# Patient Record
Sex: FEMALE | Race: WHITE | Employment: OTHER | URBAN - METROPOLITAN AREA
[De-identification: names, ages, dates, MRNs, and addresses within clinical notes are randomized per-mention and may not be internally consistent; named-entity substitution may affect disease eponyms.]

---

## 2017-04-19 ENCOUNTER — OFFICE VISIT (OUTPATIENT)
Dept: CARDIOLOGY CLINIC | Age: 67
End: 2017-04-19

## 2017-04-19 VITALS
OXYGEN SATURATION: 97 % | HEIGHT: 62 IN | RESPIRATION RATE: 18 BRPM | WEIGHT: 157.3 LBS | SYSTOLIC BLOOD PRESSURE: 142 MMHG | BODY MASS INDEX: 28.95 KG/M2 | DIASTOLIC BLOOD PRESSURE: 70 MMHG | HEART RATE: 72 BPM

## 2017-04-19 DIAGNOSIS — I25.10 ATHEROSCLEROSIS OF NATIVE CORONARY ARTERY OF NATIVE HEART WITHOUT ANGINA PECTORIS: ICD-10-CM

## 2017-04-19 DIAGNOSIS — I10 ESSENTIAL HYPERTENSION, BENIGN: Primary | ICD-10-CM

## 2017-04-19 DIAGNOSIS — I35.9 AORTIC VALVE DISORDERS: ICD-10-CM

## 2017-04-19 DIAGNOSIS — I48.0 PAROXYSMAL ATRIAL FIBRILLATION (HCC): ICD-10-CM

## 2017-04-19 NOTE — MR AVS SNAPSHOT
Visit Information Date & Time Provider Department Dept. Phone Encounter #  
 4/19/2017 10:15 AM [address], [place] [facility] [phone] [id number] Upcoming Health Maintenance Date Due Hepatitis C Screening 1950 HEMOGLOBIN A1C Q6M 1950 LIPID PANEL Q1 1950 FOOT EXAM Q1 7/23/1958 MICROALBUMIN Q1 7/23/1958 DTaP/Tdap/Td series (1 - Tdap) 7/23/1969 FOBT Q 1 YEAR AGE 50-75 7/23/1998 ZOSTER VACCINE AGE 60> 7/23/2008 OSTEOPOROSIS SCREENING (DEXA) 7/23/2013 Pneumococcal 65+ Low/Medium Risk (1 of 2 - PCV13) 7/23/2013 MEDICARE YEARLY EXAM 7/23/2013 EYE EXAM RETINAL OR DILATED Q1 6/25/2016 INFLUENZA AGE 9 TO ADULT 8/1/2016 BREAST CANCER SCRN MAMMOGRAM 6/22/2017 GLAUCOMA SCREENING Q2Y 6/25/2017 Allergies as of 4/19/2017  Review Complete On: 4/19/2017 By: Nevaeh Watson LPN Severity Noted Reaction Type Reactions Sulfa Dyne  06/20/2012    Other (comments) Current Immunizations  Never Reviewed No immunizations on file. Not reviewed this visit You Were Diagnosed With   
  
 Codes Comments Essential hypertension, benign    -  Primary ICD-10-CM: I10 
ICD-9-CM: 401.1 Aortic valve disorders     ICD-10-CM: I35.9 ICD-9-CM: 424.1 Paroxysmal atrial fibrillation (HCC)     ICD-10-CM: I48.0 ICD-9-CM: 427.31 Atherosclerosis of native coronary artery of native heart without angina pectoris     ICD-10-CM: I25.10 ICD-9-CM: 414.01 Vitals BP Pulse Resp Height(growth percentile) Weight(growth percentile) SpO2  
 142/70 (BP 1 Location: Left arm) 72 18 5' 2\" (1.575 m) 157 lb 4.8 oz (71.4 kg) 97% BMI Smoking Status 28.77 kg/m2 Never Smoker Vitals History BMI and BSA Data Body Mass Index Body Surface Area 28.77 kg/m 2 1.77 m 2 Preferred Pharmacy Pharmacy Name Phone [facility] [address] [facility] [phone] Your Updated Medication List  
  
   
This list is accurate as of: 4/19/17 11:03 AM.  Always use your most recent med list.  
  
  
  
  
 ACETAMINOPHEN PM PO Take  by mouth. amLODIPine 5 mg tablet Commonly known as:  Janelle Punter Take 1 Tab by mouth daily. amoxicillin 500 mg capsule Commonly known as:  AMOXIL Take 1 Cap by mouth as needed (take as directed one hr prior to dental procedures). aspirin 81 mg tablet Take 81 mg by mouth. atorvastatin 40 mg tablet Commonly known as:  LIPITOR Take 1 Tab by mouth nightly. Biotin 2,500 mcg Cap Take  by mouth.  
  
 calcium 600 mg Cap Take  by mouth.  
  
 clobetasol 0.05 % topical cream  
Commonly known as:  Marlyse Salt Apply  to affected area two (2) times a day. ENBREL SC  
by SubCUTAneous route every seven (7) days. FISH OIL 1,000 mg Cap Generic drug:  omega-3 fatty acids-vitamin e Take 2 Caps by mouth. glipiZIDE 5 mg tablet Commonly known as:  Ludie Null Take 5 mg by mouth daily. hydroCHLOROthiazide 50 mg tablet Commonly known as:  HYDRODIURIL Take 25 mg by mouth daily. lisinopril 20 mg tablet Commonly known as:  PRINIVIL, ZESTRIL  
TAKE 1 TABLET EVERY DAY FOR BLOOD PRESSURE  
  
 metFORMIN 500 mg tablet Commonly known as:  GLUCOPHAGE Take  by mouth two (2) times daily (with meals). metoprolol succinate 100 mg tablet Commonly known as:  TOPROL-XL Take 1 Tab by mouth daily. naproxen sodium 220 mg tablet Commonly known as:  NAPROSYN Take 220 mg by mouth nightly. NITROSTAT 0.4 mg SL tablet Generic drug:  nitroglycerin PLACE 1 TABLET UNDER THE TONGUE EVERY 5 MINUTES FOR 3 DOSES AS NEEDED FOR CHEST PAIN  
  
 ONE DAILY Tab Generic drug:  multivitamins-ca-iron-minerals Take  by mouth. terbinafine HCl 250 mg tablet Commonly known as:  LAMISIL Take 250 mg by mouth daily. VITAMIN C 500 mg tablet Generic drug:  ascorbic acid (vitamin C) Take  by mouth. VITRON-C 65 mg iron- 125 mg Tbec Generic drug:  iron,carbonyl-vitamin C Take  by mouth. We Performed the Following AMB POC EKG ROUTINE W/ 12 LEADS, INTER & REP [93333 CPT(R)] Introducing [facility] SERVICES! [employer] introduces [employer] patient portal. Now you can access parts of your medical record, email your doctor's office, and request medication refills online. 1. In your internet browser, go to [email or web address] 2. Click on the First Time User? Click Here link in the Sign In box. You will see the New Member Sign Up page. 3. Enter your [employer] Access Code exactly as it appears below. You will not need to use this code after youve completed the sign-up process. If you do not sign up before the expiration date, you must request a new code. · [employer] Access Code: [email or web address] Expires: 7/18/2017  9:56 AM 
 
4. Enter the last four digits of your Social Security Number (xxxx) and Date of Birth (mm/dd/yyyy) as indicated and click Submit. You will be taken to the next sign-up page. 5. Create a Ex[employer] ID. This will be your [employer] login ID and cannot be changed, so think of one that is secure and easy to remember. 6. Create a [employer] password. You can change your password at any time. 7. Enter your Password Reset Question and Answer. This can be used at a later time if you forget your password. 8. Enter your e-mail address. You will receive e-mail notification when new information is available in [address] 9. Click Sign Up. You can now view and download portions of your medical record. 10. Click the Download Summary menu link to download a portable copy of your medical information. If you have questions, please visit the Frequently Asked Questions section of the [employer] website.  Remember, [employer] is NOT to be used for urgent needs. For medical emergencies, dial 911. Now available from your iPhone and Android! Please provide this summary of care documentation to your next provider. Your primary care clinician is listed as Caitlyn Kraus. If you have any questions after today's visit, please call [phone].

## 2017-04-19 NOTE — PROGRESS NOTES
NAME:  Weston Del Rosario   :   1950   MRN:   [id number]   PCP:  Ovidio Whitt. Hieu Wilkins NP           Subjective: The patient is a 74y.o. year old female  who returns for a routine follow-up. Since the last visit, patient reports no change in exercise tolerance, chest pain, edema, medication intolerance, palpitations, shortness of breath, PND/orthopnea wheezing, sputum, syncope, dizziness or light headedness. Doing well. Past Medical History:   Diagnosis Date    CAD (coronary artery disease)     DM [facility]diabetes mellitus) ([facility] [address]     Dyslipidemia     Hypertension     Mitral regurgitation and aortic stenosis        Social History   Substance Use Topics    Smoking status: Never Smoker    Smokeless tobacco: Never Used    Alcohol use 0.0 oz/week     0 Standard drinks or equivalent per week      Comment: Rare      No family history on file. Review of Systems  Constitutional: Negative for fever, chills, and diaphoresis. Respiratory: Negative for cough, hemoptysis, sputum production, shortness of breath and wheezing. Cardiovascular: Negative for chest pain, palpitations, orthopnea, claudication, leg swelling and PND. Gastrointestinal: Negative for heartburn, nausea, vomiting, blood in stool and melena. Genitourinary: Negative for dysuria and flank pain. Musculoskeletal: Negative for joint pain and back pain. Skin: Negative for rash. Neurological: Negative for focal weakness, seizures, loss of consciousness, weakness and headaches. Endo/Heme/Allergies: Does not bruise/bleed easily. Psychiatric/Behavioral: Negative for memory loss. The patient does not have insomnia. Objective:       Vitals:    17 0958 17 1012 17 1048   BP: 148/70 150/78 142/70   Pulse: 72     Resp: 18     SpO2: 97%     Weight: 157 lb 4.8 oz (71.4 kg)     Height: 5' 2\" (1.575 m)      Body mass index is 28.77 kg/(m^2). General PE    Gen: NAD     Mental Status - Alert.  General Appearance - Not in acute distress. Neck - no JVD     Chest and Lung Exam     Inspection: Accessory muscles - No use of accessory muscles in breathing. Auscultation:   Breath sounds: - Normal.     Cardiovascular   Inspection: Jugular vein - Bilateral - Inspection Normal.   Palpation/Percussion:   Apical Impulse: - Normal.   Auscultation: Rhythm - Regular. Heart Sounds - S1 WNL and S2 WNL. No S3 or S4. Murmurs & Other Heart Sounds: Auscultation of the heart reveals - No Murmurs. Peripheral Vascular   Upper Extremity: Inspection - Bilateral - No Cyanotic nailbeds or Digital clubbing. Lower Extremity:   Palpation: Edema - Bilateral - No edema. Abdomen: Soft, non-tender, bowel sounds are active. Neuro: A&O times 3, CN and motor grossly WNL      Data Review:     EKG -  Sinus  Rhythm   Possible left ventricular hypertrophy on non-voltage basis. -Nonspecific ST depression      Allergies reviewed  Allergies   Allergen Reactions    Sulfa Dyne Other (comments)       Medications reviewed  Current Outpatient Prescriptions   Medication Sig    lisinopril (PRINIVIL, ZESTRIL) 20 mg tablet TAKE 1 TABLET EVERY DAY FOR BLOOD PRESSURE    amLODIPine (NORVASC) 5 mg tablet Take 1 Tab by mouth daily.  metoprolol succinate (TOPROL-XL) 100 mg tablet Take 1 Tab by mouth daily.  NITROSTAT 0.4 mg SL tablet PLACE 1 TABLET UNDER THE TONGUE EVERY 5 MINUTES FOR 3 DOSES AS NEEDED FOR CHEST PAIN    ACETAMINOPHEN/DIPHENHYDRAMINE (ACETAMINOPHEN PM PO) Take  by mouth.  naproxen sodium (NAPROSYN) 220 mg tablet Take 220 mg by mouth nightly.  iron-vitamin C (VITRON-C) 65 mg iron- 125 mg TbEC Take  by mouth.  Biotin 2,500 mcg cap Take  by mouth.  ETANERCEPT (ENBREL SC) by SubCUTAneous route every seven (7) days.  amoxicillin (AMOXIL) 500 mg capsule Take 1 Cap by mouth as needed (take as directed one hr prior to dental procedures).  atorvastatin (LIPITOR) 40 mg tablet Take 1 Tab by mouth nightly.     clobetasol (TEMOVATE) 0.05 % topical cream Apply  to affected area two (2) times a day.  aspirin 81 mg tablet Take 81 mg by mouth.  glipiZIDE (GLUCOTROL) 5 mg tablet Take 5 mg by mouth daily.  hydrochlorothiazide (HYDRODIURIL) 50 mg tablet Take 25 mg by mouth daily.  metFORMIN (GLUCOPHAGE) 500 mg tablet Take  by mouth two (2) times daily (with meals).  terbinafine (LAMISIL) 250 mg tablet Take 250 mg by mouth daily.  calcium 600 mg Cap Take  by mouth.  multivitamins-ca-iron-minerals (ONE DAILY) Tab Take  by mouth.  ascorbic acid (VITAMIN C) 500 mg tablet Take  by mouth.  omega-3 fatty acids-vitamin e (FISH OIL) 1,000 mg Cap Take 2 Caps by mouth. No current facility-administered medications for this visit. Assessment:       ICD-10-CM ICD-9-CM    1. Essential hypertension, benign I10 401.1 AMB POC EKG ROUTINE W/ 12 LEADS, INTER & REP   2. Aortic valve disorders I35.9 424.1    3. Paroxysmal atrial fibrillation (HCC) I48.0 427.31    4. Atherosclerosis of native coronary artery of native heart without angina pectoris I25.10 414.01         Orders Placed This Encounter    AMB POC EKG ROUTINE W/ 12 LEADS, INTER & REP     Order Specific Question:   Reason for Exam:     Answer:   routine       Patient Active Problem List   Diagnosis Code    Coronary atherosclerosis of native coronary artery I25.10    Mitral valve disorders I05.9    Aortic valve disorders I35.9    DM (diabetes mellitus) ([facility] [address] E11.9    Essential hypertension, benign I10    Dyslipidemia E78.5    History of MVR with cardiopulmonary bypass Z95.2    S/P AVR (aortic valve replacement) Z95.2    Paroxysmal atrial fibrillation ([facility] [address] I48.0       Plan:     Patient presents for follow up, feeling well and stable from cardiac standpoint. 1. ASHD -  s/p CABG, AVR, MVR 1/09. Stable. Last echo with stable prosthetic valve. 2. PAF - remains in sinus rhythm off amiodarone. 3. Hyperlipidemia- on atorvastatin.    Will request labs from PCP. Paul Buchanan, [facility] [address] [facility]    4/19/2017         Agree with note as outlined by  NP. I confirm findings in history and physical exam. No additional findings noted. Agree with plan as outlined above.      Kallie Beard MD

## 2017-06-14 RX ORDER — NITROGLYCERIN 0.4 MG/1
TABLET SUBLINGUAL
Qty: 50 TAB | Refills: 0 | Status: SHIPPED | OUTPATIENT
Start: 2017-06-14 | End: 2018-01-08 | Stop reason: SDUPTHER

## 2017-10-25 ENCOUNTER — OFFICE VISIT (OUTPATIENT)
Dept: CARDIOLOGY CLINIC | Age: 67
End: 2017-10-25

## 2017-10-25 VITALS
SYSTOLIC BLOOD PRESSURE: 150 MMHG | OXYGEN SATURATION: 97 % | BODY MASS INDEX: 27.71 KG/M2 | RESPIRATION RATE: 16 BRPM | HEART RATE: 64 BPM | HEIGHT: 62 IN | WEIGHT: 150.6 LBS | DIASTOLIC BLOOD PRESSURE: 80 MMHG

## 2017-10-25 DIAGNOSIS — I10 ESSENTIAL HYPERTENSION, BENIGN: ICD-10-CM

## 2017-10-25 DIAGNOSIS — R09.89 BRUIT OF RIGHT CAROTID ARTERY: ICD-10-CM

## 2017-10-25 DIAGNOSIS — E78.5 DYSLIPIDEMIA: ICD-10-CM

## 2017-10-25 DIAGNOSIS — I48.0 PAROXYSMAL ATRIAL FIBRILLATION (HCC): Primary | ICD-10-CM

## 2017-10-25 DIAGNOSIS — I25.10 ATHEROSCLEROSIS OF NATIVE CORONARY ARTERY OF NATIVE HEART WITHOUT ANGINA PECTORIS: ICD-10-CM

## 2017-10-25 DIAGNOSIS — Z95.2 S/P AVR (AORTIC VALVE REPLACEMENT): ICD-10-CM

## 2017-10-25 DIAGNOSIS — Z95.2 HISTORY OF MVR WITH CARDIOPULMONARY BYPASS: ICD-10-CM

## 2017-10-25 NOTE — PROGRESS NOTES
Vincent Saunders DNP, ANP-BC  Subjective/HPI:     Eduard Joseph is a 69 y.o. female is here for routine f/u. The patient denies chest pain/ shortness of breath, orthopnea, PND, LE edema, palpitations, syncope, presyncope or fatigue. Patient reports feeling well in her usual state of health, admits to some mild dyspnea on exertion with heavy outdoor physical activity. Reports labs followed by primary care for routine basis. PCP Provider  Shelia Quiles NP  Past Medical History:   Diagnosis Date    CAD (coronary artery disease)     DM [facility]diabetes mellitus) ([facility] [address]     Dyslipidemia     Hypertension     Mitral regurgitation and aortic stenosis       Past Surgical History:   Procedure Laterality Date    HX AORTIC VALVE REPLACEMENT      HX CORONARY ARTERY BYPASS GRAFT      HX MITRAL VALVE REPLACEMENT       Allergies   Allergen Reactions    Sulfa Dyne Other (comments)      History reviewed. No pertinent family history. Current Outpatient Prescriptions   Medication Sig    nitroglycerin (NITROSTAT) 0.4 mg SL tablet DISSOLVE 1 TABLET UNDER THE TONGUE EVERY 5 MINUTES FOR 3 DOSES AS NEEDED FOR CHEST PAIN    lisinopril (PRINIVIL, ZESTRIL) 20 mg tablet TAKE 1 TABLET EVERY DAY FOR BLOOD PRESSURE    amLODIPine (NORVASC) 5 mg tablet Take 1 Tab by mouth daily.  metoprolol succinate (TOPROL-XL) 100 mg tablet Take 1 Tab by mouth daily.  ACETAMINOPHEN/DIPHENHYDRAMINE (ACETAMINOPHEN PM PO) Take  by mouth.  naproxen sodium (NAPROSYN) 220 mg tablet Take 220 mg by mouth nightly.  iron-vitamin C (VITRON-C) 65 mg iron- 125 mg TbEC Take  by mouth.  Biotin 2,500 mcg cap Take  by mouth.  ETANERCEPT (ENBREL SC) by SubCUTAneous route every seven (7) days.  amoxicillin (AMOXIL) 500 mg capsule Take 1 Cap by mouth as needed (take as directed one hr prior to dental procedures).  atorvastatin (LIPITOR) 40 mg tablet Take 1 Tab by mouth nightly.  aspirin 81 mg tablet Take 81 mg by mouth.       glipiZIDE (GLUCOTROL) 5 mg tablet Take 5 mg by mouth daily.  hydrochlorothiazide (HYDRODIURIL) 50 mg tablet Take 25 mg by mouth daily.  metFORMIN (GLUCOPHAGE) 500 mg tablet Take  by mouth two (2) times daily (with meals).  calcium 600 mg Cap Take  by mouth.  multivitamins-ca-iron-minerals (ONE DAILY) Tab Take  by mouth.  ascorbic acid (VITAMIN C) 500 mg tablet Take  by mouth.  omega-3 fatty acids-vitamin e (FISH OIL) 1,000 mg Cap Take 2 Caps by mouth. No current facility-administered medications for this visit. Vitals:    10/25/17 0943 10/25/17 0944   BP: 140/80 150/80   Pulse: 64    Resp: 16    SpO2: 97%    Weight: 150 lb 9.6 oz (68.3 kg)    Height: 5' 2\" (1.575 m)      Social History     Social History    Marital status:      Spouse name: N/A    Number of children: N/A    Years of education: N/A     Occupational History    Not on file. Social History Main Topics    Smoking status: Never Smoker    Smokeless tobacco: Never Used    Alcohol use 0.0 oz/week     0 Standard drinks or equivalent per week      Comment: Rare    Drug use: Not on file    Sexual activity: Not on file     Other Topics Concern    Not on file     Social History Narrative       I have reviewed the nurses notes, vitals, problem list, allergy list, medical history, family, social history and medications. Review of Symptoms:    General: Pt denies excessive weight gain or loss. Pt is able to conduct ADL's  HEENT: Denies blurred vision, headaches, epistaxis and difficulty swallowing. Respiratory: Denies shortness of breath, + SONI, denies wheezing or stridor.   Cardiovascular: Denies precordial pain, palpitations, edema or PND  Gastrointestinal: Denies poor appetite, indigestion, abdominal pain or blood in stool  Musculoskeletal: Denies pain or swelling from muscles or joints  Neurologic: Denies tremor, paresthesias, or sensory motor disturbance  Skin: Denies rash, itching or texture change. Physical Exam:      General: Well developed, in no acute distress, cooperative and alert  HEENT: + Right carotid bruits, no JVD, trach is midline. Neck Supple, PEERL, EOM intact. Heart:  Normal S1/S2 negative S3 or S4. Regular, no murmur, gallop or rub.   Respiratory: Clear bilaterally x 4, no wheezing or rales  Abdomen:   Soft, non-tender, no masses, bowel sounds are active.   Extremities:  No edema, normal cap refill, no cyanosis, atraumatic. Neuro: A&Ox3, speech clear, gait stable. Skin: Skin color is normal. No rashes or lesions. Non diaphoretic  Vascular: 2+ pulses symmetric in all extremities    Cardiographics    ECG: Normal sinus rhythm unchanged from previous tracings. No results found for this or any previous visit. Cardiology Labs:  No results found for: CHOL, CHOLX, CHLST, [address], [address], HDL, LDL, LDLC, DLDLP, TGLX, TRIGL, TRIGP, CHHD, CHHDX    Lab Results   Component Value Date/Time    Sodium 137 02/20/2009 02:52 AM    Potassium 4.2 02/20/2009 02:52 AM    Chloride 98 02/20/2009 02:52 AM    CO2 29 02/20/2009 02:52 AM    Anion gap 10 02/20/2009 02:52 AM    Glucose 103 02/20/2009 02:52 AM    BUN 12 02/20/2009 02:52 AM    Creatinine 0.8 02/20/2009 02:52 AM    BUN/Creatinine ratio 15 02/20/2009 02:52 AM    GFR est AA >60 02/20/2009 02:52 AM    GFR est non-AA >60 02/20/2009 02:52 AM    Calcium 8.6 02/20/2009 02:52 AM    Bilirubin, total 0.4 02/17/2009 05:40 PM    AST (SGOT) 30 02/17/2009 05:40 PM    Alk. phosphatase 176 02/17/2009 05:40 PM    Protein, total 7.9 02/17/2009 05:40 PM    Albumin 2.6 02/17/2009 05:40 PM    Globulin 5.3 02/17/2009 05:40 PM    A-G Ratio 0.5 02/17/2009 05:40 PM    ALT (SGPT) 52 02/17/2009 05:40 PM           Assessment:     Assessment:     Diagnoses and all orders for this visit:    1. Paroxysmal atrial fibrillation (HCC)  -     AMB POC EKG ROUTINE W/ 12 LEADS, INTER & REP  -     DUPLEX CAROTID BILATERAL; Future    2.  Atherosclerosis of native coronary artery of native heart without angina pectoris  -     DUPLEX CAROTID BILATERAL; Future    3. History of MVR with cardiopulmonary bypass  -     DUPLEX CAROTID BILATERAL; Future    4. S/P AVR (aortic valve replacement)  -     DUPLEX CAROTID BILATERAL; Future    5. Dyslipidemia  -     DUPLEX CAROTID BILATERAL; Future    6. Essential hypertension, benign  -     DUPLEX CAROTID BILATERAL; Future    7. Bruit of right carotid artery  -     DUPLEX CAROTID BILATERAL; Future        ICD-10-CM ICD-9-CM    1. Paroxysmal atrial fibrillation (HCC) I48.0 427.31 AMB POC EKG ROUTINE W/ 12 LEADS, INTER & REP      DUPLEX CAROTID BILATERAL   2. Atherosclerosis of native coronary artery of native heart without angina pectoris I25.10 414.01 DUPLEX CAROTID BILATERAL   3. History of MVR with cardiopulmonary bypass Z95.2 V15.1 DUPLEX CAROTID BILATERAL   4. S/P AVR (aortic valve replacement) Z95.2 V43.3 DUPLEX CAROTID BILATERAL   5. Dyslipidemia E78.5 272.4 DUPLEX CAROTID BILATERAL   6. Essential hypertension, benign I10 401.1 DUPLEX CAROTID BILATERAL   7. Bruit of right carotid artery R09.89 785.9 DUPLEX CAROTID BILATERAL     Orders Placed This Encounter    DUPLEX CAROTID BILATERAL     Standing Status:   Future     Standing Expiration Date:   11/25/2018    AMB POC EKG ROUTINE W/ 12 LEADS, INTER & REP     Order Specific Question:   Reason for Exam:     Answer:   routine        Plan:     Patient is a 59-year-old female with a history of bioprosthetic mitral aortic valve replacements with a single-vessel Was presenting in her usual state of health. Clinically on exam today has right carotid bruit will evaluate with carotid duplex. Blood pressure mildly elevated discussed with patient diet exercise and low-sodium, lipids per primary care. Follow-up in 6 months unless markedly abnormal carotid duplex.     Yeny Ya NP

## 2017-10-25 NOTE — MR AVS SNAPSHOT
Visit Information Date & Time Provider Department Dept. Phone Encounter #  
 10/25/2017  9:45 AM [address], MD [facility] [phone] [id number] Upcoming Health Maintenance Date Due Hepatitis C Screening 1950 HEMOGLOBIN A1C Q6M 1950 LIPID PANEL Q1 1950 FOOT EXAM Q1 7/23/1958 MICROALBUMIN Q1 7/23/1958 DTaP/Tdap/Td series (1 - Tdap) 7/23/1969 FOBT Q 1 YEAR AGE 50-75 7/23/1998 ZOSTER VACCINE AGE 60> 5/23/2008 OSTEOPOROSIS SCREENING (DEXA) 7/23/2013 Pneumococcal 65+ Low/Medium Risk (1 of 2 - PCV13) 7/23/2013 MEDICARE YEARLY EXAM 7/23/2013 EYE EXAM RETINAL OR DILATED Q1 6/25/2016 BREAST CANCER SCRN MAMMOGRAM 6/22/2017 GLAUCOMA SCREENING Q2Y 6/25/2017 INFLUENZA AGE 9 TO ADULT 8/1/2017 Allergies as of 10/25/2017  Review Complete On: 10/25/2017 By: Yeny Ya NP Severity Noted Reaction Type Reactions Sulfa Dyne  06/20/2012    Other (comments) Current Immunizations  Never Reviewed No immunizations on file. Not reviewed this visit You Were Diagnosed With   
  
 Codes Comments Paroxysmal atrial fibrillation (HCC)    -  Primary ICD-10-CM: I48.0 ICD-9-CM: 427.31 Atherosclerosis of native coronary artery of native heart without angina pectoris     ICD-10-CM: I25.10 ICD-9-CM: 414.01 History of MVR with cardiopulmonary bypass     ICD-10-CM: Z95.2 ICD-9-CM: V15.1 S/P AVR (aortic valve replacement)     ICD-10-CM: Y02.3 ICD-9-CM: V43.3 Dyslipidemia     ICD-10-CM: E78.5 ICD-9-CM: 272.4 Essential hypertension, benign     ICD-10-CM: I10 
ICD-9-CM: 401.1 Bruit of right carotid artery     ICD-10-CM: R09.89 ICD-9-CM: 224. 9 Vitals BP Pulse Resp Height(growth percentile) Weight(growth percentile) SpO2  
 150/80 (BP 1 Location: Right arm, BP Patient Position: Sitting) 64 16 5' 2\" (1.575 m) 150 lb 9.6 oz (68.3 kg) 97% BMI Smoking Status 27.55 kg/m2 Never Smoker Vitals History BMI and BSA Data Body Mass Index Body Surface Area  
 27.55 kg/m 2 1.73 m 2 Preferred Pharmacy Pharmacy Name Phone [address] [facility] [phone] Your Updated Medication List  
  
   
This list is accurate as of: 10/25/17 10:24 AM.  Always use your most recent med list.  
  
  
  
  
 ACETAMINOPHEN PM PO Take  by mouth. amLODIPine 5 mg tablet Commonly known as:  Guillermina Abhishek Take 1 Tab by mouth daily. amoxicillin 500 mg capsule Commonly known as:  AMOXIL Take 1 Cap by mouth as needed (take as directed one hr prior to dental procedures). aspirin 81 mg tablet Take 81 mg by mouth. atorvastatin 40 mg tablet Commonly known as:  LIPITOR Take 1 Tab by mouth nightly. Biotin 2,500 mcg Cap Take  by mouth.  
  
 calcium 600 mg Cap Take  by mouth. ENBREL SC  
by SubCUTAneous route every seven (7) days. FISH OIL 1,000 mg Cap Generic drug:  omega-3 fatty acids-vitamin e Take 2 Caps by mouth. glipiZIDE 5 mg tablet Commonly known as:  Atlee Crystal Take 5 mg by mouth daily. hydroCHLOROthiazide 50 mg tablet Commonly known as:  HYDRODIURIL Take 25 mg by mouth daily. lisinopril 20 mg tablet Commonly known as:  PRINIVIL, ZESTRIL  
TAKE 1 TABLET EVERY DAY FOR BLOOD PRESSURE  
  
 metFORMIN 500 mg tablet Commonly known as:  GLUCOPHAGE Take  by mouth two (2) times daily (with meals). metoprolol succinate 100 mg tablet Commonly known as:  TOPROL-XL Take 1 Tab by mouth daily. naproxen sodium 220 mg tablet Commonly known as:  NAPROSYN Take 220 mg by mouth nightly. nitroglycerin 0.4 mg SL tablet Commonly known as:  NITROSTAT  
DISSOLVE 1 TABLET UNDER THE TONGUE EVERY 5 MINUTES FOR 3 DOSES AS NEEDED FOR CHEST PAIN  
  
 ONE DAILY Tab Generic drug:  multivitamins-ca-iron-minerals Take  by mouth. VITAMIN C 500 mg tablet Generic drug:  ascorbic acid (vitamin C) Take  by mouth. VITRON-C 65 mg iron- 125 mg Tbec Generic drug:  iron,carbonyl-vitamin C Take  by mouth. We Performed the Following AMB POC EKG ROUTINE W/ 12 LEADS, INTER & REP [29472 CPT(R)] To-Do List   
 10/25/2017 Imaging:  DUPLEX CAROTID BILATERAL Introducing [facility]! Bee Clayton introduces [employer] patient portal. Now you can access parts of your medical record, email your doctor's office, and request medication refills online. 1. In your internet browser, go to [email or web address] 2. Click on the First Time User? Click Here link in the Sign In box. You will see the New Member Sign Up page. 3. Enter your [employer] Access Code exactly as it appears below. You will not need to use this code after youve completed the sign-up process. If you do not sign up before the expiration date, you must request a new code. · [employer] Access Code: [facility] AUTHORITY Expires: 1/23/2018 10:24 AM 
 
4. Enter the last four digits of your Social Security Number (xxxx) and Date of Birth (mm/dd/yyyy) as indicated and click Submit. You will be taken to the next sign-up page. 5. Create a [employer] ID. This will be your [employer] login ID and cannot be changed, so think of one that is secure and easy to remember. 6. Create a [employer] password. You can change your password at any time. 7. Enter your Password Reset Question and Answer. This can be used at a later time if you forget your password. 8. Enter your e-mail address. You will receive e-mail notification when new information is available in [address] 9. Click Sign Up. You can now view and download portions of your medical record. 10. Click the Download Summary menu link to download a portable copy of your medical information.  
 
If you have questions, please visit the Frequently Asked Questions section of the [employer]. Remember, [employer] is NOT to be used for urgent needs. For medical emergencies, dial 911. Now available from your iPhone and Android! Please provide this summary of care documentation to your next provider. Your primary care clinician is listed as Tony Calabrese. If you have any questions after today's visit, please call [phone].

## 2017-10-27 NOTE — PROGRESS NOTES
NAME:  Zina Dorman   :   1950   MRN:   [id number]   PCP:  Lilly Dela Cruz NP           Subjective: The patient is a 69y.o. year old female  who returns for a routine follow-up. Since the last visit, patient reports no change in exercise tolerance, chest pain, edema, medication intolerance, palpitations, shortness of breath, PND/orthopnea wheezing, sputum, syncope, dizziness or light headedness. Doing well. Past Medical History:   Diagnosis Date    CAD (coronary artery disease)     DM (diabetes mellitus) ([facility] [address]     Dyslipidemia     Hypertension     Mitral regurgitation and aortic stenosis         ICD-10-CM ICD-9-CM    1. Paroxysmal atrial fibrillation (HCC) I48.0 427.31 AMB POC EKG ROUTINE W/ 12 LEADS, INTER & REP      DUPLEX CAROTID BILATERAL   2. Atherosclerosis of native coronary artery of native heart without angina pectoris I25.10 414.01 DUPLEX CAROTID BILATERAL   3. History of MVR with cardiopulmonary bypass Z95.2 V15.1 DUPLEX CAROTID BILATERAL   4. S/P AVR (aortic valve replacement) Z95.2 V43.3 DUPLEX CAROTID BILATERAL   5. Dyslipidemia E78.5 272.4 DUPLEX CAROTID BILATERAL   6. Essential hypertension, benign I10 401.1 DUPLEX CAROTID BILATERAL   7. Bruit of right carotid artery R09.89 785.9 DUPLEX CAROTID BILATERAL      Social History   Substance Use Topics    Smoking status: Never Smoker    Smokeless tobacco: Never Used    Alcohol use 0.0 oz/week     0 Standard drinks or equivalent per week      Comment: Rare      History reviewed. No pertinent family history. Review of Systems  General: Pt denies excessive weight gain or loss. Pt is able to conduct ADL's  HEENT: Denies blurred vision, headaches, epistaxis and difficulty swallowing. Respiratory: Denies shortness of breath, SONI, wheezing or stridor.   Cardiovascular: Denies precordial pain, palpitations, edema or PND  Gastrointestinal: Denies poor appetite, indigestion, abdominal pain or blood in stool  Musculoskeletal: Denies pain or swelling from muscles or joints  Neurologic: Denies tremor, paresthesias, or sensory motor disturbance  Skin: Denies rash, itching or texture change. Objective:       Vitals:    10/25/17 0943 10/25/17 0944   BP: 140/80 150/80   Pulse: 64    Resp: 16    SpO2: 97%    Weight: 150 lb 9.6 oz (68.3 kg)    Height: 5' 2\" (1.575 m)     Body mass index is 27.55 kg/(m^2). General PE  Mental Status - Alert. General Appearance - Not in acute distress. Chest and Lung Exam   Inspection: Accessory muscles - No use of accessory muscles in breathing. Auscultation:   Breath sounds: - Normal.    Cardiovascular   Inspection: Jugular vein - Bilateral - Inspection Normal.  Palpation/Percussion:   Apical Impulse: - Normal.  Auscultation: Rhythm - Regular. Heart Sounds - S1 WNL and S2 WNL. No S3 or S4. Murmurs & Other Heart Sounds: Auscultation of the heart reveals - No Murmurs. Peripheral Vascular   Upper Extremity: Inspection - Bilateral - No Cyanotic nailbeds or Digital clubbing. Lower Extremity:   Palpation: Edema - Bilateral - No edema. Data Review:     EKG -EKG: normal EKG, normal sinus rhythm, unchanged from previous tracings. Medications reviewed  Current Outpatient Prescriptions   Medication Sig    nitroglycerin (NITROSTAT) 0.4 mg SL tablet DISSOLVE 1 TABLET UNDER THE TONGUE EVERY 5 MINUTES FOR 3 DOSES AS NEEDED FOR CHEST PAIN    lisinopril (PRINIVIL, ZESTRIL) 20 mg tablet TAKE 1 TABLET EVERY DAY FOR BLOOD PRESSURE    amLODIPine (NORVASC) 5 mg tablet Take 1 Tab by mouth daily.  metoprolol succinate (TOPROL-XL) 100 mg tablet Take 1 Tab by mouth daily.  ACETAMINOPHEN/DIPHENHYDRAMINE (ACETAMINOPHEN PM PO) Take  by mouth.  naproxen sodium (NAPROSYN) 220 mg tablet Take 220 mg by mouth nightly.  iron-vitamin C (VITRON-C) 65 mg iron- 125 mg TbEC Take  by mouth.  Biotin 2,500 mcg cap Take  by mouth.     ETANERCEPT (ENBREL SC) by SubCUTAneous route every seven (7) days.  amoxicillin (AMOXIL) 500 mg capsule Take 1 Cap by mouth as needed (take as directed one hr prior to dental procedures).  atorvastatin (LIPITOR) 40 mg tablet Take 1 Tab by mouth nightly.  aspirin 81 mg tablet Take 81 mg by mouth.  glipiZIDE (GLUCOTROL) 5 mg tablet Take 5 mg by mouth daily.  hydrochlorothiazide (HYDRODIURIL) 50 mg tablet Take 25 mg by mouth daily.  metFORMIN (GLUCOPHAGE) 500 mg tablet Take  by mouth two (2) times daily (with meals).  calcium 600 mg Cap Take  by mouth.  multivitamins-ca-iron-minerals (ONE DAILY) Tab Take  by mouth.  ascorbic acid (VITAMIN C) 500 mg tablet Take  by mouth.  omega-3 fatty acids-vitamin e (FISH OIL) 1,000 mg Cap Take 2 Caps by mouth. No current facility-administered medications for this visit. Assessment:       ICD-10-CM ICD-9-CM    1. Paroxysmal atrial fibrillation (HCC) I48.0 427.31 AMB POC EKG ROUTINE W/ 12 LEADS, INTER & REP      DUPLEX CAROTID BILATERAL   2. Atherosclerosis of native coronary artery of native heart without angina pectoris I25.10 414.01 DUPLEX CAROTID BILATERAL   3. History of MVR with cardiopulmonary bypass Z95.2 V15.1 DUPLEX CAROTID BILATERAL   4. S/P AVR (aortic valve replacement) Z95.2 V43.3 DUPLEX CAROTID BILATERAL   5. Dyslipidemia E78.5 272.4 DUPLEX CAROTID BILATERAL   6. Essential hypertension, benign I10 401.1 DUPLEX CAROTID BILATERAL   7. Bruit of right carotid artery R09.89 785.9 DUPLEX CAROTID BILATERAL        Plan:     Patient presents doing well and stable from cardiac standpoint. Maintaining sinus rhythm. Carotid dopplers to evaluate her bruit. Continue current care and follow up in 6 months.     Carlota Russo MD

## 2017-11-29 ENCOUNTER — HOSPITAL ENCOUNTER (OUTPATIENT)
Dept: LAB | Age: 67
Discharge: HOME | End: 2017-11-29

## 2017-12-27 RX ORDER — AMLODIPINE BESYLATE 5 MG/1
TABLET ORAL
Qty: 90 TAB | Refills: 3 | Status: SHIPPED | OUTPATIENT
Start: 2017-12-27 | End: 2018-01-01 | Stop reason: SDUPTHER

## 2017-12-27 RX ORDER — METOPROLOL SUCCINATE 100 MG/1
TABLET, EXTENDED RELEASE ORAL
Qty: 90 TAB | Refills: 3 | Status: SHIPPED | OUTPATIENT
Start: 2017-12-27 | End: 2018-01-01

## 2018-01-01 ENCOUNTER — HOSPITAL ENCOUNTER (OUTPATIENT)
Dept: CARDIAC REHAB | Age: 68
Discharge: HOME | End: 2018-06-26
Payer: COMMERCIAL

## 2018-01-01 ENCOUNTER — TELEPHONE ANTICOAG (OUTPATIENT)
Dept: CARDIOTHORACIC SURGERY | Age: 68
End: 2018-01-01

## 2018-01-01 ENCOUNTER — TELEPHONE (OUTPATIENT)
Dept: CARDIOTHORACIC SURGERY | Age: 68
End: 2018-01-01

## 2018-01-01 ENCOUNTER — HOSPITAL ENCOUNTER (INPATIENT)
Age: 68
LOS: 2 days | Discharge: HOME HEALTH | DRG: 267 | End: 2018-05-04
Attending: THORACIC SURGERY (CARDIOTHORACIC VASCULAR SURGERY) | Admitting: THORACIC SURGERY (CARDIOTHORACIC VASCULAR SURGERY)
Payer: COMMERCIAL

## 2018-01-01 ENCOUNTER — HOSPITAL ENCOUNTER (OUTPATIENT)
Dept: PULMONOLOGY | Age: 68
Discharge: HOME | End: 2018-04-23
Attending: NURSE PRACTITIONER
Payer: COMMERCIAL

## 2018-01-01 ENCOUNTER — HOSPITAL ENCOUNTER (OUTPATIENT)
Dept: CARDIAC REHAB | Age: 68
Discharge: HOME | End: 2018-07-10
Payer: COMMERCIAL

## 2018-01-01 ENCOUNTER — APPOINTMENT (OUTPATIENT)
Dept: GENERAL RADIOLOGY | Age: 68
DRG: 267 | End: 2018-01-01
Attending: NURSE PRACTITIONER
Payer: COMMERCIAL

## 2018-01-01 ENCOUNTER — HOME CARE VISIT (OUTPATIENT)
Dept: SCHEDULING | Facility: HOME HEALTH | Age: 68
End: 2018-01-01
Payer: COMMERCIAL

## 2018-01-01 ENCOUNTER — TELEPHONE (OUTPATIENT)
Dept: CARDIOLOGY CLINIC | Age: 68
End: 2018-01-01

## 2018-01-01 ENCOUNTER — HOSPITAL ENCOUNTER (OUTPATIENT)
Dept: CARDIAC REHAB | Age: 68
Discharge: HOME | End: 2018-07-17
Payer: COMMERCIAL

## 2018-01-01 ENCOUNTER — OFFICE VISIT (OUTPATIENT)
Dept: CARDIOTHORACIC SURGERY | Age: 68
End: 2018-01-01

## 2018-01-01 ENCOUNTER — HOSPITAL ENCOUNTER (OUTPATIENT)
Age: 68
Setting detail: OBSERVATION
Discharge: HOME HEALTH | End: 2018-05-08
Attending: EMERGENCY MEDICINE | Admitting: THORACIC SURGERY (CARDIOTHORACIC VASCULAR SURGERY)
Payer: COMMERCIAL

## 2018-01-01 ENCOUNTER — TELEPHONE (OUTPATIENT)
Dept: CARDIAC REHAB | Age: 68
End: 2018-01-01

## 2018-01-01 ENCOUNTER — HOSPITAL ENCOUNTER (OUTPATIENT)
Dept: PREADMISSION TESTING | Age: 68
Discharge: HOME | End: 2018-04-23
Attending: THORACIC SURGERY (CARDIOTHORACIC VASCULAR SURGERY)
Payer: COMMERCIAL

## 2018-01-01 ENCOUNTER — HOSPITAL ENCOUNTER (OUTPATIENT)
Dept: VASCULAR SURGERY | Age: 68
Discharge: HOME | End: 2018-04-23
Attending: THORACIC SURGERY (CARDIOTHORACIC VASCULAR SURGERY)
Payer: COMMERCIAL

## 2018-01-01 ENCOUNTER — ANESTHESIA EVENT (OUTPATIENT)
Dept: CARDIOTHORACIC SURGERY | Age: 68
DRG: 267 | End: 2018-01-01
Payer: COMMERCIAL

## 2018-01-01 ENCOUNTER — HOME HEALTH ADMISSION (OUTPATIENT)
Dept: HOME HEALTH SERVICES | Facility: HOME HEALTH | Age: 68
End: 2018-01-01
Payer: COMMERCIAL

## 2018-01-01 ENCOUNTER — APPOINTMENT (OUTPATIENT)
Dept: CARDIAC REHAB | Age: 68
End: 2018-01-01
Payer: COMMERCIAL

## 2018-01-01 ENCOUNTER — ANTI-COAG VISIT (OUTPATIENT)
Dept: CARDIOLOGY CLINIC | Age: 68
End: 2018-01-01

## 2018-01-01 ENCOUNTER — HOSPITAL ENCOUNTER (OUTPATIENT)
Dept: NON INVASIVE DIAGNOSTICS | Age: 68
Discharge: HOME | End: 2018-04-03
Attending: NURSE PRACTITIONER
Payer: COMMERCIAL

## 2018-01-01 ENCOUNTER — HOSPITAL ENCOUNTER (OUTPATIENT)
Dept: NON INVASIVE DIAGNOSTICS | Age: 68
Discharge: HOME | End: 2018-06-05
Attending: NURSE PRACTITIONER
Payer: COMMERCIAL

## 2018-01-01 ENCOUNTER — APPOINTMENT (OUTPATIENT)
Dept: CARDIAC REHAB | Age: 68
End: 2018-01-01

## 2018-01-01 ENCOUNTER — HOSPITAL ENCOUNTER (OUTPATIENT)
Dept: GENERAL RADIOLOGY | Age: 68
Discharge: HOME | End: 2018-04-23
Attending: NURSE PRACTITIONER
Payer: COMMERCIAL

## 2018-01-01 ENCOUNTER — APPOINTMENT (OUTPATIENT)
Dept: GENERAL RADIOLOGY | Age: 68
DRG: 267 | End: 2018-01-01
Attending: THORACIC SURGERY (CARDIOTHORACIC VASCULAR SURGERY)
Payer: COMMERCIAL

## 2018-01-01 ENCOUNTER — HOSPITAL ENCOUNTER (OUTPATIENT)
Dept: NON INVASIVE DIAGNOSTICS | Age: 68
Discharge: HOME | End: 2018-02-22
Payer: COMMERCIAL

## 2018-01-01 ENCOUNTER — OFFICE VISIT (OUTPATIENT)
Dept: CARDIOLOGY CLINIC | Age: 68
End: 2018-01-01

## 2018-01-01 ENCOUNTER — HOSPITAL ENCOUNTER (OUTPATIENT)
Dept: CT IMAGING | Age: 68
Discharge: HOME | End: 2018-04-03
Attending: NURSE PRACTITIONER
Payer: COMMERCIAL

## 2018-01-01 ENCOUNTER — HOME CARE VISIT (OUTPATIENT)
Dept: HOME HEALTH SERVICES | Facility: HOME HEALTH | Age: 68
End: 2018-01-01
Payer: COMMERCIAL

## 2018-01-01 ENCOUNTER — HOSPITAL ENCOUNTER (EMERGENCY)
Age: 68
Discharge: HOME | End: 2018-05-05
Attending: STUDENT IN AN ORGANIZED HEALTH CARE EDUCATION/TRAINING PROGRAM
Payer: COMMERCIAL

## 2018-01-01 ENCOUNTER — HOSPITAL ENCOUNTER (OUTPATIENT)
Dept: CARDIAC REHAB | Age: 68
Discharge: HOME | End: 2018-06-20
Payer: COMMERCIAL

## 2018-01-01 ENCOUNTER — HOSPITAL ENCOUNTER (OUTPATIENT)
Dept: CARDIAC REHAB | Age: 68
Discharge: HOME | End: 2018-07-03
Payer: COMMERCIAL

## 2018-01-01 ENCOUNTER — ANESTHESIA (OUTPATIENT)
Dept: CARDIOTHORACIC SURGERY | Age: 68
DRG: 267 | End: 2018-01-01
Payer: COMMERCIAL

## 2018-01-01 ENCOUNTER — TELEPHONE (OUTPATIENT)
Dept: CASE MANAGEMENT | Age: 68
End: 2018-01-01

## 2018-01-01 ENCOUNTER — HOSPITAL ENCOUNTER (OUTPATIENT)
Dept: CARDIAC REHAB | Age: 68
Discharge: HOME | End: 2018-06-28
Payer: COMMERCIAL

## 2018-01-01 ENCOUNTER — HOSPITAL ENCOUNTER (OUTPATIENT)
Dept: CARDIAC REHAB | Age: 68
Discharge: HOME | End: 2018-07-12
Payer: COMMERCIAL

## 2018-01-01 ENCOUNTER — HOSPITAL ENCOUNTER (OUTPATIENT)
Dept: CARDIAC CATH/INVASIVE PROCEDURES | Age: 68
Discharge: HOME | End: 2018-03-06
Attending: INTERNAL MEDICINE | Admitting: INTERNAL MEDICINE
Payer: COMMERCIAL

## 2018-01-01 VITALS
OXYGEN SATURATION: 99 % | HEIGHT: 62 IN | RESPIRATION RATE: 18 BRPM | SYSTOLIC BLOOD PRESSURE: 155 MMHG | BODY MASS INDEX: 27.79 KG/M2 | DIASTOLIC BLOOD PRESSURE: 58 MMHG | HEART RATE: 67 BPM | WEIGHT: 151 LBS

## 2018-01-01 VITALS
SYSTOLIC BLOOD PRESSURE: 126 MMHG | RESPIRATION RATE: 18 BRPM | HEART RATE: 100 BPM | OXYGEN SATURATION: 96 % | DIASTOLIC BLOOD PRESSURE: 60 MMHG | TEMPERATURE: 98.4 F

## 2018-01-01 VITALS
SYSTOLIC BLOOD PRESSURE: 163 MMHG | HEIGHT: 62 IN | RESPIRATION RATE: 16 BRPM | OXYGEN SATURATION: 98 % | DIASTOLIC BLOOD PRESSURE: 65 MMHG | WEIGHT: 151.68 LBS | HEART RATE: 92 BPM | TEMPERATURE: 97.9 F | BODY MASS INDEX: 27.91 KG/M2

## 2018-01-01 VITALS
TEMPERATURE: 98.7 F | WEIGHT: 150 LBS | RESPIRATION RATE: 16 BRPM | HEIGHT: 62 IN | SYSTOLIC BLOOD PRESSURE: 144 MMHG | DIASTOLIC BLOOD PRESSURE: 64 MMHG | HEART RATE: 72 BPM | OXYGEN SATURATION: 96 % | BODY MASS INDEX: 27.6 KG/M2

## 2018-01-01 VITALS
BODY MASS INDEX: 24.05 KG/M2 | RESPIRATION RATE: 20 BRPM | SYSTOLIC BLOOD PRESSURE: 122 MMHG | HEART RATE: 70 BPM | WEIGHT: 131.5 LBS | DIASTOLIC BLOOD PRESSURE: 70 MMHG | OXYGEN SATURATION: 98 % | TEMPERATURE: 98.2 F

## 2018-01-01 VITALS
DIASTOLIC BLOOD PRESSURE: 85 MMHG | HEIGHT: 62 IN | OXYGEN SATURATION: 96 % | WEIGHT: 151.5 LBS | TEMPERATURE: 98.5 F | BODY MASS INDEX: 27.88 KG/M2 | HEART RATE: 89 BPM | SYSTOLIC BLOOD PRESSURE: 172 MMHG | RESPIRATION RATE: 20 BRPM

## 2018-01-01 VITALS
TEMPERATURE: 98.1 F | SYSTOLIC BLOOD PRESSURE: 116 MMHG | DIASTOLIC BLOOD PRESSURE: 80 MMHG | HEART RATE: 60 BPM | OXYGEN SATURATION: 97 % | WEIGHT: 131.5 LBS | BODY MASS INDEX: 24.05 KG/M2 | RESPIRATION RATE: 18 BRPM

## 2018-01-01 VITALS
DIASTOLIC BLOOD PRESSURE: 68 MMHG | BODY MASS INDEX: 27.4 KG/M2 | OXYGEN SATURATION: 99 % | HEART RATE: 108 BPM | SYSTOLIC BLOOD PRESSURE: 122 MMHG | WEIGHT: 145 LBS | RESPIRATION RATE: 12 BRPM | TEMPERATURE: 98.9 F

## 2018-01-01 VITALS — WEIGHT: 137.5 LBS | BODY MASS INDEX: 24.55 KG/M2

## 2018-01-01 VITALS
RESPIRATION RATE: 18 BRPM | SYSTOLIC BLOOD PRESSURE: 132 MMHG | HEART RATE: 94 BPM | DIASTOLIC BLOOD PRESSURE: 78 MMHG | OXYGEN SATURATION: 98 % | TEMPERATURE: 98 F

## 2018-01-01 VITALS
RESPIRATION RATE: 20 BRPM | HEART RATE: 104 BPM | DIASTOLIC BLOOD PRESSURE: 60 MMHG | OXYGEN SATURATION: 98 % | TEMPERATURE: 98 F | SYSTOLIC BLOOD PRESSURE: 116 MMHG

## 2018-01-01 VITALS — BODY MASS INDEX: 25 KG/M2 | WEIGHT: 140 LBS

## 2018-01-01 VITALS
WEIGHT: 114.5 LBS | DIASTOLIC BLOOD PRESSURE: 60 MMHG | SYSTOLIC BLOOD PRESSURE: 110 MMHG | RESPIRATION RATE: 18 BRPM | HEART RATE: 104 BPM | TEMPERATURE: 97.1 F | BODY MASS INDEX: 21.07 KG/M2 | OXYGEN SATURATION: 99 % | HEIGHT: 62 IN

## 2018-01-01 VITALS
OXYGEN SATURATION: 99 % | DIASTOLIC BLOOD PRESSURE: 68 MMHG | RESPIRATION RATE: 18 BRPM | TEMPERATURE: 98.3 F | BODY MASS INDEX: 27.79 KG/M2 | HEIGHT: 61 IN | HEART RATE: 108 BPM | SYSTOLIC BLOOD PRESSURE: 124 MMHG

## 2018-01-01 VITALS
TEMPERATURE: 98 F | WEIGHT: 147 LBS | SYSTOLIC BLOOD PRESSURE: 117 MMHG | RESPIRATION RATE: 24 BRPM | HEART RATE: 110 BPM | BODY MASS INDEX: 27.78 KG/M2 | DIASTOLIC BLOOD PRESSURE: 60 MMHG | OXYGEN SATURATION: 97 %

## 2018-01-01 VITALS
SYSTOLIC BLOOD PRESSURE: 130 MMHG | WEIGHT: 145 LBS | RESPIRATION RATE: 16 BRPM | DIASTOLIC BLOOD PRESSURE: 64 MMHG | OXYGEN SATURATION: 98 % | BODY MASS INDEX: 27.38 KG/M2 | TEMPERATURE: 97.9 F | HEIGHT: 61 IN | HEART RATE: 86 BPM

## 2018-01-01 VITALS
WEIGHT: 144.5 LBS | HEART RATE: 109 BPM | SYSTOLIC BLOOD PRESSURE: 115 MMHG | RESPIRATION RATE: 20 BRPM | DIASTOLIC BLOOD PRESSURE: 62 MMHG | TEMPERATURE: 98.8 F | OXYGEN SATURATION: 97 % | BODY MASS INDEX: 27.3 KG/M2

## 2018-01-01 VITALS
OXYGEN SATURATION: 98 % | SYSTOLIC BLOOD PRESSURE: 133 MMHG | RESPIRATION RATE: 24 BRPM | TEMPERATURE: 97.6 F | BODY MASS INDEX: 27.6 KG/M2 | DIASTOLIC BLOOD PRESSURE: 60 MMHG | HEART RATE: 58 BPM | HEIGHT: 62 IN | WEIGHT: 150 LBS

## 2018-01-01 VITALS
RESPIRATION RATE: 20 BRPM | DIASTOLIC BLOOD PRESSURE: 70 MMHG | HEIGHT: 62 IN | BODY MASS INDEX: 24.11 KG/M2 | OXYGEN SATURATION: 98 % | HEART RATE: 113 BPM | TEMPERATURE: 98.4 F | WEIGHT: 131 LBS | SYSTOLIC BLOOD PRESSURE: 166 MMHG

## 2018-01-01 VITALS
WEIGHT: 130 LBS | TEMPERATURE: 98.9 F | OXYGEN SATURATION: 99 % | SYSTOLIC BLOOD PRESSURE: 112 MMHG | DIASTOLIC BLOOD PRESSURE: 86 MMHG | BODY MASS INDEX: 23.78 KG/M2 | RESPIRATION RATE: 20 BRPM | HEART RATE: 76 BPM

## 2018-01-01 VITALS
TEMPERATURE: 98.5 F | WEIGHT: 132 LBS | OXYGEN SATURATION: 99 % | DIASTOLIC BLOOD PRESSURE: 60 MMHG | SYSTOLIC BLOOD PRESSURE: 128 MMHG | HEART RATE: 94 BPM | RESPIRATION RATE: 12 BRPM | BODY MASS INDEX: 24.14 KG/M2

## 2018-01-01 VITALS
SYSTOLIC BLOOD PRESSURE: 130 MMHG | WEIGHT: 139.9 LBS | HEIGHT: 63 IN | BODY MASS INDEX: 24.79 KG/M2 | RESPIRATION RATE: 18 BRPM | OXYGEN SATURATION: 98 % | DIASTOLIC BLOOD PRESSURE: 68 MMHG | HEART RATE: 61 BPM

## 2018-01-01 VITALS
WEIGHT: 139.5 LBS | OXYGEN SATURATION: 98 % | BODY MASS INDEX: 25.51 KG/M2 | DIASTOLIC BLOOD PRESSURE: 64 MMHG | SYSTOLIC BLOOD PRESSURE: 112 MMHG | RESPIRATION RATE: 14 BRPM | TEMPERATURE: 98.2 F | HEART RATE: 107 BPM

## 2018-01-01 VITALS — BODY MASS INDEX: 24.1 KG/M2 | HEIGHT: 63 IN | WEIGHT: 136 LBS

## 2018-01-01 VITALS
HEART RATE: 81 BPM | DIASTOLIC BLOOD PRESSURE: 72 MMHG | SYSTOLIC BLOOD PRESSURE: 124 MMHG | OXYGEN SATURATION: 98 % | TEMPERATURE: 97.9 F | RESPIRATION RATE: 18 BRPM

## 2018-01-01 VITALS — WEIGHT: 140 LBS | BODY MASS INDEX: 25 KG/M2

## 2018-01-01 VITALS
WEIGHT: 147 LBS | SYSTOLIC BLOOD PRESSURE: 108 MMHG | OXYGEN SATURATION: 94 % | TEMPERATURE: 97.8 F | RESPIRATION RATE: 18 BRPM | HEART RATE: 68 BPM | BODY MASS INDEX: 27.05 KG/M2 | HEIGHT: 62 IN | DIASTOLIC BLOOD PRESSURE: 83 MMHG

## 2018-01-01 VITALS
OXYGEN SATURATION: 98 % | HEART RATE: 70 BPM | HEIGHT: 62 IN | SYSTOLIC BLOOD PRESSURE: 120 MMHG | DIASTOLIC BLOOD PRESSURE: 60 MMHG | WEIGHT: 131.2 LBS | BODY MASS INDEX: 24.14 KG/M2

## 2018-01-01 VITALS
OXYGEN SATURATION: 95 % | BODY MASS INDEX: 27.6 KG/M2 | HEART RATE: 11 BPM | WEIGHT: 146.16 LBS | DIASTOLIC BLOOD PRESSURE: 68 MMHG | HEIGHT: 61 IN | RESPIRATION RATE: 20 BRPM | SYSTOLIC BLOOD PRESSURE: 147 MMHG | TEMPERATURE: 98.4 F

## 2018-01-01 DIAGNOSIS — I35.0 AORTIC VALVE STENOSIS, ETIOLOGY OF CARDIAC VALVE DISEASE UNSPECIFIED: Primary | ICD-10-CM

## 2018-01-01 DIAGNOSIS — Z95.2 S/P AVR (AORTIC VALVE REPLACEMENT): ICD-10-CM

## 2018-01-01 DIAGNOSIS — I35.9 AORTIC VALVE DISORDER: ICD-10-CM

## 2018-01-01 DIAGNOSIS — R79.1 ELEVATED INR: Primary | ICD-10-CM

## 2018-01-01 DIAGNOSIS — N30.90 CYSTITIS: Primary | ICD-10-CM

## 2018-01-01 DIAGNOSIS — I10 ESSENTIAL HYPERTENSION, BENIGN: ICD-10-CM

## 2018-01-01 DIAGNOSIS — I34.0 MITRAL VALVE INSUFFICIENCY, UNSPECIFIED ETIOLOGY: Primary | ICD-10-CM

## 2018-01-01 DIAGNOSIS — Z79.01 LONG TERM (CURRENT) USE OF ANTICOAGULANTS: Primary | ICD-10-CM

## 2018-01-01 DIAGNOSIS — E78.5 DYSLIPIDEMIA: ICD-10-CM

## 2018-01-01 DIAGNOSIS — I35.0 AORTIC VALVE STENOSIS, ETIOLOGY OF CARDIAC VALVE DISEASE UNSPECIFIED: ICD-10-CM

## 2018-01-01 DIAGNOSIS — Z95.2 HISTORY OF MVR WITH CARDIOPULMONARY BYPASS: ICD-10-CM

## 2018-01-01 DIAGNOSIS — I34.0 MITRAL VALVE INSUFFICIENCY, UNSPECIFIED ETIOLOGY: ICD-10-CM

## 2018-01-01 DIAGNOSIS — I25.10 ATHEROSCLEROSIS OF NATIVE CORONARY ARTERY OF NATIVE HEART WITHOUT ANGINA PECTORIS: ICD-10-CM

## 2018-01-01 DIAGNOSIS — I05.9 MITRAL VALVE DISORDER: ICD-10-CM

## 2018-01-01 DIAGNOSIS — Z95.1 S/P CABG X 3: ICD-10-CM

## 2018-01-01 DIAGNOSIS — Z95.2 S/P MVR (MITRAL VALVE REPLACEMENT): ICD-10-CM

## 2018-01-01 DIAGNOSIS — I35.9 AORTIC VALVE DISORDER: Primary | ICD-10-CM

## 2018-01-01 DIAGNOSIS — E11.8 TYPE 2 DIABETES MELLITUS WITH COMPLICATION, UNSPECIFIED LONG TERM INSULIN USE STATUS: ICD-10-CM

## 2018-01-01 DIAGNOSIS — Z95.2 S/P TAVR (TRANSCATHETER AORTIC VALVE REPLACEMENT): Primary | ICD-10-CM

## 2018-01-01 DIAGNOSIS — Z95.2 S/P MVR (MITRAL VALVE REPLACEMENT): Primary | ICD-10-CM

## 2018-01-01 DIAGNOSIS — I65.29 STENOSIS OF CAROTID ARTERY, UNSPECIFIED LATERALITY: Primary | ICD-10-CM

## 2018-01-01 DIAGNOSIS — I10 ESSENTIAL HYPERTENSION: ICD-10-CM

## 2018-01-01 DIAGNOSIS — I97.790 CARDIAC ARRHYTHMIA DURING SURGERY: ICD-10-CM

## 2018-01-01 DIAGNOSIS — I65.21 STENOSIS OF RIGHT CAROTID ARTERY: ICD-10-CM

## 2018-01-01 DIAGNOSIS — I05.9 MITRAL VALVE DISEASE: ICD-10-CM

## 2018-01-01 DIAGNOSIS — E11.8 TYPE 2 DIABETES MELLITUS WITH COMPLICATION, UNSPECIFIED WHETHER LONG TERM INSULIN USE: ICD-10-CM

## 2018-01-01 DIAGNOSIS — E11.8 TYPE 2 DIABETES MELLITUS WITH COMPLICATION (HCC): ICD-10-CM

## 2018-01-01 DIAGNOSIS — I48.0 PAROXYSMAL ATRIAL FIBRILLATION (HCC): ICD-10-CM

## 2018-01-01 DIAGNOSIS — Z95.2 S/P AVR (AORTIC VALVE REPLACEMENT): Primary | ICD-10-CM

## 2018-01-01 DIAGNOSIS — R58 BLEEDING: Primary | ICD-10-CM

## 2018-01-01 DIAGNOSIS — I25.10 ASHD (ARTERIOSCLEROTIC HEART DISEASE): Primary | ICD-10-CM

## 2018-01-01 DIAGNOSIS — I49.9 CARDIAC ARRHYTHMIA DURING SURGERY: ICD-10-CM

## 2018-01-01 DIAGNOSIS — I05.9 MITRAL VALVE DISEASE: Primary | ICD-10-CM

## 2018-01-01 DIAGNOSIS — E78.2 MIXED HYPERLIPIDEMIA: ICD-10-CM

## 2018-01-01 LAB
ABO + RH BLD: NORMAL
ADMINISTERED INITIALS, ADMINIT: NORMAL
ADMINISTERED INITIALS, ADMINIT: NORMAL
ALBUMIN SERPL-MCNC: 2.9 G/DL (ref 3.5–5)
ALBUMIN SERPL-MCNC: 3 G/DL (ref 3.5–5)
ALBUMIN SERPL-MCNC: 3.1 G/DL (ref 3.5–5)
ALBUMIN SERPL-MCNC: 3.3 G/DL (ref 3.5–5)
ALBUMIN SERPL-MCNC: 4.1 G/DL (ref 3.6–4.8)
ALBUMIN SERPL-MCNC: 4.4 G/DL (ref 3.5–5)
ALBUMIN SERPL-MCNC: 4.4 G/DL (ref 3.6–4.8)
ALBUMIN/GLOB SERPL: 0.9 {RATIO} (ref 1.1–2.2)
ALBUMIN/GLOB SERPL: 1 {RATIO} (ref 1.1–2.2)
ALBUMIN/GLOB SERPL: 1 {RATIO} (ref 1.1–2.2)
ALBUMIN/GLOB SERPL: 1.1 {RATIO} (ref 1.1–2.2)
ALBUMIN/GLOB SERPL: 1.1 {RATIO} (ref 1.1–2.2)
ALBUMIN/GLOB SERPL: 1.4 {RATIO} (ref 1.2–2.2)
ALBUMIN/GLOB SERPL: 1.4 {RATIO} (ref 1.2–2.2)
ALP SERPL-CCNC: 150 IU/L (ref 39–117)
ALP SERPL-CCNC: 41 U/L (ref 45–117)
ALP SERPL-CCNC: 60 U/L (ref 45–117)
ALP SERPL-CCNC: 89 U/L (ref 45–117)
ALP SERPL-CCNC: 93 IU/L (ref 39–117)
ALT SERPL-CCNC: 17 IU/L (ref 0–32)
ALT SERPL-CCNC: 19 U/L (ref 12–78)
ALT SERPL-CCNC: 27 U/L (ref 12–78)
ALT SERPL-CCNC: 28 U/L (ref 12–78)
ALT SERPL-CCNC: 29 U/L (ref 12–78)
ALT SERPL-CCNC: 30 IU/L (ref 0–32)
ALT SERPL-CCNC: 32 U/L (ref 12–78)
ANION GAP SERPL CALC-SCNC: 10 MMOL/L (ref 5–15)
ANION GAP SERPL CALC-SCNC: 10 MMOL/L (ref 5–15)
ANION GAP SERPL CALC-SCNC: 11 MMOL/L (ref 5–15)
ANION GAP SERPL CALC-SCNC: 13 MMOL/L (ref 5–15)
ANION GAP SERPL CALC-SCNC: 13 MMOL/L (ref 5–15)
ANION GAP SERPL CALC-SCNC: 7 MMOL/L (ref 5–15)
APPEARANCE UR: CLEAR
APTT PPP: 25.4 SEC (ref 22.1–32)
APTT PPP: 26.3 SEC (ref 22.1–32)
APTT PPP: 34.9 SEC (ref 22.1–32)
APTT PPP: 42.2 SEC (ref 22.1–32)
ARTERIAL PATENCY WRIST A: ABNORMAL
ARTERIAL PATENCY WRIST A: NO
ARTERIAL PATENCY WRIST A: NO
ARTERIAL PATENCY WRIST A: YES
AST SERPL-CCNC: 31 U/L (ref 15–37)
AST SERPL-CCNC: 35 U/L (ref 15–37)
AST SERPL-CCNC: 36 U/L (ref 15–37)
AST SERPL-CCNC: 37 IU/L (ref 0–40)
AST SERPL-CCNC: 37 U/L (ref 15–37)
AST SERPL-CCNC: 61 IU/L (ref 0–40)
AST SERPL-CCNC: 83 U/L (ref 15–37)
ATRIAL RATE: 103 BPM
ATRIAL RATE: 68 BPM
ATRIAL RATE: 71 BPM
ATRIAL RATE: 89 BPM
BACTERIA SPEC CULT: ABNORMAL
BACTERIA URNS QL MICRO: ABNORMAL /HPF
BASE DEFICIT BLD-SCNC: 3 MMOL/L
BASE DEFICIT BLD-SCNC: 8 MMOL/L
BASE DEFICIT BLDV-SCNC: 21 MMOL/L
BASE DEFICIT BLDV-SCNC: 4 MMOL/L
BASOPHILS # BLD: 0 K/UL (ref 0–0.1)
BASOPHILS NFR BLD: 0 % (ref 0–1)
BDY SITE: ABNORMAL
BILIRUB SERPL-MCNC: 0.4 MG/DL (ref 0.2–1)
BILIRUB SERPL-MCNC: 0.5 MG/DL (ref 0.2–1)
BILIRUB SERPL-MCNC: 0.5 MG/DL (ref 0.2–1)
BILIRUB SERPL-MCNC: 0.9 MG/DL (ref 0–1.2)
BILIRUB SERPL-MCNC: 1.3 MG/DL (ref 0.2–1)
BILIRUB SERPL-MCNC: 1.3 MG/DL (ref 0–1.2)
BILIRUB SERPL-MCNC: 1.7 MG/DL (ref 0.2–1)
BILIRUB UR QL: NEGATIVE
BLD PROD TYP BPU: NORMAL
BLOOD GROUP ANTIBODIES SERPL: NORMAL
BPU ID: NORMAL
BUN SERPL-MCNC: 16 MG/DL (ref 6–20)
BUN SERPL-MCNC: 16 MG/DL (ref 6–20)
BUN SERPL-MCNC: 17 MG/DL (ref 6–20)
BUN SERPL-MCNC: 21 MG/DL (ref 8–27)
BUN SERPL-MCNC: 22 MG/DL (ref 6–20)
BUN SERPL-MCNC: 26 MG/DL (ref 8–27)
BUN SERPL-MCNC: 30 MG/DL (ref 6–20)
BUN SERPL-MCNC: 34 MG/DL (ref 6–20)
BUN SERPL-MCNC: 35 MG/DL (ref 6–20)
BUN SERPL-MCNC: 37 MG/DL (ref 6–20)
BUN/CREAT SERPL: 17 (ref 12–20)
BUN/CREAT SERPL: 18 (ref 12–20)
BUN/CREAT SERPL: 20 (ref 12–20)
BUN/CREAT SERPL: 20 (ref 12–20)
BUN/CREAT SERPL: 21 (ref 12–28)
BUN/CREAT SERPL: 25 (ref 12–20)
BUN/CREAT SERPL: 27 (ref 12–20)
BUN/CREAT SERPL: 28 (ref 12–20)
BUN/CREAT SERPL: 28 (ref 12–20)
BUN/CREAT SERPL: 32 (ref 12–28)
CALCIUM SERPL-MCNC: 10.6 MG/DL (ref 8.5–10.1)
CALCIUM SERPL-MCNC: 8.3 MG/DL (ref 8.5–10.1)
CALCIUM SERPL-MCNC: 8.3 MG/DL (ref 8.5–10.1)
CALCIUM SERPL-MCNC: 8.4 MG/DL (ref 8.5–10.1)
CALCIUM SERPL-MCNC: 9 MG/DL (ref 8.5–10.1)
CALCIUM SERPL-MCNC: 9.1 MG/DL (ref 8.5–10.1)
CALCIUM SERPL-MCNC: 9.4 MG/DL (ref 8.5–10.1)
CALCIUM SERPL-MCNC: 9.8 MG/DL (ref 8.5–10.1)
CALCIUM SERPL-MCNC: 9.8 MG/DL (ref 8.7–10.3)
CALCIUM SERPL-MCNC: 9.9 MG/DL (ref 8.7–10.3)
CALCULATED P AXIS, ECG09: 17 DEGREES
CALCULATED P AXIS, ECG09: 23 DEGREES
CALCULATED P AXIS, ECG09: 55 DEGREES
CALCULATED P AXIS, ECG09: 63 DEGREES
CALCULATED R AXIS, ECG10: 45 DEGREES
CALCULATED R AXIS, ECG10: 64 DEGREES
CALCULATED R AXIS, ECG10: 69 DEGREES
CALCULATED R AXIS, ECG10: 71 DEGREES
CALCULATED T AXIS, ECG11: -154 DEGREES
CALCULATED T AXIS, ECG11: -27 DEGREES
CALCULATED T AXIS, ECG11: -50 DEGREES
CALCULATED T AXIS, ECG11: -76 DEGREES
CC UR VC: ABNORMAL
CHLORIDE SERPL-SCNC: 102 MMOL/L (ref 97–108)
CHLORIDE SERPL-SCNC: 102 MMOL/L (ref 97–108)
CHLORIDE SERPL-SCNC: 103 MMOL/L (ref 97–108)
CHLORIDE SERPL-SCNC: 106 MMOL/L (ref 97–108)
CHLORIDE SERPL-SCNC: 106 MMOL/L (ref 97–108)
CHLORIDE SERPL-SCNC: 108 MMOL/L (ref 97–108)
CHLORIDE SERPL-SCNC: 108 MMOL/L (ref 97–108)
CHLORIDE SERPL-SCNC: 110 MMOL/L (ref 97–108)
CHLORIDE SERPL-SCNC: 94 MMOL/L (ref 96–106)
CHLORIDE SERPL-SCNC: 97 MMOL/L (ref 96–106)
CO2 SERPL-SCNC: 19 MMOL/L (ref 21–32)
CO2 SERPL-SCNC: 21 MMOL/L (ref 21–32)
CO2 SERPL-SCNC: 21 MMOL/L (ref 21–32)
CO2 SERPL-SCNC: 22 MMOL/L (ref 21–32)
CO2 SERPL-SCNC: 23 MMOL/L (ref 21–32)
CO2 SERPL-SCNC: 23 MMOL/L (ref 21–32)
CO2 SERPL-SCNC: 24 MMOL/L (ref 18–29)
CO2 SERPL-SCNC: 26 MMOL/L (ref 18–29)
CO2 SERPL-SCNC: 26 MMOL/L (ref 21–32)
CO2 SERPL-SCNC: 29 MMOL/L (ref 21–32)
COLOR UR: ABNORMAL
CREAT SERPL-MCNC: 0.81 MG/DL (ref 0.55–1.02)
CREAT SERPL-MCNC: 0.82 MG/DL (ref 0.57–1)
CREAT SERPL-MCNC: 0.93 MG/DL (ref 0.55–1.02)
CREAT SERPL-MCNC: 0.93 MG/DL (ref 0.55–1.02)
CREAT SERPL-MCNC: 1.01 MG/DL (ref 0.57–1)
CREAT SERPL-MCNC: 1.08 MG/DL (ref 0.55–1.02)
CREAT SERPL-MCNC: 1.21 MG/DL (ref 0.55–1.02)
CREAT SERPL-MCNC: 1.21 MG/DL (ref 0.55–1.02)
CREAT SERPL-MCNC: 1.3 MG/DL (ref 0.55–1.02)
CREAT SERPL-MCNC: 1.32 MG/DL (ref 0.55–1.02)
CROSSMATCH RESULT,%XM: NORMAL
CROSSMATCH RESULT,%XM: NORMAL
D50 ADMINISTERED, D50ADM: 0 ML
D50 ADMINISTERED, D50ADM: 14 ML
D50 ORDER, D50ORD: 0 ML
D50 ORDER, D50ORD: 14 ML
DIAGNOSIS, 93000: NORMAL
DIFFERENTIAL METHOD BLD: ABNORMAL
EOSINOPHIL # BLD: 0 K/UL (ref 0–0.4)
EOSINOPHIL # BLD: 0 K/UL (ref 0–0.4)
EOSINOPHIL # BLD: 0.1 K/UL (ref 0–0.4)
EOSINOPHIL NFR BLD: 0 % (ref 0–7)
EOSINOPHIL NFR BLD: 0 % (ref 0–7)
EOSINOPHIL NFR BLD: 1 % (ref 0–7)
EOSINOPHIL NFR BLD: 1 % (ref 0–7)
EOSINOPHIL NFR BLD: 2 % (ref 0–7)
EPITH CASTS URNS QL MICRO: ABNORMAL /LPF
ERYTHROCYTE [DISTWIDTH] IN BLOOD BY AUTOMATED COUNT: 17 % (ref 11.5–14.5)
ERYTHROCYTE [DISTWIDTH] IN BLOOD BY AUTOMATED COUNT: 17.1 % (ref 11.5–14.5)
ERYTHROCYTE [DISTWIDTH] IN BLOOD BY AUTOMATED COUNT: 17.4 % (ref 11.5–14.5)
ERYTHROCYTE [DISTWIDTH] IN BLOOD BY AUTOMATED COUNT: 18.9 % (ref 12.3–15.4)
ERYTHROCYTE [DISTWIDTH] IN BLOOD BY AUTOMATED COUNT: 19 % (ref 11.5–14.5)
ERYTHROCYTE [DISTWIDTH] IN BLOOD BY AUTOMATED COUNT: 19.4 % (ref 11.5–14.5)
ERYTHROCYTE [DISTWIDTH] IN BLOOD BY AUTOMATED COUNT: 19.9 % (ref 11.5–14.5)
ERYTHROCYTE [DISTWIDTH] IN BLOOD BY AUTOMATED COUNT: 19.9 % (ref 11.5–14.5)
ERYTHROCYTE [DISTWIDTH] IN BLOOD BY AUTOMATED COUNT: 20.6 % (ref 12.3–15.4)
EST. AVERAGE GLUCOSE BLD GHB EST-MCNC: 103 MG/DL
GAS FLOW.O2 O2 DELIVERY SYS: ABNORMAL L/MIN
GAS FLOW.O2 SETTING OXYMISER: 10 L/M
GAS FLOW.O2 SETTING OXYMISER: 10 L/M
GAS FLOW.O2 SETTING OXYMISER: 2 L/M
GFR SERPLBLD CREATININE-BSD FMLA CKD-EPI: 57 ML/MIN/1.73
GFR SERPLBLD CREATININE-BSD FMLA CKD-EPI: 66 ML/MIN/1.73
GFR SERPLBLD CREATININE-BSD FMLA CKD-EPI: 73 ML/MIN/1.73
GFR SERPLBLD CREATININE-BSD FMLA CKD-EPI: 84 ML/MIN/1.73
GLOBULIN SER CALC-MCNC: 2.9 G/DL (ref 2–4)
GLOBULIN SER CALC-MCNC: 2.9 G/DL (ref 2–4)
GLOBULIN SER CALC-MCNC: 3 G/DL (ref 1.5–4.5)
GLOBULIN SER CALC-MCNC: 3 G/DL (ref 2–4)
GLOBULIN SER CALC-MCNC: 3.2 G/DL (ref 1.5–4.5)
GLOBULIN SER CALC-MCNC: 3.8 G/DL (ref 2–4)
GLOBULIN SER CALC-MCNC: 4.1 G/DL (ref 2–4)
GLSCOM COMMENTS: NORMAL
GLSCOM COMMENTS: NORMAL
GLUCOSE BLD STRIP.AUTO-MCNC: 101 MG/DL (ref 65–100)
GLUCOSE BLD STRIP.AUTO-MCNC: 102 MG/DL (ref 65–100)
GLUCOSE BLD STRIP.AUTO-MCNC: 105 MG/DL (ref 65–100)
GLUCOSE BLD STRIP.AUTO-MCNC: 109 MG/DL (ref 65–100)
GLUCOSE BLD STRIP.AUTO-MCNC: 109 MG/DL (ref 65–100)
GLUCOSE BLD STRIP.AUTO-MCNC: 110 MG/DL (ref 65–100)
GLUCOSE BLD STRIP.AUTO-MCNC: 124 MG/DL (ref 65–100)
GLUCOSE BLD STRIP.AUTO-MCNC: 138 MG/DL (ref 65–100)
GLUCOSE BLD STRIP.AUTO-MCNC: 156 MG/DL (ref 65–100)
GLUCOSE BLD STRIP.AUTO-MCNC: 170 MG/DL (ref 65–100)
GLUCOSE BLD STRIP.AUTO-MCNC: 64 MG/DL (ref 65–100)
GLUCOSE BLD STRIP.AUTO-MCNC: 65 MG/DL (ref 65–100)
GLUCOSE BLD STRIP.AUTO-MCNC: 80 MG/DL (ref 65–100)
GLUCOSE BLD STRIP.AUTO-MCNC: 82 MG/DL (ref 65–100)
GLUCOSE BLD STRIP.AUTO-MCNC: 96 MG/DL (ref 65–100)
GLUCOSE SERPL-MCNC: 103 MG/DL (ref 65–100)
GLUCOSE SERPL-MCNC: 124 MG/DL (ref 65–100)
GLUCOSE SERPL-MCNC: 76 MG/DL (ref 65–100)
GLUCOSE SERPL-MCNC: 80 MG/DL (ref 65–99)
GLUCOSE SERPL-MCNC: 89 MG/DL (ref 65–100)
GLUCOSE SERPL-MCNC: 95 MG/DL (ref 65–100)
GLUCOSE SERPL-MCNC: 95 MG/DL (ref 65–99)
GLUCOSE SERPL-MCNC: 96 MG/DL (ref 65–100)
GLUCOSE SERPL-MCNC: 97 MG/DL (ref 65–100)
GLUCOSE SERPL-MCNC: 98 MG/DL (ref 65–100)
GLUCOSE UR STRIP.AUTO-MCNC: NEGATIVE MG/DL
GLUCOSE, GLC: 110 MG/DL
GLUCOSE, GLC: 66 MG/DL
HBA1C MFR BLD: 5.2 % (ref 4.2–6.3)
HCO3 BLD-SCNC: 19.2 MMOL/L (ref 22–26)
HCO3 BLD-SCNC: 20.8 MMOL/L (ref 22–26)
HCO3 BLDV-SCNC: 21.3 MMOL/L (ref 23–28)
HCO3 BLDV-SCNC: 7.7 MMOL/L (ref 23–28)
HCT VFR BLD AUTO: 20.9 % (ref 34–46.6)
HCT VFR BLD AUTO: 21.5 % (ref 35–47)
HCT VFR BLD AUTO: 22.8 % (ref 35–47)
HCT VFR BLD AUTO: 23.4 % (ref 35–47)
HCT VFR BLD AUTO: 24.4 % (ref 35–47)
HCT VFR BLD AUTO: 25.3 % (ref 34–46.6)
HCT VFR BLD AUTO: 26.7 % (ref 35–47)
HCT VFR BLD AUTO: 28 % (ref 35–47)
HCT VFR BLD AUTO: 28.1 % (ref 35–47)
HCT VFR BLD AUTO: 28.8 % (ref 35–47)
HGB BLD-MCNC: 6.6 G/DL (ref 11.5–16)
HGB BLD-MCNC: 6.9 G/DL (ref 11.1–15.9)
HGB BLD-MCNC: 7 G/DL (ref 11.5–16)
HGB BLD-MCNC: 7 G/DL (ref 11.5–16)
HGB BLD-MCNC: 7.2 G/DL (ref 11.5–16)
HGB BLD-MCNC: 7.6 G/DL (ref 11.1–15.9)
HGB BLD-MCNC: 8.2 G/DL (ref 11.5–16)
HGB BLD-MCNC: 8.5 G/DL (ref 11.5–16)
HGB BLD-MCNC: 8.5 G/DL (ref 11.5–16)
HGB BLD-MCNC: 8.8 G/DL (ref 11.5–16)
HGB UR QL STRIP: NEGATIVE
HIGH TARGET, HITG: 130 MG/DL
HIGH TARGET, HITG: 140 MG/DL
HYALINE CASTS URNS QL MICRO: ABNORMAL /LPF (ref 0–5)
IMM GRANULOCYTES # BLD: 0 K/UL (ref 0–0.04)
IMM GRANULOCYTES # BLD: 0.1 K/UL (ref 0–0.04)
IMM GRANULOCYTES NFR BLD AUTO: 0 % (ref 0–0.5)
IMM GRANULOCYTES NFR BLD AUTO: 1 % (ref 0–0.5)
IMM GRANULOCYTES NFR BLD AUTO: 1 % (ref 0–0.5)
INR BLD: 1.6 (ref 0.9–1.1)
INR BLD: 1.7 (ref 1–1.5)
INR BLD: 1.9 (ref 0.9–1.1)
INR BLD: 2 (ref 1–1.5)
INR BLD: 2.1 (ref 1–1.5)
INR BLD: 2.2 (ref 0.9–1.1)
INR BLD: 2.3 (ref 1–1.5)
INR BLD: 2.4 (ref 0.9–1.1)
INR BLD: 3 (ref 0.9–1.1)
INR BLD: 5 (ref 0.9–1.2)
INR BLD: 6.4 (ref 0.9–1.1)
INR PPP: 1.2 (ref 0.9–1.1)
INR PPP: 1.2 (ref 0.9–1.1)
INR PPP: 1.5 (ref 0.8–1.2)
INR PPP: 2.3 (ref 0.9–1.1)
INR PPP: 2.9 (ref 0.9–1.1)
INR PPP: 7.2 (ref 0.9–1.1)
INR, EXTERNAL: 1.4
INR, EXTERNAL: 1.5
INR, EXTERNAL: 1.6
INR, EXTERNAL: 1.8
INR, EXTERNAL: 1.9
INR, EXTERNAL: 2.2
INR, EXTERNAL: 2.2
INR, EXTERNAL: 2.3
INR, EXTERNAL: 2.4
INR, EXTERNAL: 3
INR, EXTERNAL: 3.7
INR, EXTERNAL: 6.4
INSULIN ADMINSTERED, INSADM: 0 UNITS/HOUR
INSULIN ADMINSTERED, INSADM: 0 UNITS/HOUR
INSULIN ORDER, INSORD: 0 UNITS/HOUR
INSULIN ORDER, INSORD: 1.5 UNITS/HOUR
KETONES UR QL STRIP.AUTO: ABNORMAL MG/DL
LEUKOCYTE ESTERASE UR QL STRIP.AUTO: ABNORMAL
LOW TARGET, LOT: 100 MG/DL
LOW TARGET, LOT: 95 MG/DL
LYMPHOCYTES # BLD: 0.5 K/UL (ref 0.8–3.5)
LYMPHOCYTES # BLD: 0.8 K/UL (ref 0.8–3.5)
LYMPHOCYTES # BLD: 0.8 K/UL (ref 0.8–3.5)
LYMPHOCYTES # BLD: 0.9 K/UL (ref 0.8–3.5)
LYMPHOCYTES # BLD: 1 K/UL (ref 0.8–3.5)
LYMPHOCYTES NFR BLD: 12 % (ref 12–49)
LYMPHOCYTES NFR BLD: 12 % (ref 12–49)
LYMPHOCYTES NFR BLD: 14 % (ref 12–49)
LYMPHOCYTES NFR BLD: 15 % (ref 12–49)
LYMPHOCYTES NFR BLD: 6 % (ref 12–49)
MAGNESIUM SERPL-MCNC: 1.6 MG/DL (ref 1.6–2.4)
MAGNESIUM SERPL-MCNC: 1.9 MG/DL (ref 1.6–2.4)
MAGNESIUM SERPL-MCNC: 2 MG/DL (ref 1.6–2.4)
MAGNESIUM SERPL-MCNC: 2.1 MG/DL (ref 1.6–2.4)
MCH RBC QN AUTO: 20.5 PG (ref 26.6–33)
MCH RBC QN AUTO: 20.7 PG (ref 26–34)
MCH RBC QN AUTO: 20.8 PG (ref 26–34)
MCH RBC QN AUTO: 21.2 PG (ref 26–34)
MCH RBC QN AUTO: 21.4 PG (ref 26.6–33)
MCH RBC QN AUTO: 21.7 PG (ref 26–34)
MCH RBC QN AUTO: 21.8 PG (ref 26–34)
MCH RBC QN AUTO: 21.9 PG (ref 26–34)
MCH RBC QN AUTO: 22 PG (ref 26–34)
MCHC RBC AUTO-ENTMCNC: 29.5 G/DL (ref 30–36.5)
MCHC RBC AUTO-ENTMCNC: 30 G/DL (ref 31.5–35.7)
MCHC RBC AUTO-ENTMCNC: 30.2 G/DL (ref 30–36.5)
MCHC RBC AUTO-ENTMCNC: 30.4 G/DL (ref 30–36.5)
MCHC RBC AUTO-ENTMCNC: 30.6 G/DL (ref 30–36.5)
MCHC RBC AUTO-ENTMCNC: 30.7 G/DL (ref 30–36.5)
MCHC RBC AUTO-ENTMCNC: 33 G/DL (ref 31.5–35.7)
MCV RBC AUTO: 65 FL (ref 79–97)
MCV RBC AUTO: 67.6 FL (ref 80–99)
MCV RBC AUTO: 68 FL (ref 79–97)
MCV RBC AUTO: 69.1 FL (ref 80–99)
MCV RBC AUTO: 70.1 FL (ref 80–99)
MCV RBC AUTO: 71 FL (ref 80–99)
MCV RBC AUTO: 71.6 FL (ref 80–99)
MCV RBC AUTO: 71.8 FL (ref 80–99)
MCV RBC AUTO: 72.6 FL (ref 80–99)
MINUTES UNTIL NEXT BG, NBG: 15 MIN
MINUTES UNTIL NEXT BG, NBG: 60 MIN
MONOCYTES # BLD: 0.3 K/UL (ref 0–1)
MONOCYTES # BLD: 0.5 K/UL (ref 0–1)
MONOCYTES # BLD: 0.6 K/UL (ref 0–1)
MONOCYTES # BLD: 0.6 K/UL (ref 0–1)
MONOCYTES # BLD: 0.8 K/UL (ref 0–1)
MONOCYTES NFR BLD: 11 % (ref 5–13)
MONOCYTES NFR BLD: 11 % (ref 5–13)
MONOCYTES NFR BLD: 4 % (ref 5–13)
MONOCYTES NFR BLD: 7 % (ref 5–13)
MONOCYTES NFR BLD: 8 % (ref 5–13)
MULTIPLIER, MUL: 0.03
MULTIPLIER, MUL: 0.03
NEUTS SEG # BLD: 4 K/UL (ref 1.8–8)
NEUTS SEG # BLD: 5.2 K/UL (ref 1.8–8)
NEUTS SEG # BLD: 5.5 K/UL (ref 1.8–8)
NEUTS SEG # BLD: 6 K/UL (ref 1.8–8)
NEUTS SEG # BLD: 7.5 K/UL (ref 1.8–8)
NEUTS SEG NFR BLD: 72 % (ref 32–75)
NEUTS SEG NFR BLD: 74 % (ref 32–75)
NEUTS SEG NFR BLD: 79 % (ref 32–75)
NEUTS SEG NFR BLD: 84 % (ref 32–75)
NEUTS SEG NFR BLD: 86 % (ref 32–75)
NITRITE UR QL STRIP.AUTO: NEGATIVE
NRBC # BLD: 0 K/UL (ref 0–0.01)
NRBC BLD-RTO: 0 PER 100 WBC
O2/TOTAL GAS SETTING VFR VENT: 100 %
O2/TOTAL GAS SETTING VFR VENT: 100 %
O2/TOTAL GAS SETTING VFR VENT: 21 %
ORDER INITIALS, ORDINIT: NORMAL
ORDER INITIALS, ORDINIT: NORMAL
P-R INTERVAL, ECG05: 174 MS
P-R INTERVAL, ECG05: 178 MS
P-R INTERVAL, ECG05: 200 MS
P-R INTERVAL, ECG05: 210 MS
PCO2 BLD: 31.2 MMHG (ref 35–45)
PCO2 BLD: 45.3 MMHG (ref 35–45)
PCO2 BLDV: 20.4 MMHG (ref 41–51)
PCO2 BLDV: 39.3 MMHG (ref 41–51)
PH BLD: 7.24 [PH] (ref 7.35–7.45)
PH BLD: 7.43 [PH] (ref 7.35–7.45)
PH BLDV: 7.19 [PH] (ref 7.32–7.42)
PH BLDV: 7.34 [PH] (ref 7.32–7.42)
PH UR STRIP: 5 [PH] (ref 5–8)
PLATELET # BLD AUTO: 102 K/UL (ref 150–400)
PLATELET # BLD AUTO: 108 K/UL (ref 150–400)
PLATELET # BLD AUTO: 111 K/UL (ref 150–400)
PLATELET # BLD AUTO: 112 K/UL (ref 150–400)
PLATELET # BLD AUTO: 179 K/UL (ref 150–400)
PLATELET # BLD AUTO: 218 X10E3/UL (ref 150–379)
PLATELET # BLD AUTO: 238 X10E3/UL (ref 150–379)
PLATELET # BLD AUTO: 81 K/UL (ref 150–400)
PLATELET # BLD AUTO: 90 K/UL (ref 150–400)
PLATELET COMMENTS,PCOM: ABNORMAL
PMV BLD AUTO: ABNORMAL FL (ref 8.9–12.9)
PO2 BLD: 68 MMHG (ref 80–100)
PO2 BLD: 96 MMHG (ref 80–100)
PO2 BLDV: 31 MMHG (ref 25–40)
PO2 BLDV: 37 MMHG (ref 25–40)
POTASSIUM SERPL-SCNC: 3.8 MMOL/L (ref 3.5–5.1)
POTASSIUM SERPL-SCNC: 3.8 MMOL/L (ref 3.5–5.1)
POTASSIUM SERPL-SCNC: 3.8 MMOL/L (ref 3.5–5.2)
POTASSIUM SERPL-SCNC: 3.9 MMOL/L (ref 3.5–5.1)
POTASSIUM SERPL-SCNC: 4 MMOL/L (ref 3.5–5.1)
POTASSIUM SERPL-SCNC: 4.4 MMOL/L (ref 3.5–5.1)
POTASSIUM SERPL-SCNC: 4.5 MMOL/L (ref 3.5–5.2)
POTASSIUM SERPL-SCNC: 4.6 MMOL/L (ref 3.5–5.1)
POTASSIUM SERPL-SCNC: 4.6 MMOL/L (ref 3.5–5.1)
POTASSIUM SERPL-SCNC: 4.8 MMOL/L (ref 3.5–5.1)
PROT SERPL-MCNC: 5.9 G/DL (ref 6.4–8.2)
PROT SERPL-MCNC: 5.9 G/DL (ref 6.4–8.2)
PROT SERPL-MCNC: 6 G/DL (ref 6.4–8.2)
PROT SERPL-MCNC: 7.1 G/DL (ref 6.4–8.2)
PROT SERPL-MCNC: 7.1 G/DL (ref 6–8.5)
PROT SERPL-MCNC: 7.6 G/DL (ref 6–8.5)
PROT SERPL-MCNC: 8.5 G/DL (ref 6.4–8.2)
PROT UR STRIP-MCNC: NEGATIVE MG/DL
PROTHROMBIN TIME: 11.9 SEC (ref 9–11.1)
PROTHROMBIN TIME: 12.6 SEC (ref 9–11.1)
PROTHROMBIN TIME: 14.9 SEC (ref 9.1–12)
PROTHROMBIN TIME: 22.9 SEC (ref 9–11.1)
PROTHROMBIN TIME: 28.5 SEC (ref 9–11.1)
PROTHROMBIN TIME: 73.6 SEC (ref 9–11.1)
PT POC: 18.9 SECONDS (ref 11.8–14.9)
PT POC: 20.7 SECONDS (ref 9.1–12)
PT POC: 22 SECONDS (ref 11.8–14.9)
PT POC: 23.7 SECONDS (ref 9.1–12)
PT POC: 24.9 SECONDS (ref 9.1–12)
PT POC: 26.4 SECONDS (ref 11.8–14.9)
PT POC: 27.4 SECONDS (ref 9.1–12)
PT POC: 28.3 SECONDS (ref 11.8–14.9)
PT POC: 36.1 SECONDS (ref 11.8–14.9)
PT POC: 76.6 SECONDS (ref 11.8–14.9)
Q-T INTERVAL, ECG07: 354 MS
Q-T INTERVAL, ECG07: 382 MS
Q-T INTERVAL, ECG07: 410 MS
Q-T INTERVAL, ECG07: 424 MS
QRS DURATION, ECG06: 100 MS
QRS DURATION, ECG06: 102 MS
QRS DURATION, ECG06: 106 MS
QRS DURATION, ECG06: 88 MS
QTC CALCULATION (BEZET), ECG08: 435 MS
QTC CALCULATION (BEZET), ECG08: 460 MS
QTC CALCULATION (BEZET), ECG08: 463 MS
QTC CALCULATION (BEZET), ECG08: 464 MS
RBC # BLD AUTO: 3.11 M/UL (ref 3.8–5.2)
RBC # BLD AUTO: 3.21 M/UL (ref 3.8–5.2)
RBC # BLD AUTO: 3.23 X10E6/UL (ref 3.77–5.28)
RBC # BLD AUTO: 3.48 M/UL (ref 3.8–5.2)
RBC # BLD AUTO: 3.71 X10E6/UL (ref 3.77–5.28)
RBC # BLD AUTO: 3.87 M/UL (ref 3.8–5.2)
RBC # BLD AUTO: 3.91 M/UL (ref 3.8–5.2)
RBC # BLD AUTO: 3.95 M/UL (ref 3.8–5.2)
RBC # BLD AUTO: 4.01 M/UL (ref 3.8–5.2)
RBC #/AREA URNS HPF: ABNORMAL /HPF (ref 0–5)
RBC MORPH BLD: ABNORMAL
SAO2 % BLD: 94 % (ref 92–97)
SAO2 % BLD: 96 % (ref 92–97)
SAO2 % BLDV: 55 % (ref 65–88)
SAO2 % BLDV: 60 % (ref 65–88)
SERVICE CMNT-IMP: ABNORMAL
SERVICE CMNT-IMP: NORMAL
SODIUM SERPL-SCNC: 135 MMOL/L (ref 136–145)
SODIUM SERPL-SCNC: 137 MMOL/L (ref 134–144)
SODIUM SERPL-SCNC: 138 MMOL/L (ref 136–145)
SODIUM SERPL-SCNC: 139 MMOL/L (ref 136–145)
SODIUM SERPL-SCNC: 140 MMOL/L (ref 136–145)
SODIUM SERPL-SCNC: 142 MMOL/L (ref 134–144)
SODIUM SERPL-SCNC: 142 MMOL/L (ref 136–145)
SODIUM SERPL-SCNC: 143 MMOL/L (ref 136–145)
SP GR UR REFRACTOMETRY: 1.01 (ref 1–1.03)
SPECIMEN EXP DATE BLD: NORMAL
SPECIMEN TYPE: ABNORMAL
STATUS OF UNIT,%ST: NORMAL
THERAPEUTIC RANGE,PTTT: ABNORMAL SECS (ref 58–77)
THERAPEUTIC RANGE,PTTT: ABNORMAL SECS (ref 58–77)
THERAPEUTIC RANGE,PTTT: NORMAL SECS (ref 58–77)
THERAPEUTIC RANGE,PTTT: NORMAL SECS (ref 58–77)
TOTAL RESP. RATE, ITRR: 20
TOTAL RESP. RATE, ITRR: 20
TSH SERPL DL<=0.05 MIU/L-ACNC: 0.55 UIU/ML (ref 0.36–3.74)
UA: UC IF INDICATED,UAUC: ABNORMAL
UNIT DIVISION, %UDIV: 0
UROBILINOGEN UR QL STRIP.AUTO: 0.2 EU/DL (ref 0.2–1)
VALID INTERNAL CONTROL?: YES
VENTRICULAR RATE, ECG03: 103 BPM
VENTRICULAR RATE, ECG03: 68 BPM
VENTRICULAR RATE, ECG03: 71 BPM
VENTRICULAR RATE, ECG03: 89 BPM
WBC # BLD AUTO: 5.6 K/UL (ref 3.6–11)
WBC # BLD AUTO: 5.9 X10E3/UL (ref 3.4–10.8)
WBC # BLD AUTO: 6.3 K/UL (ref 3.6–11)
WBC # BLD AUTO: 6.4 K/UL (ref 3.6–11)
WBC # BLD AUTO: 6.6 K/UL (ref 3.6–11)
WBC # BLD AUTO: 7.2 K/UL (ref 3.6–11)
WBC # BLD AUTO: 7.3 K/UL (ref 3.6–11)
WBC # BLD AUTO: 8.7 K/UL (ref 3.6–11)
WBC # BLD AUTO: 9.9 X10E3/UL (ref 3.4–10.8)
WBC URNS QL MICRO: ABNORMAL /HPF (ref 0–4)

## 2018-01-01 PROCEDURE — 36600 WITHDRAWAL OF ARTERIAL BLOOD: CPT

## 2018-01-01 PROCEDURE — 83735 ASSAY OF MAGNESIUM: CPT | Performed by: NURSE PRACTITIONER

## 2018-01-01 PROCEDURE — 77030028837 HC SYR ANGI PWR INJ COEU -A

## 2018-01-01 PROCEDURE — 93005 ELECTROCARDIOGRAM TRACING: CPT

## 2018-01-01 PROCEDURE — 86923 COMPATIBILITY TEST ELECTRIC: CPT | Performed by: NURSE PRACTITIONER

## 2018-01-01 PROCEDURE — 74011000250 HC RX REV CODE- 250: Performed by: NURSE PRACTITIONER

## 2018-01-01 PROCEDURE — 77030019702 HC WRP THER MENM -C: Performed by: THORACIC SURGERY (CARDIOTHORACIC VASCULAR SURGERY)

## 2018-01-01 PROCEDURE — 85610 PROTHROMBIN TIME: CPT

## 2018-01-01 PROCEDURE — 99284 EMERGENCY DEPT VISIT MOD MDM: CPT

## 2018-01-01 PROCEDURE — 74011250637 HC RX REV CODE- 250/637: Performed by: NURSE PRACTITIONER

## 2018-01-01 PROCEDURE — 36415 COLL VENOUS BLD VENIPUNCTURE: CPT | Performed by: NURSE PRACTITIONER

## 2018-01-01 PROCEDURE — 80053 COMPREHEN METABOLIC PANEL: CPT | Performed by: NURSE PRACTITIONER

## 2018-01-01 PROCEDURE — 74011250636 HC RX REV CODE- 250/636

## 2018-01-01 PROCEDURE — 77030013469: Performed by: THORACIC SURGERY (CARDIOTHORACIC VASCULAR SURGERY)

## 2018-01-01 PROCEDURE — 77030012929 HC DIL URET COOK -C: Performed by: THORACIC SURGERY (CARDIOTHORACIC VASCULAR SURGERY)

## 2018-01-01 PROCEDURE — G0300 HHS/HOSPICE OF LPN EA 15 MIN: HCPCS

## 2018-01-01 PROCEDURE — 85610 PROTHROMBIN TIME: CPT | Performed by: EMERGENCY MEDICINE

## 2018-01-01 PROCEDURE — C1751 CATH, INF, PER/CENT/MIDLINE: HCPCS

## 2018-01-01 PROCEDURE — 76060000038 HC ANESTHESIA 3.5 TO 4 HR: Performed by: THORACIC SURGERY (CARDIOTHORACIC VASCULAR SURGERY)

## 2018-01-01 PROCEDURE — 93922 UPR/L XTREMITY ART 2 LEVELS: CPT

## 2018-01-01 PROCEDURE — 76010000111 HC CV SURG 3.5 TO 4 HR: Performed by: THORACIC SURGERY (CARDIOTHORACIC VASCULAR SURGERY)

## 2018-01-01 PROCEDURE — 87077 CULTURE AEROBIC IDENTIFY: CPT | Performed by: NURSE PRACTITIONER

## 2018-01-01 PROCEDURE — C1773 RET DEV, INSERTABLE: HCPCS | Performed by: THORACIC SURGERY (CARDIOTHORACIC VASCULAR SURGERY)

## 2018-01-01 PROCEDURE — 74011250636 HC RX REV CODE- 250/636: Performed by: THORACIC SURGERY (CARDIOTHORACIC VASCULAR SURGERY)

## 2018-01-01 PROCEDURE — 99152 MOD SED SAME PHYS/QHP 5/>YRS: CPT

## 2018-01-01 PROCEDURE — 74011000258 HC RX REV CODE- 258: Performed by: NURSE PRACTITIONER

## 2018-01-01 PROCEDURE — 93798 PHYS/QHP OP CAR RHAB W/ECG: CPT

## 2018-01-01 PROCEDURE — 74011250636 HC RX REV CODE- 250/636: Performed by: INTERNAL MEDICINE

## 2018-01-01 PROCEDURE — 77030004533 HC CATH ANGI DX IMP BSC -B: Performed by: THORACIC SURGERY (CARDIOTHORACIC VASCULAR SURGERY)

## 2018-01-01 PROCEDURE — 85730 THROMBOPLASTIN TIME PARTIAL: CPT | Performed by: NURSE PRACTITIONER

## 2018-01-01 PROCEDURE — 99218 HC RM OBSERVATION: CPT

## 2018-01-01 PROCEDURE — 97535 SELF CARE MNGMENT TRAINING: CPT

## 2018-01-01 PROCEDURE — 85025 COMPLETE CBC W/AUTO DIFF WBC: CPT | Performed by: NURSE PRACTITIONER

## 2018-01-01 PROCEDURE — 74011000250 HC RX REV CODE- 250

## 2018-01-01 PROCEDURE — 86900 BLOOD TYPING SEROLOGIC ABO: CPT | Performed by: NURSE PRACTITIONER

## 2018-01-01 PROCEDURE — C1725 CATH, TRANSLUMIN NON-LASER: HCPCS | Performed by: THORACIC SURGERY (CARDIOTHORACIC VASCULAR SURGERY)

## 2018-01-01 PROCEDURE — 82962 GLUCOSE BLOOD TEST: CPT

## 2018-01-01 PROCEDURE — 36415 COLL VENOUS BLD VENIPUNCTURE: CPT | Performed by: EMERGENCY MEDICINE

## 2018-01-01 PROCEDURE — 77030037468 HC VLV AORT EDWRD -L: Performed by: THORACIC SURGERY (CARDIOTHORACIC VASCULAR SURGERY)

## 2018-01-01 PROCEDURE — 97161 PT EVAL LOW COMPLEX 20 MIN: CPT

## 2018-01-01 PROCEDURE — 87186 SC STD MICRODIL/AGAR DIL: CPT | Performed by: NURSE PRACTITIONER

## 2018-01-01 PROCEDURE — G0299 HHS/HOSPICE OF RN EA 15 MIN: HCPCS

## 2018-01-01 PROCEDURE — 83036 HEMOGLOBIN GLYCOSYLATED A1C: CPT | Performed by: NURSE PRACTITIONER

## 2018-01-01 PROCEDURE — C1769 GUIDE WIRE: HCPCS | Performed by: THORACIC SURGERY (CARDIOTHORACIC VASCULAR SURGERY)

## 2018-01-01 PROCEDURE — 97116 GAIT TRAINING THERAPY: CPT

## 2018-01-01 PROCEDURE — C1894 INTRO/SHEATH, NON-LASER: HCPCS

## 2018-01-01 PROCEDURE — 85610 PROTHROMBIN TIME: CPT | Performed by: NURSE PRACTITIONER

## 2018-01-01 PROCEDURE — 93306 TTE W/DOPPLER COMPLETE: CPT

## 2018-01-01 PROCEDURE — 93312 ECHO TRANSESOPHAGEAL: CPT

## 2018-01-01 PROCEDURE — 76001 XR FLUOROSCOPY OVER 60 MINUTES: CPT

## 2018-01-01 PROCEDURE — 85610 PROTHROMBIN TIME: CPT | Performed by: STUDENT IN AN ORGANIZED HEALTH CARE EDUCATION/TRAINING PROGRAM

## 2018-01-01 PROCEDURE — 77030018719 HC DRSG PTCH ANTIMIC J&J -A

## 2018-01-01 PROCEDURE — B3101ZZ FLUOROSCOPY OF THORACIC AORTA USING LOW OSMOLAR CONTRAST: ICD-10-PCS | Performed by: INTERNAL MEDICINE

## 2018-01-01 PROCEDURE — 82803 BLOOD GASES ANY COMBINATION: CPT

## 2018-01-01 PROCEDURE — 85014 HEMATOCRIT: CPT | Performed by: NURSE PRACTITIONER

## 2018-01-01 PROCEDURE — 77030013798 HC KT TRNSDUC PRSSR EDWD -B: Performed by: THORACIC SURGERY (CARDIOTHORACIC VASCULAR SURGERY)

## 2018-01-01 PROCEDURE — 74011636637 HC RX REV CODE- 636/637: Performed by: NURSE PRACTITIONER

## 2018-01-01 PROCEDURE — 77030004533 HC CATH ANGI DX IMP BSC -B

## 2018-01-01 PROCEDURE — 85730 THROMBOPLASTIN TIME PARTIAL: CPT | Performed by: STUDENT IN AN ORGANIZED HEALTH CARE EDUCATION/TRAINING PROGRAM

## 2018-01-01 PROCEDURE — 85025 COMPLETE CBC W/AUTO DIFF WBC: CPT | Performed by: EMERGENCY MEDICINE

## 2018-01-01 PROCEDURE — 74011250636 HC RX REV CODE- 250/636: Performed by: NURSE PRACTITIONER

## 2018-01-01 PROCEDURE — 30233N1 TRANSFUSION OF NONAUTOLOGOUS RED BLOOD CELLS INTO PERIPHERAL VEIN, PERCUTANEOUS APPROACH: ICD-10-PCS | Performed by: THORACIC SURGERY (CARDIOTHORACIC VASCULAR SURGERY)

## 2018-01-01 PROCEDURE — 80048 BASIC METABOLIC PNL TOTAL CA: CPT | Performed by: STUDENT IN AN ORGANIZED HEALTH CARE EDUCATION/TRAINING PROGRAM

## 2018-01-01 PROCEDURE — 77030002916 HC SUT ETHLN J&J -A

## 2018-01-01 PROCEDURE — 77030020508 HC PD GRND GENRTR BAYL -A: Performed by: THORACIC SURGERY (CARDIOTHORACIC VASCULAR SURGERY)

## 2018-01-01 PROCEDURE — C1760 CLOSURE DEV, VASC: HCPCS | Performed by: THORACIC SURGERY (CARDIOTHORACIC VASCULAR SURGERY)

## 2018-01-01 PROCEDURE — 77030027138 HC INCENT SPIROMETER -A

## 2018-01-01 PROCEDURE — 77030026438 HC STYL ET INTUB CARD -A: Performed by: ANESTHESIOLOGY

## 2018-01-01 PROCEDURE — 77030005320 HC CATH PACE TEMP STJU -B

## 2018-01-01 PROCEDURE — 74011000250 HC RX REV CODE- 250: Performed by: INTERNAL MEDICINE

## 2018-01-01 PROCEDURE — 74174 CTA ABD&PLVS W/CONTRAST: CPT

## 2018-01-01 PROCEDURE — 71046 X-RAY EXAM CHEST 2 VIEWS: CPT

## 2018-01-01 PROCEDURE — 71045 X-RAY EXAM CHEST 1 VIEW: CPT

## 2018-01-01 PROCEDURE — 4A023N7 MEASUREMENT OF CARDIAC SAMPLING AND PRESSURE, LEFT HEART, PERCUTANEOUS APPROACH: ICD-10-PCS | Performed by: INTERNAL MEDICINE

## 2018-01-01 PROCEDURE — B24BZZ4 ULTRASONOGRAPHY OF HEART WITH AORTA, TRANSESOPHAGEAL: ICD-10-PCS | Performed by: ANESTHESIOLOGY

## 2018-01-01 PROCEDURE — 85027 COMPLETE CBC AUTOMATED: CPT | Performed by: STUDENT IN AN ORGANIZED HEALTH CARE EDUCATION/TRAINING PROGRAM

## 2018-01-01 PROCEDURE — 77030035440: Performed by: THORACIC SURGERY (CARDIOTHORACIC VASCULAR SURGERY)

## 2018-01-01 PROCEDURE — 71275 CT ANGIOGRAPHY CHEST: CPT

## 2018-01-01 PROCEDURE — 77030034850: Performed by: THORACIC SURGERY (CARDIOTHORACIC VASCULAR SURGERY)

## 2018-01-01 PROCEDURE — 77030018729 HC ELECTRD DEFIB PAD CARD -B

## 2018-01-01 PROCEDURE — G0151 HHCP-SERV OF PT,EA 15 MIN: HCPCS

## 2018-01-01 PROCEDURE — C1817 SEPTAL DEFECT IMP SYS: HCPCS

## 2018-01-01 PROCEDURE — 87086 URINE CULTURE/COLONY COUNT: CPT | Performed by: NURSE PRACTITIONER

## 2018-01-01 PROCEDURE — 74011636320 HC RX REV CODE- 636/320: Performed by: NURSE PRACTITIONER

## 2018-01-01 PROCEDURE — 80048 BASIC METABOLIC PNL TOTAL CA: CPT | Performed by: EMERGENCY MEDICINE

## 2018-01-01 PROCEDURE — 77030011640 HC PAD GRND REM COVD -A: Performed by: THORACIC SURGERY (CARDIOTHORACIC VASCULAR SURGERY)

## 2018-01-01 PROCEDURE — 36430 TRANSFUSION BLD/BLD COMPNT: CPT

## 2018-01-01 PROCEDURE — 74011250636 HC RX REV CODE- 250/636: Performed by: ANESTHESIOLOGY

## 2018-01-01 PROCEDURE — C1894 INTRO/SHEATH, NON-LASER: HCPCS | Performed by: THORACIC SURGERY (CARDIOTHORACIC VASCULAR SURGERY)

## 2018-01-01 PROCEDURE — 77030005402 HC CATH RAD ART LN KT TELE -B

## 2018-01-01 PROCEDURE — 77030008771 HC TU NG SALEM SUMP -A: Performed by: ANESTHESIOLOGY

## 2018-01-01 PROCEDURE — 65660000000 HC RM CCU STEPDOWN

## 2018-01-01 PROCEDURE — 77030013797 HC KT TRNSDUC PRSSR EDWD -A: Performed by: THORACIC SURGERY (CARDIOTHORACIC VASCULAR SURGERY)

## 2018-01-01 PROCEDURE — 80048 BASIC METABOLIC PNL TOTAL CA: CPT | Performed by: NURSE PRACTITIONER

## 2018-01-01 PROCEDURE — 02RG38H REPLACEMENT OF MITRAL VALVE WITH ZOOPLASTIC TISSUE, TRANSAPICAL, PERCUTANEOUS APPROACH: ICD-10-PCS | Performed by: THORACIC SURGERY (CARDIOTHORACIC VASCULAR SURGERY)

## 2018-01-01 PROCEDURE — C1769 GUIDE WIRE: HCPCS

## 2018-01-01 PROCEDURE — 93457 R HRT ART/GRFT ANGIO: CPT

## 2018-01-01 PROCEDURE — 77030029065 HC DRSG HEMO QCLOT ZMED -B

## 2018-01-01 PROCEDURE — 65610000003 HC RM ICU SURGICAL

## 2018-01-01 PROCEDURE — 77030020268 HC MISC GENERAL SUPPLY: Performed by: THORACIC SURGERY (CARDIOTHORACIC VASCULAR SURGERY)

## 2018-01-01 PROCEDURE — 02RF38Z REPLACEMENT OF AORTIC VALVE WITH ZOOPLASTIC TISSUE, PERCUTANEOUS APPROACH: ICD-10-PCS | Performed by: THORACIC SURGERY (CARDIOTHORACIC VASCULAR SURGERY)

## 2018-01-01 PROCEDURE — 94010 BREATHING CAPACITY TEST: CPT

## 2018-01-01 PROCEDURE — C1892 INTRO/SHEATH,FIXED,PEEL-AWAY: HCPCS | Performed by: THORACIC SURGERY (CARDIOTHORACIC VASCULAR SURGERY)

## 2018-01-01 PROCEDURE — 85027 COMPLETE CBC AUTOMATED: CPT | Performed by: NURSE PRACTITIONER

## 2018-01-01 PROCEDURE — 77030018729 HC ELECTRD DEFIB PAD CARD -B: Performed by: THORACIC SURGERY (CARDIOTHORACIC VASCULAR SURGERY)

## 2018-01-01 PROCEDURE — 97165 OT EVAL LOW COMPLEX 30 MIN: CPT

## 2018-01-01 PROCEDURE — 74011636320 HC RX REV CODE- 636/320

## 2018-01-01 PROCEDURE — P9016 RBC LEUKOCYTES REDUCED: HCPCS | Performed by: NURSE PRACTITIONER

## 2018-01-01 PROCEDURE — 02U53JZ SUPPLEMENT ATRIAL SEPTUM WITH SYNTHETIC SUBSTITUTE, PERCUTANEOUS APPROACH: ICD-10-PCS | Performed by: THORACIC SURGERY (CARDIOTHORACIC VASCULAR SURGERY)

## 2018-01-01 PROCEDURE — 77030008684 HC TU ET CUF COVD -B: Performed by: ANESTHESIOLOGY

## 2018-01-01 PROCEDURE — 99283 EMERGENCY DEPT VISIT LOW MDM: CPT

## 2018-01-01 PROCEDURE — 81001 URINALYSIS AUTO W/SCOPE: CPT | Performed by: NURSE PRACTITIONER

## 2018-01-01 PROCEDURE — P9059 PLASMA, FRZ BETWEEN 8-24HOUR: HCPCS | Performed by: NURSE PRACTITIONER

## 2018-01-01 PROCEDURE — 36415 COLL VENOUS BLD VENIPUNCTURE: CPT | Performed by: STUDENT IN AN ORGANIZED HEALTH CARE EDUCATION/TRAINING PROGRAM

## 2018-01-01 PROCEDURE — 77030013715 HC INFL SYS MRTM -B

## 2018-01-01 PROCEDURE — 84443 ASSAY THYROID STIM HORMONE: CPT | Performed by: NURSE PRACTITIONER

## 2018-01-01 PROCEDURE — 77030013744

## 2018-01-01 DEVICE — VLV EVOLUTR-23-US TAV 23 US COMM MX
Type: IMPLANTABLE DEVICE | Site: AORTIC VALVE | Status: FUNCTIONAL
Brand: COREVALVE™ EVOLUT™ R

## 2018-01-01 DEVICE — VALVE AORTIC SAPEIN 3 26MM -- COMMANDER SYS 9600TFX: Type: IMPLANTABLE DEVICE | Site: AORTIC VALVE | Status: FUNCTIONAL

## 2018-01-01 RX ORDER — METFORMIN HYDROCHLORIDE 500 MG/1
500 TABLET ORAL 2 TIMES DAILY WITH MEALS
Qty: 1 TAB | Refills: 0 | Status: ON HOLD
Start: 2018-01-01 | End: 2018-01-01

## 2018-01-01 RX ORDER — SODIUM CHLORIDE 0.9 % (FLUSH) 0.9 %
5-10 SYRINGE (ML) INJECTION EVERY 8 HOURS
Status: DISCONTINUED | OUTPATIENT
Start: 2018-01-01 | End: 2018-01-01

## 2018-01-01 RX ORDER — ONDANSETRON 2 MG/ML
4 INJECTION INTRAMUSCULAR; INTRAVENOUS AS NEEDED
Status: CANCELLED | OUTPATIENT
Start: 2018-01-01

## 2018-01-01 RX ORDER — ACETAMINOPHEN 325 MG/1
650 TABLET ORAL
Status: DISCONTINUED | OUTPATIENT
Start: 2018-01-01 | End: 2018-01-01 | Stop reason: HOSPADM

## 2018-01-01 RX ORDER — SODIUM CHLORIDE 0.9 % (FLUSH) 0.9 %
5-10 SYRINGE (ML) INJECTION EVERY 8 HOURS
Status: DISCONTINUED | OUTPATIENT
Start: 2018-01-01 | End: 2018-01-01 | Stop reason: HOSPADM

## 2018-01-01 RX ORDER — METFORMIN HYDROCHLORIDE 500 MG/1
1000 TABLET ORAL 2 TIMES DAILY WITH MEALS
Status: DISCONTINUED | OUTPATIENT
Start: 2018-01-01 | End: 2018-01-01 | Stop reason: HOSPADM

## 2018-01-01 RX ORDER — DIPHENHYDRAMINE HYDROCHLORIDE 50 MG/ML
12.5 INJECTION, SOLUTION INTRAMUSCULAR; INTRAVENOUS AS NEEDED
Status: CANCELLED | OUTPATIENT
Start: 2018-01-01 | End: 2018-01-01

## 2018-01-01 RX ORDER — AMOXICILLIN 250 MG
1 CAPSULE ORAL DAILY
Status: DISCONTINUED | OUTPATIENT
Start: 2018-01-01 | End: 2018-01-01 | Stop reason: HOSPADM

## 2018-01-01 RX ORDER — ASCORBIC ACID 500 MG
1000 TABLET ORAL
Status: DISCONTINUED | OUTPATIENT
Start: 2018-01-01 | End: 2018-01-01

## 2018-01-01 RX ORDER — GLIPIZIDE 5 MG/1
5 TABLET ORAL
Status: DISCONTINUED | OUTPATIENT
Start: 2018-01-01 | End: 2018-01-01 | Stop reason: HOSPADM

## 2018-01-01 RX ORDER — SODIUM CHLORIDE 0.9 % (FLUSH) 0.9 %
10 SYRINGE (ML) INJECTION AS NEEDED
Status: DISCONTINUED | OUTPATIENT
Start: 2018-01-01 | End: 2018-01-01

## 2018-01-01 RX ORDER — WARFARIN 2 MG/1
3 TABLET ORAL
Qty: 45 TAB | Refills: 1 | Status: SHIPPED | OUTPATIENT
Start: 2018-01-01 | End: 2019-01-01

## 2018-01-01 RX ORDER — FENTANYL CITRATE 50 UG/ML
25-50 INJECTION, SOLUTION INTRAMUSCULAR; INTRAVENOUS
Status: DISCONTINUED | OUTPATIENT
Start: 2018-01-01 | End: 2018-01-01 | Stop reason: ALTCHOICE

## 2018-01-01 RX ORDER — GUAIFENESIN 100 MG/5ML
81 LIQUID (ML) ORAL DAILY
Qty: 30 TAB | Refills: 0
Start: 2018-01-01

## 2018-01-01 RX ORDER — MULTIVITAMIN WITH IRON
1 TABLET ORAL DAILY
COMMUNITY
End: 2018-01-01

## 2018-01-01 RX ORDER — AMLODIPINE BESYLATE 5 MG/1
5 TABLET ORAL DAILY
Status: DISCONTINUED | OUTPATIENT
Start: 2018-01-01 | End: 2018-01-01 | Stop reason: HOSPADM

## 2018-01-01 RX ORDER — NEOSTIGMINE METHYLSULFATE 1 MG/ML
INJECTION INTRAVENOUS AS NEEDED
Status: DISCONTINUED | OUTPATIENT
Start: 2018-01-01 | End: 2018-01-01 | Stop reason: HOSPADM

## 2018-01-01 RX ORDER — LANOLIN ALCOHOL/MO/W.PET/CERES
400 CREAM (GRAM) TOPICAL 2 TIMES DAILY
Status: DISCONTINUED | OUTPATIENT
Start: 2018-01-01 | End: 2018-01-01 | Stop reason: HOSPADM

## 2018-01-01 RX ORDER — DEXTROSE 50 % IN WATER (D50W) INTRAVENOUS SYRINGE
12.5-25 AS NEEDED
Status: DISCONTINUED | OUTPATIENT
Start: 2018-01-01 | End: 2018-01-01

## 2018-01-01 RX ORDER — VERAPAMIL HYDROCHLORIDE 2.5 MG/ML
2.5 INJECTION, SOLUTION INTRAVENOUS ONCE
Status: DISCONTINUED | OUTPATIENT
Start: 2018-01-01 | End: 2018-01-01 | Stop reason: HOSPADM

## 2018-01-01 RX ORDER — SODIUM CHLORIDE 0.9 % (FLUSH) 0.9 %
20 SYRINGE (ML) INJECTION EVERY 24 HOURS
Status: DISCONTINUED | OUTPATIENT
Start: 2018-01-01 | End: 2018-01-01

## 2018-01-01 RX ORDER — GLYCOPYRROLATE 0.2 MG/ML
INJECTION INTRAMUSCULAR; INTRAVENOUS AS NEEDED
Status: DISCONTINUED | OUTPATIENT
Start: 2018-01-01 | End: 2018-01-01 | Stop reason: HOSPADM

## 2018-01-01 RX ORDER — AMOXICILLIN 250 MG
1 CAPSULE ORAL 2 TIMES DAILY
Status: DISCONTINUED | OUTPATIENT
Start: 2018-01-01 | End: 2018-01-01 | Stop reason: HOSPADM

## 2018-01-01 RX ORDER — VECURONIUM BROMIDE FOR INJECTION 1 MG/ML
INJECTION, POWDER, LYOPHILIZED, FOR SOLUTION INTRAVENOUS AS NEEDED
Status: DISCONTINUED | OUTPATIENT
Start: 2018-01-01 | End: 2018-01-01 | Stop reason: HOSPADM

## 2018-01-01 RX ORDER — BACITRACIN 500 UNIT/G
1 PACKET (EA) TOPICAL AS NEEDED
Status: DISCONTINUED | OUTPATIENT
Start: 2018-01-01 | End: 2018-01-01 | Stop reason: HOSPADM

## 2018-01-01 RX ORDER — FUROSEMIDE 20 MG/1
20 TABLET ORAL DAILY
Status: DISCONTINUED | OUTPATIENT
Start: 2018-01-01 | End: 2018-01-01 | Stop reason: HOSPADM

## 2018-01-01 RX ORDER — FENTANYL CITRATE 50 UG/ML
25-50 INJECTION, SOLUTION INTRAMUSCULAR; INTRAVENOUS
Status: DISCONTINUED | OUTPATIENT
Start: 2018-01-01 | End: 2018-01-01

## 2018-01-01 RX ORDER — HEPARIN SODIUM 200 [USP'U]/100ML
500 INJECTION, SOLUTION INTRAVENOUS ONCE
Status: COMPLETED | OUTPATIENT
Start: 2018-01-01 | End: 2018-01-01

## 2018-01-01 RX ORDER — GUAIFENESIN 100 MG/5ML
81 LIQUID (ML) ORAL DAILY
Status: DISCONTINUED | OUTPATIENT
Start: 2018-01-01 | End: 2018-01-01 | Stop reason: HOSPADM

## 2018-01-01 RX ORDER — LANOLIN ALCOHOL/MO/W.PET/CERES
1 CREAM (GRAM) TOPICAL
Status: DISCONTINUED | OUTPATIENT
Start: 2018-01-01 | End: 2018-01-01 | Stop reason: HOSPADM

## 2018-01-01 RX ORDER — ENOXAPARIN SODIUM 100 MG/ML
70 INJECTION SUBCUTANEOUS EVERY 12 HOURS
Qty: 14 SYRINGE | Refills: 0 | Status: SHIPPED | OUTPATIENT
Start: 2018-01-01 | End: 2018-01-01

## 2018-01-01 RX ORDER — MIDAZOLAM HYDROCHLORIDE 1 MG/ML
0.5 INJECTION, SOLUTION INTRAMUSCULAR; INTRAVENOUS
Status: CANCELLED | OUTPATIENT
Start: 2018-01-01

## 2018-01-01 RX ORDER — ESMOLOL HYDROCHLORIDE 10 MG/ML
INJECTION INTRAVENOUS AS NEEDED
Status: DISCONTINUED | OUTPATIENT
Start: 2018-01-01 | End: 2018-01-01 | Stop reason: HOSPADM

## 2018-01-01 RX ORDER — WARFARIN SODIUM 5 MG/1
TABLET ORAL
Qty: 30 TAB | Refills: 5 | Status: SHIPPED | OUTPATIENT
Start: 2018-01-01 | End: 2018-01-01

## 2018-01-01 RX ORDER — SODIUM CHLORIDE 0.9 % (FLUSH) 0.9 %
10 SYRINGE (ML) INJECTION
Status: COMPLETED | OUTPATIENT
Start: 2018-01-01 | End: 2018-01-01

## 2018-01-01 RX ORDER — HEPARIN SODIUM 200 [USP'U]/100ML
500 INJECTION, SOLUTION INTRAVENOUS ONCE
Status: DISCONTINUED | OUTPATIENT
Start: 2018-01-01 | End: 2018-01-01 | Stop reason: HOSPADM

## 2018-01-01 RX ORDER — LIDOCAINE HYDROCHLORIDE 20 MG/ML
SOLUTION OROPHARYNGEAL
Status: COMPLETED
Start: 2018-01-01 | End: 2018-01-01

## 2018-01-01 RX ORDER — MIDAZOLAM HYDROCHLORIDE 1 MG/ML
1 INJECTION, SOLUTION INTRAMUSCULAR; INTRAVENOUS AS NEEDED
Status: DISCONTINUED | OUTPATIENT
Start: 2018-01-01 | End: 2018-01-01 | Stop reason: HOSPADM

## 2018-01-01 RX ORDER — LIDOCAINE HYDROCHLORIDE 10 MG/ML
1-30 INJECTION, SOLUTION EPIDURAL; INFILTRATION; INTRACAUDAL; PERINEURAL
Status: DISCONTINUED | OUTPATIENT
Start: 2018-01-01 | End: 2018-01-01 | Stop reason: HOSPADM

## 2018-01-01 RX ORDER — NITROGLYCERIN 0.4 MG/1
TABLET SUBLINGUAL
Qty: 50 TAB | Refills: 0 | Status: SHIPPED | OUTPATIENT
Start: 2018-01-01

## 2018-01-01 RX ORDER — MIDAZOLAM HYDROCHLORIDE 1 MG/ML
INJECTION, SOLUTION INTRAMUSCULAR; INTRAVENOUS
Status: COMPLETED
Start: 2018-01-01 | End: 2018-01-01

## 2018-01-01 RX ORDER — LIDOCAINE HYDROCHLORIDE 20 MG/ML
INJECTION, SOLUTION EPIDURAL; INFILTRATION; INTRACAUDAL; PERINEURAL AS NEEDED
Status: DISCONTINUED | OUTPATIENT
Start: 2018-01-01 | End: 2018-01-01 | Stop reason: HOSPADM

## 2018-01-01 RX ORDER — SODIUM CHLORIDE 0.9 % (FLUSH) 0.9 %
5-10 SYRINGE (ML) INJECTION AS NEEDED
Status: DISCONTINUED | OUTPATIENT
Start: 2018-01-01 | End: 2018-01-01 | Stop reason: HOSPADM

## 2018-01-01 RX ORDER — OXYCODONE AND ACETAMINOPHEN 5; 325 MG/1; MG/1
1-2 TABLET ORAL
Status: DISCONTINUED | OUTPATIENT
Start: 2018-01-01 | End: 2018-01-01 | Stop reason: HOSPADM

## 2018-01-01 RX ORDER — SODIUM CHLORIDE, SODIUM LACTATE, POTASSIUM CHLORIDE, CALCIUM CHLORIDE 600; 310; 30; 20 MG/100ML; MG/100ML; MG/100ML; MG/100ML
100 INJECTION, SOLUTION INTRAVENOUS CONTINUOUS
Status: DISCONTINUED | OUTPATIENT
Start: 2018-01-01 | End: 2018-01-01 | Stop reason: HOSPADM

## 2018-01-01 RX ORDER — LIDOCAINE HYDROCHLORIDE 10 MG/ML
0-30 INJECTION INFILTRATION; PERINEURAL ONCE
Status: COMPLETED | OUTPATIENT
Start: 2018-01-01 | End: 2018-01-01

## 2018-01-01 RX ORDER — MIDAZOLAM HYDROCHLORIDE 1 MG/ML
.5-2 INJECTION, SOLUTION INTRAMUSCULAR; INTRAVENOUS
Status: DISCONTINUED | OUTPATIENT
Start: 2018-01-01 | End: 2018-01-01

## 2018-01-01 RX ORDER — SODIUM CHLORIDE 9 MG/ML
INJECTION, SOLUTION INTRAVENOUS
Status: DISCONTINUED | OUTPATIENT
Start: 2018-01-01 | End: 2018-01-01 | Stop reason: HOSPADM

## 2018-01-01 RX ORDER — PROPOFOL 10 MG/ML
INJECTION, EMULSION INTRAVENOUS AS NEEDED
Status: DISCONTINUED | OUTPATIENT
Start: 2018-01-01 | End: 2018-01-01 | Stop reason: HOSPADM

## 2018-01-01 RX ORDER — CEFAZOLIN SODIUM 2 G/50ML
2 SOLUTION INTRAVENOUS ONCE
Status: DISCONTINUED | OUTPATIENT
Start: 2018-01-01 | End: 2018-01-01 | Stop reason: HOSPADM

## 2018-01-01 RX ORDER — SODIUM CHLORIDE 9 MG/ML
250 INJECTION, SOLUTION INTRAVENOUS AS NEEDED
Status: DISCONTINUED | OUTPATIENT
Start: 2018-01-01 | End: 2018-01-01 | Stop reason: HOSPADM

## 2018-01-01 RX ORDER — LISINOPRIL 20 MG/1
20 TABLET ORAL DAILY
Status: DISCONTINUED | OUTPATIENT
Start: 2018-01-01 | End: 2018-01-01 | Stop reason: HOSPADM

## 2018-01-01 RX ORDER — ONDANSETRON 2 MG/ML
INJECTION INTRAMUSCULAR; INTRAVENOUS AS NEEDED
Status: DISCONTINUED | OUTPATIENT
Start: 2018-01-01 | End: 2018-01-01 | Stop reason: HOSPADM

## 2018-01-01 RX ORDER — ASCORBIC ACID 500 MG
1000 TABLET ORAL DAILY
Status: DISCONTINUED | OUTPATIENT
Start: 2018-01-01 | End: 2018-01-01

## 2018-01-01 RX ORDER — GLIPIZIDE 5 MG/1
5 TABLET ORAL DAILY
Status: DISCONTINUED | OUTPATIENT
Start: 2018-01-01 | End: 2018-01-01 | Stop reason: HOSPADM

## 2018-01-01 RX ORDER — GUAIFENESIN 100 MG/5ML
81 LIQUID (ML) ORAL DAILY
Status: DISCONTINUED | OUTPATIENT
Start: 2018-01-01 | End: 2018-01-01

## 2018-01-01 RX ORDER — SODIUM CHLORIDE 0.9 % (FLUSH) 0.9 %
5-10 SYRINGE (ML) INJECTION AS NEEDED
Status: DISCONTINUED | OUTPATIENT
Start: 2018-01-01 | End: 2018-01-01

## 2018-01-01 RX ORDER — METOPROLOL SUCCINATE 50 MG/1
50 TABLET, EXTENDED RELEASE ORAL DAILY
Qty: 30 TAB | Refills: 6 | Status: SHIPPED | OUTPATIENT
Start: 2018-01-01 | End: 2019-01-01

## 2018-01-01 RX ORDER — TRAMADOL HYDROCHLORIDE 50 MG/1
50-100 TABLET ORAL
Status: DISCONTINUED | OUTPATIENT
Start: 2018-01-01 | End: 2018-01-01 | Stop reason: HOSPADM

## 2018-01-01 RX ORDER — SODIUM CHLORIDE 9 MG/ML
25 INJECTION, SOLUTION INTRAVENOUS CONTINUOUS
Status: DISPENSED | OUTPATIENT
Start: 2018-01-01 | End: 2018-01-01

## 2018-01-01 RX ORDER — FACIAL-BODY WIPES
10 EACH TOPICAL DAILY PRN
Status: DISCONTINUED | OUTPATIENT
Start: 2018-01-01 | End: 2018-01-01 | Stop reason: HOSPADM

## 2018-01-01 RX ORDER — INSULIN LISPRO 100 [IU]/ML
INJECTION, SOLUTION INTRAVENOUS; SUBCUTANEOUS
Status: DISCONTINUED | OUTPATIENT
Start: 2018-01-01 | End: 2018-01-01 | Stop reason: HOSPADM

## 2018-01-01 RX ORDER — MIDAZOLAM HYDROCHLORIDE 1 MG/ML
.5-2 INJECTION, SOLUTION INTRAMUSCULAR; INTRAVENOUS
Status: DISCONTINUED | OUTPATIENT
Start: 2018-01-01 | End: 2018-01-01 | Stop reason: HOSPADM

## 2018-01-01 RX ORDER — SODIUM BICARBONATE 84 MG/ML
50 INJECTION, SOLUTION INTRAVENOUS
Status: COMPLETED | OUTPATIENT
Start: 2018-01-01 | End: 2018-01-01

## 2018-01-01 RX ORDER — METFORMIN HYDROCHLORIDE 500 MG/1
1000 TABLET ORAL 2 TIMES DAILY WITH MEALS
Qty: 60 TAB | Refills: 1 | Status: SHIPPED | OUTPATIENT
Start: 2018-01-01 | End: 2019-01-01

## 2018-01-01 RX ORDER — SODIUM CHLORIDE 0.9 % (FLUSH) 0.9 %
10 SYRINGE (ML) INJECTION EVERY 8 HOURS
Status: DISCONTINUED | OUTPATIENT
Start: 2018-01-01 | End: 2018-01-01

## 2018-01-01 RX ORDER — MAGNESIUM SULFATE 1 G/100ML
1 INJECTION INTRAVENOUS ONCE
Status: COMPLETED | OUTPATIENT
Start: 2018-01-01 | End: 2018-01-01

## 2018-01-01 RX ORDER — DEXTROSE 50 % IN WATER (D50W) INTRAVENOUS SYRINGE
AS NEEDED
Status: DISCONTINUED | OUTPATIENT
Start: 2018-01-01 | End: 2018-01-01 | Stop reason: HOSPADM

## 2018-01-01 RX ORDER — FAMOTIDINE 20 MG/1
20 TABLET, FILM COATED ORAL DAILY
Status: DISCONTINUED | OUTPATIENT
Start: 2018-01-01 | End: 2018-01-01 | Stop reason: HOSPADM

## 2018-01-01 RX ORDER — POTASSIUM CHLORIDE 29.8 MG/ML
20 INJECTION INTRAVENOUS ONCE
Status: COMPLETED | OUTPATIENT
Start: 2018-01-01 | End: 2018-01-01

## 2018-01-01 RX ORDER — PROTAMINE SULFATE 10 MG/ML
INJECTION, SOLUTION INTRAVENOUS AS NEEDED
Status: DISCONTINUED | OUTPATIENT
Start: 2018-01-01 | End: 2018-01-01 | Stop reason: HOSPADM

## 2018-01-01 RX ORDER — AMOXICILLIN 250 MG
1 CAPSULE ORAL DAILY
Qty: 30 TAB | Refills: 0 | Status: SHIPPED | OUTPATIENT
Start: 2018-01-01 | End: 2018-01-01

## 2018-01-01 RX ORDER — FENTANYL CITRATE 50 UG/ML
INJECTION, SOLUTION INTRAMUSCULAR; INTRAVENOUS
Status: COMPLETED
Start: 2018-01-01 | End: 2018-01-01

## 2018-01-01 RX ORDER — TRAMADOL HYDROCHLORIDE 50 MG/1
50-100 TABLET ORAL
Qty: 20 TAB | Refills: 0 | Status: SHIPPED | OUTPATIENT
Start: 2018-01-01 | End: 2018-01-01

## 2018-01-01 RX ORDER — SODIUM CHLORIDE 0.9 % (FLUSH) 0.9 %
10 SYRINGE (ML) INJECTION EVERY 24 HOURS
Status: DISCONTINUED | OUTPATIENT
Start: 2018-01-01 | End: 2018-01-01

## 2018-01-01 RX ORDER — SUCCINYLCHOLINE CHLORIDE 20 MG/ML
INJECTION INTRAMUSCULAR; INTRAVENOUS AS NEEDED
Status: DISCONTINUED | OUTPATIENT
Start: 2018-01-01 | End: 2018-01-01 | Stop reason: HOSPADM

## 2018-01-01 RX ORDER — SODIUM BICARBONATE 1 MEQ/ML
SYRINGE (ML) INTRAVENOUS
Status: DISPENSED
Start: 2018-01-01 | End: 2018-01-01

## 2018-01-01 RX ORDER — ATORVASTATIN CALCIUM 40 MG/1
TABLET, FILM COATED ORAL
Qty: 90 TAB | Refills: 3 | Status: SHIPPED | OUTPATIENT
Start: 2018-01-01 | End: 2019-01-01 | Stop reason: DRUGHIGH

## 2018-01-01 RX ORDER — LIDOCAINE HYDROCHLORIDE 10 MG/ML
0.1 INJECTION, SOLUTION EPIDURAL; INFILTRATION; INTRACAUDAL; PERINEURAL AS NEEDED
Status: DISCONTINUED | OUTPATIENT
Start: 2018-01-01 | End: 2018-01-01 | Stop reason: HOSPADM

## 2018-01-01 RX ORDER — HYDROMORPHONE HYDROCHLORIDE 2 MG/ML
0.5 INJECTION, SOLUTION INTRAMUSCULAR; INTRAVENOUS; SUBCUTANEOUS
Status: DISCONTINUED | OUTPATIENT
Start: 2018-01-01 | End: 2018-01-01 | Stop reason: CLARIF

## 2018-01-01 RX ORDER — MAGNESIUM SULFATE 100 %
4 CRYSTALS MISCELLANEOUS AS NEEDED
Status: DISCONTINUED | OUTPATIENT
Start: 2018-01-01 | End: 2018-01-01 | Stop reason: HOSPADM

## 2018-01-01 RX ORDER — LIDOCAINE HYDROCHLORIDE 10 MG/ML
INJECTION, SOLUTION EPIDURAL; INFILTRATION; INTRACAUDAL; PERINEURAL
Status: DISCONTINUED
Start: 2018-01-01 | End: 2018-01-01 | Stop reason: HOSPADM

## 2018-01-01 RX ORDER — SODIUM CHLORIDE 0.9 % (FLUSH) 0.9 %
5-10 SYRINGE (ML) INJECTION AS NEEDED
Status: CANCELLED | OUTPATIENT
Start: 2018-01-01

## 2018-01-01 RX ORDER — ATORVASTATIN CALCIUM 40 MG/1
40 TABLET, FILM COATED ORAL
Status: DISCONTINUED | OUTPATIENT
Start: 2018-01-01 | End: 2018-01-01 | Stop reason: HOSPADM

## 2018-01-01 RX ORDER — WARFARIN 2 MG/1
2 TABLET ORAL
Qty: 30 TAB | Refills: 1 | Status: SHIPPED | OUTPATIENT
Start: 2018-01-01 | End: 2018-01-01 | Stop reason: SDUPTHER

## 2018-01-01 RX ORDER — FENTANYL CITRATE 50 UG/ML
25 INJECTION, SOLUTION INTRAMUSCULAR; INTRAVENOUS
Status: CANCELLED | OUTPATIENT
Start: 2018-01-01

## 2018-01-01 RX ORDER — ENOXAPARIN SODIUM 100 MG/ML
70 INJECTION SUBCUTANEOUS EVERY 12 HOURS
Status: DISCONTINUED | OUTPATIENT
Start: 2018-01-01 | End: 2018-01-01 | Stop reason: HOSPADM

## 2018-01-01 RX ORDER — ASCORBIC ACID 500 MG
1000 TABLET ORAL DAILY
Status: DISCONTINUED | OUTPATIENT
Start: 2018-01-01 | End: 2018-01-01 | Stop reason: HOSPADM

## 2018-01-01 RX ORDER — CEFAZOLIN SODIUM 1 G/3ML
INJECTION, POWDER, FOR SOLUTION INTRAMUSCULAR; INTRAVENOUS AS NEEDED
Status: DISCONTINUED | OUTPATIENT
Start: 2018-01-01 | End: 2018-01-01 | Stop reason: HOSPADM

## 2018-01-01 RX ORDER — SODIUM CHLORIDE 450 MG/100ML
10 INJECTION, SOLUTION INTRAVENOUS CONTINUOUS
Status: DISCONTINUED | OUTPATIENT
Start: 2018-01-01 | End: 2018-01-01

## 2018-01-01 RX ORDER — EPINEPHRINE 1 MG/ML
INJECTION, SOLUTION, CONCENTRATE INTRAVENOUS AS NEEDED
Status: DISCONTINUED | OUTPATIENT
Start: 2018-01-01 | End: 2018-01-01 | Stop reason: HOSPADM

## 2018-01-01 RX ORDER — AMLODIPINE BESYLATE 5 MG/1
TABLET ORAL
Qty: 90 TAB | Refills: 3 | Status: SHIPPED | OUTPATIENT
Start: 2018-01-01 | End: 2019-01-01 | Stop reason: ALTCHOICE

## 2018-01-01 RX ORDER — ONDANSETRON 2 MG/ML
4 INJECTION INTRAMUSCULAR; INTRAVENOUS
Status: DISCONTINUED | OUTPATIENT
Start: 2018-01-01 | End: 2018-01-01 | Stop reason: HOSPADM

## 2018-01-01 RX ORDER — NALOXONE HYDROCHLORIDE 0.4 MG/ML
0.4 INJECTION, SOLUTION INTRAMUSCULAR; INTRAVENOUS; SUBCUTANEOUS AS NEEDED
Status: DISCONTINUED | OUTPATIENT
Start: 2018-01-01 | End: 2018-01-01 | Stop reason: HOSPADM

## 2018-01-01 RX ORDER — SODIUM CHLORIDE, SODIUM LACTATE, POTASSIUM CHLORIDE, CALCIUM CHLORIDE 600; 310; 30; 20 MG/100ML; MG/100ML; MG/100ML; MG/100ML
100 INJECTION, SOLUTION INTRAVENOUS CONTINUOUS
Status: CANCELLED | OUTPATIENT
Start: 2018-01-01

## 2018-01-01 RX ORDER — HYDROCHLOROTHIAZIDE 25 MG/1
50 TABLET ORAL DAILY
Status: DISCONTINUED | OUTPATIENT
Start: 2018-01-01 | End: 2018-01-01 | Stop reason: HOSPADM

## 2018-01-01 RX ORDER — WARFARIN SODIUM 5 MG/1
5 TABLET ORAL ONCE
Status: DISCONTINUED | OUTPATIENT
Start: 2018-01-01 | End: 2018-01-01 | Stop reason: HOSPADM

## 2018-01-01 RX ORDER — HEPARIN SODIUM 1000 [USP'U]/ML
INJECTION, SOLUTION INTRAVENOUS; SUBCUTANEOUS AS NEEDED
Status: DISCONTINUED | OUTPATIENT
Start: 2018-01-01 | End: 2018-01-01 | Stop reason: HOSPADM

## 2018-01-01 RX ORDER — SODIUM CHLORIDE 9 MG/ML
1.5-3 INJECTION, SOLUTION INTRAVENOUS
Status: DISCONTINUED | OUTPATIENT
Start: 2018-01-01 | End: 2018-01-01

## 2018-01-01 RX ORDER — SODIUM CHLORIDE, SODIUM LACTATE, POTASSIUM CHLORIDE, CALCIUM CHLORIDE 600; 310; 30; 20 MG/100ML; MG/100ML; MG/100ML; MG/100ML
INJECTION, SOLUTION INTRAVENOUS
Status: DISCONTINUED | OUTPATIENT
Start: 2018-01-01 | End: 2018-01-01 | Stop reason: HOSPADM

## 2018-01-01 RX ORDER — ALBUTEROL SULFATE 0.83 MG/ML
2.5 SOLUTION RESPIRATORY (INHALATION)
Status: DISCONTINUED | OUTPATIENT
Start: 2018-01-01 | End: 2018-01-01 | Stop reason: HOSPADM

## 2018-01-01 RX ORDER — ROPIVACAINE HYDROCHLORIDE 5 MG/ML
30 INJECTION, SOLUTION EPIDURAL; INFILTRATION; PERINEURAL AS NEEDED
Status: DISCONTINUED | OUTPATIENT
Start: 2018-01-01 | End: 2018-01-01 | Stop reason: HOSPADM

## 2018-01-01 RX ORDER — SODIUM CHLORIDE 9 MG/ML
9 INJECTION, SOLUTION INTRAVENOUS CONTINUOUS
Status: DISCONTINUED | OUTPATIENT
Start: 2018-01-01 | End: 2018-01-01

## 2018-01-01 RX ORDER — FENTANYL CITRATE 50 UG/ML
50 INJECTION, SOLUTION INTRAMUSCULAR; INTRAVENOUS AS NEEDED
Status: DISCONTINUED | OUTPATIENT
Start: 2018-01-01 | End: 2018-01-01 | Stop reason: HOSPADM

## 2018-01-01 RX ORDER — SODIUM CHLORIDE 9 MG/ML
250 INJECTION, SOLUTION INTRAVENOUS AS NEEDED
Status: DISCONTINUED | OUTPATIENT
Start: 2018-01-01 | End: 2018-01-01

## 2018-01-01 RX ORDER — MIDAZOLAM HYDROCHLORIDE 1 MG/ML
.5-2 INJECTION, SOLUTION INTRAMUSCULAR; INTRAVENOUS
Status: DISCONTINUED | OUTPATIENT
Start: 2018-01-01 | End: 2018-01-01 | Stop reason: ALTCHOICE

## 2018-01-01 RX ORDER — LIDOCAINE HYDROCHLORIDE 20 MG/ML
15 SOLUTION OROPHARYNGEAL ONCE
Status: COMPLETED | OUTPATIENT
Start: 2018-01-01 | End: 2018-01-01

## 2018-01-01 RX ORDER — LEVOFLOXACIN 250 MG/1
250 TABLET ORAL DAILY
Qty: 5 TAB | Refills: 0 | Status: SHIPPED | OUTPATIENT
Start: 2018-01-01 | End: 2018-01-01

## 2018-01-01 RX ORDER — FENTANYL CITRATE 50 UG/ML
25-50 INJECTION, SOLUTION INTRAMUSCULAR; INTRAVENOUS
Status: DISCONTINUED | OUTPATIENT
Start: 2018-01-01 | End: 2018-01-01 | Stop reason: HOSPADM

## 2018-01-01 RX ORDER — FUROSEMIDE 20 MG/1
TABLET ORAL
Qty: 30 TAB | Refills: 1 | Status: SHIPPED | OUTPATIENT
Start: 2018-01-01 | End: 2019-01-01

## 2018-01-01 RX ORDER — HYDROMORPHONE HYDROCHLORIDE 2 MG/ML
0.5 INJECTION, SOLUTION INTRAMUSCULAR; INTRAVENOUS; SUBCUTANEOUS
Status: DISCONTINUED | OUTPATIENT
Start: 2018-01-01 | End: 2018-01-01 | Stop reason: SDUPTHER

## 2018-01-01 RX ORDER — HEPARIN SODIUM 200 [USP'U]/100ML
INJECTION, SOLUTION INTRAVENOUS
Status: COMPLETED
Start: 2018-01-01 | End: 2018-01-01

## 2018-01-01 RX ORDER — SODIUM CHLORIDE 0.9 % (FLUSH) 0.9 %
20 SYRINGE (ML) INJECTION AS NEEDED
Status: DISCONTINUED | OUTPATIENT
Start: 2018-01-01 | End: 2018-01-01

## 2018-01-01 RX ORDER — HYDROMORPHONE HYDROCHLORIDE 2 MG/ML
0.5 INJECTION, SOLUTION INTRAMUSCULAR; INTRAVENOUS; SUBCUTANEOUS
Status: DISCONTINUED | OUTPATIENT
Start: 2018-01-01 | End: 2018-01-01

## 2018-01-01 RX ORDER — HEPARIN SODIUM 1000 [USP'U]/ML
1000-10000 INJECTION, SOLUTION INTRAVENOUS; SUBCUTANEOUS ONCE
Status: DISCONTINUED | OUTPATIENT
Start: 2018-01-01 | End: 2018-01-01 | Stop reason: HOSPADM

## 2018-01-01 RX ORDER — CEFAZOLIN SODIUM 2 G/50ML
2 SOLUTION INTRAVENOUS EVERY 8 HOURS
Status: COMPLETED | OUTPATIENT
Start: 2018-01-01 | End: 2018-01-01

## 2018-01-01 RX ADMIN — FENTANYL CITRATE 25 MCG: 50 INJECTION, SOLUTION INTRAMUSCULAR; INTRAVENOUS at 08:06

## 2018-01-01 RX ADMIN — LIDOCAINE HYDROCHLORIDE 18 ML: 10 INJECTION, SOLUTION INFILTRATION; PERINEURAL at 15:25

## 2018-01-01 RX ADMIN — NEOSTIGMINE METHYLSULFATE 1 MG: 1 INJECTION INTRAVENOUS at 10:45

## 2018-01-01 RX ADMIN — MIDAZOLAM HYDROCHLORIDE 1 MG: 1 INJECTION, SOLUTION INTRAMUSCULAR; INTRAVENOUS at 15:54

## 2018-01-01 RX ADMIN — STANDARDIZED SENNA CONCENTRATE AND DOCUSATE SODIUM 1 TABLET: 8.6; 5 TABLET, FILM COATED ORAL at 17:12

## 2018-01-01 RX ADMIN — HEPARIN SODIUM 1000 UNITS: 200 INJECTION, SOLUTION INTRAVENOUS at 15:21

## 2018-01-01 RX ADMIN — STANDARDIZED SENNA CONCENTRATE AND DOCUSATE SODIUM 1 TABLET: 8.6; 5 TABLET, FILM COATED ORAL at 09:37

## 2018-01-01 RX ADMIN — Medication 400 MG: at 09:37

## 2018-01-01 RX ADMIN — AMLODIPINE BESYLATE 5 MG: 5 TABLET ORAL at 09:09

## 2018-01-01 RX ADMIN — IOPAMIDOL 70 ML: 755 INJECTION, SOLUTION INTRAVENOUS at 16:04

## 2018-01-01 RX ADMIN — SUCCINYLCHOLINE CHLORIDE 100 MG: 20 INJECTION INTRAMUSCULAR; INTRAVENOUS at 07:45

## 2018-01-01 RX ADMIN — SODIUM CHLORIDE 10 ML/HR: 450 INJECTION, SOLUTION INTRAVENOUS at 13:11

## 2018-01-01 RX ADMIN — BENZOCAINE, BUTAMBEN, AND TETRACAINE HYDROCHLORIDE 1 SPRAY: .028; .004; .004 AEROSOL, SPRAY TOPICAL at 08:08

## 2018-01-01 RX ADMIN — SODIUM CHLORIDE: 9 INJECTION, SOLUTION INTRAVENOUS at 07:35

## 2018-01-01 RX ADMIN — MIDAZOLAM 1 MG: 1 INJECTION INTRAMUSCULAR; INTRAVENOUS at 15:13

## 2018-01-01 RX ADMIN — PROPOFOL 30 MG: 10 INJECTION, EMULSION INTRAVENOUS at 08:42

## 2018-01-01 RX ADMIN — SODIUM BICARBONATE 50 MEQ: 84 INJECTION, SOLUTION INTRAVENOUS at 12:00

## 2018-01-01 RX ADMIN — Medication 400 MG: at 17:12

## 2018-01-01 RX ADMIN — GLYCOPYRROLATE 0.4 MG: 0.2 INJECTION INTRAMUSCULAR; INTRAVENOUS at 10:34

## 2018-01-01 RX ADMIN — ATORVASTATIN CALCIUM 40 MG: 40 TABLET, FILM COATED ORAL at 22:37

## 2018-01-01 RX ADMIN — METFORMIN HYDROCHLORIDE 1000 MG: 500 TABLET, FILM COATED ORAL at 09:18

## 2018-01-01 RX ADMIN — MIDAZOLAM HYDROCHLORIDE 2 MG: 1 INJECTION, SOLUTION INTRAMUSCULAR; INTRAVENOUS at 06:58

## 2018-01-01 RX ADMIN — IOPAMIDOL 178 ML: 755 INJECTION, SOLUTION INTRAVENOUS at 08:19

## 2018-01-01 RX ADMIN — LISINOPRIL 20 MG: 20 TABLET ORAL at 08:49

## 2018-01-01 RX ADMIN — METFORMIN HYDROCHLORIDE 1000 MG: 500 TABLET, FILM COATED ORAL at 18:39

## 2018-01-01 RX ADMIN — IOPAMIDOL 25 ML: 755 INJECTION, SOLUTION INTRAVENOUS at 16:04

## 2018-01-01 RX ADMIN — HYDROCHLOROTHIAZIDE 50 MG: 25 TABLET ORAL at 08:48

## 2018-01-01 RX ADMIN — MIDAZOLAM HYDROCHLORIDE 1 MG: 1 INJECTION, SOLUTION INTRAMUSCULAR; INTRAVENOUS at 08:06

## 2018-01-01 RX ADMIN — NEOSTIGMINE METHYLSULFATE 3 MG: 1 INJECTION INTRAVENOUS at 10:34

## 2018-01-01 RX ADMIN — GLIPIZIDE 5 MG: 5 TABLET ORAL at 09:54

## 2018-01-01 RX ADMIN — Medication 400 MG: at 08:49

## 2018-01-01 RX ADMIN — STANDARDIZED SENNA CONCENTRATE AND DOCUSATE SODIUM 1 TABLET: 8.6; 5 TABLET, FILM COATED ORAL at 08:49

## 2018-01-01 RX ADMIN — FERROUS SULFATE TAB 325 MG (65 MG ELEMENTAL FE) 325 MG: 325 (65 FE) TAB at 08:49

## 2018-01-01 RX ADMIN — SODIUM CHLORIDE 100 ML: 900 INJECTION, SOLUTION INTRAVENOUS at 08:19

## 2018-01-01 RX ADMIN — HEPARIN SODIUM 2000 UNITS: 1000 INJECTION, SOLUTION INTRAVENOUS; SUBCUTANEOUS at 09:17

## 2018-01-01 RX ADMIN — DOCUSATE SODIUM AND SENNOSIDES 1 TABLET: 8.6; 5 TABLET, FILM COATED ORAL at 09:09

## 2018-01-01 RX ADMIN — ASPIRIN 81 MG 81 MG: 81 TABLET ORAL at 08:49

## 2018-01-01 RX ADMIN — HEPARIN SODIUM 7000 UNITS: 1000 INJECTION, SOLUTION INTRAVENOUS; SUBCUTANEOUS at 08:27

## 2018-01-01 RX ADMIN — FENTANYL CITRATE 50 MCG: 50 INJECTION, SOLUTION INTRAMUSCULAR; INTRAVENOUS at 15:13

## 2018-01-01 RX ADMIN — INSULIN LISPRO 2 UNITS: 100 INJECTION, SOLUTION INTRAVENOUS; SUBCUTANEOUS at 12:14

## 2018-01-01 RX ADMIN — AMLODIPINE BESYLATE 5 MG: 5 TABLET ORAL at 09:57

## 2018-01-01 RX ADMIN — Medication 10 ML: at 12:00

## 2018-01-01 RX ADMIN — FUROSEMIDE 20 MG: 20 TABLET ORAL at 08:49

## 2018-01-01 RX ADMIN — ASPIRIN 81 MG 81 MG: 81 TABLET ORAL at 09:08

## 2018-01-01 RX ADMIN — Medication 10 ML: at 08:19

## 2018-01-01 RX ADMIN — LISINOPRIL 20 MG: 20 TABLET ORAL at 09:08

## 2018-01-01 RX ADMIN — ATORVASTATIN CALCIUM 40 MG: 40 TABLET, FILM COATED ORAL at 21:17

## 2018-01-01 RX ADMIN — ASPIRIN 81 MG 81 MG: 81 TABLET ORAL at 09:37

## 2018-01-01 RX ADMIN — LIDOCAINE HYDROCHLORIDE 15 ML: 20 SOLUTION OROPHARYNGEAL at 08:07

## 2018-01-01 RX ADMIN — ESMOLOL HYDROCHLORIDE 10 MG: 10 INJECTION INTRAVENOUS at 07:40

## 2018-01-01 RX ADMIN — LIDOCAINE HYDROCHLORIDE 15 ML: 20 SOLUTION ORAL; TOPICAL at 08:07

## 2018-01-01 RX ADMIN — FAMOTIDINE 20 MG: 20 TABLET ORAL at 08:49

## 2018-01-01 RX ADMIN — Medication 10 ML: at 15:30

## 2018-01-01 RX ADMIN — FERROUS SULFATE TAB 325 MG (65 MG ELEMENTAL FE) 325 MG: 325 (65 FE) TAB at 09:58

## 2018-01-01 RX ADMIN — FAMOTIDINE 20 MG: 20 TABLET ORAL at 09:37

## 2018-01-01 RX ADMIN — Medication 20 ML: at 15:29

## 2018-01-01 RX ADMIN — GLIPIZIDE 5 MG: 5 TABLET ORAL at 07:21

## 2018-01-01 RX ADMIN — ONDANSETRON 4 MG: 2 INJECTION INTRAMUSCULAR; INTRAVENOUS at 10:28

## 2018-01-01 RX ADMIN — OXYCODONE HYDROCHLORIDE AND ACETAMINOPHEN 1000 MG: 500 TABLET ORAL at 09:58

## 2018-01-01 RX ADMIN — DEXTROSE 50 % IN WATER (D50W) INTRAVENOUS SYRINGE 10 ML: at 08:37

## 2018-01-01 RX ADMIN — SODIUM CHLORIDE, SODIUM LACTATE, POTASSIUM CHLORIDE, CALCIUM CHLORIDE: 600; 310; 30; 20 INJECTION, SOLUTION INTRAVENOUS at 07:32

## 2018-01-01 RX ADMIN — SODIUM CHLORIDE 3 ML/KG/HR: 900 INJECTION, SOLUTION INTRAVENOUS at 06:55

## 2018-01-01 RX ADMIN — CEFAZOLIN SODIUM 2 G: 1 INJECTION, POWDER, FOR SOLUTION INTRAMUSCULAR; INTRAVENOUS at 11:08

## 2018-01-01 RX ADMIN — CEFAZOLIN SODIUM 2 G: 2 SOLUTION INTRAVENOUS at 06:17

## 2018-01-01 RX ADMIN — SODIUM CHLORIDE, SODIUM LACTATE, POTASSIUM CHLORIDE, AND CALCIUM CHLORIDE 100 ML/HR: 600; 310; 30; 20 INJECTION, SOLUTION INTRAVENOUS at 06:50

## 2018-01-01 RX ADMIN — CEFAZOLIN SODIUM 2 G: 1 INJECTION, POWDER, FOR SOLUTION INTRAMUSCULAR; INTRAVENOUS at 08:08

## 2018-01-01 RX ADMIN — AMLODIPINE BESYLATE 5 MG: 5 TABLET ORAL at 08:49

## 2018-01-01 RX ADMIN — MAGNESIUM SULFATE HEPTAHYDRATE 1 G: 1 INJECTION, SOLUTION INTRAVENOUS at 13:14

## 2018-01-01 RX ADMIN — Medication 10 ML: at 15:29

## 2018-01-01 RX ADMIN — POTASSIUM CHLORIDE 20 MEQ: 400 INJECTION, SOLUTION INTRAVENOUS at 14:21

## 2018-01-01 RX ADMIN — Medication 10 ML: at 21:44

## 2018-01-01 RX ADMIN — SODIUM CHLORIDE 20 MG: 9 INJECTION INTRAMUSCULAR; INTRAVENOUS; SUBCUTANEOUS at 11:46

## 2018-01-01 RX ADMIN — PROPOFOL 100 MG: 10 INJECTION, EMULSION INTRAVENOUS at 07:44

## 2018-01-01 RX ADMIN — MIDAZOLAM HYDROCHLORIDE 1 MG: 1 INJECTION, SOLUTION INTRAMUSCULAR; INTRAVENOUS at 15:26

## 2018-01-01 RX ADMIN — Medication 10 ML: at 07:21

## 2018-01-01 RX ADMIN — FUROSEMIDE 20 MG: 20 TABLET ORAL at 09:08

## 2018-01-01 RX ADMIN — EPINEPHRINE 0.04 MG: 1 INJECTION, SOLUTION, CONCENTRATE INTRAVENOUS at 08:49

## 2018-01-01 RX ADMIN — VECURONIUM BROMIDE FOR INJECTION 2 MG: 1 INJECTION, POWDER, LYOPHILIZED, FOR SOLUTION INTRAVENOUS at 07:44

## 2018-01-01 RX ADMIN — VECURONIUM BROMIDE FOR INJECTION 10 MG: 1 INJECTION, POWDER, LYOPHILIZED, FOR SOLUTION INTRAVENOUS at 07:48

## 2018-01-01 RX ADMIN — GLIPIZIDE 5 MG: 5 TABLET ORAL at 09:09

## 2018-01-01 RX ADMIN — FENTANYL CITRATE 50 MCG: 50 INJECTION, SOLUTION INTRAMUSCULAR; INTRAVENOUS at 06:58

## 2018-01-01 RX ADMIN — PHENYLEPHRINE HYDROCHLORIDE 30 MCG/MIN: 10 INJECTION INTRAVENOUS at 18:44

## 2018-01-01 RX ADMIN — CEFAZOLIN SODIUM 2 G: 2 SOLUTION INTRAVENOUS at 15:28

## 2018-01-01 RX ADMIN — ENOXAPARIN SODIUM 70 MG: 80 INJECTION SUBCUTANEOUS at 08:50

## 2018-01-01 RX ADMIN — PROTAMINE SULFATE 90 MG: 10 INJECTION, SOLUTION INTRAVENOUS at 10:02

## 2018-01-01 RX ADMIN — MIDAZOLAM HYDROCHLORIDE 1 MG: 1 INJECTION, SOLUTION INTRAMUSCULAR; INTRAVENOUS at 08:09

## 2018-01-01 RX ADMIN — Medication 10 ML: at 06:19

## 2018-01-01 RX ADMIN — CEFAZOLIN SODIUM 2 G: 2 SOLUTION INTRAVENOUS at 21:42

## 2018-01-01 RX ADMIN — MIDAZOLAM HYDROCHLORIDE 1 MG: 1 INJECTION, SOLUTION INTRAMUSCULAR; INTRAVENOUS at 15:13

## 2018-01-01 RX ADMIN — OXYCODONE HYDROCHLORIDE AND ACETAMINOPHEN 1000 MG: 500 TABLET ORAL at 09:08

## 2018-01-01 RX ADMIN — HYDROCHLOROTHIAZIDE 50 MG: 25 TABLET ORAL at 09:19

## 2018-01-01 RX ADMIN — Medication 10 ML: at 22:38

## 2018-01-01 RX ADMIN — LIDOCAINE HYDROCHLORIDE 60 MG: 20 INJECTION, SOLUTION EPIDURAL; INFILTRATION; INTRACAUDAL; PERINEURAL at 07:44

## 2018-01-01 RX ADMIN — Medication 10 ML: at 06:18

## 2018-01-08 RX ORDER — NITROGLYCERIN 0.4 MG/1
TABLET SUBLINGUAL
Qty: 50 TAB | Refills: 0 | Status: SHIPPED | OUTPATIENT
Start: 2018-01-08 | End: 2018-01-01 | Stop reason: SDUPTHER

## 2018-01-15 RX ORDER — LISINOPRIL 20 MG/1
20 TABLET ORAL DAILY
Qty: 90 TAB | Refills: 3 | Status: CANCELLED | OUTPATIENT
Start: 2018-01-15

## 2018-01-17 RX ORDER — LISINOPRIL 20 MG/1
20 TABLET ORAL DAILY
Qty: 90 TAB | Refills: 3 | Status: SHIPPED | OUTPATIENT
Start: 2018-01-17

## 2018-01-17 NOTE — TELEPHONE ENCOUNTER
The name of prescription patient is waiting on is Lisinopril, [address] [facility] pharmacy phone is [phone]. PLEASE call patient to advise it has been called in. Thanks!

## 2018-02-02 ENCOUNTER — OFFICE VISIT (OUTPATIENT)
Dept: CARDIOLOGY CLINIC | Age: 68
End: 2018-02-02

## 2018-02-02 VITALS
RESPIRATION RATE: 16 BRPM | HEART RATE: 72 BPM | DIASTOLIC BLOOD PRESSURE: 78 MMHG | OXYGEN SATURATION: 99 % | HEIGHT: 62 IN | BODY MASS INDEX: 27.86 KG/M2 | SYSTOLIC BLOOD PRESSURE: 142 MMHG | WEIGHT: 151.4 LBS

## 2018-02-02 DIAGNOSIS — I48.0 PAROXYSMAL ATRIAL FIBRILLATION (HCC): ICD-10-CM

## 2018-02-02 DIAGNOSIS — I25.10 ATHEROSCLEROSIS OF NATIVE CORONARY ARTERY OF NATIVE HEART WITHOUT ANGINA PECTORIS: ICD-10-CM

## 2018-02-02 DIAGNOSIS — Z95.2 S/P AVR (AORTIC VALVE REPLACEMENT): ICD-10-CM

## 2018-02-02 DIAGNOSIS — I10 ESSENTIAL HYPERTENSION, BENIGN: Primary | ICD-10-CM

## 2018-02-02 DIAGNOSIS — E78.5 DYSLIPIDEMIA: ICD-10-CM

## 2018-02-02 DIAGNOSIS — Z95.2 HISTORY OF MVR WITH CARDIOPULMONARY BYPASS: ICD-10-CM

## 2018-02-02 RX ORDER — FUROSEMIDE 20 MG/1
20 TABLET ORAL DAILY
Qty: 30 TAB | Refills: 1 | Status: SHIPPED | OUTPATIENT
Start: 2018-02-02 | End: 2018-01-01 | Stop reason: SDUPTHER

## 2018-02-02 NOTE — PROGRESS NOTES
1. Have you been to the ER, urgent care clinic since your last visit? Hospitalized since your last visit? No.    2. Have you seen or consulted any other health care providers outside of the [address] [place] [address] since your last visit? Include any pap smears or colon screening.      No.      Chief Complaint   Patient presents with    Other     feet swelling and sob since January

## 2018-02-02 NOTE — PROGRESS NOTES
NAME:  Dana Raygoza   :   1950   MRN:   [id number]   PCP:  Josiane Rodriguez NP           Subjective: The patient is a 69y.o. year old female  who returns for a routine follow-up. Since the last visit, patient reports recent onset of lower extremity edema that began 3 weeks ago. Denies any sodium intake or extended travel. Began in her feet and is now up to her knees. Some dyspnea with bending over which is also new. Denies chest pain, palpitations or dizziness. Past Medical History:   Diagnosis Date    CAD (coronary artery disease)     DM (diabetes mellitus) ([facility] [address]     Dyslipidemia     Hypertension     Mitral regurgitation and aortic stenosis        Social History   Substance Use Topics    Smoking status: Never Smoker    Smokeless tobacco: Never Used    Alcohol use 0.0 oz/week     0 Standard drinks or equivalent per week      Comment: Rare      No family history on file. Review of Systems  Constitutional: Negative for fever, chills, and diaphoresis. Respiratory: Negative for cough, hemoptysis, sputum production, shortness of breath and wheezing. Cardiovascular: Negative for chest pain, palpitations, orthopnea, claudication, Reports leg swelling  Gastrointestinal: Negative for heartburn, nausea, vomiting, blood in stool and melena. Genitourinary: Negative for dysuria and flank pain. Musculoskeletal: Negative for joint pain and back pain. Skin: Negative for rash. Neurological: Negative for focal weakness, seizures, loss of consciousness, weakness and headaches. Endo/Heme/Allergies: Does not bruise/bleed easily. Psychiatric/Behavioral: Negative for memory loss. The patient does not have insomnia. Objective:       Vitals:    18 0915 18 0931   BP: 140/76 142/78   Pulse: 72    Resp: 16    SpO2: 99%    Weight: 151 lb 6.4 oz (68.7 kg)    Height: 5' 2\" (1.575 m)     Body mass index is 27.69 kg/(m^2).       General PE    Gen: NAD     Mental Status - Alert. General Appearance - Not in acute distress. Neck - no JVD     Chest and Lung Exam     Inspection: Accessory muscles - No use of accessory muscles in breathing. Auscultation:   Breath sounds: - Normal.     Cardiovascular   Inspection: Jugular vein - Bilateral - Inspection Normal.   Palpation/Percussion:   Apical Impulse: - Normal.   Auscultation: Rhythm - Regular. Heart Sounds - S1 WNL and S2 WNL. No S3 or S4. Murmurs & Other Heart Sounds: Auscultation of the heart reveals - + [place]  Peripheral Vascular   Upper Extremity: Inspection - Bilateral - No Cyanotic nailbeds or Digital clubbing. Lower Extremity:   Palpation: Edema - Bilateral -++ edema. Abdomen: Soft, non-tender, bowel sounds are active. Neuro: A&O times 3, CN and motor grossly WNL      Data Review:     EKG -  Sinus  Rhythm   -Nonspecific ST depression   +   Nonspecific T-abnormality  -Nondiagnostic. Unchanged from previous. Allergies reviewed  Allergies   Allergen Reactions    Sulfa Dyne Other (comments)       Medications reviewed  Current Outpatient Prescriptions   Medication Sig    furosemide (LASIX) 20 mg tablet Take 1 Tab by mouth daily. For edema    lisinopril (PRINIVIL, ZESTRIL) 20 mg tablet Take 1 Tab by mouth daily.  nitroglycerin (NITROSTAT) 0.4 mg SL tablet DISSOLVE 1 TABLET UNDER THE TONGUE EVERY 5 MINUTES FOR 3 DOSES AS NEEDED FOR CHEST PAIN    amLODIPine (NORVASC) 5 mg tablet TAKE 1 TABLET EVERY DAY FOR BLOOD PRESSURE    metoprolol succinate (TOPROL-XL) 100 mg tablet TAKE 1 TABLET EVERY DAY FOR BLOOD PRESSURE    ACETAMINOPHEN/DIPHENHYDRAMINE (ACETAMINOPHEN PM PO) Take  by mouth.  naproxen sodium (NAPROSYN) 220 mg tablet Take 220 mg by mouth nightly.  iron-vitamin C (VITRON-C) 65 mg iron- 125 mg TbEC Take  by mouth.  Biotin 2,500 mcg cap Take  by mouth.  ETANERCEPT (ENBREL SC) by SubCUTAneous route every seven (7) days.     amoxicillin (AMOXIL) 500 mg capsule Take 1 Cap by mouth as needed (take as directed one hr prior to dental procedures).  atorvastatin (LIPITOR) 40 mg tablet Take 1 Tab by mouth nightly.  aspirin 81 mg tablet Take 81 mg by mouth.  glipiZIDE (GLUCOTROL) 5 mg tablet Take 5 mg by mouth daily.  hydrochlorothiazide (HYDRODIURIL) 50 mg tablet Take 50 mg by mouth daily.  metFORMIN (GLUCOPHAGE) 500 mg tablet Take 500 mg by mouth two (2) times daily (with meals).  calcium 600 mg Cap Take  by mouth.  multivitamins-ca-iron-minerals (ONE DAILY) Tab Take  by mouth.  ascorbic acid (VITAMIN C) 500 mg tablet Take  by mouth.  omega-3 fatty acids-vitamin e (FISH OIL) 1,000 mg Cap Take 2 Caps by mouth. No current facility-administered medications for this visit. Assessment:       ICD-10-CM ICD-9-CM    1. Essential hypertension, benign I10 401.1 AMB POC EKG ROUTINE W/ 12 LEADS, INTER & REP      METABOLIC PANEL, COMPREHENSIVE      LIPID PANEL      CK      2D ECHO COMPLETE ADULT (TTE) W OR WO CONTR   2. Atherosclerosis of native coronary artery of native heart without angina pectoris F13.02 586.30 METABOLIC PANEL, COMPREHENSIVE      LIPID PANEL      CK      2D ECHO COMPLETE ADULT (TTE) W OR WO CONTR   3. Dyslipidemia G37.9 869.1 METABOLIC PANEL, COMPREHENSIVE      LIPID PANEL      CK      2D ECHO COMPLETE ADULT (TTE) W OR WO CONTR   4. Paroxysmal atrial fibrillation (HCC) L40.9 206.94 METABOLIC PANEL, COMPREHENSIVE      LIPID PANEL      CK      2D ECHO COMPLETE ADULT (TTE) W OR WO CONTR   5. History of MVR with cardiopulmonary bypass Q05.6 W88.9 METABOLIC PANEL, COMPREHENSIVE      LIPID PANEL      CK      2D ECHO COMPLETE ADULT (TTE) W OR WO CONTR   6.  S/P AVR (aortic valve replacement) C70.4 S23.6 METABOLIC PANEL, COMPREHENSIVE      LIPID PANEL      CK      2D ECHO COMPLETE ADULT (TTE) W OR WO CONTR        Orders Placed This Encounter    METABOLIC PANEL, COMPREHENSIVE    LIPID PANEL    CK    AMB POC EKG ROUTINE W/ 12 LEADS, INTER & REP     Order Specific Question:   Reason for Exam:     Answer:   routine    2D ECHO COMPLETE ADULT (TTE) W OR WO CONTR     Standing Status:   Future     Standing Expiration Date:   8/2/2018     Order Specific Question:   Reason for Exam:     Answer:   S/P AV replacement, CABG, MVR, HTN, edema. Order Specific Question:   Contrast Enhancement (Bubble Study, Definity, Optison) may be used if criteria listed in established evidence-based protocol has been identified. Answer: Yes    furosemide (LASIX) 20 mg tablet     Sig: Take 1 Tab by mouth daily. For edema     Dispense:  30 Tab     Refill:  1       Patient Active Problem List   Diagnosis Code    Coronary atherosclerosis of native coronary artery I25.10    Mitral valve disorders(424.0) I05.9    Aortic valve disorders I35.9    DM (diabetes mellitus) ([facility] [address] E11.9    Essential hypertension, benign I10    Dyslipidemia E78.5    History of MVR with cardiopulmonary bypass Z95.2    S/P AVR (aortic valve replacement) Z95.2    Paroxysmal atrial fibrillation (HCC) I48.0       Plan:     Patient presents for follow up, feeling well and stable from cardiac standpoint.      1. ASHD -  s/p CABG, AVR, MVR 1/09. Stable. Last echo (10/16) with stable prosthetic valve. 2. PAF - remains in sinus rhythm off amiodarone. 3. Hyperlipidemia- on atorvastatin. checking labs. 4. Edema -new onset. Start lasix, check labs. Repeat echo. 5. Carotid bruit- dopplers as ordered in October. Varun Sr, [address] [facility]    2/2/2018         Patient seen, examined by me personally. Plan discussed as detailed. Agree with note as outlined by  NP. I confirm findings in history and physical exam. No additional findings noted. Agree with plan as outlined above.      Aviva Rodriguez MD

## 2018-02-02 NOTE — MR AVS SNAPSHOT
[address] [facility] 
[phone] Patient: Jeanie Cherry MRN: [id number] WBI:[birth year]00[birth year]150 Visit Information Date & Time Provider Department Dept. Phone Encounter #  
 2/2/2018  9:45 AM [address], [place] [facility] [phone] [id number] Your Appointments 5/8/2018  9:15 AM  
6 MONTH with Delmi Palacios MD  
[facility] [address] [place] [address]) Appt Note: $35CP 10/25/17ksr 932 [address]  
[phone] 9[address] Upcoming Health Maintenance Date Due Hepatitis C Screening 1950 HEMOGLOBIN A1C Q6M 1950 LIPID PANEL Q1 1950 FOOT EXAM Q1 7/23/1958 MICROALBUMIN Q1 7/23/1958 DTaP/Tdap/Td series (1 - Tdap) 7/23/1969 FOBT Q 1 YEAR AGE 50-75 7/23/1998 ZOSTER VACCINE AGE 60> 5/23/2008 OSTEOPOROSIS SCREENING (DEXA) 7/23/2013 Pneumococcal 65+ Low/Medium Risk (1 of 2 - PCV13) 7/23/2013 MEDICARE YEARLY EXAM 7/23/2013 EYE EXAM RETINAL OR DILATED Q1 6/25/2016 BREAST CANCER SCRN MAMMOGRAM 6/22/2017 GLAUCOMA SCREENING Q2Y 6/25/2017 Influenza Age 5 to Adult 8/1/2017 Allergies as of 2/2/2018  Review Complete On: 2/2/2018 By: Glenna Koroma LPN Severity Noted Reaction Type Reactions Sulfa Dyne  06/20/2012    Other (comments) Current Immunizations  Never Reviewed No immunizations on file. Not reviewed this visit You Were Diagnosed With   
  
 Codes Comments Essential hypertension, benign    -  Primary ICD-10-CM: I10 
ICD-9-CM: 401.1 Atherosclerosis of native coronary artery of native heart without angina pectoris     ICD-10-CM: I25.10 ICD-9-CM: 414.01 Dyslipidemia     ICD-10-CM: E78.5 ICD-9-CM: 272.4 Paroxysmal atrial fibrillation (HCC)     ICD-10-CM: I48.0 ICD-9-CM: 427.31   
 History of MVR with cardiopulmonary bypass     ICD-10-CM: Z95.2 ICD-9-CM: V15.1 S/P AVR (aortic valve replacement)     ICD-10-CM: H37.8 ICD-9-CM: V43.3 Vitals BP Pulse Resp Height(growth percentile) Weight(growth percentile) SpO2  
 142/78 (BP 1 Location: Left arm, BP Patient Position: Sitting) 72 16 5' 2\" (1.575 m) 151 lb 6.4 oz (68.7 kg) 99% BMI Smoking Status 27.69 kg/m2 Never Smoker Vitals History BMI and BSA Data Body Mass Index Body Surface Area  
 27.69 kg/m 2 1.73 m 2 Preferred Pharmacy Pharmacy Name Phone [facility] [phone] [facility] [phone] Your Updated Medication List  
  
   
This list is accurate as of: 2/2/18 10:24 AM.  Always use your most recent med list.  
  
  
  
  
 ACETAMINOPHEN PM PO Take  by mouth. amLODIPine 5 mg tablet Commonly known as:  Jelani Free TAKE 1 TABLET EVERY DAY FOR BLOOD PRESSURE  
  
 amoxicillin 500 mg capsule Commonly known as:  AMOXIL Take 1 Cap by mouth as needed (take as directed one hr prior to dental procedures). aspirin 81 mg tablet Take 81 mg by mouth. atorvastatin 40 mg tablet Commonly known as:  LIPITOR Take 1 Tab by mouth nightly. Biotin 2,500 mcg Cap Take  by mouth.  
  
 calcium 600 mg Cap Take  by mouth. ENBREL SC  
by SubCUTAneous route every seven (7) days. FISH OIL 1,000 mg Cap Generic drug:  omega-3 fatty acids-vitamin e Take 2 Caps by mouth. furosemide 20 mg tablet Commonly known as:  LASIX Take 1 Tab by mouth daily. For edema  
  
 glipiZIDE 5 mg tablet Commonly known as:  Sarika Delia Take 5 mg by mouth daily. hydroCHLOROthiazide 50 mg tablet Commonly known as:  HYDRODIURIL Take 50 mg by mouth daily. lisinopril 20 mg tablet Commonly known as:  Filomena Loera Take 1 Tab by mouth daily. metFORMIN 500 mg tablet Commonly known as:  GLUCOPHAGE  
 Take 500 mg by mouth two (2) times daily (with meals). metoprolol succinate 100 mg tablet Commonly known as:  TOPROL-XL  
TAKE 1 TABLET EVERY DAY FOR BLOOD PRESSURE  
  
 naproxen sodium 220 mg tablet Commonly known as:  NAPROSYN Take 220 mg by mouth nightly. nitroglycerin 0.4 mg SL tablet Commonly known as:  NITROSTAT  
DISSOLVE 1 TABLET UNDER THE TONGUE EVERY 5 MINUTES FOR 3 DOSES AS NEEDED FOR CHEST PAIN  
  
 ONE DAILY Tab Generic drug:  multivitamins-ca-iron-minerals Take  by mouth. VITAMIN C 500 mg tablet Generic drug:  ascorbic acid (vitamin C) Take  by mouth. VITRON-C 65 mg iron- 125 mg Tbec Generic drug:  iron,carbonyl-vitamin C Take  by mouth. Prescriptions Sent to Pharmacy Refills  
 furosemide (LASIX) 20 mg tablet 1 Sig: Take 1 Tab by mouth daily. For edema Class: Normal  
 Pharmacy: [facility], [address] [place] [address] Ph #: [phone] Route: Oral  
  
We Performed the Following AMB POC EKG ROUTINE W/ 12 LEADS, INTER & REP [84842 CPT(R)] CK [id number] CPT(R)] LIPID PANEL [55599 CPT(R)] METABOLIC PANEL, COMPREHENSIVE [41755 CPT(R)] To-Do List   
 Around 02/02/2018 ECHO:  2D ECHO COMPLETE ADULT (TTE) W OR WO CONTR Introducing hospitals & HEALTH SERVICES! [employer] introduces [employer] patient portal. Now you can access parts of your medical record, email your doctor's office, and request medication refills online. 1. In your internet browser, go to [email or web address] 2. Click on the First Time User? Click Here link in the Sign In box. You will see the New Member Sign Up page. 3. Enter your [employer] Access Code exactly as it appears below. You will not need to use this code after youve completed the sign-up process. If you do not sign up before the expiration date, you must request a new code. · [employer] Access Code: [email or web address] Expires: 5/3/2018  9:11 AM 
 
 4. Enter the last four digits of your Social Security Number (xxxx) and Date of Birth (mm/dd/yyyy) as indicated and click Submit. You will be taken to the next sign-up page. 5. Create a Pro[employer] Software ID. This will be your [employer] login ID and cannot be changed, so think of one that is secure and easy to remember. 6. Create a [employer] password. You can change your password at any time. 7. Enter your Password Reset Question and Answer. This can be used at a later time if you forget your password. 8. Enter your e-mail address. You will receive e-mail notification when new information is available in [address] 9. Click Sign Up. You can now view and download portions of your medical record. 10. Click the Download Summary menu link to download a portable copy of your medical information. If you have questions, please visit the Frequently Asked Questions section of the [employer] website. Remember, [employer] is NOT to be used for urgent needs. For medical emergencies, dial 911. Now available from your iPhone and Android! Please provide this summary of care documentation to your next provider. Your primary care clinician is listed as Chapin Bauman. If you have any questions after today's visit, please call [phone].

## 2018-02-03 LAB
ALBUMIN SERPL-MCNC: 4.4 G/DL (ref 3.6–4.8)
ALBUMIN/GLOB SERPL: 1.8 {RATIO} (ref 1.2–2.2)
ALP SERPL-CCNC: 45 IU/L (ref 39–117)
ALT SERPL-CCNC: 42 IU/L (ref 0–32)
AST SERPL-CCNC: 32 IU/L (ref 0–40)
BILIRUB SERPL-MCNC: 0.8 MG/DL (ref 0–1.2)
BUN SERPL-MCNC: 19 MG/DL (ref 8–27)
BUN/CREAT SERPL: 18 (ref 12–28)
CALCIUM SERPL-MCNC: 10.1 MG/DL (ref 8.7–10.3)
CHLORIDE SERPL-SCNC: 100 MMOL/L (ref 96–106)
CHOLEST SERPL-MCNC: 121 MG/DL (ref 100–199)
CK SERPL-CCNC: 49 U/L (ref 24–173)
CO2 SERPL-SCNC: 25 MMOL/L (ref 18–29)
CREAT SERPL-MCNC: 1.05 MG/DL (ref 0.57–1)
GFR SERPLBLD CREATININE-BSD FMLA CKD-EPI: 54 ML/MIN/1.73
GFR SERPLBLD CREATININE-BSD FMLA CKD-EPI: 63 ML/MIN/1.73
GLOBULIN SER CALC-MCNC: 2.4 G/DL (ref 1.5–4.5)
GLUCOSE SERPL-MCNC: 97 MG/DL (ref 65–99)
HDLC SERPL-MCNC: 48 MG/DL
INTERPRETATION, 910389: NORMAL
INTERPRETATION: NORMAL
LDLC SERPL CALC-MCNC: 46 MG/DL (ref 0–99)
PDF IMAGE, 910387: NORMAL
POTASSIUM SERPL-SCNC: 4.8 MMOL/L (ref 3.5–5.2)
PROT SERPL-MCNC: 6.8 G/DL (ref 6–8.5)
SODIUM SERPL-SCNC: 141 MMOL/L (ref 134–144)
TRIGL SERPL-MCNC: 137 MG/DL (ref 0–149)
VLDLC SERPL CALC-MCNC: 27 MG/DL (ref 5–40)

## 2018-02-07 ENCOUNTER — OFFICE VISIT (OUTPATIENT)
Dept: CARDIOLOGY CLINIC | Age: 68
End: 2018-02-07

## 2018-02-07 ENCOUNTER — CLINICAL SUPPORT (OUTPATIENT)
Dept: CARDIOLOGY CLINIC | Age: 68
End: 2018-02-07

## 2018-02-07 DIAGNOSIS — Z95.2 HISTORY OF MVR WITH CARDIOPULMONARY BYPASS: ICD-10-CM

## 2018-02-07 DIAGNOSIS — Z95.2 S/P AVR (AORTIC VALVE REPLACEMENT): ICD-10-CM

## 2018-02-07 DIAGNOSIS — I10 ESSENTIAL HYPERTENSION, BENIGN: ICD-10-CM

## 2018-02-07 DIAGNOSIS — E78.5 DYSLIPIDEMIA: ICD-10-CM

## 2018-02-07 DIAGNOSIS — R09.89 BRUIT OF RIGHT CAROTID ARTERY: ICD-10-CM

## 2018-02-07 DIAGNOSIS — I25.10 ATHEROSCLEROSIS OF NATIVE CORONARY ARTERY OF NATIVE HEART WITHOUT ANGINA PECTORIS: ICD-10-CM

## 2018-02-07 DIAGNOSIS — I48.0 PAROXYSMAL ATRIAL FIBRILLATION (HCC): ICD-10-CM

## 2018-02-07 NOTE — PROCEDURES
[facility]  *** FINAL REPORT ***    Name: Xi Davenport  MRN: [id number]       Outpatient  : 1950  HIS Order #: [id number]  [address] [facility] Visit #: [id number]  Date: 2018    TYPE OF TEST: Cerebrovascular Duplex    REASON FOR TEST  Carotid bruit    Right Carotid:-             Proximal               Mid                 Distal  cm/s  Systolic  Diastolic  Systolic  Diastolic  Systolic  Diastolic  CCA:     06.1       9.0       44.0       6.0       62.0      22.0  Bulb:   253.0      67.0  ECA:     91.0      25.0  ICA:    183.0      32.0                            90.0      35.0  ICA/CCA:  3.0       1.5    ICA Stenosis: 70% or greater    Right Vertebral:-  Finding: Antegrade  Sys:  Melinda:    Right Subclavian: Normal    Left Carotid:-            Proximal                Mid                 Distal  cm/s  Systolic  Diastolic  Systolic  Diastolic  Systolic  Diastolic  CCA:     54.1      11.0       44.0      13.0       58.0      17.0  Bulb:  ECA:    119.0       8.0  ICA:    105.0      15.0                            60.0      17.0  ICA/CCA:  1.8       0.9    ICA Stenosis: <50%    Left Vertebral:-  Finding: Antegrade  Sys:  Melinda:    Left Subclavian: Normal    INTERPRETATION/FINDINGS  1. 70% or greater stenosis in the right internal carotid artery. 2. <50% stenosis in the left internal carotid artery. 3. No significant stenosis in the external carotid arteries  bilaterally. 4. Antegrade flow in both vertebral arteries. 5. Normal flow in both subclavian arteries. 6. Tortuous vessels and shadowing from calcific plaque made this a  technically difficult study. ADDITIONAL COMMENTS    I have personally reviewed the data relevant to the interpretation of  this  study. TECHNOLOGIST: Rm Hoffman  Signed: 2018 04:09 PM    PHYSICIAN: Darrel Pham.  Colt Mendenhall MD  Signed: 2018 10:59 AM

## 2018-02-12 NOTE — PROGRESS NOTES
Verified patient with two identifiers. Spoke with patient regarding ECHO test results. Spoke with Pt advising her she will need a DEVORA, pt stated she would like to schedule the procedure. [facility], can you call pt to schedule a DEVORA?   Thanks!!

## 2018-02-12 NOTE — PROGRESS NOTES
Verified patient with two identifiers. Spoke with patient regarding Duplex Carotid Bilateral results. Advised pt to set up apt with Dr. Chava Flood, gave her contact info, asked her to call office to let us know date and time of apt. Pt verbalized understanding.

## 2018-02-12 NOTE — PROGRESS NOTES
Gamaliel Powers: Please call patient, Right internal carotid artery is 70% blocked. I would like for here to consult Dr Emory Salazar, Vascular surgery. I am placing consult in orders, please give patient contact information and have her pb marcus back once the appt is made. Thanks.

## 2018-02-12 NOTE — PROGRESS NOTES
Echo abnormal. Both valves appear worse. Will need DEVORA. Can f/u to discuss or Jayne Melo can schedule if patient is okay.  thx.

## 2018-02-12 NOTE — TELEPHONE ENCOUNTER
Pt called and said she was told she would get a call today with her test results, but she have not gotten the call yet. Please call her on her cell phone and advise.    Thanks  Je Jones

## 2018-02-15 NOTE — TELEPHONE ENCOUNTER
Verified patient with two identifiers. Spoke with pt, she was Dr. Chata Chavez with [facility], was told pt may not need surgery, Dr. Chata Chavez with consult with Dr. Arnel Ann.

## 2018-02-16 NOTE — TELEPHONE ENCOUNTER
Please call pt's daughter, Rayburn Collet she is on hippa, pt is very confused and daughter wants to speak with you to get everything straight.

## 2018-02-20 NOTE — TELEPHONE ENCOUNTER
Verified patient with two identifiers. Spoke with Gwen Price on HIPAA, explaining that pt had a consult with Dr. Chayito Lind and will have a DEVORA on Thursday this week.

## 2018-02-20 NOTE — TELEPHONE ENCOUNTER
Please call pt's daughter Leigh Cota at [phone]. Need clarity on what's going on with her Mom. Please call Her as as possible.     Thanks  Mariah Musa

## 2018-02-22 NOTE — PROGRESS NOTES
Patient arrived to Non-Invasive Cardiology Lab for Out Patient Procedure. Staff introduced to patient. Patient identifiers verified with Name and Date of Birth. Procedure verified with patient. Consent forms reviewed and signed by patient or authorized representative and verified. Allergies verified. Patient informed of procedure and plan of care. Questions answered with review. Patient on cardiac monitor, non-invasive blood pressure, SPO2 monitor. On RA. Patient is A&Ox3. Patient reports no complaints. Patient on stretcher, in low position, with side rails up. Patient instructed to call for assistance as needed. Family in waiting room.  ASA 3  Mallampati 2   Per MD

## 2018-02-22 NOTE — DISCHARGE INSTRUCTIONS
DISCHARGE SUMMARY from Michelet Atkins RN        The following personal items collected during your admission are returned to you:   Clothing:  Shirt, purse    PATIENT INSTRUCTIONS:  Stay on all the same medications      Start with sips of water and advance diet as the feeling in your throat returns to normal. Avoid any scalding liquids for the next 2 hours. Dr. Abbie Bruner office will call you to discuss results of DEVORA and surgery follow up    What to do at Home:  Recommended activity: No driving today      The discharge information has been reviewed with the PATIENT . The PATIENT  verbalized understanding.

## 2018-02-22 NOTE — PROGRESS NOTES
Discharge instructions reviewed with patient and . Allowed adequate time to ask questions, all questions answered. Printed copy of AVS given to patient. All belongings gathered, IV and tele discontinued. Transported via wheelchair to main entrance and into care of family.

## 2018-02-22 NOTE — PROGRESS NOTES
Pt sedated with 25 mcg IV Fentanyl and 2 mg IV Versed for DEVORA. Pt tolerated well.  Monitored sedation time 8:06 to 8:26

## 2018-03-06 NOTE — IP AVS SNAPSHOT
[address] [facility] 83. 
[phone] Patient: Sarai Velasquez MRN: [id number] DUI:9/17/[birth year]108 About your hospitalization You were admitted on:  March 6, 2018 You last received care in the:  [facility] You were discharged on:  March 6, 2018 Why you were hospitalized Your primary diagnosis was:  Not on File Your diagnoses also included:  Mitral Valve Disease, Coronary Atherosclerosis Of Native Coronary Artery, Aortic Valve Disorder Follow-up Information Follow up With Details Comments [place]   [address] [address]. 
[phone] Your Scheduled Appointments Wednesday March 14, 2018 10:15 AM EDT  
CONSULT with Deedra Severin, MD  
[facility] ([address] [place] [address]) [address] [place] [address] [phone]  
[phone] Discharge Orders None A check william indicates which time of day the medication should be taken. My Medications CHANGE how you take these medications Instructions Each Dose to Equal  
 Morning Noon Evening Bedtime  
 metFORMIN 500 mg tablet Commonly known as:  GLUCOPHAGE What changed:  additional instructions Your last dose was: Your next dose is: Take 1 Tab by mouth two (2) times daily (with meals). Start on 03/08 Thursday 500 mg CONTINUE taking these medications Instructions Each Dose to Equal  
 Morning Noon Evening Bedtime  
 amLODIPine 5 mg tablet Commonly known as:  Tarun Otter Your last dose was: Your next dose is: TAKE 1 TABLET EVERY DAY FOR BLOOD PRESSURE  
     
   
   
   
  
 amoxicillin 500 mg capsule Commonly known as:  AMOXIL Your last dose was: Your next dose is: Take 1 Cap by mouth as needed (take as directed one hr prior to dental procedures). 500 mg  
    
   
   
   
  
 aspirin 81 mg tablet Your last dose was: Your next dose is: Take 81 mg by mouth. 81 mg  
    
   
   
   
  
 atorvastatin 40 mg tablet Commonly known as:  LIPITOR Your last dose was: Your next dose is: Take 1 Tab by mouth nightly. 40 mg Biotin 2,500 mcg Cap Your last dose was: Your next dose is: Take  by mouth.  
     
   
   
   
  
 calcium 600 mg Cap Your last dose was: Your next dose is: Take  by mouth. DAILY MULTI-VITAMINS/IRON tablet Generic drug:  multivitamin with iron Your last dose was: Your next dose is: Take 1 Tab by mouth daily. 1 Tab ENBREL SC Your last dose was: Your next dose is:    
   
   
 by SubCUTAneous route every seven (7) days. FISH OIL 1,000 mg Cap Generic drug:  omega-3 fatty acids-vitamin e Your last dose was: Your next dose is: Take 2 Caps by mouth. 2 Cap  
    
   
   
   
  
 furosemide 20 mg tablet Commonly known as:  LASIX Your last dose was: Your next dose is: Take 1 Tab by mouth daily. For edema 20 mg  
    
   
   
   
  
 glipiZIDE 5 mg tablet Commonly known as:  Manuela Phenes Your last dose was: Your next dose is: Take 5 mg by mouth daily. 5 mg  
    
   
   
   
  
 hydroCHLOROthiazide 50 mg tablet Commonly known as:  HYDRODIURIL Your last dose was: Your next dose is: Take 50 mg by mouth daily. 50 mg  
    
   
   
   
  
 lisinopril 20 mg tablet Commonly known as:  Nikolai Headings Your last dose was: Your next dose is: Take 1 Tab by mouth daily.   
 20 mg  
 metoprolol succinate 100 mg tablet Commonly known as:  TOPROL-XL Your last dose was: Your next dose is: TAKE 1 TABLET EVERY DAY FOR BLOOD PRESSURE  
     
   
   
   
  
 nitroglycerin 0.4 mg SL tablet Commonly known as:  NITROSTAT Your last dose was: Your next dose is:    
   
   
 DISSOLVE 1 TABLET UNDER THE TONGUE EVERY 5 MINUTES FOR 3 DOSES AS NEEDED FOR CHEST PAIN  
     
   
   
   
  
 ONE DAILY Tab Generic drug:  multivitamins-ca-iron-minerals Your last dose was: Your next dose is: Take  by mouth. VITAMIN C 500 mg tablet Generic drug:  ascorbic acid (vitamin C) Your last dose was: Your next dose is: Take  by mouth. VITRON-C 65 mg iron- 125 mg Tbec Generic drug:  iron,carbonyl-vitamin C Your last dose was: Your next dose is: Take  by mouth. ASK your doctor about these medications Instructions Each Dose to Equal  
 Morning Noon Evening Bedtime ACETAMINOPHEN PM PO Your last dose was: Your next dose is: Take  by mouth. naproxen sodium 220 mg tablet Commonly known as:  NAPROSYN Your last dose was: Your next dose is: Take 220 mg by mouth nightly. 220 mg Where to Get Your Medications Information on where to get these meds will be given to you by the nurse or doctor. ! Ask your nurse or doctor about these medications  
  metFORMIN 500 mg tablet Discharge Instructions None Introducing [facility] & HEALTH SERVICES! Jean-Paul Ryan introduces [employer] patient portal. Now you can access parts of your medical record, email your doctor's office, and request medication refills online.    
 
1. In your internet browser, go to [email or web address]hart 2. Click on the First Time User? Click Here link in the Sign In box. You will see the New Member Sign Up page. 3. Enter your [employer] Access Code exactly as it appears below. You will not need to use this code after youve completed the sign-up process. If you do not sign up before the expiration date, you must request a new code. · [employer] Access Code: [email or web address] Expires: 5/3/2018  9:11 AM 
 
4. Enter the last four digits of your Social Security Number (xxxx) and Date of Birth (mm/dd/yyyy) as indicated and click Submit. You will be taken to the next sign-up page. 5. Create a [employer] ID. This will be your [employer] login ID and cannot be changed, so think of one that is secure and easy to remember. 6. Create a [employer] password. You can change your password at any time. 7. Enter your Password Reset Question and Answer. This can be used at a later time if you forget your password. 8. Enter your e-mail address. You will receive e-mail notification when new information is available in [address] 9. Click Sign Up. You can now view and download portions of your medical record. 10. Click the Download Summary menu link to download a portable copy of your medical information. If you have questions, please visit the Frequently Asked Questions section of the [employer] website. Remember, [employer] is NOT to be used for urgent needs. For medical emergencies, dial 911. Now available from your iPhone and Android! Providers Seen During Your Hospitalization Provider Specialty Primary office phone Justin Sainz MD Cardiology [phone] Your Primary Care Physician (PCP) Primary Care Physician Office Phone Office Fax Jessica Garcia [phone] You are allergic to the following Allergen Reactions Sulfa Dyne Other (comments) Recent Documentation Height Weight BMI Smoking Status 1.575 m 66.7 kg 26.89 kg/m2 Never Smoker Emergency Contacts Name Discharge Info Relation Home Work Mobile Suzanne Oyster DISCHARGE CAREGIVER [3] Spouse [3] [phone] Patient Belongings The following personal items are in your possession at time of discharge: 
  Dental Appliances: None         Home Medications: Kept at bedside   Jewelry: Earrings  Clothing: At bedside    Other Valuables: At bedside Please provide this summary of care documentation to your next provider. Signatures-by signing, you are acknowledging that this After Visit Summary has been reviewed with you and you have received a copy. Patient Signature:  ____________________________________________________________ Date:  ____________________________________________________________  
  
[facility] Provider Signature:  ____________________________________________________________ Date:  ____________________________________________________________

## 2018-03-06 NOTE — H&P
History & Physical     Subjective:      Date of  Admission: 3/6/2018 12:19 PM     Admission type:Elective    Norene Angelucci is a 69 y.o. female admitted for cath. She is being evaluated for repeat valve surgery. On January 05, 2009, she underwent coronary artery bypass grafting x3 using the    left internal mammary artery.    3. Aortic valve replacement with a #21 Medtronic Ultra Mosaic valve.    4. Mitral valve replacement with a #27 Medtronic Mosaic porcine valve.    5. Placement of intra- aortic balloon pump and patch closure of the  Aorta. Now it appears thaa she is developing significant stenosis of both valves and is symptomatic. Patient Active Problem List    Diagnosis Date Noted    Paroxysmal atrial fibrillation ([facility] 10/03/2016    Coronary atherosclerosis of native coronary artery 12/12/2012    Mitral valve disease 12/12/2012    Aortic valve disorder 12/12/2012    DM (diabetes mellitus) ([facility] 12/12/2012    Essential hypertension, benign 12/12/2012    Dyslipidemia 12/12/2012    History of MVR with cardiopulmonary bypass 12/12/2012    S/P AVR (aortic valve replacement) 12/12/2012      Nas Mclean NP  Past Medical History:   Diagnosis Date    CAD (coronary artery disease)     DM (diabetes mellitus) ([facility]     Dyslipidemia     Hypertension     Mitral regurgitation and aortic stenosis       Past Surgical History:   Procedure Laterality Date    HX AORTIC VALVE REPLACEMENT      HX CORONARY ARTERY BYPASS GRAFT      HX MITRAL VALVE REPLACEMENT       Allergies   Allergen Reactions    Sulfa Dyne Other (comments)      No family history on file. No current facility-administered medications for this encounter. Review of Symptoms:  A comprehensive review of systems was negative except for that written in the HPI. Subjective:      Physical Exam    There were no vitals taken for this visit.   General Appearance:  Well developed, well nourished,alert and oriented x 3, and individual in no acute distress. Ears/Nose/Mouth/Throat:   Hearing grossly normal.         Neck: Supple. Chest:   Lungs clear to auscultation bilaterally. Cardiovascular:  Regular rate and rhythm, S1, S2 normal, no murmur. Abdomen:   Soft, non-tender, bowel sounds are active. Extremities: No edema bilaterally. Skin: Warm and dry. Cardiographics    Telemetry: normal sinus rhythm  ECG: normal EKG, normal sinus rhythm, unchanged from previous tracings  Echocardiogram: Not done    Labs: No results found for this or any previous visit (from the past 24 hour(s)). Assessment:     Assessment:       Active Problems:    Coronary atherosclerosis of native coronary artery (12/12/2012)      Mitral valve disease (12/12/2012)      Aortic valve disorder (12/12/2012)         Plan:     Right and left heart cath today using femoral approach. CT surgery consult.     Fredo Hammonds MD

## 2018-03-06 NOTE — IP AVS SNAPSHOT
[address] 
[phone] Patient: Fiorella Braga MRN: [id number] TIF:0/00/8901 A check william indicates which time of day the medication should be taken. My Medications CHANGE how you take these medications Instructions Each Dose to Equal  
 Morning Noon Evening Bedtime  
 metFORMIN 500 mg tablet Commonly known as:  GLUCOPHAGE What changed:  additional instructions Your last dose was: Your next dose is: Take 1 Tab by mouth two (2) times daily (with meals). Start on 03/08 Thursday 500 mg CONTINUE taking these medications Instructions Each Dose to Equal  
 Morning Noon Evening Bedtime  
 amLODIPine 5 mg tablet Commonly known as:  Ellen Benz Your last dose was: Your next dose is: TAKE 1 TABLET EVERY DAY FOR BLOOD PRESSURE  
     
   
   
   
  
 amoxicillin 500 mg capsule Commonly known as:  AMOXIL Your last dose was: Your next dose is: Take 1 Cap by mouth as needed (take as directed one hr prior to dental procedures). 500 mg  
    
   
   
   
  
 aspirin 81 mg tablet Your last dose was: Your next dose is: Take 81 mg by mouth. 81 mg  
    
   
   
   
  
 atorvastatin 40 mg tablet Commonly known as:  LIPITOR Your last dose was: Your next dose is: Take 1 Tab by mouth nightly. 40 mg Biotin 2,500 mcg Cap Your last dose was: Your next dose is: Take  by mouth.  
     
   
   
   
  
 calcium 600 mg Cap Your last dose was: Your next dose is: Take  by mouth. DAILY MULTI-VITAMINS/IRON tablet Generic drug:  multivitamin with iron Your last dose was: Your next dose is: Take 1 Tab by mouth daily. 1 Tab ENBREL SC Your last dose was: Your next dose is:    
   
   
 by SubCUTAneous route every seven (7) days. FISH OIL 1,000 mg Cap Generic drug:  omega-3 fatty acids-vitamin e Your last dose was: Your next dose is: Take 2 Caps by mouth. 2 Cap  
    
   
   
   
  
 furosemide 20 mg tablet Commonly known as:  LASIX Your last dose was: Your next dose is: Take 1 Tab by mouth daily. For edema 20 mg  
    
   
   
   
  
 glipiZIDE 5 mg tablet Commonly known as:  Esme Baltazarke Your last dose was: Your next dose is: Take 5 mg by mouth daily. 5 mg  
    
   
   
   
  
 hydroCHLOROthiazide 50 mg tablet Commonly known as:  HYDRODIURIL Your last dose was: Your next dose is: Take 50 mg by mouth daily. 50 mg  
    
   
   
   
  
 lisinopril 20 mg tablet Commonly known as:  Ladely Lowks Your last dose was: Your next dose is: Take 1 Tab by mouth daily. 20 mg  
    
   
   
   
  
 metoprolol succinate 100 mg tablet Commonly known as:  TOPROL-XL Your last dose was: Your next dose is: TAKE 1 TABLET EVERY DAY FOR BLOOD PRESSURE  
     
   
   
   
  
 nitroglycerin 0.4 mg SL tablet Commonly known as:  NITROSTAT Your last dose was: Your next dose is:    
   
   
 DISSOLVE 1 TABLET UNDER THE TONGUE EVERY 5 MINUTES FOR 3 DOSES AS NEEDED FOR CHEST PAIN  
     
   
   
   
  
 ONE DAILY Tab Generic drug:  multivitamins-ca-iron-minerals Your last dose was: Your next dose is: Take  by mouth. VITAMIN C 500 mg tablet Generic drug:  ascorbic acid (vitamin C) Your last dose was: Your next dose is: Take  by mouth. VITRON-C 65 mg iron- 125 mg Tbec Generic drug:  iron,carbonyl-vitamin C Your last dose was: Your next dose is: Take  by mouth. ASK your doctor about these medications Instructions Each Dose to Equal  
 Morning Noon Evening Bedtime ACETAMINOPHEN PM PO Your last dose was: Your next dose is: Take  by mouth. naproxen sodium 220 mg tablet Commonly known as:  NAPROSYN Your last dose was: Your next dose is: Take 220 mg by mouth nightly. 220 mg Where to Get Your Medications Information on where to get these meds will be given to you by the nurse or doctor. ! Ask your nurse or doctor about these medications  
  metFORMIN 500 mg tablet

## 2018-03-06 NOTE — PROCEDURES
LM-distal 90% with filling defect  LAD-proximal 90%, mid 100%   LCX-mid 100% . RCA-mid 100%. LIMA to LAD-patent  SVG to D1/OM1- it is a Y graft, both limbs patent. RCA not grafted. LV-not crossed. RA 12, PA 5/15, W 20   Right femoral access. No complications. EBL-minimal.   Specimen-none. Ct surgery evaluation for valve replacement.

## 2018-03-06 NOTE — PROGRESS NOTES
SHEATH PULL NOTE:    Patient informed of procedure with questions answered with review. Sheath site prepped with Chloraprep swab. 5 fr sheath and 6 fr sheath in right groin pulled by Lewie Severe. Hand hold and quick clot, with manual compression to site. No bleeding, no hematoma, no pain at site. Hemostasis obtained with hand hold/manual compression at site. Patient tolerated well. No change in status. Handhold for 15 minutes. No change at site. Sterile dressing applied to site. No bleeding, no hematoma, no pain/discomfort at site. Groin instructions provided with review. Continue to monitor procedure site and patient status. *Advised patient to keep head flat and extremity flat to decrease risk of bleeding. *Recommended that patient not drink for ONE HOUR post sheath pull completion. *Recommended that patient not eat for TWO HOURS post sheath pull completion. *Instructed patient on rationale for delay of PO products to decrease risk for aspiration and if additional treatment to procedure site is required. Patient verbalized understanding of instructions with review.

## 2018-03-06 NOTE — PROGRESS NOTES
Problem: Falls - Risk of  Goal: *Absence of Falls  Document Art Fall Risk and appropriate interventions in the flowsheet.    Outcome: Progressing Towards Goal  Fall Risk Interventions:            Medication Interventions: Teach patient to arise slowly

## 2018-03-07 NOTE — PROGRESS NOTES
Bedside report received from Ryan Berrios RN                   Assessment, Background, Procedure summary, Intake/Output, MAR, and recent results discussed. Care assumed. Discharge instructions reviewed with patient and family. Allowed adequate time to ask questions, all questions answered. Printed copy of AVS given to patient. All belongings gathered, IV and tele discontinued. Transported via wheelchair by RN to main entrance and into care of family.

## 2018-03-07 NOTE — PROGRESS NOTES
Pt received discharge instructions and prescriptions. Pt states understanding of follow-up care and side effects of medications. Pt given opportunity for questions and clarifications. IV removed. Pt has all belongings.  Grey Mattson RN

## 2018-03-13 NOTE — TELEPHONE ENCOUNTER
Verified patient with two identifiers. Spoke with pt advising her about her apt with Dr. Parish Edwards tomorrow. Pt verbalized understanding.

## 2018-03-14 PROBLEM — E11.21 TYPE 2 DIABETES WITH NEPHROPATHY (HCC): Status: ACTIVE | Noted: 2018-01-01

## 2018-03-14 NOTE — PROGRESS NOTES
I had the pleasure of seeing Ms. Jorge Dean in the valve clinic earlier today with Ms. Lainey Bond, ANDREA - please see her note for details. She has a bioprosthetic mitral and aortic valves, and increasing dyspnea on exertion past 3-6 months. By TTE, there is severe MS with gradient of 20mmHg mean across it, although by cath no pulmonary hypertension. I am still unclear as to the status of the aortic valve, so would repeat the TTE to focus on that, and get CTA to consider transcatheter MVR versus probably double valve open approach with mechanical mitral (she and her  want the transcatheter approach).

## 2018-03-14 NOTE — PROGRESS NOTES
Patient: Thien Anaya   Age: 69 y.o. Patient Care Team:  Lucero Roy NP as PCP - General (Nurse Practitioner)  [address], MD as Physician (Cardiology)    PCP: Lucero Roy NP    Cardiologist: Ritesh Hawthorne    Diagnosis/Reason for Consultation: The primary encounter diagnosis was Mitral valve insufficiency, unspecified etiology. Diagnoses of Aortic valve stenosis, etiology of cardiac valve disease unspecified, Atherosclerosis of native coronary artery of native heart without angina pectoris, Essential hypertension, benign, Type 2 diabetes mellitus with complication, unspecified long term insulin use status ([facility] [address] History of MVR with cardiopulmonary bypass, S/P AVR (aortic valve replacement), and Dyslipidemia were also pertinent to this visit. Problem List:   Patient Active Problem List   Diagnosis Code    Coronary atherosclerosis of native coronary artery I25.10    Mitral valve disease I05.9    Aortic valve disorder I35.9    DM (diabetes mellitus) ([facility] [address] E11.9    Essential hypertension, benign I10    Dyslipidemia E78.5    History of MVR with cardiopulmonary bypass Z95.2    S/P AVR (aortic valve replacement) Z95.2    Paroxysmal atrial fibrillation (HCC) I48.0    Type 2 diabetes with nephropathy ([facility] [address] E11.21      HPI: 69 y.o.  female with PMHx of MS, MR s/p MVR (#27 Medtronic Mosaic in 2009), AS s/p SAVR with patch closure of Aorta (#21 Medtronic Ultra Mosaic in 2009),  CAD s/p CABG X 3 (LIMA-LAD, SVG to OM1, Diag 1 in 2009), HTN, DM (oral regimen), HLD, PAF, Carotid stenosis, TIA X 2 (2003), CVA (incidental CT finding), psoriasis  that is referred to the [address] [place] [address] by Dr. Ritesh Hawthorne for interventional evaluation of AS & MS.     Ms. Emanuel Chu reports a noticeable increase in her SOB/SONI when walking on a flat surface for 100 ft over the past 6 months as well as some fatigue.   She also reported new LE edema when she saw her cardiologist in February and was started on furosemide. There has been some mild improvement in her LE edema with the diuretic but not in her SOB/SONI or fatigue. She denies any CP/ chest tightness, palpitations, orthopnea (2 pillows for ortho comfort for many yrs),  PND, lightheadedness, syncope or falls. She denies any cardiac hospitalizations in past 12 months. She has no memory of having PAF and suspect it was likely post-op following 2009 surgery. She has seen vascular surgery (Dr. Carol Interiano) for her carotid stenosis and the plan is to medically manage for now and return for repeat dopplers in 6 months. She has been on Enbrel for 5 yrs d/t her psoriasis and this is prescribed by her dermatologist.     Her first surgery was done by Dr. Damaris Etienne and per his note, at the time she had CHF/pulm edema w/ 20-25% LVEF and her MR was an incidental finding during intraop DEVORA. Per pt/, there was limited discussion re: tissue v. Mechanical valve selection at the time of surgery but no known contraindication to long-term anticoagulation at the time. Ms. Saniya Goodman is accompanied by her  today. She has some mild short-term memory loss but is independent in her ADLs, medical decision making and medication administration. NYHA Classification: II   Class II (Mild): Slight limitation of physical activity. Comfortable at rest, but ordinary physical activity results in fatigue, palpitation, or dyspnea.     Angina Classification: 0   Class 0: No symptoms    Past Medical History:   Diagnosis Date    CAD (coronary artery disease)     DM (diabetes mellitus) ([facility] [address]     Dyslipidemia     Hypertension     Mitral regurgitation and aortic stenosis      Endocarditis: no  Pulmonary HTN:  yes Greater than 2/3 systemic: No  Immunocompromised/Steroids: yes  Heart Failure Admission w/in past year:  no  Heart Failure Admission w/in past 2 weeks: no  Liver Dz/Cirrhosis: no  If yes, MELD score: N/A    Past Surgical History:   Procedure Laterality Date    HX AORTIC VALVE REPLACEMENT      HX CORONARY ARTERY BYPASS GRAFT      HX MITRAL VALVE REPLACEMENT        Social History   Substance Use Topics    Smoking status: Never Smoker    Smokeless tobacco: Never Used    Alcohol use 0.0 oz/week     0 Standard drinks or equivalent per week      Comment: Rare      No family history on file. Prior to Admission medications    Medication Sig Start Date End Date Taking? Authorizing Provider   multivitamin with iron (DAILY MULTI-VITAMINS/IRON) tablet Take 1 Tab by mouth daily. Yes Historical Provider   metFORMIN (GLUCOPHAGE) 500 mg tablet Take 1 Tab by mouth two (2) times daily (with meals). Start on 03/08 Thursday 3/6/18  Yes Maryam ZARAGOZA MD   furosemide (LASIX) 20 mg tablet Take 1 Tab by mouth daily. For edema 2/2/18  Yes ERIC Galeano   lisinopril (PRINIVIL, ZESTRIL) 20 mg tablet Take 1 Tab by mouth daily. 1/17/18  Yes Galo Saavedra NP   nitroglycerin (NITROSTAT) 0.4 mg SL tablet DISSOLVE 1 TABLET UNDER THE TONGUE EVERY 5 MINUTES FOR 3 DOSES AS NEEDED FOR CHEST PAIN 1/8/18  Yes Galo Saavedra NP   amLODIPine (NORVASC) 5 mg tablet TAKE 1 TABLET EVERY DAY FOR BLOOD PRESSURE 12/27/17  Yes Maryam ZARAGOZA MD   metoprolol succinate (TOPROL-XL) 100 mg tablet TAKE 1 TABLET EVERY DAY FOR BLOOD PRESSURE 12/27/17  Yes Maryam Ge MD   ACETAMINOPHEN/DIPHENHYDRAMINE (ACETAMINOPHEN PM PO) Take  by mouth. Yes Historical Provider   naproxen sodium (NAPROSYN) 220 mg tablet Take 220 mg by mouth nightly. Yes Historical Provider   iron-vitamin C (VITRON-C) 65 mg iron- 125 mg TbEC Take 1 Tab by mouth three (3) times daily. Yes Historical Provider   Biotin 2,500 mcg cap Take  by mouth. Yes Historical Provider   ETANERCEPT (ENBREL SC) by SubCUTAneous route every seven (7) days. Yes Historical Provider   atorvastatin (LIPITOR) 40 mg tablet Take 1 Tab by mouth nightly.  6/19/13  Yes ERIC Galeano   aspirin 81 mg tablet Take 81 mg by mouth. Yes Historical Provider   glipiZIDE (GLUCOTROL) 5 mg tablet Take 5 mg by mouth daily. Yes Historical Provider   hydrochlorothiazide (HYDRODIURIL) 50 mg tablet Take 50 mg by mouth daily. Yes Historical Provider   calcium 600 mg Cap Take  by mouth. Yes Historical Provider   ascorbic acid (VITAMIN C) 500 mg tablet Take  by mouth. Yes Historical Provider   omega-3 fatty acids-vitamin e (FISH OIL) 1,000 mg Cap Take 2 Caps by mouth. Yes Historical Provider   amoxicillin (AMOXIL) 500 mg capsule Take 1 Cap by mouth as needed (take as directed one hr prior to dental procedures). 1/8/14   Williemae Gottron, MD       Allergies   Allergen Reactions    Sulfa Dyne Other (comments)       Current Medications:   Current Outpatient Prescriptions   Medication Sig Dispense Refill    multivitamin with iron (DAILY MULTI-VITAMINS/IRON) tablet Take 1 Tab by mouth daily.  metFORMIN (GLUCOPHAGE) 500 mg tablet Take 1 Tab by mouth two (2) times daily (with meals). Start on 03/08 Thursday 1 Tab 0    furosemide (LASIX) 20 mg tablet Take 1 Tab by mouth daily. For edema 30 Tab 1    lisinopril (PRINIVIL, ZESTRIL) 20 mg tablet Take 1 Tab by mouth daily. 90 Tab 3    nitroglycerin (NITROSTAT) 0.4 mg SL tablet DISSOLVE 1 TABLET UNDER THE TONGUE EVERY 5 MINUTES FOR 3 DOSES AS NEEDED FOR CHEST PAIN 50 Tab 0    amLODIPine (NORVASC) 5 mg tablet TAKE 1 TABLET EVERY DAY FOR BLOOD PRESSURE 90 Tab 3    metoprolol succinate (TOPROL-XL) 100 mg tablet TAKE 1 TABLET EVERY DAY FOR BLOOD PRESSURE 90 Tab 3    ACETAMINOPHEN/DIPHENHYDRAMINE (ACETAMINOPHEN PM PO) Take  by mouth.  naproxen sodium (NAPROSYN) 220 mg tablet Take 220 mg by mouth nightly.  iron-vitamin C (VITRON-C) 65 mg iron- 125 mg TbEC Take 1 Tab by mouth three (3) times daily.  Biotin 2,500 mcg cap Take  by mouth.  ETANERCEPT (ENBREL SC) by SubCUTAneous route every seven (7) days.       atorvastatin (LIPITOR) 40 mg tablet Take 1 Tab by mouth nightly. 90 Tab 3    aspirin 81 mg tablet Take 81 mg by mouth.  glipiZIDE (GLUCOTROL) 5 mg tablet Take 5 mg by mouth daily.  hydrochlorothiazide (HYDRODIURIL) 50 mg tablet Take 50 mg by mouth daily.  calcium 600 mg Cap Take  by mouth.  ascorbic acid (VITAMIN C) 500 mg tablet Take  by mouth.  omega-3 fatty acids-vitamin e (FISH OIL) 1,000 mg Cap Take 2 Caps by mouth.  amoxicillin (AMOXIL) 500 mg capsule Take 1 Cap by mouth as needed (take as directed one hr prior to dental procedures). 4 Cap prn       Vitals: Blood pressure 133/60, pulse (!) 58, temperature 97.6 °F (36.4 °C), temperature source Oral, resp. rate 24, height 5' 2\" (1.575 m), weight 150 lb (68 kg), SpO2 98 %. Pre 6 min walk VS: 156/74, RR 16, HR 72, 95% sat    Allergies: is allergic to sulfa dyne. Review of Systems: Pertinent Positives per HPI   [x]Total of 13 systems reviewed as follows:  Constitutional: Negative fever, negative chills  Eyes:   Negative for amauroses fugax  ENT:   Negative sore throat,oral absecess  Endocrine Negative for thyroid replacement Rx; goiter  Respiratory:  Negative chronic cough,sputum production  Cards:   Negative for palpitations, varicosities, claudication  GI:   Negative for dysphagia, bleeding, nausea, vomiting, diarrhea, and abdominal pain  Genitourinary: Negative for frequency, dysuria  Integument:  Negative for rash and pruritus  Hematologic:  Negative for easy bruising; bleeding dyscarsia  Musculoskel: Negative for muscle weakness inhibiting ambulation  Neurological:  Negative for stroke, TIA, syncope, dizziness  Behavl/Psych: Negative for feelings of anxiety, depression     Cardiovascular Testing:   EKG 2/2/2018: SR 72 bpm. 1st AVB. Inferior T wave inversion    TTE 2/7/2018: LEFT VENTRICLE: Size was normal. Systolic function was normal. Ejection fraction was estimated in the range of 55 % to 60 %. There were no regional wall motion abnormalities.  Wall thickness was normal. DOPPLER: The study was not technically sufficient to allow evaluation of LV diastolic function. RIGHT VENTRICLE: The ventricle was dilated. Systolic  function was moderately reduced. LEFT ATRIUM: The atrium was moderately to severely dilated. RIGHT ATRIUM: Size was normal.  MITRAL VALVE: A mechanical prosthesis was present. It exhibited stenosis and reduced motion. Recommend DEVORA for better evaluation. DOPPLER: Regurgitation could not be evaluated due to acoustic artifact from the prosthesis. There was no regurgitation. AORTIC VALVE: A prosthesis was present. Accurate transvalvular gradient unobtainable with stenotic MV prosthesis present. DOPPLER: There was trivial regurgitation. TRICUSPID VALVE: Normal valve structure. There was normal leaflet separation. DOPPLER: There was trivial regurgitation. Pulmonary artery systolic pressure was within the normal range. PULMONIC VALVE: Not well visualized, but normal Doppler findings. DOPPLER: There was mild regurgitation. AORTA: Aortic root not well seen. PERICARDIUM: There was no pericardial effusion. AV MaxP.09 mmHg  AV Vmax: 2.83 m/s  AV meanP.86 mmHg  MV Vmax: 2.84 m/s  MV Vmean: 2.11 m/s  MV maxP.34 mmHg  MV meanP.16 mmHg    DEVORA 2018: LEFT VENTRICLE: Size was normal. Systolic function was normal. Ejection fraction was estimated in the range of 55 % to 60 %. There were no regional wall motion abnormalities. Wall thickness was normal. RIGHT VENTRICLE: The ventricle was mildly to moderately dilated. Systolic function was moderately reduced. LEFT ATRIUM: The atrium was moderately dilated. APPENDAGE: The size was normal. No thrombus was identified. DOPPLER: The function was normal (normal emptying velocity). ATRIAL SEPTUM: No defect or patent foramen ovale was identified. Contrast injection was performed. There was no right-to-left shunt, with provocative maneuvers to increase right atrial pressure.  RIGHT ATRIUM: The atrium was moderately dilated. MITRAL VALVE: There was moderate to marked annular calcification. There was moderate-marked calcification. A bioprosthesis was present. It exhibited stenosis. DOPPLER: There was moderate regurgitation. AORTIC VALVE: Not well visualized. A mechanical prosthesis was present. It exhibited stenosis. DOPPLER: There was moderate stenosis. TRICUSPID VALVE: Normal valve structure. There was normal leaflet separation. No obvious mass, vegetation or thrombus noted. DOPPLER: There was no regurgitation. AORTA: The root exhibited normal size. PERICARDIUM: There was no pericardial effusion. The pericardium was normal in appearance. AV Vmax: 2.7 m/s  AV maxP.4 mmHg  AV meanPG: 15.6 mmHg  MV VTI: 81.9 cm  MV Vmax: 2.6 m/s  MV Vmean: 1.8 m/s  MV maxP.7 mmHg  MV meanP.9 mmHg  LORI (VTI): 1.2 cm2    L/R Cardiac catheterization 3/6/2018: CORONARY CIRCULATION: The coronary circulation is right dominant. Distal left main: There was a 75 % stenosis. The lesion was hazy. Ostial LAD:  Normal. 1st obtuse marginal: There was a 100 % stenosis. 2nd obtuse marginal: Normal. Graft to the mid LAD: The graft was a LIMA. It was normal. Graft to the 1st diagonal: The graft was a saphenous vein graft from the aorta. It is a Y graft to D1 and OM1  Aortic Pressure (S/D/M): 148/61/96  Pulmonary Artery (S/D/M): 70/24/41  Pulmonary Capillary Wedge: 25/35/23  Right Atrium (a/v/M): 6/6/0  Right Ventricle (s/edp): 33/8/--    Carotid Dopplers 10/25/2017: 70% or greater stenosis in the right internal carotid artery. <50% stenosis in the left internal carotid artery. No significant stenosis in the external carotid arteries bilaterally. Antegrade flow in both vertebral arteries. Normal flow in both subclavian arteries. Tortuous vessels and shadowing from calcific plaque made this a technically difficult study.     PFTs: FEV1/Predicted:  None    Gated C/A/P CTA: None    Physical Exam:  General: Well nourished well groomed woman appearing stated age accompanied by   Neuro: A&OX3. WAN. PERRL. Steady un-assisted gait  Head:Normocephalic. Atraumatic. Symmetrical  Neck: Trachea Midline  Resp: CTA B. No Adv BS/cough/sputum/tachypnea with seated conversation  CV: S1S2 RRR. LIBERTY IVVI. No JVD/carotid bruits. Pink/warm/dry extremities. Mod pre-tibial LE peripheral edema  GI:Benign ab. Soft. NT/ND. Active BS  : Voids  Integ: No obvious s/s of infection or breakdown  Musculo/Skeletal: FROM in all major joints. Good muscle tone    Clinic Evaluation:   K[facility]Q-12: scanned into EMR    6 minute walk test: PreTest HR/02 sat:  See VS this visit - completed 6 min             Post Test HR/02 sat: 58 / 98% sat on RA             Distance Walked: 837 ft 8 inchest    5 meter gait: 6.33 seconds    Frality Survey:  Mckenzie Index ADL - 6/6  scanned into EMR     Assessment/Plan:   1.  MS of bioprosthetic MVR (#27 Medtronic Mosaic in 1/2009) - Darleen? Needs CTA. 2. AS s/p SAVR with patch closure of Aorta (#21 Medtronic Ultra Mosaic in 1/2009) - significant AS murmur but TTE measurements not severe AS. Needs CTA to better eval and repeat TTE. 3. CAD s/p CABG X 3 (LIMA-LAD, SVG to OM1, Diag 1 in 1/2009) - grafts patent on most recent cath. BB/Statin/ASA per cards  4. HTN - controlled on current regimen per cards  5. DM (oral regimen) - per PCP  6. HLD - per primary cards  7. PAF-SR for some time. Not anticoagulated  8. Carotid stenosis - f/u from [facility]? 9. RA - Enbrel weekly  10. Further plan/care by Padmini Pelaez

## 2018-04-11 NOTE — PROGRESS NOTES
Patient: Pamela Thompson   Age: 69 y.o. Patient Care Team:  Oliver Beltrán NP as PCP - General (Nurse Practitioner)  [address], [place] as Physician (Cardiology)    PCP: Oliver Beltrán NP    Cardiologist: Grace Parker    Diagnosis/Reason for Consultation: The primary encounter diagnosis was Mitral valve insufficiency, unspecified etiology. Diagnoses of History of MVR with cardiopulmonary bypass, Aortic valve stenosis, etiology of cardiac valve disease unspecified, S/P AVR (aortic valve replacement), Essential hypertension, benign, Type 2 diabetes mellitus with complication, unspecified whether long term insulin use ([address] Atherosclerosis of native coronary artery of native heart without angina pectoris, and S/P CABG x 3 were also pertinent to this visit. Problem List:   Patient Active Problem List   Diagnosis Code    Coronary atherosclerosis of native coronary artery I25.10    Mitral valve disease I05.9    Aortic valve disorder I35.9    DM (diabetes mellitus) ([address] E11.9    Essential hypertension, benign I10    Dyslipidemia E78.5    History of MVR with cardiopulmonary bypass Z95.2    S/P AVR (aortic valve replacement) Z95.2    Paroxysmal atrial fibrillation (HCC) I48.0    Type 2 diabetes with nephropathy ([address] E11.21      HPI: 69 y.o.  female with PMHx of MS, MR s/p MVR (#27 Medtronic Mosaic in 2009), AS s/p SAVR with patch closure of Aorta (#21 Medtronic Ultra Mosaic in 2009),  CAD s/p CABG X 3 (LIMA-LAD, SVG to OM1, Diag 1 in 2009), HTN, DM (oral regimen), HLD, PAF, Carotid stenosis, TIA X 2 (2003), CVA (incidental CT finding), psoriasis  that was referred to the [address] [place] [id number] by Dr. Grace Parker for interventional evaluation of AS & MS. She is back today after completing her dx testing to discuss an interventional plan.     Ms. Nghia Parikh reports a noticeable increase in her SOB/SONI when walking on a flat surface for 100 ft over the past 6 months as well as some fatigue. She also reported new LE edema when she saw her cardiologist in February and was started on furosemide. She reports today it is markedly improved and to her eye almost resolved but I could leave a thumb print in her shin up to her knee. There has not been any improvement in her SOB/SONI or fatigue with the fluid reduction. She denies any CP/ chest tightness, palpitations, orthopnea (2 pillows for ortho comfort for many yrs),  PND, lightheadedness, syncope or falls.      She denies any cardiac hospitalizations in past 12 months. She has no memory of having PAF and suspect it was likely post-op following 2009 surgery. She has seen vascular surgery (Dr. Corazon Hubbard) for her carotid stenosis and the plan is to medically manage for now and return for repeat dopplers in 6 months. She has been on Enbrel for 5 yrs d/t her psoriasis and this is prescribed by her dermatologist.      Her first surgery was done by Dr. Sarika Dos Santos and per his note, at the time she had CHF/pulm edema w/ 20-25% LVEF and her MR was an incidental finding during intraop DEVORA. Per pt/, there was limited discussion re: tissue v. Mechanical valve selection at the time of surgery but no known contraindication to long-term anticoagulation at the time.     Ms. Gabriele Sawyer is accompanied by her  & daughter today. She has some mild short-term memory loss but is independent in her ADLs, medical decision making and medication administration. NYHA Classification: II                        Class II (Mild): Slight limitation of physical activity.  Comfortable at rest, but ordinary physical activity results in fatigue, palpitation, or dyspnea.     Angina Classification: 0                        Class 0: No symptoms    Past Medical History:   Diagnosis Date    CAD (coronary artery disease)     DM (diabetes mellitus) ([facility]     Dyslipidemia     Hypertension     Mitral regurgitation and aortic stenosis      Endocarditis: no  Pulmonary HTN: yes                      Greater than 2/3 systemic: No  Immunocompromised/Steroids: yes  Heart Failure Admission w/in past year:  no  Heart Failure Admission w/in past 2 weeks: no  Liver Dz/Cirrhosis: no  If yes, MELD score: N/A  No dental issues. Routine appt tomorrow w/ prophylax abx    Past Surgical History:   Procedure Laterality Date    HX AORTIC VALVE REPLACEMENT      HX CORONARY ARTERY BYPASS GRAFT      HX MITRAL VALVE REPLACEMENT        Social History   Substance Use Topics    Smoking status: Never Smoker    Smokeless tobacco: Never Used    Alcohol use 0.0 oz/week     0 Standard drinks or equivalent per week      Comment: Rare      No family history on file. Prior to Admission medications    Medication Sig Start Date End Date Taking? Authorizing Provider   multivitamin with iron (DAILY MULTI-VITAMINS/IRON) tablet Take 1 Tab by mouth daily. Yes Historical Provider   metFORMIN (GLUCOPHAGE) 500 mg tablet Take 1 Tab by mouth two (2) times daily (with meals). Start on 03/08 Thursday  Patient taking differently: Take 1,000 mg by mouth two (2) times daily (with meals). Start on 03/08 Thursday 3/6/18  Yes Maryam ZARAGOZA MD   lisinopril (PRINIVIL, ZESTRIL) 20 mg tablet Take 1 Tab by mouth daily. 1/17/18  Yes Aron Braun NP   nitroglycerin (NITROSTAT) 0.4 mg SL tablet DISSOLVE 1 TABLET UNDER THE TONGUE EVERY 5 MINUTES FOR 3 DOSES AS NEEDED FOR CHEST PAIN 1/8/18  Yes Aron Braun NP   amLODIPine (NORVASC) 5 mg tablet TAKE 1 TABLET EVERY DAY FOR BLOOD PRESSURE 12/27/17  Yes Maryam ZARAGOZA MD   metoprolol succinate (TOPROL-XL) 100 mg tablet TAKE 1 TABLET EVERY DAY FOR BLOOD PRESSURE 12/27/17  Yes Maryam Rain MD   ACETAMINOPHEN/DIPHENHYDRAMINE (ACETAMINOPHEN PM PO) Take  by mouth. Yes Historical Provider   naproxen sodium (NAPROSYN) 220 mg tablet Take 220 mg by mouth nightly.    Yes Historical Provider   iron-vitamin C (VITRON-C) 65 mg iron- 125 mg TbEC Take 1 Tab by mouth three (3) times daily. Yes Historical Provider   Biotin 2,500 mcg cap Take  by mouth. Yes Historical Provider   ETANERCEPT (ENBREL SC) by SubCUTAneous route every seven (7) days. Yes Historical Provider   amoxicillin (AMOXIL) 500 mg capsule Take 1 Cap by mouth as needed (take as directed one hr prior to dental procedures). 1/8/14  Yes Hannah Mendenhall MD   atorvastatin (LIPITOR) 40 mg tablet Take 1 Tab by mouth nightly. 6/19/13  Yes ERIC Galeano   aspirin 81 mg tablet Take 81 mg by mouth. Yes Historical Provider   glipiZIDE (GLUCOTROL) 5 mg tablet Take 5 mg by mouth daily. Yes Historical Provider   hydrochlorothiazide (HYDRODIURIL) 50 mg tablet Take 50 mg by mouth daily. Yes Historical Provider   calcium 600 mg Cap Take  by mouth. Yes Historical Provider   ascorbic acid (VITAMIN C) 500 mg tablet Take  by mouth. Yes Historical Provider   omega-3 fatty acids-vitamin e (FISH OIL) 1,000 mg Cap Take 2 Caps by mouth. Yes Historical Provider   furosemide (LASIX) 20 mg tablet TAKE 1 TABLET BY MOUTH EVERY DAY FOR EDEMA 4/2/18   Maryam Vazquez MD       Allergies   Allergen Reactions    Sulfa Dyne Other (comments)       Current Medications:   Current Outpatient Prescriptions   Medication Sig Dispense Refill    multivitamin with iron (DAILY MULTI-VITAMINS/IRON) tablet Take 1 Tab by mouth daily.  metFORMIN (GLUCOPHAGE) 500 mg tablet Take 1 Tab by mouth two (2) times daily (with meals). Start on 03/08 Thursday (Patient taking differently: Take 1,000 mg by mouth two (2) times daily (with meals). Start on 03/08 Thursday) 1 Tab 0    lisinopril (PRINIVIL, ZESTRIL) 20 mg tablet Take 1 Tab by mouth daily.  90 Tab 3    nitroglycerin (NITROSTAT) 0.4 mg SL tablet DISSOLVE 1 TABLET UNDER THE TONGUE EVERY 5 MINUTES FOR 3 DOSES AS NEEDED FOR CHEST PAIN 50 Tab 0    amLODIPine (NORVASC) 5 mg tablet TAKE 1 TABLET EVERY DAY FOR BLOOD PRESSURE 90 Tab 3    metoprolol succinate (TOPROL-XL) 100 mg tablet TAKE 1 TABLET EVERY DAY FOR BLOOD PRESSURE 90 Tab 3    ACETAMINOPHEN/DIPHENHYDRAMINE (ACETAMINOPHEN PM PO) Take  by mouth.  naproxen sodium (NAPROSYN) 220 mg tablet Take 220 mg by mouth nightly.  iron-vitamin C (VITRON-C) 65 mg iron- 125 mg TbEC Take 1 Tab by mouth three (3) times daily.  Biotin 2,500 mcg cap Take  by mouth.  ETANERCEPT (ENBREL SC) by SubCUTAneous route every seven (7) days.  amoxicillin (AMOXIL) 500 mg capsule Take 1 Cap by mouth as needed (take as directed one hr prior to dental procedures). 4 Cap prn    atorvastatin (LIPITOR) 40 mg tablet Take 1 Tab by mouth nightly. 90 Tab 3    aspirin 81 mg tablet Take 81 mg by mouth.  glipiZIDE (GLUCOTROL) 5 mg tablet Take 5 mg by mouth daily.  hydrochlorothiazide (HYDRODIURIL) 50 mg tablet Take 50 mg by mouth daily.  calcium 600 mg Cap Take  by mouth.  ascorbic acid (VITAMIN C) 500 mg tablet Take  by mouth.  omega-3 fatty acids-vitamin e (FISH OIL) 1,000 mg Cap Take 2 Caps by mouth.  furosemide (LASIX) 20 mg tablet TAKE 1 TABLET BY MOUTH EVERY DAY FOR EDEMA 30 Tab 1       Vitals: Blood pressure 144/64, pulse 72, temperature 98.7 °F (37.1 °C), temperature source Oral, resp. rate 16, height 5' 2\" (1.575 m), weight 150 lb (68 kg), SpO2 96 %. Allergies: is allergic to sulfa dyne.     Review of Systems: Pertinent Positives per HPI   [x]Total of 13 systems reviewed as follows:  Constitutional: Negative fever, negative chills  Eyes:              Negative for amauroses fugax  ENT:               Negative sore throat,oral absecess  Endocrine      Negative for thyroid replacement Rx; goiter  Respiratory:   Negative chronic cough,sputum production  Cards:            Negative for palpitations, varicosities, claudication  GI:                   Negative for dysphagia, bleeding, nausea, vomiting, diarrhea, and abdominal pain  Genitourinary:  Negative for frequency, dysuria  Integument: Negative for rash and pruritus  Hematologic:   Negative for easy bruising; bleeding dyscarsia  Musculoskel:    Negative for muscle weakness inhibiting ambulation  Neurological:  Negative for stroke, TIA, syncope, dizziness  Behavl/Psych: Negative for feelings of anxiety, depression      Cardiovascular Testing:   EKG 2018: SR 72 bpm. 1st AVB. Inferior T wave inversion     TTE 2018: LEFT VENTRICLE: Size was normal. Systolic function was normal. Ejection fraction was estimated in the range of 55 % to 60 %. There were no regional wall motion abnormalities. Wall thickness was normal. DOPPLER: The study was not technically sufficient to allow evaluation of LV diastolic function. RIGHT VENTRICLE: The ventricle was dilated. Systolic  function was moderately reduced. LEFT ATRIUM: The atrium was moderately to severely dilated. RIGHT ATRIUM: Size was normal.  MITRAL VALVE: A mechanical prosthesis was present. It exhibited stenosis and reduced motion. Recommend DEVORA for better evaluation. DOPPLER: Regurgitation could not be evaluated due to acoustic artifact from the prosthesis. There was no regurgitation. AORTIC VALVE: A prosthesis was present. Accurate transvalvular gradient unobtainable with stenotic MV prosthesis present. DOPPLER: There was trivial regurgitation. TRICUSPID VALVE: Normal valve structure. There was normal leaflet separation. DOPPLER: There was trivial regurgitation. Pulmonary artery systolic pressure was within the normal range. PULMONIC VALVE: Not well visualized, but normal Doppler findings. DOPPLER: There was mild regurgitation. AORTA: Aortic root not well seen. PERICARDIUM: There was no pericardial effusion. AV MaxP.09 mmHg  AV Vmax: 2.83 m/s  AV meanP.86 mmHg  MV Vmax: 2.84 m/s  MV Vmean: 2.11 m/s  MV maxP.34 mmHg  MV meanP.16 mmHg     TTE 4/3/2018: LEFT VENTRICLE: Size was normal. Systolic function was normal. Ejection fraction was estimated in the range of 60 % to 65 %. There were no regional wall motion abnormalities. Wall thickness was mildly increased. There was mild concentric hypertrophy. RIGHT VENTRICLE: The size was normal. Systolic function was normal. Wall thickness was normal.  LEFT ATRIUM: The atrium was moderately to severely dilated. RIGHT ATRIUM: Size was normal. MITRAL VALVE: A bioprosthesis was present. DOPPLER: Transmitral velocity was increased due to valvular stenosis. The findings were consistent with severe mitral stenosis. There was trivial regurgitation. AORTIC VALVE: A bioprosthesis was present. DOPPLER: Transaortic velocity  was increased due to valvular stenosis. There was moderate stenosis. TRICUSPID VALVE: Normal valve structure. There was normal leaflet separation. DOPPLER: The transtricuspid velocity was within the normal range. There was no evidence for tricuspid stenosis. There was mild to moderate regurgitation. Pulmonary artery systolic pressure was moderately increased. Tricuspid regurgitation peak velocity: 3.5 m/sec. Right ventricular-right atrial (RV-RA) gradient: 48 mmHg. Right atrial pressure estimate: 5 mmHg. Pulmonary artery systolic pressure: 53 mmHg. PULMONIC VALVE: Leaflets exhibited normal thickness, no calcification, and normal cuspal separation. DOPPLER: The transpulmonic velocity was within the normal range. There was trivial regurgitation. AORTA: The root exhibited normal size. PERICARDIUM: There was no pericardial effusion. The pericardium was normal in appearance. AV Vmax: 3.1 m/s  AV maxP.2 mmHg  AV meanP.1 mmHg  MV PHT: 96.5 ms  MV maxP.9 mmHg  MV meanP.9 mmHg  MVA By PHT: 2.3 cm2  LORI (VTI): 0.9 cm2    DEVORA 2018: LEFT VENTRICLE: Size was normal. Systolic function was normal. Ejection fraction was estimated in the range of 55 % to 60 %. There were no regional wall motion abnormalities. Wall thickness was normal. RIGHT VENTRICLE: The ventricle was mildly to moderately dilated.  Systolic function was moderately reduced. LEFT ATRIUM: The atrium was moderately dilated. APPENDAGE: The size was normal. No thrombus was identified. DOPPLER: The function was normal (normal emptying velocity). ATRIAL SEPTUM: No defect or patent foramen ovale was identified. Contrast injection was performed. There was no right-to-left shunt, with provocative maneuvers to increase right atrial pressure. RIGHT ATRIUM: The atrium was moderately dilated. MITRAL VALVE: There was moderate to marked annular calcification. There was moderate-marked calcification. A bioprosthesis was present. It exhibited stenosis. DOPPLER: There was moderate regurgitation. AORTIC VALVE: Not well visualized. A mechanical prosthesis was present. It exhibited stenosis. DOPPLER: There was moderate stenosis. TRICUSPID VALVE: Normal valve structure. There was normal leaflet separation. No obvious mass, vegetation or thrombus noted. DOPPLER: There was no regurgitation. AORTA: The root exhibited normal size. PERICARDIUM: There was no pericardial effusion. The pericardium was normal in appearance. AV Vmax: 2.7 m/s  AV maxP.4 mmHg  AV meanPG: 15.6 mmHg  MV VTI: 81.9 cm  MV Vmax: 2.6 m/s  MV Vmean: 1.8 m/s  MV maxP.7 mmHg  MV meanP.9 mmHg  LORI (VTI): 1.2 cm2     L/R Cardiac catheterization 3/6/2018: CORONARY CIRCULATION: The coronary circulation is right dominant. Distal left main: There was a 75 % stenosis. The lesion was hazy. Ostial LAD:  Normal. 1st obtuse marginal: There was a 100 % stenosis. 2nd obtuse marginal: Normal. Graft to the mid LAD: The graft was a LIMA. It was normal. Graft to the 1st diagonal: The graft was a saphenous vein graft from the aorta. It is a Y graft to D1 and OM1  Aortic Pressure (S/D/M): 148/61/96  Pulmonary Artery (S/D/M): 70/24/41  Pulmonary Capillary Wedge: 25/35/23  Right Atrium (a/v/M): 6/6/0  Right Ventricle (s/edp): 33/8/--     Carotid Dopplers 10/25/2017: 70% or greater stenosis in the right internal carotid artery. <50% stenosis in the left internal carotid artery. No significant stenosis in the external carotid arteries bilaterally. Antegrade flow in both vertebral arteries. Normal flow in both subclavian arteries. Tortuous vessels and shadowing from calcific plaque made this a technically difficult study. PFTs: FEV1/Predicted:  None     Gated C/A/P CTA 4/3/2018:  Arteriogram:   Aortic annular and valvular calcifications are seen. The thoracic aorta is patent and normal in caliber. The great vessel origins are patent.    The abdominal aorta is patent and normal in caliber with moderate atherosclerosis most pronounced at the level of the renal arteries where there is eccentric calcified plaque causing approximately 50% short segment luminal stenosis. The mesenteric arteries are patent. Single bilateral renal arteries are patent with moderate atherosclerotic narrowing at the origin of the left  renal artery.  Bilateral common, external, and common femoral arteries are patent and normal in caliber measuring 7 mm or greater in diameter. The internal iliac arteries are patent and normal in caliber with moderate atherosclerosis. The visualized proximal right superficial femoral artery is severely atherosclerotic. Mild to moderate atherosclerosis in the proximal left superficial femoral artery. Chest: LUNGS: No focal consolidation or lung lesion LYMPH NODES: Minimally enlarged precarinal mediastinal and right hilar lymph nodes. A representative precarinal lymph node measures 12 mm in short axis. A right hilar lymph node measures 12 mm in short axis. No other thoracic lymphadenopathy. PLEURAL FLUID: Mild left pleural effusion. No right effusion PERICARDIAL FLUID: No pericardial effusion. THYROID: Heterogeneous 2.2 cm left thyroid nodule OTHER: None Abdomen: Arterial phase of imaging limits evaluation of the solid abdominal  Organs. LIVER: No focal mass lesion. Scattered calcifications throughout the parenchyma.  No biliary ductal dilatation. GALLBLADDER: Small calcified stones in the neck. SPLEEN: Unremarkable PANCREAS: No mass or ductal dilatation. ADRENALS: Unremarkable. KIDNEYS/URETERS: Normal symmetric nephrograms with small exophytic left renal cyst. No enhancing renal mass lesion. No hydronephrosis or hydroureter. PERITONEUM: No abdominal lymphadenopathy or ascites. COLON: No dilatation or wall thickening. APPENDIX: Not clearly seen  SMALL BOWEL: No dilatation or wall thickening. STOMACH: Unremarkable. Pelvis:  PELVIS: No pelvic lymphadenopathy or free fluid. The bladder is unremarkable BONES: No destructive bone lesion. IMPRESSION:   Focal aortic stenosis at the level of the renal arteries measuring approximately 50%. Other arterial findings as described above Unchanged minimally enlarged mediastinal and right hilar lymph nodes. Mild left pleural effusion. Heterogeneous 2 cm left thyroid nodule is unchanged. Cholelithiasis. Physical Exam:  General: Well nourished well groomed woman appearing stated age accompanied by  & daughter  Neuro: A&OX3. WAN. PERRL. Steady un-assisted gait  Head:Normocephalic. Atraumatic. Symmetrical  Neck: Trachea Midline  Resp: CTA B. No Adv BS/cough/sputum/tachypnea with seated conversation  CV: S1S2 RRR. LIBERTY IVVI. No JVD/carotid bruits. Pink/warm/dry extremities. Mod pre-tibial LE peripheral edema  GI:Benign ab. Soft. NT/ND. Active BS  : Voids  Integ: No obvious s/s of infection or breakdown  Musculo/Skeletal: FROM in all major joints.  Good muscle tone     Clinic Evaluation:   KCCQ-12: scanned into EMR     6 minute walk test: PreTest HR/02 sat:  See VS this visit - completed 6 min                                                       Post Test HR/02 sat: 58 / 98% sat on RA                                                       Distance Walked: 837 ft 8 inchest     5 meter gait: 6.33 seconds     Frality Survey:  Mckenzie Index ADL - 6/6  scanned into EMR     STS 2.81 Risk Score / Predicted 30 day mortality: - calculations scanned into EMR - no calculator available to support double valve procedure   Procedure: AV Replacement    Risk of Mortality: 6.137%    Morbidity or Mortality: 24.484%    Long Length of Stay: 12.624%    Short Length of Stay: 28.72%    Permanent Stroke: 3.164%    Prolonged Ventilation: 16.809%    DSW Infection: 0.118%    Renal Failure: 7.785%    Reoperation: 7.781%   Procedure: MV Replacement Only    Risk of Mortality: 7.925%    Morbidity or Mortality: 32.606%    Long Length of Stay: 17.39%    Short Length of Stay: 17.909%    Permanent Stroke: 3.164%    Prolonged Ventilation: 18.088%    DSW Infection: 0.118%    Renal Failure: 10.776%    Reoperation: 9.147%     Assessment/Plan:   1. MS of bioprosthetic MVR (#27 Medtronic Mosaic in 1/2009) - severe and symptomatic. Needs PFTs. Risks/benefits of Transvenous-Transeptal TMVR reviewed and pt/family wish to proceed. Darleen TMVR 26 mm S3.   2. AS s/p SAVR with patch closure of Aorta (#21 Medtronic Ultra Mosaic in 1/2009) - severe and symptomatic. Risks/benefits of TAVR reviewed w/ pt/family and they wish to proceed. R TF 23mm Evolut R. Procedurally will do TAVR first followed by TMVR. 3. CAD s/p CABG X 3 (LIMA-LAD, SVG to OM1, Diag 1 in 1/2009) - grafts patent on most recent cath. BB/Statin/ASA per cards  4. HTN - controlled on current regimen per cards  5. DM (oral regimen) - per PCP. Check HgbA1C with PAT. Anticipate insulin gtt intra-op/post-op. 6. HLD - per primary cards  7. PAF-SR for some time. Not anticoagulated  8. Carotid stenosis - vascular f/u with repeat carotid dopplers due in May. No s/s of cerebral compromise currently. Known SCOTT 70% stenosis. No intervention needed at this point in time. 9. RA - Enbrel weekly  10. Further plan/care by Padmini Cueto

## 2018-04-23 NOTE — ANESTHESIA PREPROCEDURE EVALUATION
Anesthetic History   No history of anesthetic complications            Review of Systems / Medical History  Patient summary reviewed, nursing notes reviewed and pertinent labs reviewed    Pulmonary  Within defined limits                 Neuro/Psych   Within defined limits           Cardiovascular  Within defined limits  Hypertension  Valvular problems/murmurs: mitral stenosis and aortic stenosis      Dysrhythmias   CAD and CABG    Exercise tolerance: <4 METS     GI/Hepatic/Renal  Within defined limits              Endo/Other  Within defined limits  Diabetes    Anemia     Other Findings            Physical Exam    Airway  Mallampati: II  TM Distance: > 6 cm  Neck ROM: normal range of motion   Mouth opening: Normal     Cardiovascular    Rhythm: regular      Murmur: Grade 2, Aortic area and Mitral area     Dental    Dentition: Caps/crowns     Pulmonary  Breath sounds clear to auscultation               Abdominal  GI exam deferred       Other Findings            Anesthetic Plan    ASA: 4  Anesthesia type: general    Monitoring Plan: Arterial line, [place], DEVORA, CVP and BIS      Induction: Intravenous  Anesthetic plan and risks discussed with: Patient and Family

## 2018-04-23 NOTE — H&P
CSS   History and Physical    Subjective:      Taken from prior office appointment - no significant changes    HPI: 67 y.o.  female with PMHx of MS, MR s/p MVR (#27 Medtronic Mosaic in 2009), AS s/p SAVR with patch closure of Aorta (#21 Medtronic Ultra Mosaic in 2009),  CAD s/p CABG X 3 (LIMA-LAD, SVG to OM1, Diag 1 in 2009), HTN, DM (oral regimen), HLD, PAF, Carotid stenosis, TIA X 2 (2003), CVA (incidental CT finding), psoriasis  that was referred to the [address] [place] [address] by Dr. Pizarro Mention interventional evaluation of AS & MS. She is back today after completing her dx testing to discuss an interventional plan.      Ms. Tee Haines reports a noticeable increase in her SOB/SONI when walking on a flat surface for 100 ft over the past 6 months as well as some fatigue.  She also reported new LE edema when she saw her cardiologist in February and was started on furosemide. She reports today it is markedly improved and to her eye almost resolved but I could leave a thumb print in her shin up to her knee. Myriam Coppola has not been any improvement in her SOB/SONI or fatigue with the fluid reduction.  She denies any CP/ chest tightness, palpitations, orthopnea (2 pillows for ortho comfort for many yrs),  PND, lightheadedness, syncope or falls.       She denies any cardiac hospitalizations in past 12 months. She has no memory of having PAF and suspect it was likely post-op following 2009 surgery. She has seen vascular surgery (Dr. Rodrigue Dill) for her carotid stenosis and the plan is to medically manage for now and return for repeat dopplers in 6 months. Kizzy Infante has been on Enbrel for 5 yrs d/t her psoriasis and this is prescribed by her dermatologist.   Guerline Ortega  Her first surgery was done by Dr. Reyna Garcia and per his note, at the time she had CHF/pulm edema w/ 20-25% LVEF and her MR was an incidental finding during intraop DEVORA.  Per pt/, there was limited discussion re: tissue v. Mechanical valve selection at the time of surgery but no known contraindication to long-term anticoagulation at the time.      Ms. Kemar Shafer is accompanied by her  & daughter today. Shona Spatz has some mild short-term memory loss but is independent in her ADLs, medical decision making and medication administration.     NYHA Classification: II                        Class II (Mild): Slight limitation of physical activity. Comfortable at rest, but ordinary physical activity results in fatigue, palpitation, or dyspnea.      Angina Classification: 0                        Class 0: No symptoms    Cardiac Testing    TTE 4/3/2018: LEFT VENTRICLE: Size was normal. Systolic function was normal. Ejection fraction was estimated in the range of 60 % to 65 %. There were no regional wall motion abnormalities. Wall thickness was mildly increased. There was mild concentric hypertrophy. RIGHT VENTRICLE: The size was normal. Systolic function was normal. Wall thickness was normal.  LEFT ATRIUM: The atrium was moderately to severely dilated. RIGHT ATRIUM: Size was normal. MITRAL VALVE: A bioprosthesis was present. DOPPLER: Transmitral velocity was increased due to valvular stenosis. The findings were consistent with severe mitral stenosis. There was trivial regurgitation. AORTIC VALVE: A bioprosthesis was present. DOPPLER: Transaortic velocity  was increased due to valvular stenosis. There was moderate stenosis. TRICUSPID VALVE: Normal valve structure. There was normal leaflet separation. DOPPLER: The transtricuspid velocity was within the normal range. There was no evidence for tricuspid stenosis. There was mild to moderate regurgitation. Pulmonary artery systolic pressure was moderately increased. Tricuspid regurgitation peak velocity: 3.5 m/sec. Right ventricular-right atrial (RV-RA) gradient: 48 mmHg. Right atrial pressure estimate: 5 mmHg. Pulmonary artery systolic pressure: 53 mmHg.  PULMONIC VALVE: Leaflets exhibited normal thickness, no calcification, and normal cuspal separation. DOPPLER: The transpulmonic velocity was within the normal range. There was trivial regurgitation. AORTA: The root exhibited normal size. PERICARDIUM: There was no pericardial effusion. The pericardium was normal in appearance. AV Vmax: 3.1 m/s  AV maxP.2 mmHg  AV meanP.1 mmHg  MV PHT: 96.5 ms  MV maxP.9 mmHg  MV meanP.9 mmHg  MVA By PHT: 2.3 cm2  LORI (VTI): 0.9 cm2     DEVORA 2018: LEFT VENTRICLE: Size was normal. Systolic function was normal. Ejection fraction was estimated in the range of 55 % to 60 %. There were no regional wall motion abnormalities. Wall thickness was normal. RIGHT VENTRICLE: The ventricle was mildly to moderately dilated. Systolic function was moderately reduced.  LEFT ATRIUM: The atrium was moderately dilated. APPENDAGE: The size was normal. No thrombus was identified. DOPPLER: The function was normal (normal emptying velocity). ATRIAL SEPTUM: No defect or patent foramen ovale was identified. Contrast injection was performed. There was no right-to-left shunt, with provocative maneuvers to increase right atrial pressure. RIGHT ATRIUM: The atrium was moderately dilated. MITRAL VALVE: There was moderate to marked annular calcification. There was moderate-marked calcification. A bioprosthesis was present. It exhibited stenosis. DOPPLER: There was moderate regurgitation. AORTIC VALVE: Not well visualized. A mechanical prosthesis was present. It exhibited stenosis. DOPPLER: There was moderate stenosis. TRICUSPID VALVE: Normal valve structure. There was normal leaflet separation. No obvious mass, vegetation or thrombus noted. DOPPLER: There was no regurgitation. AORTA: The root exhibited normal size. PERICARDIUM: There was no pericardial effusion. The pericardium was normal in appearance.   AV Vmax: 2.7 m/s  AV maxP.4 mmHg  AV meanPG: 15.6 mmHg  MV VTI: 81.9 cm  MV Vmax: 2.6 m/s  MV Vmean: 1.8 m/s  MV maxP.7 mmHg  MV meanP.9 mmHg  LORI (VTI): 1.2 cm2      L/R Cardiac catheterization 3/6/2018: CORONARY CIRCULATION: The coronary circulation is right dominant. Distal left main: There was a 75 % stenosis. The lesion was hazy. Ostial LAD:  Normal. 1st obtuse marginal: There was a 100 % stenosis. 2nd obtuse marginal: Normal. Graft to the mid LAD: The graft was a LIMA. It was normal. Graft to the 1st diagonal: The graft was a saphenous vein graft from the aorta. It is a Y graft to D1 and OM1  Aortic Pressure (S/D/M): 148/61/96  Pulmonary Artery (S/D/M): 70/24/41  Pulmonary Capillary Wedge: 25/35/23  Right Atrium (a/v/M): 6/6/0  Right Ventricle (s/edp): 33/8/--      Past Medical History:   Diagnosis Date    CAD (coronary artery disease)     DM [facility]diabetes mellitus) ([facility] [address]     Dyslipidemia     Hypertension     Mitral regurgitation and aortic stenosis      Past Surgical History:   Procedure Laterality Date    HX AORTIC VALVE REPLACEMENT      HX CORONARY ARTERY BYPASS GRAFT      HX MITRAL VALVE REPLACEMENT        Social History   Substance Use Topics    Smoking status: Never Smoker    Smokeless tobacco: Never Used    Alcohol use 0.0 oz/week     0 Standard drinks or equivalent per week      Comment: Rare      No family history on file. Prior to Admission medications    Medication Sig Start Date End Date Taking? Authorizing Provider   levoFLOXacin (LEVAQUIN) 250 mg tablet Take 1 Tab by mouth daily for 5 days. 4/23/18 4/28/18  JACQUELYN Rosales   furosemide (LASIX) 20 mg tablet TAKE 1 TABLET BY MOUTH EVERY DAY FOR EDEMA 4/2/18   Maryam ZARAGOZA MD   multivitamin with iron (DAILY MULTI-VITAMINS/IRON) tablet Take 1 Tab by mouth daily. Historical Provider   metFORMIN (GLUCOPHAGE) 500 mg tablet Take 1 Tab by mouth two (2) times daily (with meals). Start on 03/08 Thursday  Patient taking differently: Take 1,000 mg by mouth two (2) times daily (with meals).  Start on 03/08 Thursday 3/6/18   Maryam Campa MD   lisinopril (PRINIVIL, ZESTRIL) 20 mg tablet Take 1 Tab by mouth daily. 1/17/18   Talha Lang NP   nitroglycerin (NITROSTAT) 0.4 mg SL tablet DISSOLVE 1 TABLET UNDER THE TONGUE EVERY 5 MINUTES FOR 3 DOSES AS NEEDED FOR CHEST PAIN 1/8/18   Talha Lang NP   amLODIPine (NORVASC) 5 mg tablet TAKE 1 TABLET EVERY DAY FOR BLOOD PRESSURE 12/27/17   Maryam ZARAGOZA MD   metoprolol succinate (TOPROL-XL) 100 mg tablet TAKE 1 TABLET EVERY DAY FOR BLOOD PRESSURE 12/27/17   Maryam Broderick MD   ACETAMINOPHEN/DIPHENHYDRAMINE (ACETAMINOPHEN PM PO) Take  by mouth. Historical Provider   naproxen sodium (NAPROSYN) 220 mg tablet Take 220 mg by mouth nightly. Historical Provider   iron-vitamin C (VITRON-C) 65 mg iron- 125 mg TbEC Take 1 Tab by mouth three (3) times daily. Historical Provider   Biotin 2,500 mcg cap Take  by mouth. Historical Provider   ETANERCEPT (ENBREL SC) by SubCUTAneous route every seven (7) days. Historical Provider   amoxicillin (AMOXIL) 500 mg capsule Take 1 Cap by mouth as needed (take as directed one hr prior to dental procedures). 1/8/14   Nenita Cooper MD   atorvastatin (LIPITOR) 40 mg tablet Take 1 Tab by mouth nightly. 6/19/13   ERIC Galeano   aspirin 81 mg tablet Take 81 mg by mouth. Historical Provider   glipiZIDE (GLUCOTROL) 5 mg tablet Take 5 mg by mouth daily. Historical Provider   hydrochlorothiazide (HYDRODIURIL) 50 mg tablet Take 50 mg by mouth daily. Historical Provider   calcium 600 mg Cap Take  by mouth. Historical Provider   ascorbic acid (VITAMIN C) 500 mg tablet Take  by mouth. Historical Provider   omega-3 fatty acids-vitamin e (FISH OIL) 1,000 mg Cap Take 2 Caps by mouth. Historical Provider       Allergies   Allergen Reactions    Sulfa Dyne Other (comments)     Review of Systems:   Consititutional: Denies fever or chills. Eyes:  Denies use of glasses or vision problems(cataracts). ENT:  Denies hearing or swallowing difficulty.   CV: Denies CP, claudication, HTN. Resp: + dyspnea, _productive cough. : Denies dialysis or kidney problems. GI: Denies ulcers, esophageal strictures, liver problems. M/S: Denies joint or bone problems, or implanted artificial hardware. Skin: Denies varicose veins, edema. Neuro: Denies strokes, or TIAs. Psych: Denies anxiety or depression. Endocrine: Denies thyroid problems or diabetes. Heme/Lymphatic: Denies easy bruising or lymphedema. Objective:     VS:   Wt: 65.1 kg  Ht:  158 cm  HR: 87  RR: 16  BP: 134/76  Temp: 98.2 degrees F  SpO2: 94%    Physical Exam:    General appearance: alert, cooperative, no distress  Head: normocephalic, without obvious abnormality; atraumatic  Eyes: conjunctivae/corneas clear; EOM's intact. Nose: nares normal; no drainage. Neck: no carotid bruit and no JVD  Lungs: clear to auscultation bilaterally  Heart: regular rate and rhythm; no murmur  Abdomen: soft, non-tender; bowel sounds normal  Extremities: moves all extremities; no weakness. Skin: Skin color normal; No varicose veins or edema. Neurologic: Grossly normal      Labs:   Recent Labs      04/23/18   1220   WBC  7.2   HGB  8.2*   HCT  26.7*   PLT  179   NA  139   K  4.0   BUN  37*   CREA  1.30*   GLU  98   INR  1.2*       Diagnostics:   Carotid Dopplers 10/25/2017: 70% or greater stenosis in the right internal carotid artery. <50% stenosis in the left internal carotid artery. No significant stenosis in the external carotid arteries bilaterally. Antegrade flow in both vertebral arteries. Normal flow in both subclavian arteries. Tortuous vessels and shadowing from calcific plaque made this a technically difficult study.     PFTs: FEV1/Predicted:  None     Gated C/A/P CTA 4/3/2018:  Arteriogram:   Aortic annular and valvular calcifications are seen. The thoracic aorta is patent and normal in caliber.  The great vessel origins are patent.    The abdominal aorta is patent and normal in caliber with moderate atherosclerosis most pronounced at the level of the renal arteries where there is eccentric calcified plaque causing approximately 50% short segment luminal stenosis. The mesenteric arteries are patent. Single bilateral renal arteries are patent with moderate atherosclerotic narrowing at the origin of the left  renal artery.  Bilateral common, external, and common femoral arteries are patent and normal in caliber measuring 7 mm or greater in diameter. The internal iliac arteries are patent and normal in caliber with moderate atherosclerosis. The visualized proximal right superficial femoral artery is severely atherosclerotic. Mild to moderate atherosclerosis in the proximal left superficial femoral artery. Chest: LUNGS: No focal consolidation or lung lesion LYMPH NODES: Minimally enlarged precarinal mediastinal and right hilar lymph nodes. A representative precarinal lymph node measures 12 mm in short axis. A right hilar lymph node measures 12 mm in short axis. No other thoracic lymphadenopathy. PLEURAL FLUID: Mild left pleural effusion. No right effusion PERICARDIAL FLUID: No pericardial effusion. THYROID: Heterogeneous 2.2 cm left thyroid nodule OTHER: None Abdomen: Arterial phase of imaging limits evaluation of the solid abdominal  Organs. LIVER: No focal mass lesion. Scattered calcifications throughout the parenchyma. No biliary ductal dilatation. GALLBLADDER: Small calcified stones in the neck. SPLEEN: Unremarkable PANCREAS: No mass or ductal dilatation. ADRENALS: Unremarkable. KIDNEYS/URETERS: Normal symmetric nephrograms with small exophytic left renal cyst. No enhancing renal mass lesion. No hydronephrosis or hydroureter. PERITONEUM: No abdominal lymphadenopathy or ascites. COLON: No dilatation or wall thickening. APPENDIX: Not clearly seen  SMALL BOWEL: No dilatation or wall thickening. STOMACH: Unremarkable. Pelvis:  PELVIS: No pelvic lymphadenopathy or free fluid.  The bladder is unremarkable BONES: No destructive bone lesion. IMPRESSION:   Focal aortic stenosis at the level of the renal arteries measuring approximately 50%. Other arterial findings as described above Unchanged minimally enlarged mediastinal and right hilar lymph nodes. Mild left pleural effusion. Heterogeneous 2 cm left thyroid nodule is unchanged. Cholelithiasis.     EKG:   Sinus tach 103 with 1st degree AV block    Assessment:     Active Problems:    Mitral valve disease (12/12/2012)      Aortic valve disorder (12/12/2012)        Plan:   The risk and benefit of surgery were reviewed with patient and family and all questions answered and the patient wishes to proceed. Risk include infection, bleeding, stroke, heart attack, irregular heart rhythm, kidney failure and death. The patient was instructed to stop metformin 4-30-18. Surgery is scheduled for 5-2-18. STS Risk Calculator V2.81 - Discussed by surgeon with patient.  Double valve surgery not supported    Procedure: AV Replacement                                              Risk of Mortality: 6.137%                                              Morbidity or Mortality: 24.484%                                              Long Length of Stay: 12.624%                                              Short Length of Stay: 28.72%                                              Permanent Stroke: 3.164%                                              Prolonged Ventilation: 16.809%                                              DSW Infection: 0.118%                                              Renal Failure: 7.785%                                              Reoperation: 7.781%                        Procedure: MV Replacement Only                                              Risk of Mortality: 7.925%                                              Morbidity or Mortality: 32.606%                                              Long Length of Stay: 17.39%                                              Short Length of Stay: 17.909% Permanent Stroke: 3.164%                                              Prolonged Ventilation: 18.088%                                              DSW Infection: 0.118%                                              Renal Failure: 10.776%                                              Reoperation: 9.147%    Treatment Plan:    1. MS of bioprosthetic MVR (#27 Medtronic Mosaic in 1/2009) - TMR 5-2-18  2. AS s/p SAVR with patch closure of Aorta (#21 Medtronic Ultra Mosaic in 1/2009) - TAVR 5-2-18  3. CAD s/p CABG X 3 (LIMA-LAD, SVG to OM1, Diag 1 in 1/2009) - grafts patent on most recent cath. BB/Statin/ASA per cards  4. HTN - controlled on current regimen per cards  5. DM (oral regimen) - Home meds. A1C 5.2. 6. HLD - home meds  7. Hx PAF-SR for some time. Not anticoagulated  8. Carotid stenosis - vascular f/u with repeat carotid dopplers due in May. No s/s of cerebral compromise currently. Known SCOTT 70% stenosis. No intervention needed at this point in time. 9. RA - Enbrel weekly  10. UTI: Levaquin ordered.  No repeat UA necessary per Dr. Kaila Swan By: JACQUELYN Medrano     April 23, 2018

## 2018-04-23 NOTE — PROGRESS NOTES
Anesthesia  Consult note     Anesthesia consult requested by the surgeon for:  suitability of patient for General anesthesia for  minimal  Invasive surgery   contraindications for  One lung ventilation  Contraindication for DEVORA  Suitability for invasive lines    Subjective:      Patient:  Kayden Killer     Procedure: Allergies   Allergen Reactions    Sulfa Dyne Other (comments)       Current Outpatient Prescriptions   Medication Sig    furosemide (LASIX) 20 mg tablet TAKE 1 TABLET BY MOUTH EVERY DAY FOR EDEMA    multivitamin with iron (DAILY MULTI-VITAMINS/IRON) tablet Take 1 Tab by mouth daily.  metFORMIN (GLUCOPHAGE) 500 mg tablet Take 1 Tab by mouth two (2) times daily (with meals). Start on 03/08 Thursday (Patient taking differently: Take 1,000 mg by mouth two (2) times daily (with meals). Start on 03/08 Thursday)    lisinopril (PRINIVIL, ZESTRIL) 20 mg tablet Take 1 Tab by mouth daily.  nitroglycerin (NITROSTAT) 0.4 mg SL tablet DISSOLVE 1 TABLET UNDER THE TONGUE EVERY 5 MINUTES FOR 3 DOSES AS NEEDED FOR CHEST PAIN    amLODIPine (NORVASC) 5 mg tablet TAKE 1 TABLET EVERY DAY FOR BLOOD PRESSURE    metoprolol succinate (TOPROL-XL) 100 mg tablet TAKE 1 TABLET EVERY DAY FOR BLOOD PRESSURE    ACETAMINOPHEN/DIPHENHYDRAMINE (ACETAMINOPHEN PM PO) Take  by mouth.  naproxen sodium (NAPROSYN) 220 mg tablet Take 220 mg by mouth nightly.  iron-vitamin C (VITRON-C) 65 mg iron- 125 mg TbEC Take 1 Tab by mouth three (3) times daily.  Biotin 2,500 mcg cap Take  by mouth.  ETANERCEPT (ENBREL SC) by SubCUTAneous route every seven (7) days.  amoxicillin (AMOXIL) 500 mg capsule Take 1 Cap by mouth as needed (take as directed one hr prior to dental procedures).  atorvastatin (LIPITOR) 40 mg tablet Take 1 Tab by mouth nightly.  aspirin 81 mg tablet Take 81 mg by mouth.  glipiZIDE (GLUCOTROL) 5 mg tablet Take 5 mg by mouth daily.     hydrochlorothiazide (HYDRODIURIL) 50 mg tablet Take 50 mg by mouth daily.  calcium 600 mg Cap Take  by mouth.  ascorbic acid (VITAMIN C) 500 mg tablet Take  by mouth.  omega-3 fatty acids-vitamin e (FISH OIL) 1,000 mg Cap Take 2 Caps by mouth. No current facility-administered medications for this encounter. Past Medical History:   Diagnosis Date    CAD (coronary artery disease)     DM [facility]diabetes mellitus) ([facility] [address]     Dyslipidemia     Hypertension     Mitral regurgitation and aortic stenosis        Past Surgical History:   Procedure Laterality Date    HX AORTIC VALVE REPLACEMENT      HX CORONARY ARTERY BYPASS GRAFT      HX MITRAL VALVE REPLACEMENT         Social History   Substance Use Topics    Smoking status: Never Smoker    Smokeless tobacco: Never Used    Alcohol use 0.0 oz/week     0 Standard drinks or equivalent per week      Comment: Rare         Anesthetic Problems: None in the past   patient denies any problems with swallowing, esophageal stricture, upper GI bleed, GI surgery. No contraindications for DEVORA. Objective:     Physical Exam:    Patient Vitals for the past 8 hrs:   BP Height Weight   04/23/18 1220 (P) 135/78 (P) 5' 2.25\" (1.581 m) (P) 65.1 kg (143 lb 9 oz)       No data recorded. Airway Class: 2  Heart-  Regular rate and rhythm,   s1 s2 heard 2/6 murmer.   Lungs- Clear bilateral on ascultation  Abd- Soft, non tender no organomegaly  Limbs- Normal  Neuro- Normal    Labs:   Recent Results (from the past 24 hour(s))   CBC WITH AUTOMATED DIFF    Collection Time: 04/23/18 12:20 PM   Result Value Ref Range    WBC 7.2 3.6 - 11.0 K/uL    RBC 3.95 3.80 - 5.20 M/uL    HGB 8.2 (L) 11.5 - 16.0 g/dL    HCT 26.7 (L) 35.0 - 47.0 %    MCV 67.6 (L) 80.0 - 99.0 FL    MCH 20.8 (L) 26.0 - 34.0 PG    MCHC 30.7 30.0 - 36.5 g/dL    RDW 17.1 (H) 11.5 - 14.5 %    PLATELET 881 648 - 205 K/uL    MPV ABNORMAL 8.9 - 12.9 FL    NRBC 0.0 0  WBC    ABSOLUTE NRBC 0.00 0.00 - 0.01 K/uL    NEUTROPHILS PENDING %    LYMPHOCYTES PENDING % MONOCYTES PENDING %    EOSINOPHILS PENDING %    BASOPHILS PENDING %    IMMATURE GRANULOCYTES PENDING %    ABS. NEUTROPHILS PENDING K/UL    ABS. LYMPHOCYTES PENDING K/UL    ABS. MONOCYTES PENDING K/UL    ABS. EOSINOPHILS PENDING K/UL    ABS. BASOPHILS PENDING K/UL    ABS. IMM. GRANS. PENDING K/UL    DF PENDING    EKG, 12 LEAD, INITIAL    Collection Time: 04/23/18  1:16 PM   Result Value Ref Range    Ventricular Rate 103 BPM    Atrial Rate 103 BPM    P-R Interval 210 ms    QRS Duration 102 ms    Q-T Interval 354 ms    QTC Calculation (Bezet) 463 ms    Calculated P Axis 55 degrees    Calculated R Axis 69 degrees    Calculated T Axis -154 degrees    Diagnosis       Sinus tachycardia with 1st degree AV block  Left ventricular hypertrophy with repolarization abnormality  When compared with ECG of 13-JAN-2009 11:26,  Previous ECG has undetermined rhythm, needs review  ST no longer elevated in Anterior leads  T wave inversion no longer evident in Anterior leads           Assessment:       Active Problems:    * No active hospital problems. *      ASA4    Plan/Recommendations/Medical Decision Making:       Anesthetic Plan: GETA with invasive monitoring, post op ventilation and DEVORA monitoring  Risks and benefits of the anesthetic plan discussed and agreed upon by the patient.       no contraindications  For one lung ventilation, large bore lines, DEVORA  Signed By: Alicia West MD  Date:           4/23/2018  Time:          1:26 PM

## 2018-05-02 PROBLEM — I35.0 AS (AORTIC STENOSIS): Status: ACTIVE | Noted: 2018-01-01

## 2018-05-02 NOTE — PERIOP NOTES
Aryan Kaminski updated throughout procedure. Patient arrived to the operating room via stretcher. All wheels locked and patient transferred to the OR table with the assistance of four people. MTRE warming wrap present on OR table. Temp set at 37 C and monitored by perfusion. Unit # [id number]. After the induction of anesthesia and intubation, a 16 fr temp sensing adams catheter was placed without difficulty. 10 ml of sterile water was used to inflate the balloon. Clear, yellow urine return noted. Urine output monitored by anesthesia. Fluids:  0.9 % Sodium Chloride:  2000 ml was used for warm irrigation   2000 ml was used to produce slush. Sterile water:  1000 ml in splash basin for instrument care.

## 2018-05-02 NOTE — PROCEDURES
1. Transcatheter Aortic Valve Replacement    Indication  The valve was placed for severe aortic stenosis. Patient was at high surgical risk due to co-morbid conditions. Co-Surgeon: Bladergroen    Access  The valve was placed using a transfemoral approach. The 16F valve delivery sheath was placed in the right femoral artery via a percutaneous approach. 6 Congolese sheaths were placed in the left artery for angiography. 6 Congolese sheaths were placed in bilateral femoral veins for SVC wire and temporary transvenous pacemaker. Procedure  A 6 Fr sheath was placed in the left femoral artery for aortography. Prior to placement of the aortic valve, a left heart catheterization and aortogram were performed. A balloon aortic valvuloplasty was not performed prior to valve placement. A Evolut 23 R aortic valve was then deployed without rapid ventricular pacing. Post-deployment balloon inflation was not performed. Cardiopulmonary bypass support was not used for hemodynamic support during the procedure. Hemodynamics  The pre-TAVR hemodynamic assessment confirmed severe aortic stenosis with a gradient of 22 mmHg mean. Pre: PA 62/30, /20, Ao 120/60, LORI 0.8. Post: PA 60/28, /18, Ao 120/60, mean gradient 7mmHg. The LVEDP was noted to be elevated and there was no aortic regurgitation. Post-deployment hemodynamics showed no transvalvular gradient and elevated LVEDP. Angiography  1. Aortic angiography pre intervention demonstrates severe AS. 2. Aortic angiogrpahy post TAVR demonstrates the CoreValve in good position with no AI.     Conclusion:  Successful transcatheter aortic valve replacement with no paravalvular leak. Post-deployment aortography showed the valve was in good position with no aortic regurgitation. The coronary arteries were patent. LE angiography showed good hemostasis at right CFA access site without any complication and good distal flow. Complications:  None.      Specimen removed: None.     Estimated blood loss:   Minimal.      2. 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 Patient was at high surgical risk due to co-morbid conditions. Co-Surgeon: Bladergroen    Access  The valve was placed using a transfemoral-transapical approach. The 16F valve delivery sheath was placed in the right femoral vein via a percutaneous approach. A mirco sheath was place in left ventricular apex via percutaneous approach using Echo and fluoro guidance. Procedure  Apical wire was externalized from right femoral venous sheath. Interatrial seprum was dilated using 12 x 60 balloon. A 26 S3 aortic valve was then deployed with rapid ventricular pacing. Post-deployment balloon inflation was not performed. Cardiopulmonary bypass support was not used for hemodynamic support during the procedure. Hemodynamics  Pre gradient across mitral valve: 12 mmHg, Post gradient across mitral valve: 4 mmHg. Complications:  None. Specimen removed:   None. Estimated blood loss:   Minimal.      3. Procedure: DEVORA guided PFO closure     Indication:   Significant right to left shunting of blood, hypoxia and RV dysfunction. Procedure: The left upper pulmonary vein was accessed with a 0.035 J-wire. A #25 cribriform device prepared in standard fashion. The cribriform device was advanced over the 0.035 J- wire. The wire was removed. The device was pulled back into the left atrium and under direct fluoroscopic and echocardiographic guidance, the left atrial disk was exposed and brought back to the interatrial septum. The right atrial disk was exposed within the right atrium and brought  to the interatrial septum. Excellent position was obtained in multiple views, by both fluoroscopic and echocardiographic criteria. After confirming secured position the device was then locked and released. Excellent   position was maintained. Final fluoroscopic pictures and echo pictures were obtained.      Conclusion: Successful closure of the patent foramen ovale, leaving no residual shunting by color-flow Doppler and agitated saline contrast study. Complications:  None. Specimen removed:   None. Estimated blood loss:   Minimal.      I was present for the entire procedure. I have edited the procedure note as appropriate.     Janett Nicole MD

## 2018-05-02 NOTE — ANESTHESIA PROCEDURE NOTES
DEVORA        Procedure Details: probe placement, image aquisition & interpretation    Site marked  Risks and benefits discussed with the patient and plans are to proceed    Procedure Note    Performed by: Susan Leos by: Marci AGARWAL       Indications: assessment of ascending aorta and assessment of surgical repair  Modalities: 2D, CF, CWD, contrast, PWD  Probe Type: multiplane  Insertion: atraumatic  Patient Status: intubated    Echocardiographic and Doppler Measurements   Aorta  Size  Diam(cm)  Dissection PlaqueThick(mm)  Plaque Mobile    Ascending normal  No >3 No    Arch normal  No >3 No    Descending normal  No >3 Yes          Valves  Annulus  Stenosis  Area/Grad  Regurg  Leaflet   Morph  Leaflet   Motion    Aortic bioprosthetic severe 60/30,ava0.6 cm2 0 calcified restricted    Mitral bioprosthetic severe 20/12 mm hg 0 normal, calcified restricted    Tricuspid normal none  2+ normal normal          Atria  Size  SEC (smoke)  Thrombus  Tumor  Device    Rt Atrium dilated No No No No    Lt Atrium dilated No No No No     Interatrial Septum Morphology: normal    Interventricular Septum Morphology: normal    Ventricle  Cavity Size  Cavity Dimension Hypertrophy  Thrombus  Gloal FXN  EF    RV dilated  Yes no normal     LV normal  Yes No normal 60       Regional Function  (1 = normal, 2 = mildly hypokinetic, 3 = severely hypokinetic, 4 = akinetic, 5 = dyskinetic) LAV - [place]   ME LAV = 0  ME LAV = 90  ME LAV = 130   Basal Sept:1 Basal Ant:1 Basal Post:1   Mid Sept:1 Mid Ant:1 Mid Post:1   Apical Sept:1 Apical Ant:1 Basal Ant Sept:1   Basal Lat:1 Basal Inf:1    Mid Lat:1 Mid Inf:1    Apical Lat:1 Apical Inf:1      Diastolic function: Stage 2 diastolic dysfunction  Pericardium: normal    Post Intervention Follow-up Study  Ventricular Global Function: improved  Ventricular Regional Function: improved     Valve  Function  Regurgitation  Area    Aortic no change      Mitral improved 0     Tricuspid improved 0     Prosthetic normal       Complications: None  Comments: 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 calcified fixed leaflet. 2 other leaflets moving but restricted. Severe prosthetic aortic  Stenosis. 60/30 gradient. No .  tavr placed under aurelio and fluoro guidance. No pvl. Valve leaflets open and close normally. Ms unchanged so soriano  Valve placed in mitral position under aurelio and fluor guidance,. No PVL. Leaflets opening and closing normally. Inflow gradient 5 mmHG. After both valves were placed , the ASD had a r-l shunt  Hence was closed by asd closure device. No residual  Shunt.

## 2018-05-02 NOTE — CARDIO/PULMONARY
Cardiac Rehab: Transcatheter education folder to the bedside of Milton Yen. [facility] updated. Will continue to follow for post-op education and enrollment in cardiac rehab. Nataliia Wilhelm RN

## 2018-05-02 NOTE — PROGRESS NOTES
Renal Dosing/Monitoring  Medication: Famotidine  Indication:  SUP  Current regimen:  20 mg every 12 hr    Estimated CrCl:  36 ml/min  Plan: Will change to 20 mg Q 24 hrs for crcl < 50 per renal adjustment protocol. Pharmacy to monitor patient daily for dosage adjustments as needed.

## 2018-05-02 NOTE — PROGRESS NOTES
Underwent the following procedures by Padmini Victoria and myself:  1. TAVR with 23mm Corevalve Evolut R  2. Transcatheter Mitral valve replacement with 26mm S3 valve  3. ASD closure (right to left shunting with hypoxemia)  Without complication and with good result.

## 2018-05-02 NOTE — PROGRESS NOTES
Problem: Cardiac Valve Surgery: Day of Surgery/Post-Op (Initiate SCIP measures for post-op care)  Goal: Nutrition/Diet  Outcome: Progressing Towards Goal  Clear liquid diet ordered.   Goal: Respiratory  Outcome: Progressing Towards Goal  4 L/min O2 via nasal cannula

## 2018-05-02 NOTE — ANESTHESIA PROCEDURE NOTES
Central Line Placement    Start time: 5/2/2018 7:02 AM  End time: 5/2/2018 7:16 AM  Performed by: Jayro Day  Authorized by: Vikash AGARWAL     Indications: vascular access, central pressure monitoring and need for vasopressors  Preanesthetic Checklist: patient identified, risks and benefits discussed, anesthesia consent, site marked, patient being monitored and timeout performed      Pre-procedure: All elements of maximal sterile barrier technique followed? Yes    2% Chlorhexidine for cutaneous antisepsis, Hand hygiene performed prior to catheter insertion and Ultrasound guidance              Procedure:   Prep:  Chlorhexidine  Location:  Internal jugular  Orientation:  Right    Catheter type:  Triple lumen  Catheter size:  9 Fr  Catheter length:  16 cm  Number of attempts:  1  Successful placement: Yes      Assessment:   Post-procedure:  Catheter secured, sterile dressing applied and sterile dressing with CHG applied  Assessment:  Blood return through all ports and free fluid flow  Insertion:  Uncomplicated  Patient tolerance:  Patient tolerated the procedure well with no immediate complications  PA catheter inserted .

## 2018-05-02 NOTE — IP AVS SNAPSHOT
[address] [facility] Mavis Bass [address] 
[phone] Patient: Torsten Willett MRN: [id number] QMX:[id number] About your hospitalization You were admitted on:  May 2, 2018 You last received care in the:  [facility] 4 CV SERVICES UNIT You were discharged on: May 4, 2018 Why you were hospitalized Your primary diagnosis was:  Not on File Your diagnoses also included: Aortic Valve Disorder, Mitral Valve Disease, As (Aortic Stenosis) Follow-up Information Follow up With Details Comments Contact Info [address] [place] [address] On 5/5/2018 [facility] [address] [address] 
[phone] Jacqueline Conrad NP In 1 month post hospital [address] [place] [address] 
[phone] Your Scheduled Appointments Tuesday May 08, 2018 10:30 AM EDT  
POST OP with Paris Guevara NP [facility] - [address] [place] [address] ([address] [place] [address]) 200 [facility]-[address] [phone]  
[phone] Tuesday June 05, 2018  1:00 PM EDT  
POST OP with Paris Guevara NP [facility] - [address] [place] [address] ([address] [place] [address]) 200 [facility] [phone]  
[phone] Tuesday June 05, 2018  1:30 PM EDT  
POST OP with Dc Ruvalcaba MD  
[facility] - [address] [place] [address] ([address] [place] [address]) 200 [facility] [phone]  
[phone] Wednesday June 06, 2018  9:00 AM EDT  
ESTABLISHED PATIENT with Bernice Carrero MD  
[facility] [address] [place] [address]) [address] [facility]  
[phone] Discharge Orders None A check william indicates which time of day the medication should be taken. My Medications START taking these medications Instructions Each Dose to Equal  
 Morning Noon Evening Bedtime  
 enoxaparin 80 mg/0.8 mL injection Commonly known as:  LOVENOX Your last dose was: Your next dose is:    
   
   
 70 mg by SubCUTAneous route every twelve (12) hours every twelve (12) hours. 70 mg  
    
   
   
   
  
 senna-docusate 8.6-50 mg per tablet Commonly known as:  Montserrat Puente Your last dose was: Your next dose is: Take 1 Tab by mouth daily. Take until having regular bowel movements. 1 Tab  
    
   
   
   
  
 traMADol 50 mg tablet Commonly known as:  ULTRAM  
   
Your last dose was: Your next dose is: Take 1-2 Tabs by mouth every six (6) hours as needed. Max Daily Amount: 400 mg.  
  mg  
    
   
   
   
  
 warfarin 5 mg tablet Commonly known as:  COUMADIN Your last dose was: Your next dose is: Take 5 mg by mouth at 6pm on 5/4/18. Dosing to be determined by NP/PA team.  
     
   
   
   
  
  
CHANGE how you take these medications Instructions Each Dose to Equal  
 Morning Noon Evening Bedtime  
 metFORMIN 500 mg tablet Commonly known as:  GLUCOPHAGE Start taking on:  5/5/2018 What changed:   
- how much to take 
- additional instructions Your last dose was: Your next dose is: Take 2 Tabs by mouth two (2) times daily (with meals). Start on 5/5 Saturday  
 1000 mg CONTINUE taking these medications Instructions Each Dose to Equal  
 Morning Noon Evening Bedtime ACETAMINOPHEN PM PO Your last dose was: Your next dose is: Take  by mouth. amLODIPine 5 mg tablet Commonly known as:  Shawn Kennedy Your last dose was: Your next dose is: TAKE 1 TABLET EVERY DAY FOR BLOOD PRESSURE  
     
   
   
   
  
 amoxicillin 500 mg capsule Commonly known as:  AMOXIL Your last dose was: Your next dose is: Take 1 Cap by mouth as needed (take as directed one hr prior to dental procedures). 500 mg  
    
   
   
   
  
 aspirin 81 mg tablet Your last dose was: Your next dose is: Take 81 mg by mouth. 81 mg  
    
   
   
   
  
 atorvastatin 40 mg tablet Commonly known as:  LIPITOR Your last dose was: Your next dose is: Take 1 Tab by mouth nightly. 40 mg Biotin 2,500 mcg Cap Your last dose was: Your next dose is: Take  by mouth.  
     
   
   
   
  
 calcium 600 mg Cap Your last dose was: Your next dose is: Take  by mouth. DAILY MULTI-VITAMINS/IRON tablet Generic drug:  multivitamin with iron Your last dose was: Your next dose is: Take 1 Tab by mouth daily. 1 Tab ENBREL SC Your last dose was: Your next dose is:    
   
   
 by SubCUTAneous route every seven (7) days. furosemide 20 mg tablet Commonly known as:  LASIX Your last dose was: Your next dose is: TAKE 1 TABLET BY MOUTH EVERY DAY FOR EDEMA  
     
   
   
   
  
 glipiZIDE 5 mg tablet Commonly known as:  Jody Kitchen Your last dose was: Your next dose is: Take 5 mg by mouth daily. 5 mg  
    
   
   
   
  
 hydroCHLOROthiazide 50 mg tablet Commonly known as:  HYDRODIURIL Your last dose was: Your next dose is: Take 50 mg by mouth daily. 50 mg  
    
   
   
   
  
 lisinopril 20 mg tablet Commonly known as:  Samantha Stokes Your last dose was: Your next dose is: Take 1 Tab by mouth daily. 20 mg  
    
   
   
   
  
 nitroglycerin 0.4 mg SL tablet Commonly known as:  NITROSTAT Your last dose was: Your next dose is: DISSOLVE 1 TABLET UNDER THE TONGUE EVERY 5 MINUTES FOR 3 DOSES AS NEEDED FOR CHEST PAIN  
     
   
   
   
  
 VITAMIN C 500 mg tablet Generic drug:  ascorbic acid (vitamin C) Your last dose was: Your next dose is: Take  by mouth. VITRON-C 65 mg iron- 125 mg Tbec Generic drug:  iron,carbonyl-vitamin C Your last dose was: Your next dose is: Take 1 Tab by mouth three (3) times daily. 1 Tab STOP taking these medications FISH OIL 1,000 mg Cap Generic drug:  omega-3 fatty acids-vitamin e  
   
  
 metoprolol succinate 100 mg tablet Commonly known as:  TOPROL-XL  
   
  
 naproxen sodium 220 mg tablet Commonly known as:  NAPROSYN Where to Get Your Medications These medications were sent to [address] [facility], [address] [place] [address] [address] [facility]  [address] [facility], [address] Phone:  [phone]  
  enoxaparin 80 mg/0.8 mL injection  
 metFORMIN 500 mg tablet  
 senna-docusate 8.6-50 mg per tablet  
 warfarin 5 mg tablet Information on where to get these meds will be given to you by the nurse or doctor. ! Ask your nurse or doctor about these medications  
  traMADol 50 mg tablet Opioid Education Prescription Opioids: What You Need to Know: 
 
Prescription opioids can be used to help relieve moderate-to-severe pain and are often prescribed following a surgery or injury, or for certain health conditions. These medications can be an important part of treatment but also come with serious risks. Opioids are strong pain medicines. Examples include hydrocodone, oxycodone, fentanyl, and morphine. Heroin is an example of an illegal opioid. It is important to work with your health care provider to make sure you are getting the safest, most effective care. WHAT ARE THE RISKS AND SIDE EFFECTS OF OPIOID USE? Prescription opioids carry serious risks of addiction and overdose, especially with prolonged use. An opioid overdose, often marked by slow breathing, can cause sudden death. The use of prescription opioids can have a number of side effects as well, even when taken as directed. · Tolerance-meaning you might need to take more of a medication for the same pain relief · Physical dependence-meaning you have symptoms of withdrawal when the medication is stopped. Withdrawal symptoms can include nausea, sweating, chills, diarrhea, stomach cramps, and muscle aches. Withdrawal can last up to several weeks, depending on which drug you took and how long you took it. · Increased sensitivity to pain · Constipation · Nausea, vomiting, and dry mouth · Sleepiness and dizziness · Confusion · Depression · Low levels of testosterone that can result in lower sex drive, energy, and strength · Itching and sweating RISKS ARE GREATER WITH:      
· History of drug misuse, substance use disorder, or overdose · Mental health conditions (such as depression or anxiety) · Sleep apnea · Older age (72 years or older) · Pregnancy Avoid alcohol while taking prescription opioids. Also, unless specifically advised by your health care provider, medications to avoid include: · Benzodiazepines (such as Xanax or Valium) · Muscle relaxants (such as Soma or Flexeril) · Hypnotics (such as Ambien or Lunesta) · Other prescription opioids KNOW YOUR OPTIONS Talk to your health care provider about ways to manage your pain that don't involve prescription opioids. Some of these options may actually work better and have fewer risks and side effects. Options may include: 
· Pain relievers such as acetaminophen, ibuprofen, and naproxen · Some medications that are also used for depression or seizures · Physical therapy and exercise · Counseling to help patients learn how to cope better with triggers of pain and stress. · Application of heat or cold compress · Massage therapy · Relaxation techniques Be Informed Make sure you know the name of your medication, how much and how often to take it, and its potential risks & side effects. IF YOU ARE PRESCRIBED OPIOIDS FOR PAIN: 
· Never take opioids in greater amounts or more often than prescribed. Remember the goal is not to be pain-free but to manage your pain at a tolerable level. · Follow up with your primary care provider to: · Work together to create a plan on how to manage your pain. · Talk about ways to help manage your pain that don't involve prescription opioids. · Talk about any and all concerns and side effects. · Help prevent misuse and abuse. · Never sell or share prescription opioids · Help prevent misuse and abuse. · Store prescription opioids in a secure place and out of reach of others (this may include visitors, children, friends, and family). · Safely dispose of unused/unwanted prescription opioids: Find your community drug take-back program or your pharmacy mail-back program, or flush them down the toilet, following guidance from the Food and Drug Administration (www.fda.gov/Drugs/ResourcesForYou). · Visit www.cdc.gov/drugoverdose to learn about the risks of opioid abuse and overdose. · If you believe you may be struggling with addiction, tell your health care provider and ask for guidance or call [address] [place] [address]"[employer] at 6-656-758-JQPH. Discharge Instructions Aortic Valve Replacement: What to Expect at Home Your Recovery You have had surgery to replace your heart's aortic valve. Your doctor did the surgery through a cut, called an incision, in your chest. 
You will feel tired and sore for the first few weeks after surgery.  You may have some brief, sharp pains on either side of your chest. Your chest, shoulders, and upper back may ache. The incision in your chest may be sore or swollen. These symptoms usually get better after 4 to 6 weeks. You will probably be able to do many of your usual activities after 4 to 6 weeks. But for at least 6 weeks, you will not be able to lift heavy objects or do activities that strain your chest or upper arm muscles. At first you may notice that you get tired easily and need to rest often. It may take 1 to 2 months to get your energy back. Some people find that they are more emotional after this surgery. You may cry easily or show emotion in ways that are unusual for you. This is common and may last for up to a year. Some people get depressed after this surgery. Talk with your doctor if you have sadness that continues or you are concerned about how you are feeling. Treatment and other support can help you feel better. Even though you have a new aortic valve, it is still important to eat a heart-healthy diet, get regular exercise, not smoke, take your heart medicines, and reduce stress. Your doctor may recommend that you work with a nurse, a dietitian, and a physical therapist to make these changes. This is sometimes called cardiac rehabilitation. This care sheet gives you a general idea about how long it will take for you to recover. But each person recovers at a different pace. Follow the steps below to get better as quickly as possible. How can you care for yourself at home? Activity ? · Rest when you feel tired. Getting enough sleep will help you recover. Try to sleep on your back while your breastbone (sternum) heals. This usually takes about 4 to 6 weeks. ? · Try to walk each day. Start by walking a little more than you did the day before. Bit by bit, increase the amount you walk. Walking boosts blood flow and helps prevent pneumonia and constipation.   
? · Avoid strenuous activities, such as bicycle riding, jogging, weight lifting, or heavy aerobic exercise, until your doctor says it is okay. ? · For 3 months, avoid activities that strain your chest or upper arm muscles. This includes pushing a  or vacuum, mopping floors, or swinging a golf club or tennis racquet. ? · For at least 6 weeks, avoid lifting anything that would make you strain. This may include a child, heavy grocery bags and milk containers, a heavy briefcase or backpack, or cat litter or dog food bags. ? · Hold a pillow firmly over your chest incision when you cough or take deep breaths. This will support your chest and reduce your pain. ? · Do breathing exercises at home as instructed by your doctor. This will help prevent pneumonia. ? · Ask your doctor when you can drive again. ? · You will probably need to take 4 to 12 weeks off from work. It depends on the type of work you do and how you feel. ? · You may shower as usual. Pat the incision dry. Do not take a bath for the first 3 weeks, or until your doctor tells you it is okay. ? · Do not swim or use a hot tub for at least 1 month, or until your doctor says it is okay. ? · Ask your doctor when it is okay for you to have sex. Diet ? · Eat a heart-healthy, low-salt diet. If you have not been eating this way, talk to your doctor. You also may want to talk to a dietitian. A dietitian can help you plan meals and learn about healthy foods. ? · Drink plenty of fluids (unless your doctor tells you not to). ? · You may notice that your bowel movements are not regular right after your surgery. This is common. Try to avoid constipation and straining with bowel movements. You may want to take a fiber supplement every day. If you have not had a bowel movement after a couple of days, ask your doctor about taking a mild laxative. Medicines ? · Your doctor will tell you if and when you can restart your medicines. He or she will also give you instructions about taking any new medicines. ? · If you take blood thinners, such as warfarin (Coumadin), clopidogrel (Plavix), or aspirin, be sure to talk to your doctor. He or she will tell you if and when to start taking those medicines again. Make sure that you understand exactly what your doctor wants you to do. ? · Be safe with medicines. Take your medicines exactly as prescribed. Call your doctor if you think you are having a problem with your medicine. ? · Take pain medicines exactly as directed. ¨ If the doctor gave you a prescription medicine for pain, take it as prescribed. ¨ If you are not taking a prescription pain medicine, ask your doctor if you can take an over-the-counter medicine. ¨ Do not take aspirin, ibuprofen (Advil, Motrin), naproxen (Aleve), or other nonsteroidal anti-inflammatory drugs (NSAIDs) unless your doctor says it is okay. ? · If you think your pain medicine is making you sick to your stomach: 
¨ Take your medicine after meals (unless your doctor has told you not to). ¨ Ask your doctor for a different pain medicine. ? · If your doctor prescribed antibiotics, take them as directed. Do not stop taking them just because you feel better. You need to take the full course of antibiotics. ? · Your doctor may give you a blood thinner to prevent blood clots. If you take a blood thinner, be sure you get instructions about how to take your medicine safely. Blood thinners can cause serious bleeding problems. Incision care ? · If you have strips of tape on the incision the doctor made, leave the tape on for a week or until it falls off.  
? · Wash the area daily with warm, soapy water and pat it dry. Don't use hydrogen peroxide or alcohol, which may delay healing. You may cover the area with a gauze bandage if it weeps or rubs against clothing. Change the bandage every day. ? · Keep the area clean and dry. Other instructions ? · Keep track of your weight.  Weigh yourself every day at the same time of day, on the same scale, in the same amount of clothing. A sudden increase in weight can be a sign of a problem with your heart. Tell your doctor if you suddenly gain weight, such as 3 pounds or more in 2 to 3 days. ? · Be sure to tell all your doctors and your dentist that you have an artificial aortic valve. This is important, because you may need to take antibiotics before certain procedures to prevent infection. Follow-up care is a key part of your treatment and safety. Be sure to make and go to all appointments, and call your doctor if you are having problems. It's also a good idea to know your test results and keep a list of the medicines you take. When should you call for help? Call 911 anytime you think you may need emergency care. For example, call if: 
? · You passed out (lost consciousness). ? · You have trouble breathing. ? · You have severe pain in your chest.  
? · You have symptoms of a stroke. These may include: 
¨ Sudden numbness, tingling, weakness, or loss of movement in your face, arm, or leg, especially on only one side of your body. ¨ Sudden vision changes. ¨ Sudden trouble speaking. ¨ Sudden confusion or trouble understanding simple statements. ¨ Sudden problems with walking or balance. ¨ A sudden, severe headache that is different from past headaches. ? · You have symptoms of a heart attack. These may include: ¨ Chest pain or pressure, or a strange feeling in the chest. 
¨ Sweating. ¨ Shortness of breath. ¨ Nausea or vomiting. ¨ Pain, pressure, or a strange feeling in the back, neck, jaw, or upper belly or in one or both shoulders orarms. ¨ Lightheadedness or sudden weakness. ¨ A fast or irregular heartbeat. ? After you call 911, the  may tell you to chew 1 adult-strength or 2 to 4 low-dose aspirin. Wait for anambulance. Do not try to drive yourself. ?Call your doctor now or seek immediate medical care if: ? · You have pain that does not get better after you take pain medicine. ? · You have loose stitches, or your incision comes open. ? · Bright red blood has soaked through the bandage over your incision. ? · You have signs of infection, such as: 
¨ Increased pain, swelling, warmth, or redness. ¨ Red streaks leading from the incision. ¨ Pus draining from the incision. ¨ A fever. ? · You have signs of a blood clot, such as: 
¨ Pain in your calf, back of the knee, thigh, or groin. ¨ Redness and swelling in your leg or groin. ? · You have new or changed symptoms of heart failure, such as: ¨ New or increased shortness of breath. ¨ New or worse swelling in your legs, ankles, or feet. ¨ Sudden weight gain, such as more than 2 to 3 pounds in a day or 5 pounds in a week. (Your doctor may suggest a different range of weight gain.) ¨ Feeling dizzy or lightheaded or like you may faint. ¨ Feeling so tired or weak that you cannot do your usual activities. ¨ Not sleeping well. Shortness of breath wakes you at night. You need extra pillows to prop yourself up to breathe easier. ? Watch closely for changes in your health, and be sure to contact your doctor if you have any problems. Where can you learn more? Go to http://josé luis-aggie.info/. Enter X295 in the search box to learn more about \"Aortic Valve Replacement: What to Expect at Home. \" Current as of: September 21, 2016 Content Version: 11.4 © 1308-9723 [employer]. Care instructions adapted under license by [employer] (which disclaims liability or warranty for this information). If you have questions about a medical condition or this instruction, always ask your healthcare professional. [place] [address] any warranty or liability for your use of this information.  
    [facility]ist 
 
[address] [place] [address] [address] Kiki Campos [address] [place] [address]   [place], [address] [facility] Office- [phone]  Fax- [phone]       Office- [phone]  Fax- [phone] 
_____________________________________________________________ Dr. Marilyne Lemming Dr. Rosendo Heimlich Dr. Cloria Ramming Dr. Sherrill Parr [facility] [address] ACNP   Arie Del Rosario, AGACNP Tressa Bernstein Surgery & Date: Procedure(s): 
VALVE ON VALVE TRANSCATHETER AORTIC VALVE REPLACEMENT AND VALVE ON VALVE TRANSCATHETER MITRAL VALVE REPAIR , CLOSURE OF ASD, DEVORA BY DR MOE [facility]. Discharge Date: 5/4/18 MEDICATIONS: 
Please refer to your After Visit Summary for your medication list.  
 
 
INR TARGET- 2-3 INR LEVEL to be drawn- by [facility] nursing on 5/5/18 INR management per Dr. Sandra Whipple NP/PA team 
 
DO NOT TAKE ANY MEDICATIONS THAT ARE NOT ON THIS LIST INSTRUCTIONS: 
NO SMOKING OR TOBACCO PRODUCTS Do not follow the activity/exercise instructions in your discharge book given to you as an inpatient. You have no activity restrictions. You may shower. Wash all incisions twice daily with mild soap and water. No lotions, ointments or powder. Call the office immediately for any redness, swelling, or drainage from your incision. Take your temperature daily and call for a temperature of 101 degrees or higher or for any symptoms that make you think you have and infection. Weigh yourself each morning. Call if you gain more than 5 pounds in 48 hours. Use the incentive spirometer 6-8 times a day-10 breaths each time. Walk several hundred feet several times daily. DIET Eat an American Heart Association diet. If you are having trouble with your appetite, eat what you can. Try eating small, frequent meals throughout the day. ACTIVITY 1. You have no activity restrictions. You may resume your daily activities at home, based on your comfort level. You may also drive. FOLLOW UP 
1. Your first follow up appointment will be on 5/8 at 10:30am. Our office is located in [address] [place] [address] on floor G-5. Your second follow up appointment will be in four weeks, on 6/5 at 1:00pm. You will need to have an ECHO prior to your appointment time. Our office will set that up for you. Please call our office at [phone] if you are unable to make either one of these appointments. 2. You will be receiving a call before your 3 day follow up appointment to begin cardiac rehab. They are located in the [address] [place] [address] on [facility]. Their phone number is [phone]. Please call if you have not been contacted 2-3 weeks after discharge from the hospital. 
3. We will make an appointment for you with your cardiologist in 4-5 weeks. 4. Consult you primary care physician regarding your influenza &  
pneumovax vaccines. 5.   Please bring all medications with you to your appointment. Signature:___________________________________________________ [employer] Announcement We are excited to announce that we are making your provider's discharge notes available to you in [employer]. You will see these notes when they are completed and signed by the physician that discharged you from your recent hospital stay. If you have any questions or concerns about any information you see in [employer], please call the Health Information Department where you were seen or reach out to your Primary Care Provider for more information about your plan of care. Introducing [facility]! [employer] introduces [employer] patient portal. Now you can access parts of your medical record, email your doctor's office, and request medication refills online. 1. In your internet browser, go to [email or web address] 2. Click on the First Time User? Click Here link in the Sign In box. You will see the New Member Sign Up page. 3. Enter your [employer] Access Code exactly as it appears below. You will not need to use this code after youve completed the sign-up process. If you do not sign up before the expiration date, you must request a new code. · [employer] Access Code: [email or web address] Expires: 8/1/2018  2:59 PM 
 
4. Enter the last four digits of your Social Security Number (xxxx) and Date of Birth (mm/dd/yyyy) as indicated and click Submit. You will be taken to the next sign-up page. 5. Create a [employer] ID. This will be your [employer] login ID and cannot be changed, so think of one that is secure and easy to remember. 6. Create a [employer] password. You can change your password at any time. 7. Enter your Password Reset Question and Answer. This can be used at a later time if you forget your password. 8. Enter your e-mail address. You will receive e-mail notification when new information is available in [address] 9. Click Sign Up. You can now view and download portions of your medical record. 10. Click the Download Summary menu link to download a portable copy of your medical information. If you have questions, please visit the Frequently Asked Questions section of the [employer] website. Remember, [employer] is NOT to be used for urgent needs. For medical emergencies, dial 911. Now available from your iPhone and Android! Introducing Gio Burns As a [facility] patient, I wanted to make you aware of our electronic visit tool called G[employer] Burns. [address] 24/7 allows you to connect within minutes with a medical provider 24 hours a day, seven days a week via a mobile device or tablet or logging into a secure website from your computer. You can access Gio Burns from anywhere in the [place].  
 
A virtual visit might be right for you when you have a simple condition and feel like you just dont want to get out of bed, or cant get away from work for an appointment, when your regular [employer] provider is not available (evenings, weekends or holidays), or when youre out of town and need minor care. Electronic visits cost only $49 and if the [employer] 24/7 provider determines a prescription is needed to treat your condition, one can be electronically transmitted to a nearby pharmacy*. Please take a moment to enroll today if you have not already done so. The enrollment process is free and takes just a few minutes. To enroll, please download the [employer] 24/7 elke to your tablet or phone, or visit [email or web address] to enroll on your computer. And, as an [address] [place] [address] patient with a [employer] account, the results of your visits will be scanned into your electronic medical record and your primary care provider will be able to view the scanned results. We urge you to continue to see your regular [employer] provider for your ongoing medical care. And while your primary care provider may not be the one available when you seek a [employer] virtual visit, the peace of mind you get from getting a real diagnosis real time can be priceless. For more information on [employer], view our Frequently Asked Questions (FAQs) at [email or web address]. Sincerely, 
 
Christian Matthew MD 
Chief Medical Officer Renae Presley *:  certain medications cannot be prescribed via [employer] Unresulted Labs-Please follow up with your PCP about these lab tests Order Current Status EKG, 12 LEAD, INITIAL Preliminary result Providers Seen During Your Hospitalization Provider Specialty Primary office phone Avery Doshi MD Cardiothoracic Surgery [phone] Your Primary Care Physician (PCP) Primary Care Physician Office Phone Office Fax Sarita Brooks [phone] You are allergic to the following Allergen Reactions Sulfa Dyne Other (comments) Recent Documentation Height Weight BMI Smoking Status 1.581 m 68.8 kg 27.52 kg/m2 Never Smoker Emergency Contacts Name Discharge Info Relation Home Work Mobile Foreignrtbryanna Spialison DISCHARGE CAREGIVER [3] Spouse [3]   [phone] Wonda Grief  Daughter [21]   [phone] Patient Belongings The following personal items are in your possession at time of discharge: 
  Dental Appliances: None Please provide this summary of care documentation to your next provider. Signatures-by signing, you are acknowledging that this After Visit Summary has been reviewed with you and you have received a copy. Patient Signature:  ____________________________________________________________ Date:  ____________________________________________________________  
  
[facility] Provider Signature:  ____________________________________________________________ Date:  ____________________________________________________________

## 2018-05-02 NOTE — ANESTHESIA POSTPROCEDURE EVALUATION
Post-Anesthesia Evaluation and Assessment    Patient: Lela Mcmillan MRN: [id number]  SSN: [id number]    YOB: 1950  Age: 69 y.o. Sex: female       Cardiovascular Function/Vital Signs  Visit Vitals    /70    Pulse 70    Temp 36.9 °C (98.5 °F)    Resp 15    Ht 5' 2.25\" (1.581 m)    Wt 65.1 kg (143 lb 9 oz)    SpO2 93%    BMI 26.05 kg/m2       Patient is status post general anesthesia for Procedure(s):  VALVE ON VALVE TRANSCATHETER AORTIC VALVE REPLACEMENT AND VALVE ON VALVE TRANSCATHETER MITRAL VALVE REPAIR , CLOSURE OF ASD, DEVORA BY DR MOE [facility]. .    Nausea/Vomiting: None    Postoperative hydration reviewed and adequate. Pain:  Pain Scale 1: Numeric (0 - 10) (05/02/18 1200)  Pain Intensity 1: 0 (05/02/18 1200)   Managed    Neurological Status:   Neuro (WDL): Within Defined Limits (05/02/18 0735)  Neuro  Neurologic State: Lethargic;Eyes open to voice (05/02/18 1115)  Orientation Level: Unable to verbalize (05/02/18 1115)  Cognition: Follows commands (05/02/18 1115)  Speech: Intubated (05/02/18 1115)  Assessment L Pupil: Brisk;Round (05/02/18 1115)  Size L Pupil (mm): 3 (05/02/18 1115)  Assessment R Pupil: Brisk;Round (05/02/18 1115)  Size R Pupil (mm): 3 (05/02/18 1115)  LUE Motor Response: Purposeful (05/02/18 1115)  LLE Motor Response: Purposeful (05/02/18 1115)  RUE Motor Response: Purposeful (05/02/18 1115)  RLE Motor Response: Purposeful (05/02/18 1115)   At baseline    Mental Status and Level of Consciousness: Arousable    Pulmonary Status:   O2 Device: Nasal cannula (05/02/18 1415)   Adequate oxygenation and airway patent    Complications related to anesthesia: None    Post-anesthesia assessment completed.  No concerns    Signed By: Flower Ye MD     May 2, 2018

## 2018-05-02 NOTE — IP AVS SNAPSHOT
1111 [facility] [address] [place] [address] 
[phone] Patient: Kayden Whitehead MRN: [id number] FOQ:6/96/5037 A check william indicates which time of day the medication should be taken. My Medications START taking these medications Instructions Each Dose to Equal  
 Morning Noon Evening Bedtime  
 enoxaparin 80 mg/0.8 mL injection Commonly known as:  LOVENOX Your last dose was: Your next dose is:    
   
   
 70 mg by SubCUTAneous route every twelve (12) hours every twelve (12) hours. 70 mg  
    
   
   
   
  
 senna-docusate 8.6-50 mg per tablet Commonly known as:  Everrett [place] Your last dose was: Your next dose is: Take 1 Tab by mouth daily. Take until having regular bowel movements. 1 Tab  
    
   
   
   
  
 traMADol 50 mg tablet Commonly known as:  ULTRAM  
   
Your last dose was: Your next dose is: Take 1-2 Tabs by mouth every six (6) hours as needed. Max Daily Amount: 400 mg.  
  mg  
    
   
   
   
  
 warfarin 5 mg tablet Commonly known as:  COUMADIN Your last dose was: Your next dose is: Take 5 mg by mouth at 6pm on 5/4/18. Dosing to be determined by NP/PA team.  
     
   
   
   
  
  
CHANGE how you take these medications Instructions Each Dose to Equal  
 Morning Noon Evening Bedtime  
 metFORMIN 500 mg tablet Commonly known as:  GLUCOPHAGE Start taking on:  5/5/2018 What changed:   
- how much to take 
- additional instructions Your last dose was: Your next dose is: Take 2 Tabs by mouth two (2) times daily (with meals). Start on 5/5 Saturday  
 1000 mg CONTINUE taking these medications Instructions Each Dose to Equal  
 Morning Noon Evening Bedtime ACETAMINOPHEN PM PO Your last dose was: Your next dose is: Take  by mouth. amLODIPine 5 mg tablet Commonly known as:  Liz Hinson Your last dose was: Your next dose is: TAKE 1 TABLET EVERY DAY FOR BLOOD PRESSURE  
     
   
   
   
  
 amoxicillin 500 mg capsule Commonly known as:  AMOXIL Your last dose was: Your next dose is: Take 1 Cap by mouth as needed (take as directed one hr prior to dental procedures). 500 mg  
    
   
   
   
  
 aspirin 81 mg tablet Your last dose was: Your next dose is: Take 81 mg by mouth. 81 mg  
    
   
   
   
  
 atorvastatin 40 mg tablet Commonly known as:  LIPITOR Your last dose was: Your next dose is: Take 1 Tab by mouth nightly. 40 mg Biotin 2,500 mcg Cap Your last dose was: Your next dose is: Take  by mouth.  
     
   
   
   
  
 calcium 600 mg Cap Your last dose was: Your next dose is: Take  by mouth. DAILY MULTI-VITAMINS/IRON tablet Generic drug:  multivitamin with iron Your last dose was: Your next dose is: Take 1 Tab by mouth daily. 1 Tab ENBREL SC Your last dose was: Your next dose is:    
   
   
 by SubCUTAneous route every seven (7) days. furosemide 20 mg tablet Commonly known as:  LASIX Your last dose was: Your next dose is: TAKE 1 TABLET BY MOUTH EVERY DAY FOR EDEMA  
     
   
   
   
  
 glipiZIDE 5 mg tablet Commonly known as:  [place] [employer] Your last dose was: Your next dose is: Take 5 mg by mouth daily. 5 mg  
    
   
   
   
  
 hydroCHLOROthiazide 50 mg tablet Commonly known as:  HYDRODIURIL Your last dose was: Your next dose is: Take 50 mg by mouth daily.   
 50 mg  
    
   
   
   
  
 lisinopril 20 mg tablet Commonly known as:  Everett Ordaz Your last dose was: Your next dose is: Take 1 Tab by mouth daily. 20 mg  
    
   
   
   
  
 nitroglycerin 0.4 mg SL tablet Commonly known as:  NITROSTAT Your last dose was: Your next dose is:    
   
   
 DISSOLVE 1 TABLET UNDER THE TONGUE EVERY 5 MINUTES FOR 3 DOSES AS NEEDED FOR CHEST PAIN  
     
   
   
   
  
 VITAMIN C 500 mg tablet Generic drug:  ascorbic acid (vitamin C) Your last dose was: Your next dose is: Take  by mouth. VITRON-C 65 mg iron- 125 mg Tbec Generic drug:  iron,carbonyl-vitamin C Your last dose was: Your next dose is: Take 1 Tab by mouth three (3) times daily. 1 Tab STOP taking these medications FISH OIL 1,000 mg Cap Generic drug:  omega-3 fatty acids-vitamin e  
   
  
 metoprolol succinate 100 mg tablet Commonly known as:  TOPROL-XL  
   
  
 naproxen sodium 220 mg tablet Commonly known as:  NAPROSYN Where to Get Your Medications These medications were sent to [facility], [address] [place] [address], [address] Phone:  [phone]  
  enoxaparin 80 mg/0.8 mL injection  
 metFORMIN 500 mg tablet  
 senna-docusate 8.6-50 mg per tablet  
 warfarin 5 mg tablet Information on where to get these meds will be given to you by the nurse or doctor. ! Ask your nurse or doctor about these medications  
  traMADol 50 mg tablet

## 2018-05-02 NOTE — ANESTHESIA PROCEDURE NOTES
Arterial Line Placement    Start time: 5/2/2018 7:01 AM  End time: 5/2/2018 6:51 AM  Performed by: Sydnee Valle  Authorized by: [place] AGARWAL     Pre-Procedure  Indications:  Arterial pressure monitoring and blood sampling  Preanesthetic Checklist: patient identified, risks and benefits discussed, anesthesia consent, site marked, patient being monitored, timeout performed and patient being monitored      Procedure:   Prep:  Chlorhexidine  Seldinger Technique?: Yes    Orientation:  Right  Location:  Radial artery  Catheter size:  20 G  Number of attempts:  1    Assessment:   Post-procedure:  Line secured and sterile dressing applied  Patient Tolerance:  Patient tolerated the procedure well with no immediate complications

## 2018-05-02 NOTE — DIABETES MGMT
DTC TAVR Note     Recommendations/ Comments: Pt s/p TAVR, Transcatheter Mitral valve replacement and ASD closure. Insulin drip was ordered but has been d/c. Pt currently on Humalog correction scale normal sensitivity. DTC to continue to follow. Current hospital medications: correction scale Humalog, normal sensitivity. Chart reviewed on [facility]. Patient is 69 y.o. female s/p cardiac surgery. POD 0. Pt with a known history of diabetes on Metformin 1000 mg BID and Glipizide 5 mg at home. A1c:   Lab Results   Component Value Date/Time    Hemoglobin A1c 5.2 04/23/2018 12:20 PM         Recent Glucose Results: No results found for: GLU, GLUPOC, GLUCPOC     Lab Results   Component Value Date/Time    Creatinine 1.30 (H) 04/23/2018 12:20 PM     Estimated Creatinine Clearance: 36.4 mL/min (based on Cr of 1.3). Active Orders   Diet    DIET NPO        PO intake: No data found. Will continue to follow as needed. Thank you.     Jus Manjarrez RD, CDE

## 2018-05-02 NOTE — PROGRESS NOTES
1111: Patient admitted to CVICU. Cath lab RN and Roopa Azul CRNA at bedside. Patient extubated by CRNA on arrival.   1113: 15 L/min nonrebreather applied for decreased O2 sats. Patient lethargic but opens eyes to voice and follows commands. 1121: TRANSFER - IN REPORT:    Verbal report received from Dr. Morgan Montes and Roopa Azul CRNA (name) on Flora Rose  being received from Cath Lab (unit) for routine post - op      Report consisted of patients Situation, Background, Assessment and   Recommendations(SBAR). Information from the following report(s) SBAR, OR Summary, Procedure Summary, MAR, Recent Results and Cardiac Rhythm NSR was reviewed with the receiving nurse. Opportunity for questions and clarification was provided. Assessment completed upon patients arrival to unit and care assumed. Goal to keep SBP <150. Wean BiPAP. 1139: ABG drawn. 7.235/45.3/96/19.2/96%. 1144: Evy [place] updated. Orders received for 1 amp bicarb. BiPAP 10/5 and 80% FiO2.   1244: FiO2 dropped to 60%  1640: Dr. Morgan Montes at bedside. Updates given on patient status. Hb low. Orders received to continue to monitor patient. 1950: Bedside and Verbal shift change report given to Sarah Barney RN (oncoming nurse) by Jack Guevara RN (offgoing nurse). Report included the following information SBAR, OR Summary, Procedure Summary, Intake/Output, MAR, Recent Results and Cardiac Rhythm NSR.

## 2018-05-02 NOTE — PROGRESS NOTES
Occupational Therapy   Orders received, chart review completed. Note patient POD #0 from VALVE ON VALVE TRANSCATHETER AORTIC VALVE REPLACEMENT AND VALVE ON VALVE TRANSCATHETER MITRAL VALVE REPAIR . Per pathway, OT will follow up tomorrow for evaluation. Recommend OOB to chair three times a day for meals and self-completion of ADLs as able and medically stable. Thank you.

## 2018-05-02 NOTE — OP NOTES
Pre-procedure Diagnoses:      Aortic stenosis, severe [I35.0]     Mitral valve stenosis, unspecified etiology [I05.0]         Post-procedure Diagnoses:     Aortic stenosis, severe [I35.0]     Mitral valve stenosis, unspecified etiology [I05.0]         Procedures:     TRANSCATHETER AORTIC VALVE REPLACEMENT [[id number] (Custom)]     ATRIAL SEPTAL DEFECT CLOSURE [[id number] (Custom)]     [place] REPLACEMENT OF MITRAL VALVE [[id number]]             []Hide copied text  1. Transcatheter Aortic Valve Replacement     Indication  The valve was placed for severe aortic stenosis. Patient was at high surgical risk due to co-morbid conditions. Co-Surgeon: Evie Blood MD     Access  The valve was placed using a transfemoral approach. The 16F valve delivery sheath was placed in the right femoral artery via a percutaneous approach. 6 Lithuanian sheaths were placed in the left artery for angiography. 6 Lithuanian sheaths were placed in bilateral femoral veins for SVC wire and temporary transvenous pacemaker.      Procedure  A 6 Fr sheath was placed in the left femoral artery for aortography. Prior to placement of the aortic valve, a left heart catheterization and aortogram were performed. A balloon aortic valvuloplasty was not performed prior to valve placement. A Evolut 23 R aortic valve was then deployed without rapid ventricular pacing. Post-deployment balloon inflation was not performed. Cardiopulmonary bypass support was not used for hemodynamic support during the procedure.     Hemodynamics  The pre-TAVR hemodynamic assessment confirmed severe aortic stenosis with a gradient of 22 mmHg mean. Pre: PA 62/30, /20, Ao 120/60, LORI 0.8. Post: PA 60/28, /18, Ao 120/60, mean gradient 7mmHg. The LVEDP was noted to be elevated and there was no aortic regurgitation. Post-deployment hemodynamics showed no transvalvular gradient and elevated LVEDP.     Angiography  1. Aortic angiography pre intervention demonstrates severe AS.   2. Aortic angiogrpahy post TAVR demonstrates the CoreValve in good position with no AI.      Conclusion:  Successful transcatheter aortic valve replacement with no paravalvular leak. Post-deployment aortography showed the valve was in good position with no aortic regurgitation. The coronary arteries were patent. LE angiography showed good hemostasis at right CFA access site without any complication and good distal flow.      Complications:  None.      Specimen removed:   None.      Estimated blood loss:   Minimal.        2. Transcatheter Mitral Valve Replacement     Indication  The valve was placed for severe mitral stenosis of bioprosthetic mitral valve. Patient was at high surgical risk due to co-morbid conditions. Co-Surgeon: MD Rolando      Access  The valve was placed using a transfemoral-transapical approach. The 16F valve delivery sheath was placed in the right femoral vein via a percutaneous approach. A mirco sheath was place in left ventricular apex via percutaneous approach using Echo and fluoro guidance.      Procedure  Apical wire was externalized from right femoral venous sheath. Interatrial seprum was dilated using 12 x 60 balloon. A 26 S3 aortic valve was then deployed with rapid ventricular pacing. Post-deployment balloon inflation was not performed. Cardiopulmonary bypass support was not used for hemodynamic support during the procedure.     Hemodynamics  Pre gradient across mitral valve: 12 mmHg, Post gradient across mitral valve: 4 mmHg.      Complications:  None.      Specimen removed:   None.      Estimated blood loss:   Minimal.        3. Procedure: DEVORA guided PFO closure      Indication:   Significant right to left shunting of blood, hypoxia and RV dysfunction. Co Surgeon, Evie Blood MD     Procedure: The left upper pulmonary vein was accessed with a 0.035 J-wire. A #25 cribriform device prepared in standard fashion. The cribriform device was advanced over the 0.035 J- wire. The wire was removed. The device was pulled back into the left atrium and under direct fluoroscopic and echocardiographic guidance, the left atrial disk was exposed and brought back to the interatrial septum.  The right atrial disk was exposed within the right atrium and brought  to the interatrial septum. Excellent position was obtained in multiple views, by both fluoroscopic and echocardiographic criteria. After confirming secured position the device was then locked and released. Excellent   position was maintained. Final fluoroscopic pictures and echo pictures were obtained.       Conclusion: Successful closure of the patent foramen ovale, leaving no residual shunting by color-flow Doppler and agitated saline contrast study.      Complications:  None.      Specimen removed:   None.      Estimated blood loss:   Minimal.        I was present for the entire procedure.  I have edited the procedure note as appropriate.

## 2018-05-02 NOTE — H&P
Date of Surgery Update:  Radha Guzman was seen and examined. History and physical has been reviewed. The patient has been examined.  There have been no significant clinical changes since the completion of the originally dated History and Physical.    Signed By: Taylor Quevedo PA-C     May 2, 2018 7:40 AM           History and physical confirmed  Chart reviewed and plans noted for TVAR and TMVR

## 2018-05-03 NOTE — PROGRESS NOTES
Problem: Cardiac Valve Surgery: Post-Op Day 1  Goal: *Stable cardiac rhythm  Outcome: Progressing Towards Goal  Pt in NSR  Goal: *Optimal pain control at patient's stated goal  Outcome: Progressing Towards Goal  No complaints of pain    Problem: Falls - Risk of  Goal: *Absence of Falls  Document Art Fall Risk and appropriate interventions in the flowsheet. Outcome: Progressing Towards Goal  Fall Risk Interventions:       Mentation Interventions: Door open when patient unattended         Elimination Interventions: Call light in reach             Problem: Pressure Injury - Risk of  Goal: *Prevention of pressure injury  Document Ivan Scale and appropriate interventions in the flowsheet. Outcome: Progressing Towards Goal  Pressure Injury Interventions:  Sensory Interventions: Assess changes in LOC, Check visual cues for pain, Float heels, Monitor skin under medical devices, Pad between skin to skin, Pressure redistribution bed/mattress (bed type), Turn and reposition approx.  every two hours (pillows and wedges if needed)         Activity Interventions: Assess need for specialty bed, Pressure redistribution bed/mattress(bed type)    Mobility Interventions: Assess need for specialty bed, Float heels, HOB 30 degrees or less, Pressure redistribution bed/mattress (bed type)    Nutrition Interventions: Document food/fluid/supplement intake, Discuss nutritional consult with provider    Friction and Shear Interventions: Feet elevated on foot rest, HOB 30 degrees or less, Lift sheet, Minimize layers

## 2018-05-03 NOTE — PROGRESS NOTES
Problem: Self Care Deficits Care Plan (Adult)  Goal: *Acute Goals and Plan of Care (Insert Text)  Occupational Therapy Goals  Initiated 5/3/2018  1. Patient will perform ADLs standing 5 mins without fatigue or LOB with independence within 7 day(s). 2.  Patient will perform lower body ADLs with independence within 7 day(s). 3.  Patient will perform upper body ADLs with independence within 7 day(s). 4.  Patient will perform toilet transfers with independence within 7 day(s). 5.  Patient will perform all aspects of toileting with independence within 7 day(s). 6.  Patient will participate in cardiac/sternal upper extremity therapeutic exercise/activities to increase independence with ADLs with independence for 5 minutes within 7 day(s). Occupational Therapy EVALUATION  Patient: Pamela Thompson (6 y.o. female)  Date: 5/3/2018  Primary Diagnosis: AORTIC STENOSIS, MITRAL STENOSIS  AS (aortic stenosis)  Procedure(s) (LRB):  VALVE ON VALVE TRANSCATHETER AORTIC VALVE REPLACEMENT AND VALVE ON VALVE TRANSCATHETER MITRAL VALVE REPAIR , CLOSURE OF ASD, DEVORA BY DR MOE [facility]. (N/A) 1 Day Post-Op   Precautions: falls       ASSESSMENT :  Based on the objective data described below, the patient presents with overall CGA for functional mobility, s/u-SBA for upper body ADLs and CGA for lower body ADLs s/p TAVR and MVR POD 1. Patient demonstrating typical limitations following cardiac sx icluding decreased functional activity tolerance, decreased cardiopulmonary endurance, generalized weakness and balance deficits. Patient noted to desat to 75% on 2L O2 during standing activities with no symptoms, returned to 99% within 10 seconds when returning to sitting, informed RN of trend to monitor. Patient left lying supine in bed with HOB slightly elevated, call bell in reach, RN aware. Anticipate no further OT needs once patient medically stable and cleared for D/C.     Recommend with nursing patient to complete as able in order to maintain strength, endurance and independence: ADLs with supervision/setup, OOB to chair 3x/day and mobilizing to the bathroom for toileting with 1 assist. Thank you for your assistance. Patient will benefit from skilled intervention to address the above impairments. Patients rehabilitation potential is considered to be Good  Factors which may influence rehabilitation potential include:   []             None noted  []             Mental ability/status  []             Medical condition  []             Home/family situation and support systems  []             Safety awareness  []             Pain tolerance/management  []             Other:      PLAN :  Recommendations and Planned Interventions:  [x]               Self Care Training                  [x]        Therapeutic Activities  [x]               Functional Mobility Training    []        Cognitive Retraining  [x]               Therapeutic Exercises           [x]        Endurance Activities  [x]               Balance Training                   []        Neuromuscular Re-Education  []               Visual/Perceptual Training     [x]   Home Safety Training  [x]               Patient Education                 [x]        Family Training/Education  []               Other (comment):    Frequency/Duration: Patient will be followed by occupational therapy 5 times a week to address goals. Discharge Recommendations: None  Further Equipment Recommendations for Discharge: TBD - likely none     SUBJECTIVE:   Patient stated I feel fine.  (when asked if feeling dizzy or short of breath in standing)    OBJECTIVE DATA SUMMARY:   HISTORY:   Past Medical History:   Diagnosis Date    CAD (coronary artery disease)     DM [facility]diabetes mellitus) ([facility] [address]     Dyslipidemia     Hypertension     Mitral regurgitation and aortic stenosis      Past Surgical History:   Procedure Laterality Date    HX AORTIC VALVE REPLACEMENT      HX CORONARY ARTERY BYPASS GRAFT      HX MITRAL VALVE REPLACEMENT         Prior Level of Function/Environment/Context: Patient reports she was IND with ADLs and functional mobility prior to sx. Patient lives in 1 level home with her , both retired,  there to assist PRN. Home Situation  Home Environment: Private residence  # Steps to Enter: 3  Rails to Enter: Yes  Hand Rails : Right  One/Two Story Residence: One story  Living Alone: No  Support Systems: Child(behzad), Spouse/Significant Other/Partner  Patient Expects to be Discharged to[de-identified] Private residence  Current DME Used/Available at Home: Grab bars, Shower chair (built in shower seat)  Tub or Shower Type: Shower  []  Right hand dominant   []  Left hand dominant    EXAMINATION OF PERFORMANCE DEFICITS:  Cognitive/Behavioral Status:  Neurologic State: Alert; Appropriate for age  Orientation Level: Oriented X4  Cognition: Follows commands; Appropriate for age attention/concentration; Appropriate decision making  Perception: Appears intact  Perseveration: No perseveration noted  Safety/Judgement: Awareness of environment; Insight into deficits    Skin: Appears grossly intact, R Swanz-Hang intact, R AIV intact    Edema: none noted in BUEs    Hearing: Auditory  Auditory Impairment: None    Vision/Perceptual:    Tracking: Able to track stimulus in all quadrants w/o difficulty      Range of Motion:  Shoulders ~175* flexion, elbows, wrists and all digits WFL  AROM: Within functional limits    Strength:   strength 4/5 equal  Strength: Generally decreased, functional    Coordination:  Coordination: Within functional limits  Fine Motor Skills-Upper: Left Intact; Right Intact    Gross Motor Skills-Upper: Left Intact; Right Intact    Tone & Sensation:  Tone: Normal  Sensation: Intact    Balance:  Sitting: Intact  Standing: Impaired; Without support  Standing - Static: Good  Standing - Dynamic : Fair    Functional Mobility and Transfers for ADLs:  Bed Mobility:  Rolling: Contact guard assistance  Sit to Supine: Minimum assistance    Transfers:  Sit to Stand: Contact guard assistance  Stand to Sit: Contact guard assistance  Bed to Chair: Contact guard assistance  Toilet Transfer : Contact guard assistance (Infer per obs of func mob, BUE ROM, balance)    ADL Assessment:  Feeding: Stand-by assistance (Infer per pbs of func mob, BUE ROM, FM control )    Oral Facial Hygiene/Grooming: Stand-by assistance    Bathing: Contact guard assistance (Infer per pbs of func mob, BUE ROM, balance)    Upper Body Dressing: Setup (Infer per pbs of func mob, BUE ROM, func reach)    Lower Body Dressing: Contact guard assistance (Infer per pbs of func mob, BUE ROM, balance)    Toileting: Contact guard assistance (Infer per pbs of func mob, BUE ROM, balance)     ADL Intervention and task modifications:  Grooming  Brushing Teeth: Contact guard assistance (Standing at sink )    Patient able to complete donning/doffing socks in tailor sitting  Lower Body Dressing Assistance  Socks: Stand-by assistance; Compensatory technique training  Leg Crossed Method Used: Yes  Position Performed: Seated in chair    Cognitive Retraining  Safety/Judgement: Awareness of environment; Insight into deficits  May have to adjust home setup to increase ease with items closer to waist height to prevent deep bending and the automatic  of asymmetrical UE WB/pushing for stabilization during bending. Benefit to don clothing tailor sitting and don all clothing while sitting prior to standing. Patient demonstrated lower body dressing seated in chair with Stand-by assistance. Instruction and indicated understanding on the benefits of loose clothing throughout to accommodate for post surgical swelling, decreased ROM and increased pain. Instruction and indicated understanding the technique of pull over shirt versus front open clothing.      Functional Measure:  Barthel Index:    Bathin  Bladder: 10  Bowels: 10  Groomin  Dressin  Feeding: 10  Mobility: 5  Stairs: 0  Toilet Use: 5  Transfer (Bed to Chair and Back): 10  Total: 60       Barthel and G-code impairment scale:  Percentage of impairment CH  0% CI  1-19% CJ  20-39% CK  40-59% CL  60-79% CM  80-99% CN  100%   Barthel Score 0-100 100 99-80 79-60 59-40 20-39 1-19   0   Barthel Score 0-20 20 17-19 13-16 9-12 5-8 1-4 0      The Barthel ADL Index: Guidelines  1. The index should be used as a record of what a patient does, not as a record of what a patient could do. 2. The main aim is to establish degree of independence from any help, physical or verbal, however minor and for whatever reason. 3. The need for supervision renders the patient not independent. 4. A patient's performance should be established using the best available evidence. Asking the patient, friends/relatives and nurses are the usual sources, but direct observation and common sense are also important. However direct testing is not needed. 5. Usually the patient's performance over the preceding 24-48 hours is important, but occasionally longer periods will be relevant. 6. Middle categories imply that the patient supplies over 50 per cent of the effort. 7. Use of aids to be independent is allowed. Rene Diamond., Barthel, D.W. (8023). Functional evaluation: the Barthel Index. [facility] (14)2. Ehsan Gonzales james AKBAR Odom, Thuan Bergman., Cooper [facility]., [facility], [address] [place] [address] (1999). Measuring the change indisability after inpatient rehabilitation; comparison of the responsiveness of the Barthel Index and Functional Hooker Measure. Journal of Neurology, Neurosurgery, and Psychiatry, 66(4), 944-136. Juan Napoles, N.J.A, EMMANUEL Cooper, & Francis Patel, MCassiusA. (2004.) Assessment of post-stroke quality of life in cost-effectiveness studies: The usefulness of the Barthel Index and the EuroQoL-5D. Quality of Life Research, 13, 259-67       G codes:   In compliance with CMSs Claims Based Outcome Reporting, the following G-code set was chosen for this patient based on their primary functional limitation being treated: The outcome measure chosen to determine the severity of the functional limitation was the Barthel Index with a score of 60/100 which was correlated with the impairment scale. ? Self Care:     - CURRENT STATUS: CJ - 20%-39% impaired, limited or restricted    - GOAL STATUS: CI - 1%-19% impaired, limited or restricted    - D/C STATUS:  ---------------To be determined---------------     Occupational Therapy Evaluation Charge Determination   History Examination Decision-Making   LOW Complexity : Brief history review  LOW Complexity : 1-3 performance deficits relating to physical, cognitive , or psychosocial skils that result in activity limitations and / or participation restrictions  LOW Complexity : No comorbidities that affect functional and no verbal or physical assistance needed to complete eval tasks       Based on the above components, the patient evaluation is determined to be of the following complexity level: LOW   Pain:  Pain Scale 1: Numeric (0 - 10)  Pain Intensity 1: 0    Activity Tolerance:   Good, VSS (see note above for O2 desat)  Please refer to the flowsheet for vital signs taken during this treatment. After treatment:   [] Patient left in no apparent distress sitting up in chair  [x] Patient left in no apparent distress in bed  [x] Call bell left within reach  [x] Nursing notified  [] Caregiver present  [] Bed alarm activated    COMMUNICATION/EDUCATION:   The patients plan of care was discussed with: Physical Therapist and Registered Nurse. [x] Home safety education was provided and the patient/caregiver indicated understanding. [x] Patient/family have participated as able in goal setting and plan of care. [] Patient/family agree to work toward stated goals and plan of care. [] Patient understands intent and goals of therapy, but is neutral about his/her participation.   [] Patient is unable to participate in goal setting and plan of care. This patients plan of care is appropriate for delegation to DANIEL.     Thank you for this referral.  Gwen Wang, JASMYNE  Time Calculation: 34 mins

## 2018-05-03 NOTE — PROGRESS NOTES
Problem: Discharge Planning  Goal: *Discharge to safe environment  Outcome: Progressing Towards Goal  See CM note.      Oswaldo Townsend MS

## 2018-05-03 NOTE — PROGRESS NOTES
[facility] ICU Progress Note    Admit Date: 2018  POD:  1 Day Post-Op    Procedure:  Procedure(s):  VALVE ON VALVE TRANSCATHETER AORTIC VALVE REPLACEMENT AND VALVE ON VALVE TRANSCATHETER MITRAL VALVE REPAIR , CLOSURE OF ASD, DEVORA BY DR MOE [facility]. Subjective:   Pt seen with Dr. Irving Ho. Afebrile, on 2L NC. Up in chair. No complaints this morning. Objective:   Vitals:  Blood pressure 135/70, pulse 68, temperature 99.1 °F (37.3 °C), resp. rate 17, height 5' 2.25\" (1.581 m), weight 149 lb 4 oz (67.7 kg), SpO2 100 %. Temp (24hrs), Av °F (37.2 °C), Min:98.4 °F (36.9 °C), Max:99.7 °F (37.6 °C)    Hemodynamics:   CO: CO (l/min): 36.4 l/min   CI: CI (l/min/m2): 3.9 l/min/m2   CVP: CVP (mmHg): 5 mmHg (18 07)   SVR: SVR (dyne*sec)/cm5: 917 (dyne*sec)/cm5 (18 1456)   PAP Systolic: PAP Systolic: 50 (42/60/85 1121)   PAP Diastolic: PAP Diastolic: 17 (37/91/46 6935)   PVR:     SV02: SVO2 (%): 55 % (18 07)   SCV02:      EKG/Rhythm:  NSR OK interval decreased from preop    Extubation Date / Time: 18 1111    Oxygen Therapy:  Oxygen Therapy  O2 Sat (%): 100 % (18 07)  Pulse via Oximetry: 68 beats per minute (18 0700)  O2 Device: Nasal cannula (18)  O2 Flow Rate (L/min): 2 l/min (18 0400)  FIO2 (%): 40 % (18 1400)    CXR:    Admission Weight: Last Weight   Weight: 143 lb 9 oz (65.1 kg) Weight: 149 lb 4 oz (67.7 kg)     Intake / Output / Drain:  Current Shift:    Last 24 hrs.:   Intake/Output Summary (Last 24 hours) at 18 0853  Last data filed at 18 0700   Gross per 24 hour   Intake          2755.82 ml   Output              960 ml   Net          1795.82 ml       EXAM:  General:  Up in chair, NAD                                                                                            Lungs:   Clear to auscultation bilaterally.    Incision:  Groin sites clean, no bleeding/hematoma   Heart:  Regular rate and rhythm, S1, S2 normal, ++ murmur, click, rub or gallop. Abdomen:   Soft, non-tender. Bowel sounds normal. No masses,  No organomegaly. Extremities:  No edema. PPP. Neurologic:  Gross motor and sensory apparatus intact. Labs: Recent Labs      18   0655  18   0429   18   1136   WBC   --   6.3   --   8.7   HGB   --   6.6*   < >  7.2*   HCT   --   21.5*   < >  24.4*   PLT   --   81*   --   112*   NA   --   142   < >  140   K   --   3.9   < >  3.8   BUN   --   30*   < >  35*   CREA   --   1.21*   < >  1.32*   GLU   --   89   < >  96   GLUCPOC  124*   --    < >   --    INR   --    --    --   1.2*    < > = values in this interval not displayed. Assessment:     Active Problems:    Mitral valve disease (2012)      Aortic valve disorder (2012)      AS (aortic stenosis) (2018)         Plan/Recommendations/Medical Decision Makin. AS s/p TAVR: On ASA, EKG stable, dc PA line  2. MS s/p TMVR: On ASA, stable  3. CAD s/p CABG in : On ASA, statin, no BB until appropriate  4. Hx HTN: Reume norvasc, monitor BP  5. DM: Resume glipizide, hold metformin 48 hrs postop  6. HLD: resume statin  7. Hx PAF: in NSR, not on anticoag preop, monitor rhythm  8. Carotid stenosis: f/u with vascular surgery postop  9. RA: on enbrel weely  10. UTI preop: completed tx with levaquin  11. Post op anemia st acute blood loss on chronic anemia: Give 1 unit PRBC today, resume vitamin C and iron, monitor Hgb  12. Thrombocytopenia: Plt 81, monitor  13. [facility]     Signed By: Tracie Branham NP

## 2018-05-03 NOTE — DIABETES MGMT
DTC TAVR Note     Recommendations/ Comments: Pt s/p TAVR, Transcatheter Mitral valve replacement and ASD closure. BG's in range on Glipizide 5 mg daily       Current hospital medications: correction scale Humalog, normal sensitivity. Chart reviewed on [facility]. Patient is 69 y.o. female s/p cardiac surgery. POD 1. Pt with a known history of diabetes on Metformin 1000 mg BID and Glipizide 5 mg at home. A1c:   Lab Results   Component Value Date/Time    Hemoglobin A1c 5.2 04/23/2018 12:20 PM         Recent Glucose Results:   Lab Results   Component Value Date/Time    GLU 89 05/03/2018 04:29 AM    GLU 76 05/02/2018 03:50 PM    GLUCPOC 101 (H) 05/03/2018 12:39 PM    GLUCPOC 124 (H) 05/03/2018 06:55 AM    GLUCPOC 156 (H) 05/02/2018 08:53 PM        Lab Results   Component Value Date/Time    Creatinine 1.21 (H) 05/03/2018 04:29 AM     Estimated Creatinine Clearance: 39.8 mL/min (based on Cr of 1.21). Active Orders   Diet    DIET CLEAR LIQUID No Conc. Sweets        PO intake:   Patient Vitals for the past 72 hrs:   % Diet Eaten   05/03/18 1347 0 %   05/03/18 0900 50 %   05/02/18 1800 75 %       Will continue to follow as needed. Thank you.     Sd Robledo RN, CDE

## 2018-05-03 NOTE — PROGRESS NOTES
Reason for Admission:   Patient referred to the [address] [place] [address] by Dr. Deangelo Arreoal interventional evaluation of AS & MS.                    RRAT Score:          13           Plan for utilizing home health:   Patient provided freedom of choice and has selected [address] [place] [address]. CM completed referral.                      Likelihood of Readmission:  Low                         Transition of Care Plan:        Patient to discharge home with  and daughter who will provide support and assistance as needed. CM met with patient and daughter Virginia (visiting from [place]). Patient verified demographic info. Patient reports that she was seen sometime last year but has an upcoming appt in June. Patient verified insurance info; patient is a retired [employer] employee. Patient gave permission for her daughter to be added to emergency contacts. Prior to hospitalization, patient reports independence with ADLs and IADLs. Patient prefers [facility] MIKE BURNETT located in [place]. CM to monitor for any additional needs.      Juju Pettit, MS

## 2018-05-03 NOTE — PROGRESS NOTES
Problem: Mobility Impaired (Adult and Pediatric)  Goal: *Acute Goals and Plan of Care (Insert Text)  Physical Therapy Goals  Initiated 5/3/2018  1. Patient will move from supine to sit and sit to supine , scoot up and down and roll side to side in bed with modified independence within 5 days. 2.  Patient will perform sit to/from stand with modified independence within 5 days. 3.  Patient will ambulate 300 feet with least restrictive assistive device and modified independence within 5 days. 4.  Patient will ascend/descend 3 stairs with right handrail(s) with supervision/set-up within 5 days. 5.  Patient will improve Tinetti score by 4-5 points within 7 days. physical Therapy EVALUATION  Patient: Cong Hu (57 y.o. female)  Date: 5/3/2018  Primary Diagnosis: AORTIC STENOSIS, MITRAL STENOSIS  AS (aortic stenosis)  Procedure(s) (LRB):  VALVE ON VALVE TRANSCATHETER AORTIC VALVE REPLACEMENT AND VALVE ON VALVE TRANSCATHETER MITRAL VALVE REPAIR , CLOSURE OF ASD, DEVORA BY DR MOE [facility]. (N/A) 1 Day Post-Op   Precautions:   Fall    ASSESSMENT :  Based on the objective data described below, the patient presents with decreased cardiopulmonary endurance, impaired balance, generalized weakness, increased fall risk, and overall decline from baseline following TAVR and TMVR POD 1. Patient received supine in bed, on 1 L O2, sats stable at 96%. She was agreeable to therapy and overall supervision-min A for bed mobility, transfers, and gait. Performed all mobility on room air, with sats between 93-96%. Gait with frequent mild path deviations, patient sometimes able to self correct and other times requiring min A to steady. She demonstrated good insight into the fact that she was quite \"wobbly\" although this improved with time spent ambulating. More likely to demonstrate LOB/path deviation if patient was talking to daughter or PT and distracted from task.  Attempted to urinate upon return to room but patient unable and then returned to chair, BP and sats stable on room air (although replaced 1 L O2 patient was on prior to session). Suspect she will progress well and be safe for d/c home with HHPT and family support. Patient will benefit from skilled intervention to address the above impairments. Patients rehabilitation potential is considered to be Good  Factors which may influence rehabilitation potential include:   [x]         None noted  []         Mental ability/status  []         Medical condition  []         Home/family situation and support systems  []         Safety awareness  []         Pain tolerance/management  []         Other:      PLAN :  Recommendations and Planned Interventions:  [x]           Bed Mobility Training             [x]    Neuromuscular Re-Education  [x]           Transfer Training                   []    Orthotic/Prosthetic Training  [x]           Gait Training                         []    Modalities  [x]           Therapeutic Exercises           []    Edema Management/Control  [x]           Therapeutic Activities            [x]    Patient and Family Training/Education  []           Other (comment):    Frequency/Duration: Patient will be followed by physical therapy  daily to address goals. Discharge Recommendations: Home Health  Further Equipment Recommendations for Discharge: none     SUBJECTIVE:   Patient stated I feel ok, just wobbly.     OBJECTIVE DATA SUMMARY:   HISTORY:    Past Medical History:   Diagnosis Date    CAD (coronary artery disease)     DM [facility]di[facility] mellitus) ([facility] [address]     Dyslipidemia     Hypertension     Mitral regurgitation and aortic stenosis      Past Surgical History:   Procedure Laterality Date    HX AORTIC VALVE REPLACEMENT      HX CORONARY ARTERY BYPASS GRAFT      HX MITRAL VALVE REPLACEMENT       Prior Level of Function/Home Situation: Independent and driving PTA, lives with , daughter stated patient had difficulty walking to mailbox PTA without becoming SOB  Personal factors and/or comorbidities impacting plan of care: PMH    Home Situation  Home Environment: Private residence  # Steps to Enter: 3  Rails to Enter: Yes  Hand Rails : Right  Wheelchair Ramp: No  One/Two Story Residence: One story  Living Alone: No  Support Systems: Child(behzad), Spouse/Significant Other/Partner  Patient Expects to be Discharged to[de-identified] Private residence  Current DME Used/Available at Home: Grab bars  Tub or Shower Type: Shower    EXAMINATION/PRESENTATION/DECISION MAKING:   Critical Behavior:  Neurologic State: Alert  Orientation Level: Oriented X4  Cognition: Appropriate for age attention/concentration  Safety/Judgement: Awareness of environment, Insight into deficits  Hearing: Auditory  Auditory Impairment: None  Skin:  All dressings dry and intact  Edema: none noted  Range Of Motion:  AROM: Within functional limits  Strength:    Strength: Generally decreased, functional  Tone & Sensation:   Tone: Normal  Sensation: Intact  Coordination:  Coordination: Within functional limits  Vision:   Tracking: Able to track stimulus in all quadrants w/o difficulty  Functional Mobility:  Bed Mobility:  Rolling: Contact guard assistance  Supine to Sit: Supervision; Additional time; Adaptive equipment  Sit to Supine: Minimum assistance  Scooting: Supervision  Transfers:  Sit to Stand: Contact guard assistance  Stand to Sit: Stand-by assistance  Bed to Chair: Contact guard assistance  Balance:   Sitting: Intact  Standing: Impaired; Without support  Standing - Static: Good  Standing - Dynamic : Fair  Ambulation/Gait Training:  Distance (ft): 180 Feet (ft)  Assistive Device: Gait belt  Ambulation - Level of Assistance: Contact guard assistance;Minimal assistance  Gait Abnormalities: Decreased step clearance; Path deviations  Base of Support: Widened  Speed/Jessica: Pace decreased (<100 feet/min)  Step Length: Right shortened;Left shortened    Functional Measure:  Tinetti test:    Sitting Balance: 1  Arises: 1  Attempts to Rise: 1  Immediate Standing Balance: 0  Standing Balance: 0  Nudged: 0  Eyes Closed: 0  Turn 360 Degrees - Continuous/Discontinuous: 0  Turn 360 Degrees - Steady/Unsteady: 0  Sitting Down: 1  Balance Score: 4  Indication of Gait: 1  R Step Length/Height: 1  L Step Length/Height: 1  R Foot Clearance: 1  L Foot Clearance: 1  Step Symmetry: 1  Step Continuity: 1  Path: 1  Trunk: 0  Walking Time: 0  Gait Score: 8  Total Score: 12       Tinetti Test and G-code impairment scale:  Percentage of Impairment CH    0%   CI    1-19% CJ    20-39% CK    40-59% CL    60-79% CM    80-99% CN     100%   Tinetti  Score 0-28 28 23-27 17-22 12-16 6-11 1-5 0       Tinetti Tool Score Risk of Falls  <19 = High Fall Risk  19-24 = Moderate Fall Risk  25-28 = Low Fall Risk  Tinetti ME. Performance-Oriented Assessment of Mobility Problems in Elderly Patients. Irvin 64; [id number]. (Scoring Description: PT Bulletin Feb. 10, 1993)    Older adults: Kavya Hunt et al, 2009; n = 1000 [facility] elderly evaluated with ABC, JON, ADL, and IADL)  · Mean JON score for males aged 69-68 years = 26.21(3.40)  · Mean JON score for females age 69-68 years = 25.16(4.30)  · Mean JON score for males over 80 years = 23.29(6.02)  · Mean JON score for females over 80 years = 17.20(8.32)         G codes: In compliance with CMSs Claims Based Outcome Reporting, the following G-code set was chosen for this patient based on their primary functional limitation being treated: The outcome measure chosen to determine the severity of the functional limitation was the Tinetti with a score of 12/28 which was correlated with the impairment scale.     ? Mobility - Walking and Moving Around:     - CURRENT STATUS: CK - 40%-59% impaired, limited or restricted    - GOAL STATUS: CJ - 20%-39% impaired, limited or restricted    - D/C STATUS:  ---------------To be determined---------------      Physical Therapy Evaluation Charge Determination   History Examination Presentation Decision-Making   HIGH Complexity :3+ comorbidities / personal factors will impact the outcome/ POC  MEDIUM Complexity : 3 Standardized tests and measures addressing body structure, function, activity limitation and / or participation in recreation  LOW Complexity : Stable, uncomplicated  Other outcome measures Tinetti 12/28  MEDIUM      Based on the above components, the patient evaluation is determined to be of the following complexity level: LOW     Pain:  Pain Scale 1: Numeric (0 - 10)  Pain Intensity 1: 0  Activity Tolerance:   VSS, O2 stable on room air 93-96%   Please refer to the flowsheet for vital signs taken during this treatment. After treatment:   [x]         Patient left in no apparent distress sitting up in chair  []         Patient left in no apparent distress in bed  [x]         Call bell left within reach  [x]         Nursing notified  [x]         Caregiver present  []         Bed alarm activated    COMMUNICATION/EDUCATION:   The patients plan of care was discussed with: Registered Nurse. [x]         Fall prevention education was provided and the patient/caregiver indicated understanding. [x]         Patient/family have participated as able in goal setting and plan of care. [x]         Patient/family agree to work toward stated goals and plan of care. []         Patient understands intent and goals of therapy, but is neutral about his/her participation. []         Patient is unable to participate in goal setting and plan of care.     Thank you for this referral.  Shelly Melissa, PT, DPT   Time Calculation: 17 mins

## 2018-05-03 NOTE — PROGRESS NOTES
2000- Bedside and Verbal shift change report given to [address] (oncoming nurse) by Jose Carney RN (offgoing nurse). Report included the following information SBAR, OR Summary, Procedure Summary, Intake/Output, Recent Results and Cardiac Rhythm NSR.     0800- Bedside and Verbal shift change report given to Vicky Velarde (oncoming nurse) by Loc Guillen RN (offgoing nurse). Report included the following information SBAR, OR Summary, Procedure Summary, Intake/Output, Recent Results and Cardiac Rhythm NSR.

## 2018-05-03 NOTE — PROGRESS NOTES
1400:  TRANSFER - IN REPORT:    Verbal report received from [facility]) on Milton Nearing  being received from CVICU (unit) for routine progression of care      Report consisted of patients Situation, Background, Assessment and   Recommendations(SBAR). Information from the following report(s) SBAR, Kardex, Med Rec Status and Cardiac Rhythm NSR was reviewed with the receiving nurse. Opportunity for questions and clarification was provided. Assessment completed upon patients arrival to unit and care assumed. 1930: Bedside shift change report given to [address] (oncoming nurse) by Wendy Dotson RN (offgoing nurse). Report included the following information SBAR, Kardex, MAR and Cardiac Rhythm NSR. Problem: Cardiac Valve Surgery: Post-Op Day 1  Goal: Off Pathway (Use only if patient is Off Pathway)  Outcome: Progressing Towards Goal  Vitals stable, up for all meals. Room air. sats WNL. Denies chest pain or SOB, NSR    Problem: Falls - Risk of  Goal: *Absence of Falls  Document Art Fall Risk and appropriate interventions in the flowsheet.    Outcome: Progressing Towards Goal  Fall Risk Interventions:  Mobility Interventions: Patient to call before getting OOB    Mentation Interventions: Evaluate medications/consider consulting pharmacy    Medication Interventions: Patient to call before getting OOB, Evaluate medications/consider consulting pharmacy    Elimination Interventions: Patient to call for help with toileting needs

## 2018-05-04 NOTE — CARDIO/PULMONARY
Cardiac Rehab: Met with Nolen Scheuermann, NP and RN regarding Rosalee Jain. There is a potential for the patient to discharge today. Education deferred, at this time, patient is off the floor. Will follow. 1135: Met with Rosalee Jain and her daughter. Teach back method used. Discussed the following: daily weight and temperature monitoring, signs and symptoms of infection, and when to call the surgeon. Smoking history assessed. Patient is a NON smoker. Red reminder bracelet give by Nolen Scheuermann, NP. Discussed purpose of bracelet, duration of wear, and when to call surgeons office. Discussed Cardiac Rehab Program benefits, format, and encouraged participation. She would like to attend at [facility] and will determine if she wants to enroll in complete program, at her intake appointment. Initial Cardiac Rehab Program intake appointment scheduled for 6/11/18 at 1 pm and appointment information is on the AVS. General questions answered. Rosalee Jain verbalized understanding. Rosalee Jain has discharge orders.  Jv Brock RN

## 2018-05-04 NOTE — PROGRESS NOTES
Problem: Cardiac Valve Surgery: Post-Op Day 2  Goal: *Hemodynamically stable without vasoactive medications  Outcome: Progressing Towards Goal  Visit Vitals    /71    Pulse (!) 108    Temp 98.8 °F (37.1 °C)    Resp 16    Ht 5' 2.25\" (1.581 m)    Wt 67.7 kg (149 lb 4 oz)    SpO2 94%    BMI 27.08 kg/m2       Goal: *Stable cardiac rhythm  Outcome: Progressing Towards Goal  Remains Sinus Tachycardic  Goal: *Optimal pain control at patient's stated goal  Outcome: Progressing Towards Goal  No complaints of pain  Goal: *Tolerating diet  Outcome: Progressing Towards Goal  Good appetite    Problem: Falls - Risk of  Goal: *Absence of Falls  Document Art Fall Risk and appropriate interventions in the flowsheet. Fall Risk Interventions:  Mobility Interventions: Patient to call before getting OOB, Strengthening exercises (ROM-active/passive)    Mentation Interventions: Adequate sleep, hydration, pain control, Door open when patient unattended, Increase mobility, Update white board    Medication Interventions: Evaluate medications/consider consulting pharmacy, Teach patient to arise slowly    Elimination Interventions: Call light in reach, Patient to call for help with toileting needs, Toileting schedule/hourly rounds    Call bell and personal effects within reach. Bed locked and in low position. Non skid footwear in place. Problem: Pressure Injury - Risk of  Goal: *Prevention of pressure injury  Document Ivan Scale and appropriate interventions in the flowsheet.    Outcome: Progressing Towards Goal  Pressure Injury Interventions:  Sensory Interventions: Discuss PT/OT consult with provider    Activity Interventions: Increase time out of bed, Pressure redistribution bed/mattress(bed type)    Mobility Interventions: Assess need for specialty bed, Pressure redistribution bed/mattress (bed type), PT/OT evaluation    Nutrition Interventions: Document food/fluid/supplement intake    Friction and Shear Interventions: HOB 30 degrees or less      Turns self at appropriate intervals; skin assessed Q4H; blood glucose controlled

## 2018-05-04 NOTE — PROGRESS NOTES
Physical Therapy  [id number]    Chart reviewed. Noted pt w/ discharge orders and to be d/c'd home w/ Home Health. Attempted to see pt this AM for stair training, however pt and pt daughter watching discharge video instructions, requesting therapy PT to come back later. Will f/u later as able and appropriate.      Preethi Means, PTA

## 2018-05-04 NOTE — PROGRESS NOTES
Occupational Therapy Note 1021  -   5.4.2018    Chart reviewed in prep for OT session, MD currently in patient room, will f/u later for OT as able and appropriate. Thank you. Cecilia Parra MS, OTR/L

## 2018-05-04 NOTE — PROGRESS NOTES
CSS Progress Note    Admit Date: 2018  POD:  2 Day Post-Op    Procedure:  Procedure(s):  VALVE ON VALVE TRANSCATHETER AORTIC VALVE REPLACEMENT AND VALVE ON VALVE TRANSCATHETER MITRAL VALVE REPAIR , CLOSURE OF ASD, DEVORA BY DR MOE [facility]. Subjective:   Pt seen with Dr. Torrie Reilly. Afebrile, on RA. Up in chair. Feels well. Wants to go home today. Objective:   Vitals:  Blood pressure 176/63, pulse 96, temperature 97.9 °F (36.6 °C), resp. rate 16, height 5' 2.25\" (1.581 m), weight 151 lb 10.8 oz (68.8 kg), SpO2 98 %. Temp (24hrs), Av.5 °F (36.9 °C), Min:97.8 °F (36.6 °C), Max:99.3 °F (37.4 °C)    EKG/Rhythm:  NSR     Oxygen Therapy:  Oxygen Therapy  O2 Sat (%): 98 % (18 0735)  Pulse via Oximetry: 76 beats per minute (18 1300)  O2 Device: Room air (18 0735)  O2 Flow Rate (L/min): 1 l/min (18 1347)  O2 Temperature: 37.4 °F (3 °C) (18 1200)  FIO2 (%): 40 % (18 1400)    CXR: IMPRESSION:     Stable appearance of the chest with mild diffuse interstitial opacities and left  basilar opacity. Admission Weight: Last Weight   Weight: 143 lb 9 oz (65.1 kg) Weight: 151 lb 10.8 oz (68.8 kg)     Intake / Output / Drain:  Current Shift:  07 -  1900  In: 240 [P.O.:240]  Out: -   Last 24 hrs.:     Intake/Output Summary (Last 24 hours) at 18 0936  Last data filed at 18 0730   Gross per 24 hour   Intake             1066 ml   Output              500 ml   Net              566 ml       EXAM:  General:  Up in chair, NAD                                                                                            Lungs:   Clear to auscultation bilaterally. Incision:  Groin sites clean, no bleeding/hematoma   Heart:  Regular rate and rhythm, S1, S2 normal, ++ murmur, click, rub or gallop. Abdomen:   Soft, non-tender. Bowel sounds normal. No masses,  No organomegaly. Extremities:  No edema. PPP. Neurologic:  Gross motor and sensory apparatus intact.      Labs:   Recent Labs      18   0632  18   0523   18   1136   WBC   --   6.6   < >  8.7   HGB   --   8.5*   < >  7.2*   HCT   --   28.0*   < >  24.4*   PLT   --   108*   < >  112*   NA   --   135*   < >  140   K   --   4.8   < >  3.8   BUN   --   22*   < >  35*   CREA   --   1.08*   < >  1.32*   GLU   --   124*   < >  96   GLUCPOC  138*   --    < >   --    INR   --    --    --   1.2*    < > = values in this interval not displayed. Assessment:     Active Problems:    Mitral valve disease (2012)      Aortic valve disorder (2012)      AS (aortic stenosis) (2018)         Plan/Recommendations/Medical Decision Makin. AS s/p TAVR: On ASA, EKG stable   2. MS s/p TMVR: On ASA, start coumadin x3 months, dc home with lovenox until INR therapeutic. 3. CAD s/p CABG in : On ASA, statin, no BB until appropriate   4. Hx HTN: Reume home meds  5. DM: Resume glipizide, hold metformin 48 hrs postop  6. HLD: resume statin  7. Hx PAF: in NSR, not on anticoag preop, monitor rhythm  8. Carotid stenosis: f/u with vascular surgery postop  9. RA: on enbrel weely  10. UTI preop: completed tx with levaquin  11. Post op anemia st acute blood loss on chronic anemia: Hgb stable, cont vit C and iron  12. Thrombocytopenia: Plt 108, monitor  13. Dispo: PT/OT, home with [facility] today. Sutures removed.      Signed By: Arie Del Rosario, NP

## 2018-05-04 NOTE — PROGRESS NOTES
2330: Bedside and Verbal shift change report given to Robert Villafana (oncoming nurse) by Carolina Webber (offgoing nurse). Report included the following information SBAR, Kardex, Procedure Summary, Intake/Output, MAR, Recent Results and Cardiac Rhythm NSR.     0730: Bedside and Verbal shift change report given to Oumar Bentley and Carmen Cummins (oncoming nurse) by Robert Villafana (offgoing nurse).  Report included the following informatiSTSBAR, Kardex, Procedure Summary, MAR, Recent Results and Cardiac Rhythm ST.

## 2018-05-04 NOTE — PROGRESS NOTES
Physical Therapy  5/4/20018    Chart reviewed. Noted pt currently GALA to Xray. Will follow up for PT.      Preethi Means, PTA

## 2018-05-04 NOTE — PROGRESS NOTES
Occupational Therapy Note 1108 -  5.4.2018     Chart reviewed in prep for OT session. Noted pt w/ discharge orders and to be d/c'd home w/ Home Health. Patient received coming out of bathroom IND. Patient noted to have good balance and insight into need for energy conservation. Patient safe to go home with [facility] and assist from family. Patient and daughter reporting no further concerns with patient ability to complete ADLs. Will complete OT orders at this time.      Cecilia Samano MS, OTR/L

## 2018-05-04 NOTE — DISCHARGE SUMMARY
[facility] Discharge Summary     Patient ID:  Isacjef Garcia  [id number]  89 y.o.  1950    Admit date: 5/2/2018    Discharge date: 5/4/2018     Admitting Physician: Ligia Ricketts MD     Referring Cardiologist:  Dr. Jose Williamson    PCP:  Leyda Dye NP    Admitting Diagnoses: MS/AS    Discharge Diagnoses:     Hospital Problems  Date Reviewed: 5/2/2018          Codes Class Noted POA    AS (aortic stenosis) ICD-10-CM: I35.0  ICD-9-CM: 424.1  5/2/2018 Unknown        Mitral valve disease ICD-10-CM: I05.9  ICD-9-CM: 394.9  12/12/2012 Yes        Aortic valve disorder ICD-10-CM: I35.9  ICD-9-CM: 424.1  12/12/2012 Yes              Discharged Condition: good    Disposition: home, see patient instructions for treatment and plan    Procedures for this admission:  Procedure(s):  VALVE ON VALVE TRANSCATHETER AORTIC VALVE REPLACEMENT AND VALVE ON VALVE TRANSCATHETER MITRAL VALVE REPAIR , CLOSURE OF ASD, DEVORA BY DR MOE [facility]. Discharge Medications:      My Medications      START taking these medications       Instructions Each Dose to Equal   Morning Noon Evening Bedtime    enoxaparin 80 mg/0.8 mL injection   Commonly known as:  LOVENOX       Your last dose was: Your next dose is:              70 mg by SubCUTAneous route every twelve (12) hours every twelve (12) hours. 70 mg                        senna-docusate 8.6-50 mg per tablet   Commonly known as:  PERICOLACE       Your last dose was: Your next dose is: Take 1 Tab by mouth daily. Take until having regular bowel movements. 1 Tab                        traMADol 50 mg tablet   Commonly known as:  ULTRAM       Your last dose was: Your next dose is: Take 1-2 Tabs by mouth every six (6) hours as needed. Max Daily Amount: 400 mg.     mg                        warfarin 5 mg tablet   Commonly known as:  COUMADIN       Your last dose was: Your next dose is: Take 5 mg by mouth at 6pm on 5/4/18.  Dosing to be determined by NP/PA team.                           CHANGE how you take these medications       Instructions Each Dose to Equal   Morning Noon Evening Bedtime    metFORMIN 500 mg tablet   Commonly known as:  GLUCOPHAGE   Start taking on:  5/5/2018   What changed:    - how much to take  - additional instructions       Your last dose was: Your next dose is: Take 2 Tabs by mouth two (2) times daily (with meals). Start on 5/5 Saturday    1000 mg                          CONTINUE taking these medications       Instructions Each Dose to Equal   Morning Noon Evening Bedtime    ACETAMINOPHEN PM PO       Your last dose was: Your next dose is: Take  by mouth. amLODIPine 5 mg tablet   Commonly known as:  NORVASC       Your last dose was: Your next dose is:              TAKE 1 TABLET EVERY DAY FOR BLOOD PRESSURE                         amoxicillin 500 mg capsule   Commonly known as:  AMOXIL       Your last dose was: Your next dose is: Take 1 Cap by mouth as needed (take as directed one hr prior to dental procedures). 500 mg                        aspirin 81 mg tablet       Your last dose was: Your next dose is: Take 81 mg by mouth. 81 mg                        atorvastatin 40 mg tablet   Commonly known as:  LIPITOR       Your last dose was: Your next dose is: Take 1 Tab by mouth nightly. 40 mg                        Biotin 2,500 mcg Cap       Your last dose was: Your next dose is: Take  by mouth.                         calcium 600 mg Cap       Your last dose was: Your next dose is: Take  by mouth. DAILY MULTI-VITAMINS/IRON tablet   Generic drug:  multivitamin with iron       Your last dose was: Your next dose is: Take 1 Tab by mouth daily.     1 Tab                        ENBREL SC       Your last dose was: Your next dose is:              by SubCUTAneous route every seven (7) days. furosemide 20 mg tablet   Commonly known as:  LASIX       Your last dose was: Your next dose is:              TAKE 1 TABLET BY MOUTH EVERY DAY FOR EDEMA                         glipiZIDE 5 mg tablet   Commonly known as:  GLUCOTROL       Your last dose was: Your next dose is: Take 5 mg by mouth daily. 5 mg                        hydroCHLOROthiazide 50 mg tablet   Commonly known as:  HYDRODIURIL       Your last dose was: Your next dose is: Take 50 mg by mouth daily. 50 mg                        lisinopril 20 mg tablet   Commonly known as:  Nicholas Juan       Your last dose was: Your next dose is: Take 1 Tab by mouth daily. 20 mg                        nitroglycerin 0.4 mg SL tablet   Commonly known as:  NITROSTAT       Your last dose was: Your next dose is:              DISSOLVE 1 TABLET UNDER THE TONGUE EVERY 5 MINUTES FOR 3 DOSES AS NEEDED FOR CHEST PAIN                         VITAMIN C 500 mg tablet   Generic drug:  ascorbic acid (vitamin C)       Your last dose was: Your next dose is: Take  by mouth. VITRON-C 65 mg iron- 125 mg Tbec   Generic drug:  iron,carbonyl-vitamin C       Your last dose was: Your next dose is: Take 1 Tab by mouth three (3) times daily.     1 Tab                          STOP taking these medications          FISH OIL 1,000 mg Cap   Generic drug:  omega-3 fatty acids-vitamin e           metoprolol succinate 100 mg tablet   Commonly known as:  TOPROL-XL           naproxen sodium 220 mg tablet   Commonly known as:  NAPROSYN                Where to Get Your Medications      These medications were sent to [facility], [address]  [address] [place] [address], [address]     Phone: [phone]     enoxaparin 80 mg/0.8 mL injection    metFORMIN 500 mg tablet    senna-docusate 8.6-50 mg per tablet    warfarin 5 mg tablet         Information on where to get these meds will be given to you by the nurse or doctor. ! Ask your nurse or doctor about these medications     traMADol 50 mg tablet             INR TARGET- 2-3  INR LEVEL to be drawn- by [facility] nursing  INR management per Dr. Alma Reilly NP/PA team    HPI: 69 y.o.  female with PMHx of MS, MR s/p MVR (#27 Medtronic Mosaic in 2009), AS s/p SAVR with patch closure of Aorta (#21 Medtronic Ultra Mosaic in 2009),  CAD s/p CABG X 3 (LIMA-LAD, SVG to OM1, Diag 1 in 2009), HTN, DM (oral regimen), HLD, PAF, Carotid stenosis, TIA X 2 (2003), CVA (incidental CT finding), psoriasis  that was referred to the [address] [place] [address] by Dr. Bree Jacques interventional evaluation of AS & MS. She is back today after completing her dx testing to discuss an interventional plan.      Ms. Enmanuel Tiwari reports a noticeable increase in her SOB/SONI when walking on a flat surface for 100 ft over the past 6 months as well as some fatigue.  She also reported new LE edema when she saw her cardiologist in February and was started on furosemide. She reports today it is markedly improved and to her eye almost resolved but I could leave a thumb print in her shin up to her knee. Jaret Huh has not been any improvement in her SOB/SONI or fatigue with the fluid reduction.  She denies any CP/ chest tightness, palpitations, orthopnea (2 pillows for ortho comfort for many yrs),  PND, lightheadedness, syncope or falls.       She denies any cardiac hospitalizations in past 12 months. She has no memory of having PAF and suspect it was likely post-op following 2009 surgery.  She has seen vascular surgery (Dr. Smiley Kasper) for her carotid stenosis and the plan is to medically manage for now and return for repeat dopplers in 6 months. Roly Smoker has been on Enbrel for 5 yrs d/t her psoriasis and this is prescribed by her dermatologist.   Wilber Mary  Her first surgery was done by Dr. Isaiah Simons and per his note, at the time she had CHF/pulm edema w/ 20-25% LVEF and her MR was an incidental finding during intraop DEVORA. Per pt/, there was limited discussion re: tissue v. Mechanical valve selection at the time of surgery but no known contraindication to long-term anticoagulation at the time.      Ms. Gertrudis Saucedo is accompanied by her  & daughter today. Kenroy Salazar has some mild short-term memory loss but is independent in her ADLs, medical decision making and medication administration. Hospital Course: The patient underwent a TAVR/TMVR by Dr. Tank Leggett and Dr. Aron Flower on 5/2/18 -see op notes for details. She was transferred to the ICU in stable condition. On POD1 she was hemodynamically stable and transferred to stepdown. After working with PT/OT and medical optimization she is being discharged home. POD#1: 1. AS s/p TAVR: On ASA, EKG stable, dc PA line  2. MS s/p TMVR: On ASA, stable  3. CAD s/p CABG in 2009: On ASA, statin, no BB until appropriate  4. Hx HTN: Reume norvasc, monitor BP  5. DM: Resume glipizide, hold metformin 48 hrs postop  6. HLD: resume statin  7. Hx PAF: in NSR, not on anticoag preop, monitor rhythm  8. Carotid stenosis: f/u with vascular surgery postop  9. RA: on enbrel weely  10. UTI preop: completed tx with levaquin  11. Post op anemia st acute blood loss on chronic anemia: Give 1 unit PRBC today, resume vitamin C and iron, monitor Hgb  12. Thrombocytopenia: Plt 81, monitor  13. Dispo: PT/OT, transfer to stepdown. POD#2: 1. AS s/p TAVR: On ASA, EKG stable   2. MS s/p TMVR: On ASA, start coumadin x3 months, dc home with lovenox until INR therapeutic. 3. CAD s/p CABG in 2009: On ASA, statin, no BB until appropriate   4. Hx HTN: Reume home meds  5. DM: Resume glipizide, hold metformin 48 hrs postop  6. HLD: resume statin  7.  Hx PAF: in NSR, not on anticoag preop, monitor rhythm  8. Carotid stenosis: f/u with vascular surgery postop  9. RA: on enbrel weely  10. UTI preop: completed tx with levaquin  11. Post op anemia st acute blood loss on chronic anemia: Hgb stable, cont vit C and iron  12. Thrombocytopenia: Plt 108, monitor  13. Dispo: PT/OT, home with [place] today. Sutures removed. Referral to outpatient cardiac rehab made. Discharge Vital Signs:   Visit Vitals    /63 (BP 1 Location: Left arm, BP Patient Position: Sitting)    Pulse 96    Temp 97.9 °F (36.6 °C)    Resp 16    Ht 5' 2.25\" (1.581 m)    Wt 151 lb 10.8 oz (68.8 kg)    SpO2 98%    BMI 27.52 kg/m2       Labs:   Recent Labs      05/04/18   0632  05/04/18   0523   05/02/18   1136   WBC   --   6.6   < >  8.7   HGB   --   8.5*   < >  7.2*   HCT   --   28.0*   < >  24.4*   PLT   --   108*   < >  112*   NA   --   135*   < >  140   K   --   4.8   < >  3.8   BUN   --   22*   < >  35*   CREA   --   1.08*   < >  1.32*   GLU   --   124*   < >  96   GLUCPOC  138*   --    < >   --    INR   --    --    --   1.2*    < > = values in this interval not displayed. Diagnostics: PA/lat 5/4 IMPRESSION:     Decreased diffuse interstitial and left basilar opacities. Small left pleural  effusion. Patient Instructions/Follow Up Care:  Discharge instructions were reviewed with the patient and family present. Questions were also answered at this time. Prescriptions and medications were reviewed. The patient has a follow up appointment with the Nurse Practitioner or [de-identified] Assistant on 5/8 and with Dr. Mable Major on 6/5. The patient was also instructed to follow up with her primary care physician as needed. The patient and family were encouraged to call with any questions or concerns.        Signed:  [place] NP  5/4/2018  10:04 AM

## 2018-05-04 NOTE — PROGRESS NOTES
Problem: Mobility Impaired (Adult and Pediatric)  Goal: *Acute Goals and Plan of Care (Insert Text)  Physical Therapy Goals  Initiated 5/3/2018  1. Patient will move from supine to sit and sit to supine , scoot up and down and roll side to side in bed with modified independence within 5 days. 2.  Patient will perform sit to/from stand with modified independence within 5 days. 3.  Patient will ambulate 300 feet with least restrictive assistive device and modified independence within 5 days. 4.  Patient will ascend/descend 3 stairs with right handrail(s) with supervision/set-up within 5 days. 5.  Patient will improve Tinetti score by 4-5 points within 7 days. physical Therapy TREATMENT  Patient: Kayden Whitehead (89 y.o. female)  Date: 5/4/2018  Diagnosis: AORTIC STENOSIS, MITRAL STENOSIS  AS (aortic stenosis) <principal problem not specified>  Procedure(s) (LRB):  VALVE ON VALVE TRANSCATHETER AORTIC VALVE REPLACEMENT AND VALVE ON VALVE TRANSCATHETER MITRAL VALVE REPAIR , CLOSURE OF ASD, DEVORA BY DR MOE [facility]. (N/A) 2 Days Post-Op  Precautions: Fall  Chart, physical therapy assessment, plan of care and goals were reviewed. ASSESSMENT:  Pt received amb to BR yet agreeable to PT after use. OT briefly in to see pt as well as pt amb to chair. Pt completed stair training, negotiated 4 steps using R rail for ascend and both rails for descend. Pt completed ~175 FT gait w/ Supervision; noted mild path deviations however no LOB. Pt denies SOB; HR prior to gait 100 bpm, post gait 108 bpm. Pt returned dto chair at bedside and remained seated here. Reviewed energy conservation and activity pacing, as well as task breakdown. Pt and Pt DTR w/ no additional concerns re:PT. Pt clear from PT standpoint for discharge home.    Progression toward goals:  [x]    Improving appropriately and progressing toward goals  []    Improving slowly and progressing toward goals  []    Not making progress toward goals and plan of care will be adjusted     PLAN:  Patient continues to benefit from skilled intervention to address the above impairments. Continue treatment per established plan of care. Discharge Recommendations:  Home Health,   Further Equipment Recommendations for Discharge:  None     SUBJECTIVE:   Patient stated I'm getting ready to use the bathroom.     OBJECTIVE DATA SUMMARY:   Critical Behavior:  Neurologic State: Alert  Orientation Level: Oriented X4  Cognition: Appropriate for age attention/concentration  Safety/Judgement: Awareness of environment, Insight into deficits  Functional Mobility Training:  Bed Mobility:     Supine to Sit:  (pt recieved OOB, amb to BR (I))  Sit to Supine:  (returned to chair)           Transfers:  Sit to Stand: Supervision  Stand to Sit: Supervision                             Balance:  Sitting: Intact  Standing: Intact  Standing - Static: Good  Standing - Dynamic : Fair  Ambulation/Gait Training:  Distance (ft): 180 Feet (ft)  Assistive Device: Gait belt  Ambulation - Level of Assistance: Stand-by assistance        Gait Abnormalities: Decreased step clearance (minor path deviations)        Base of Support: Widened     Speed/Jessica: Pace decreased (<100 feet/min)  Step Length: Left shortened;Right shortened                Stairs:  Number of Stairs Trained: 4  Stairs - Level of Assistance: Stand-by assistance   Rail Use: Both (Right ascend; both descend)      Pain:  Pain Scale 1: Numeric (0 - 10)  Pain Intensity 1: 0              Activity Tolerance:   HR pre activity-100 , post activity 108. Denied SOB/SONI. Please refer to the flowsheet for vital signs taken during this treatment.   After treatment:   [x]    Patient left in no apparent distress sitting up in chair  []    Patient left in no apparent distress in bed  [x]    Call bell left within reach  [x]    Nursing notified  []    Caregiver present  []    Bed alarm activated    COMMUNICATION/COLLABORATION:   The patients plan of care was discussed with: Registered Nurse    Misti WNOG Means,PTA   Time Calculation: 14 mins

## 2018-05-04 NOTE — PROGRESS NOTES
1227  Discharge instructions reviewed with patient and family, off tele, IV d/c'd. Volunteer to take patient to discharge entrance. Meaghan Lopez DISCHARGE SUMMARY from Nurse    PATIENT INSTRUCTIONS:    After general anesthesia or intravenous sedation, for 24 hours or while taking prescription Narcotics:  · Limit your activities  · Do not drive and operate hazardous machinery  · Do not make important personal or business decisions  · Do  not drink alcoholic beverages  · If you have not urinated within 8 hours after discharge, please contact your surgeon on call. Report the following to your surgeon:  · Excessive pain, swelling, redness or odor of or around the surgical area  · Temperature over 100.5  · Nausea and vomiting lasting longer than 4 hours or if unable to take medications  · Any signs of decreased circulation or nerve impairment to extremity: change in color, persistent  numbness, tingling, coldness or increase pain  · Any questions    What to do at Home:  Recommended activity: Activity as tolerated and No lifting, Driving, or Strenuous exercise for 1 month,     If you experience any of the following symptoms SOB, Bleeding, Pain, Fever, please follow up with Dr Courtney Corona. *  Please give a list of your current medications to your Primary Care Provider. *  Please update this list whenever your medications are discontinued, doses are      changed, or new medications (including over-the-counter products) are added. *  Please carry medication information at all times in case of emergency situations. These are general instructions for a healthy lifestyle:    No smoking/ No tobacco products/ Avoid exposure to second hand smoke  Surgeon General's Warning:  Quitting smoking now greatly reduces serious risk to your health.     Obesity, smoking, and sedentary lifestyle greatly increases your risk for illness    A healthy diet, regular physical exercise & weight monitoring are important for maintaining a healthy lifestyle    You may be retaining fluid if you have a history of heart failure or if you experience any of the following symptoms:  Weight gain of 3 pounds or more overnight or 5 pounds in a week, increased swelling in our hands or feet or shortness of breath while lying flat in bed. Please call your doctor as soon as you notice any of these symptoms; do not wait until your next office visit. Recognize signs and symptoms of STROKE:    F-face looks uneven    A-arms unable to move or move unevenly    S-speech slurred or non-existent    T-time-call 911 as soon as signs and symptoms begin-DO NOT go       Back to bed or wait to see if you get better-TIME IS BRAIN. Warning Signs of HEART ATTACK     Call 911 if you have these symptoms:   Chest discomfort. Most heart attacks involve discomfort in the center of the chest that lasts more than a few minutes, or that goes away and comes back. It can feel like uncomfortable pressure, squeezing, fullness, or pain.  Discomfort in other areas of the upper body. Symptoms can include pain or discomfort in one or both arms, the back, neck, jaw, or stomach.  Shortness of breath with or without chest discomfort.  Other signs may include breaking out in a cold sweat, nausea, or lightheadedness. Don't wait more than five minutes to call [address]! Fast action can save your life. Calling 911 is almost always the fastest way to get lifesaving treatment. Emergency Medical Services staff can begin treatment when they arrive  up to an hour sooner than if someone gets to the hospital by car. The discharge information has been reviewed with the patient and caregiver. The patient and caregiver verbalized understanding.   Discharge medications reviewed with the patient and appropriate educational materials and side effects teaching were provided.   ___________________________________________________________________________________________________________________________________

## 2018-05-04 NOTE — DISCHARGE INSTRUCTIONS
Aortic Valve Replacement: What to Expect at [address] [place] [address] have had surgery to replace your heart's aortic valve. Your doctor did the surgery through a cut, called an incision, in your chest.  You will feel tired and sore for the first few weeks after surgery. You may have some brief, sharp pains on either side of your chest. Your chest, shoulders, and upper back may ache. The incision in your chest may be sore or swollen. These symptoms usually get better after 4 to 6 weeks. You will probably be able to do many of your usual activities after 4 to 6 weeks. But for at least 6 weeks, you will not be able to lift heavy objects or do activities that strain your chest or upper arm muscles. At first you may notice that you get tired easily and need to rest often. It may take 1 to 2 months to get your energy back. Some people find that they are more emotional after this surgery. You may cry easily or show emotion in ways that are unusual for you. This is common and may last for up to a year. Some people get depressed after this surgery. Talk with your doctor if you have sadness that continues or you are concerned about how you are feeling. Treatment and other support can help you feel better. Even though you have a new aortic valve, it is still important to eat a heart-healthy diet, get regular exercise, not smoke, take your heart medicines, and reduce stress. Your doctor may recommend that you work with a nurse, a dietitian, and a physical therapist to make these changes. This is sometimes called cardiac rehabilitation. This care sheet gives you a general idea about how long it will take for you to recover. But each person recovers at a different pace. Follow the steps below to get better as quickly as possible. How can you care for yourself at home? Activity  ? · Rest when you feel tired. Getting enough sleep will help you recover. Try to sleep on your back while your breastbone (sternum) heals. This usually takes about 4 to 6 weeks. ? · Try to walk each day. Start by walking a little more than you did the day before. Bit by bit, increase the amount you walk. Walking boosts blood flow and helps prevent pneumonia and constipation. ? · Avoid strenuous activities, such as bicycle riding, jogging, weight lifting, or heavy aerobic exercise, until your doctor says it is okay. ? · For 3 months, avoid activities that strain your chest or upper arm muscles. This includes pushing a  or vacuum, mopping floors, or swinging a golf club or tennis racquet. ? · For at least 6 weeks, avoid lifting anything that would make you strain. This may include a child, heavy grocery bags and milk containers, a heavy briefcase or backpack, or cat litter or dog food bags. ? · Hold a pillow firmly over your chest incision when you cough or take deep breaths. This will support your chest and reduce your pain. ? · Do breathing exercises at home as instructed by your doctor. This will help prevent pneumonia. ? · Ask your doctor when you can drive again. ? · You will probably need to take 4 to 12 weeks off from work. It depends on the type of work you do and how you feel. ? · You may shower as usual. Pat the incision dry. Do not take a bath for the first 3 weeks, or until your doctor tells you it is okay. ? · Do not swim or use a hot tub for at least 1 month, or until your doctor says it is okay. ? · Ask your doctor when it is okay for you to have sex. Diet  ? · Eat a heart-healthy, low-salt diet. If you have not been eating this way, talk to your doctor. You also may want to talk to a dietitian. A dietitian can help you plan meals and learn about healthy foods. ? · Drink plenty of fluids (unless your doctor tells you not to). ? · You may notice that your bowel movements are not regular right after your surgery. This is common. Try to avoid constipation and straining with bowel movements.  You may want to take a fiber supplement every day. If you have not had a bowel movement after a couple of days, ask your doctor about taking a mild laxative. Medicines  ? · Your doctor will tell you if and when you can restart your medicines. He or she will also give you instructions about taking any new medicines. ? · If you take blood thinners, such as warfarin (Coumadin), clopidogrel (Plavix), or aspirin, be sure to talk to your doctor. He or she will tell you if and when to start taking those medicines again. Make sure that you understand exactly what your doctor wants you to do. ? · Be safe with medicines. Take your medicines exactly as prescribed. Call your doctor if you think you are having a problem with your medicine. ? · Take pain medicines exactly as directed. ¨ If the doctor gave you a prescription medicine for pain, take it as prescribed. ¨ If you are not taking a prescription pain medicine, ask your doctor if you can take an over-the-counter medicine. ¨ Do not take aspirin, ibuprofen (Advil, Motrin), naproxen (Aleve), or other nonsteroidal anti-inflammatory drugs (NSAIDs) unless your doctor says it is okay. ? · If you think your pain medicine is making you sick to your stomach:  ¨ Take your medicine after meals (unless your doctor has told you not to). ¨ Ask your doctor for a different pain medicine. ? · If your doctor prescribed antibiotics, take them as directed. Do not stop taking them just because you feel better. You need to take the full course of antibiotics. ? · Your doctor may give you a blood thinner to prevent blood clots. If you take a blood thinner, be sure you get instructions about how to take your medicine safely. Blood thinners can cause serious bleeding problems. Incision care  ? · If you have strips of tape on the incision the doctor made, leave the tape on for a week or until it falls off.   ? · Wash the area daily with warm, soapy water and pat it dry.  Don't use hydrogen peroxide or alcohol, which may delay healing. You may cover the area with a gauze bandage if it weeps or rubs against clothing. Change the bandage every day. ? · Keep the area clean and dry. Other instructions  ? · Keep track of your weight. Weigh yourself every day at the same time of day, on the same scale, in the same amount of clothing. A sudden increase in weight can be a sign of a problem with your heart. Tell your doctor if you suddenly gain weight, such as 3 pounds or more in 2 to 3 days. ? · Be sure to tell all your doctors and your dentist that you have an artificial aortic valve. This is important, because you may need to take antibiotics before certain procedures to prevent infection. Follow-up care is a key part of your treatment and safety. Be sure to make and go to all appointments, and call your doctor if you are having problems. It's also a good idea to know your test results and keep a list of the medicines you take. When should you call for help? Call 911 anytime you think you may need emergency care. For example, call if:  ? · You passed out (lost consciousness). ? · You have trouble breathing. ? · You have severe pain in your chest.   ? · You have symptoms of a stroke. These may include:  ¨ Sudden numbness, tingling, weakness, or loss of movement in your face, arm, or leg, especially on only one side of your body. ¨ Sudden vision changes. ¨ Sudden trouble speaking. ¨ Sudden confusion or trouble understanding simple statements. ¨ Sudden problems with walking or balance. ¨ A sudden, severe headache that is different from past headaches. ? · You have symptoms of a heart attack. These may include:  ¨ Chest pain or pressure, or a strange feeling in the chest.  ¨ Sweating. ¨ Shortness of breath. ¨ Nausea or vomiting. ¨ Pain, pressure, or a strange feeling in the back, neck, jaw, or upper belly or in one or both shoulders orarms. ¨ Lightheadedness or sudden weakness.   ¨ A fast or irregular heartbeat. ? After you call 911, the  may tell you to chew 1 adult-strength or 2 to 4 low-dose aspirin. Wait for anambulance. Do not try to drive yourself. ?Call your doctor now or seek immediate medical care if:  ? · You have pain that does not get better after you take pain medicine. ? · You have loose stitches, or your incision comes open. ? · Bright red blood has soaked through the bandage over your incision. ? · You have signs of infection, such as:  ¨ Increased pain, swelling, warmth, or redness. ¨ Red streaks leading from the incision. ¨ Pus draining from the incision. ¨ A fever. ? · You have signs of a blood clot, such as:  ¨ Pain in your calf, back of the knee, thigh, or groin. ¨ Redness and swelling in your leg or groin. ? · You have new or changed symptoms of heart failure, such as:  ¨ New or increased shortness of breath. ¨ New or worse swelling in your legs, ankles, or feet. ¨ Sudden weight gain, such as more than 2 to 3 pounds in a day or 5 pounds in a week. (Your doctor may suggest a different range of weight gain.)  ¨ Feeling dizzy or lightheaded or like you may faint. ¨ Feeling so tired or weak that you cannot do your usual activities. ¨ Not sleeping well. Shortness of breath wakes you at night. You need extra pillows to prop yourself up to breathe easier. ? Watch closely for changes in your health, and be sure to contact your doctor if you have any problems. Where can you learn more? Go to http://josé luis-aggie.info/. Enter P546 in the search box to learn more about \"Aortic Valve Replacement: What to Expect at Home. \"  Current as of: September 21, 2016  Content Version: 11.4  © 2539-0949 [employer]. Care instructions adapted under license by [employer] (which disclaims liability or warranty for this information).  If you have questions about a medical condition or this instruction, always ask your healthcare professional. [address] any warranty or liability for your use of this information. Cardiac Surgery Specialist    [facility] [address]                                          [place] [address] [address], [address] [facility]  Office- [phone]  Fax- [phone]       Office- [phone]  Fax- [phone]  _____________________________________________________________  Dr. Swapna Chacon, AGACNP  Jason Davidson PA-C    Name:Alicia Saenz     Surgery & Date: Procedure(s):  VALVE ON VALVE TRANSCATHETER AORTIC VALVE REPLACEMENT AND VALVE ON VALVE TRANSCATHETER MITRAL VALVE REPAIR , CLOSURE OF ASD, DEVORA BY DR MOE [facility]. Discharge Date: 5/4/18    MEDICATIONS:  Please refer to your After Visit Summary for your medication list.       INR TARGET- 2-3  INR LEVEL to be drawn- by [facility] nursing on 5/5/18  INR management per Dr. Phylicia Jiang NP/PA team    DO NOT TAKE ANY MEDICATIONS THAT ARE NOT ON THIS LIST    INSTRUCTIONS:  NO SMOKING OR TOBACCO PRODUCTS  Do not follow the activity/exercise instructions in your discharge book given to you as an inpatient. You have no activity restrictions. You may shower. Wash all incisions twice daily with mild soap and water. No lotions, ointments or powder. Call the office immediately for any redness, swelling, or drainage from your incision. Take your temperature daily and call for a temperature of 101 degrees or higher or for any symptoms that make you think you have and infection. Weigh yourself each morning. Call if you gain more than 5 pounds in 48 hours. Use the incentive spirometer 6-8 times a day-10 breaths each time. Walk several hundred feet several times daily.     DIET  Eat an American Heart Association diet. If you are having trouble with your appetite, eat what you can. Try eating small, frequent meals throughout the day. ACTIVITY  1. You have no activity restrictions. You may resume your daily activities at home, based on your comfort level. You may also drive. FOLLOW UP  1. Your first follow up appointment will be on 5/8 at 10:30am. Our office is located in [address] [place] [address] on floor G-5. Your second follow up appointment will be in four weeks, on 6/5 at 1:00pm. You will need to have an ECHO prior to your appointment time. Our office will set that up for you. Please call our office at [phone] if you are unable to make either one of these appointments. 2. You will be receiving a call before your 3 day follow up appointment to begin cardiac rehab. They are located in the [address] [place] [address] on [facility]. Their phone number is [phone]. Please call if you have not been contacted 2-3 weeks after discharge from the hospital.  3. We will make an appointment for you with your cardiologist in 4-5 weeks. 4. Consult you primary care physician regarding your influenza &   pneumovax vaccines. 5.   Please bring all medications with you to your appointment.     Signature:___________________________________________________

## 2018-05-04 NOTE — PROGRESS NOTES
1030 Coumadin education performed, with video, booklet, and MEF's sheet. Lovenox teaching done, teaching kit explained/reviewed with return demonstration.

## 2018-05-05 NOTE — ED TRIAGE NOTES
Pt reports she had valve replacement surgery this past Wednesday here @ [facility]. Pt states she began to bleed from her incision site this morning in her right groin. Pt family placed pressure dressing to site. Pt is currently receiving Lovenox 70 mg SQ and has taken Coumadin 5 mg PO x 1 dose. Pt denies other symptoms.

## 2018-05-05 NOTE — ED PROVIDER NOTES
HPI Comments: 69 y.o. female with past medical history significant for DM, HTN, dyslipidemia, mitral regurgitation, aortic stenosis, and PAF, who presents ambulatory with family members to the ED with cc of post-op complication s/p TAVR. Pt reports she had a TAVR operation 3 days ago (5/2/18) and was discharged yesterday. She states she woke this morning at 8:00AM with active bleeding from the incision site at her right groin that had started before she woke. She states the bleeding has been constant since she has been awake. She denies trauma. Pt's family member estimates pt lost \"6 to 7 ccs of blood\" prior to placement of a pressure dressing to the site at home. Pt reports she has had 3 Lovenox injections and 1 dose of Warfarin. Pt specifically denies any swelling or pain to the area. There are no other acute medical concerns at this time. Social Hx: denies Tobacco use, rare EtOH use, denies Illicit Drug use  PCP: Eunice Guerrero NP  Cardiac surgeon: Cammie Baez MD    Note written by Alison Ramirez, as dictated by Tim Gregorio MD 9:49 AM      The history is provided by the patient and a relative. No  was used. Past Medical History:   Diagnosis Date    Bruit of right carotid artery     CAD (coronary artery disease)     DM (diabetes mellitus) ([address]     Dyslipidemia     Hypertension     Mitral regurgitation and aortic stenosis     Paroxysmal A-fib (HCC)        Past Surgical History:   Procedure Laterality Date    HX AORTIC VALVE REPLACEMENT      HX CORONARY ARTERY BYPASS GRAFT      HX MITRAL VALVE REPLACEMENT      TAVR         History reviewed. No pertinent family history. Social History     Social History    Marital status:      Spouse name: N/A    Number of children: N/A    Years of education: N/A     Occupational History    Not on file.      Social History Main Topics    Smoking status: Never Smoker    Smokeless tobacco: Never Used   [employer] Alcohol use 0.0 oz/week     0 Standard drinks or equivalent per week      Comment: Rare    Drug use: No    Sexual activity: Not on file     Other Topics Concern    Not on file     Social History Narrative         ALLERGIES: Sulfa dyne    Review of Systems   Constitutional: Negative for chills and fever. HENT: Negative for sore throat. Respiratory: Negative for cough and shortness of breath. Cardiovascular: Negative for chest pain. Gastrointestinal: Negative for abdominal pain and vomiting. Genitourinary: Negative for dysuria. Musculoskeletal: Negative for back pain. Skin: Positive for wound (TAVR incision site bleeding). Negative for rash. Neurological: Negative for syncope and headaches. Psychiatric/Behavioral: Negative for confusion. All other systems reviewed and are negative. Vitals:    05/05/18 0928   BP: 174/70   Pulse: 94   Resp: 16   Temp: 98.5 °F (36.9 °C)   SpO2: 98%   Weight: 68.7 kg (151 lb 8 oz)   Height: 5' 2\" (1.575 m)            Physical Exam   Constitutional: She is oriented to person, place, and time. She appears well-developed. No distress. HENT:   Head: Normocephalic and atraumatic. Eyes: Conjunctivae and EOM are normal. Pupils are equal, round, and reactive to light. Neck: Normal range of motion. Neck supple. Cardiovascular: Normal rate, regular rhythm and normal heart sounds. No murmur heard. Pulmonary/Chest: Effort normal and breath sounds normal. No respiratory distress. Abdominal: Soft. Bowel sounds are normal. She exhibits no distension. There is no tenderness. There is no rebound. Genitourinary:   Genitourinary Comments: R groin: mimimal oozing of blood over the incision, but there is no palpable aneurysm, no palpable mass, no discoloration appreciated   Musculoskeletal: Normal range of motion. She exhibits no edema. Neurological: She is alert and oriented to person, place, and time. No cranial nerve deficit. She exhibits normal muscle tone. Coordination normal.   Skin: Skin is warm and dry. No rash noted. Psychiatric: She has a normal mood and affect. Her behavior is normal.   Nursing note and vitals reviewed. Note written by Alison Royal, as dictated by Yandel West MD 9:49 AM      [facility]      ED Course       Procedures    10:12 AM  Dr. Scarlett Garza examined pt while in ED; reports to nursing that he will stitch pt and pt can be d/c after labs result.

## 2018-05-05 NOTE — ED NOTES
Dr. FRY at bedside for stitching. States pt may be discharged after holding pressure x 15 minutes. Was instructed to hold lovenox injection today.

## 2018-05-07 PROBLEM — R79.1 ELEVATED INR: Status: ACTIVE | Noted: 2018-01-01

## 2018-05-07 NOTE — IP AVS SNAPSHOT
[address] [facility] [address] [facility] 
[phone] Patient: Xi Harvey MRN: [id number] [facility]:2/76/4960 About your hospitalization You were admitted on: May 7, 2018 You last received care in the:  [facility] 4 [facility] You were discharged on: May 8, 2018 Why you were hospitalized Your primary diagnosis was:  Not on File Your diagnoses also included:  Elevated Inr Follow-up Information Follow up With Details Comments Contact Info Jayshree Berger NP   [facility] 
[phone] Your Scheduled Appointments Wednesday May 09, 2018 To Be Determined ROUTINE with Garo Ovalle LPN  
[address] ([address]) [address] ([address]) Friday May 11, 2018 To Be Determined ROUTINE with Curtis Manuel RN  
[address] ([address]) [address] ([address]) Friday May 11, 2018 11:00 AM EDT  
POST OP with Eliceo Meza MD  
[facility] ([address] [place] [address]) [address] [place], [place] [address] [phone] Monday May 14, 2018 To Be Determined ROUTINE with Gretchen Lugo RN  
[address] ([address]) [address]) Wednesday May 16, 2018 To Be Determined ROUTINE with Gretchen Lugo RN  
[address] ([address]) [address]) Friday May 18, 2018 To Be Determined ROUTINE with RONNI Lowery [address] ([address]) [address]) Monday May 21, 2018 To Be Determined ROUTINE with RONNI Browne [address] ([address]) [address]) Wednesday May 23, 2018 To Be Determined SN REASSESSMENT with RONNI Browne [address] ([address]) [address]) Tuesday June 05, 2018  1:00 PM EDT  
POST OP with Ashley Antunez NP [facility] ([address] [place] [address]) [address] [place] [address] [address] [phone]  
[phone] Tuesday June 05, 2018  1:30 PM EDT  
POST OP with Ag Mcallister MD  
[facility] ([address] [place] [address]) [address] [place] [address] [address] [phone]  
[phone] Wednesday June 06, 2018  9:00 AM EDT  
ESTABLISHED PATIENT with Nicci June MD  
[facility] [address] [place] [address]) [address] [facility]  
[phone] Monday June 11, 2018  1:00 PM EDT  
CR INTAKE with Georgina Gannon RN  
[address] [place] [address] ([address]) [facility] [address] [facility] [address]  
[phone]  
  
    
[address] [place], [place] [address], [address]. Please arrive 15 minutes prior to your appointment time and you will register in the [facility] [address], [address], on the first floor of the [address] [place] [address]. Telephone: [phone] Fax: [phone] Driving directions To [facility] [place]. Building: Driving WEST on [address], take exit [address] to [employer]. Turn left onto [facility], then turn right into [employer] Parking lot Driving EAST on [address], take exit [address] [facility]. Turn right at the end of the exit ramp.  Turn left onto [facility], then turn right into [employer] lot. Discharge Orders None A check william indicates which time of day the medication should be taken. My Medications CONTINUE taking these medications Instructions Each Dose to Equal  
 Morning Noon Evening Bedtime  
 amLODIPine 5 mg tablet Commonly known as:  Lowell Diallo Your last dose was: Your next dose is: TAKE 1 TABLET EVERY DAY FOR BLOOD PRESSURE  
     
   
   
   
  
 amoxicillin 500 mg capsule Commonly known as:  AMOXIL Your last dose was: Your next dose is: Take 1 Cap by mouth as needed (take as directed one hr prior to dental procedures). 500 mg  
    
   
   
   
  
 atorvastatin 40 mg tablet Commonly known as:  LIPITOR Your last dose was: Your next dose is: Take 1 Tab by mouth nightly. 40 mg Biotin 2,500 mcg Cap Your last dose was: Your next dose is: Take 5,000 mcg by mouth daily. 5000 mcg  
    
   
   
   
  
 calcium 600 mg Cap Your last dose was: Your next dose is: Take 600 mg by mouth daily. With lunch  
 600 mg DAILY MULTI-VITAMINS/IRON tablet Generic drug:  multivitamin with iron Your last dose was: Your next dose is: Take 1 Tab by mouth daily. 1 Tab  
    
   
   
   
  
 furosemide 20 mg tablet Commonly known as:  LASIX Your last dose was: Your next dose is: TAKE 1 TABLET BY MOUTH EVERY DAY FOR EDEMA  
     
   
   
   
  
 glipiZIDE 5 mg tablet Commonly known as:  Tad Melbourne Your last dose was: Your next dose is: Take 5 mg by mouth daily. 5 mg  
    
   
   
   
  
 hydroCHLOROthiazide 50 mg tablet Commonly known as:  HYDRODIURIL Your last dose was: Your next dose is: Take 50 mg by mouth daily. 50 mg  
    
   
   
   
  
 lisinopril 20 mg tablet Commonly known as:  Karlashaun Desanctis Your last dose was: Your next dose is: Take 1 Tab by mouth daily. 20 mg  
    
   
   
   
  
 metFORMIN 500 mg tablet Commonly known as:  GLUCOPHAGE Your last dose was: Your next dose is: Take 2 Tabs by mouth two (2) times daily (with meals). Start on 5/5 Saturday  
 1000 mg  
    
   
   
   
  
 nitroglycerin 0.4 mg SL tablet Commonly known as:  NITROSTAT Your last dose was: Your next dose is:    
   
   
 DISSOLVE 1 TABLET UNDER THE TONGUE EVERY 5 MINUTES FOR 3 DOSES AS NEEDED FOR CHEST PAIN  
     
   
   
   
  
 VITAMIN C 500 mg tablet Generic drug:  ascorbic acid (vitamin C) Your last dose was: Your next dose is: Take 500 mg by mouth daily. 500 mg  
    
   
   
   
  
 VITRON-C 65 mg iron- 125 mg Tbec Generic drug:  iron,carbonyl-vitamin C Your last dose was: Your next dose is: Take 1 Tab by mouth three (3) times daily. 1 Tab STOP taking these medications ACETAMINOPHEN PM PO  
   
  
 aspirin 81 mg tablet  
   
  
 enoxaparin 80 mg/0.8 mL injection Commonly known as:  LOVENOX  
   
  
 warfarin 5 mg tablet Commonly known as:  COUMADIN Discharge Instructions Cardiac Surgery Specialist 
 
[address] 200 [facility] Office- [phone]  Fax- [phone]       Office- [phone]  Fax- [phone] 
_____________________________________________________________ [facility] Yandel Krause [facility]   [facility] 
 Teresa Dutta PA-C [place] [facility] Discharge Date: 5/8/18 MEDICATIONS: 
Please refer to your After Visit Summary for your medication list.  
 
DO NOT TAKE ANY MEDICATIONS THAT ARE NOT ON THIS LIST INSTRUCTIONS: 
NO SMOKING OR TOBACCO PRODUCTS Do not follow the activity/exercise instructions in your discharge book given to you as an inpatient. You have no activity restrictions. You may shower. Wash all incisions twice daily with mild soap and water. No lotions, ointments or powder. Call the office immediately for any redness, swelling, or drainage from your incision. Take your temperature daily and call for a temperature of 101 degrees or higher or for any symptoms that make you think you have and infection. Weigh yourself each morning. Call if you gain more than 5 pounds in 48 hours. Use the incentive spirometer 6-8 times a day-10 breaths each time. Walk several hundred feet several times daily. DIET Eat an American Heart Association diet. If you are having trouble with your appetite, eat what you can. Try eating small, frequent meals throughout the day. ACTIVITY 1. You have no activity restrictions. You may resume your daily activities at home, based on your comfort level. You may also drive. FOLLOW UP 
1. Your first follow up appointment will be on 5/11/18 at [address] office is located in [address] [place] [address] on floor G-5. Your second follow up appointment will be in four weeks, on 6/5/18 at 1pm. You will need to have an ECHO prior to your appointment time. Our office will set that up for you. Please call our office at [phone] if you are unable to make either one of these appointments. 2. You will be receiving a call before your 3 day follow up appointment to begin cardiac rehab.  They are located in the [address] [place] [address] on [address] and [place]. Their phone number is [phone]. Please call if you have not been contacted 2-3 weeks after discharge from the hospital. 
3. We will make an appointment for you with your cardiologist in 4-5 weeks. 4. Consult you primary care physician regarding your influenza &  
pneumovax vaccines. 5.   Please bring all medications with you to your appointment. Signature:___________________________________________________ [employer] Announcement We are excited to announce that we are making your provider's discharge notes available to you in [employer]. You will see these notes when they are completed and signed by the physician that discharged you from your recent hospital stay. If you have any questions or concerns about any information you see in [employer], please call the Health Information Department where you were seen or reach out to your Primary Care Provider for more information about your plan of care. Introducing [facility]! [employer] introduces [employer] patient portal. Now you can access parts of your medical record, email your doctor's office, and request medication refills online. 1. In your internet browser, go to [email or web address] 2. Click on the First Time User? Click Here link in the Sign In box. You will see the New Member Sign Up page. 3. Enter your [employer] Access Code exactly as it appears below. You will not need to use this code after youve completed the sign-up process. If you do not sign up before the expiration date, you must request a new code. · [employer] Access Code: [email or web address] Expires: 8/1/2018  2:59 PM 
 
4. Enter the last four digits of your Social Security Number (xxxx) and Date of Birth (mm/dd/yyyy) as indicated and click Submit. You will be taken to the next sign-up page. 5. Create a [employer] ID. This will be your [employer] login ID and cannot be changed, so think of one that is secure and easy to remember. 6. Create a [employer] password. You can change your password at any time. 7. Enter your Password Reset Question and Answer. This can be used at a later time if you forget your password. 8. Enter your e-mail address. You will receive e-mail notification when new information is available in [address] 9. Click Sign Up. You can now view and download portions of your medical record. 10. Click the Download Summary menu link to download a portable copy of your medical information. If you have questions, please visit the Frequently Asked Questions section of the [employer] website. Remember, [employer] is NOT to be used for urgent needs. For medical emergencies, dial 911. Now available from your iPhone and Android! Introducing Gio Burns As a [employer] patient, I wanted to make you aware of our electronic visit tool called Gio Burns. [employer] 24/[employer]ingArt allows you to connect within minutes with a medical provider 24 hours a day, seven days a week via a mobile device or tablet or logging into a secure website from your computer. You can access Gio Burns from anywhere in the [place]. A virtual visit might be right for you when you have a simple condition and feel like you just dont want to get out of bed, or cant get away from work for an appointment, when your regular [employer] provider is not available (evenings, weekends or holidays), or when youre out of town and need minor care. Electronic visits cost only $49 and if the [employer] provider determines a prescription is needed to treat your condition, one can be electronically transmitted to a nearby pharmacy*. Please take a moment to enroll today if you have not already done so. The enrollment process is free and takes just a few minutes. To enroll, please download the [employer] elke to your tablet or phone, or visit [email or web address] to enroll on your computer. And, as an [address] [place] [address] patient with a [employer] account, the results of your visits will be scanned into your electronic medical record and your primary care provider will be able to view the scanned results. We urge you to continue to see your regular [facility] provider for your ongoing medical care. And while your primary care provider may not be the one available when you seek a Gio Brinkfin virtual visit, the peace of mind you get from getting a real diagnosis real time can be priceless. For more information on Gio Tomorrowisherick[employer], view our Frequently Asked Questions (FAQs) at [email or web address]. Sincerely, 
 
Keron Granger MD 
Chief Medical Officer [facility] Tayler Presley *:  certain medications cannot be prescribed via [employer] Providers Seen During Your Hospitalization Provider Specialty Primary office phone Leo Higgins MD Emergency Medicine [phone] Kenia Cat MD Cardiothoracic Surgery [phone] Your Primary Care Physician (PCP) Primary Care Physician Office Phone Office Fax Mary Dear [phone] You are allergic to the following Allergen Reactions Sulfa Dyne Hives Recent Documentation Height Weight BMI OB Status Smoking Status 1.556 m 66.3 kg 27.62 kg/m2 Postmenopausal Never Smoker Emergency Contacts Name Discharge Info Relation Home Work Mobile Kimberly Jj DISCHARGE CAREGIVER [3] Spouse [3] [phone] Sindy Abdalla DISCHARGE CAREGIVER [3] Daughter [21] [phone] Patient Belongings The following personal items are in your possession at time of discharge: 
  Dental Appliances: None  Visual Aid: Glasses, At bedside      Home Medications: None   Jewelry: None  Clothing: At bedside    Other Valuables: Cell Phone Please provide this summary of care documentation to your next provider. Signatures-by signing, you are acknowledging that this After Visit Summary has been reviewed with you and you have received a copy. Patient Signature:  ____________________________________________________________ Date:  ____________________________________________________________  
  
Joana Lambing Provider Signature:  ____________________________________________________________ Date:  ____________________________________________________________

## 2018-05-07 NOTE — ED PROVIDER NOTES
HPI Comments: Pt sent in by home health RN for elevated INR. Was 6.7. Pt with no complaints. Denies any evidence of active bleeding. On coumadin for recent TAVR. Last dose of coumadin was 2 days ago. Patient is a 69 y.o. female presenting with abnormal lab results. The history is provided by the patient. Abnormal Lab Results   Pertinent negatives include no chest pain and no abdominal pain. Past Medical History:   Diagnosis Date    Bruit of right carotid artery     CAD (coronary artery disease)     DM (diabetes mellitus) ([address]     Dyslipidemia     Hypertension     Mitral regurgitation and aortic stenosis     Paroxysmal A-fib (HCC)        Past Surgical History:   Procedure Laterality Date    HX AORTIC VALVE REPLACEMENT      HX CORONARY ARTERY BYPASS GRAFT      HX MITRAL VALVE REPLACEMENT      TAVR         History reviewed. No pertinent family history. Social History     Social History    Marital status:      Spouse name: N/A    Number of children: N/A    Years of education: N/A     Occupational History    Not on file. Social History Main Topics    Smoking status: Never Smoker    Smokeless tobacco: Never Used    Alcohol use 0.0 oz/week     0 Standard drinks or equivalent per week      Comment: Rare    Drug use: No    Sexual activity: Not on file     Other Topics Concern    Not on file     Social History Narrative         ALLERGIES: Sulfa dyne    Review of Systems   Constitutional: Negative for fever. HENT: Negative for facial swelling. Eyes: Negative for visual disturbance. Respiratory: Negative for chest tightness. Cardiovascular: Negative for chest pain. Gastrointestinal: Negative for abdominal pain. Genitourinary: Negative for dysuria. Musculoskeletal: Negative for arthralgias. Skin: Negative for rash. Neurological: Negative for dizziness. Hematological: Negative for adenopathy. Psychiatric/Behavioral: Negative for suicidal ideas. Vitals:    05/07/18 1450 05/07/18 1738   BP: 134/67 146/75   Pulse: (!) 110 78   Resp: 16 18   Temp: 98.2 °F (36.8 °C) 97.9 °F (36.6 °C)   SpO2: 98% 100%   Weight: 66.7 kg (147 lb 1 oz)    Height: 5' 1.25\" (1.556 m)             Physical Exam   Constitutional: She is oriented to person, place, and time. She appears well-developed and well-nourished. No distress. HENT:   Head: Normocephalic and atraumatic. Eyes: No scleral icterus. Neck: Normal range of motion. No thyromegaly present. Cardiovascular: Normal rate. Pulmonary/Chest: Effort normal. No respiratory distress. Musculoskeletal: Normal range of motion. Neurological: She is alert and oriented to person, place, and time. Skin: Skin is warm and dry. She is not diaphoretic. Nursing note and vitals reviewed. MDM  Number of Diagnoses or Management Options  Elevated INR:   Diagnosis management comments: A:  INR here is 7.2    P:  Discussed with Cardiac Surgery. Plan to admit.         ED Course       Procedures

## 2018-05-07 NOTE — H&P
CSS   History and Physical    Subjective:      Yaima Martinez is a 69 y.o. female who came to the emergency room for elevated INR. She had a TMVR/TAVR on 5/2/18 by Dr. Laura Appiah and Dr. Jennifer Rincon. She was discharged on 5/4/18 on coumadin and lovenox bridge. She received 5 mg of coumadin on 5/4/18 and 5/5/18. She came to the emergency room on 5/5/18 with bleeding from her groin site. At that time her INR was 2.3. She then developed bleeding from her right wrist arterial line site on 5/6/18 and was told to stop lovenox and coumadin. Today when [place] checked her INR it was 6.4. She has a PMHx of MS, MR s/p MVR (#27 Medtronic Mosaic in 2009) and TMVR 5/2/18, AS s/p SAVR with patch closure of Aorta (#21 Medtronic Ultra Mosaic in 2009) and TAVR 5/2/18,  CAD s/p CABG X 3 (LIMA-LAD, SVG to OM1, Diag 1 in 2009), HTN, DM (oral regimen), HLD, PAF, Carotid stenosis, TIA X 2 (2003), CVA (incidental CT finding), and psoriasis. Past Medical History:   Diagnosis Date    Bruit of right carotid artery     CAD (coronary artery disease)     DM [facility]diabetes mellitus) ([facility] [address]     Dyslipidemia     Hypertension     Mitral regurgitation and aortic stenosis     Paroxysmal A-fib (HCC)      Past Surgical History:   Procedure Laterality Date    HX AORTIC VALVE REPLACEMENT      HX CORONARY ARTERY BYPASS GRAFT      HX MITRAL VALVE REPLACEMENT      TAVR      Social History   Substance Use Topics    Smoking status: Never Smoker    Smokeless tobacco: Never Used    Alcohol use 0.0 oz/week     0 Standard drinks or equivalent per week      Comment: Rare      History reviewed. No pertinent family history. Prior to Admission medications    Medication Sig Start Date End Date Taking? Authorizing Provider   metFORMIN (GLUCOPHAGE) 500 mg tablet Take 2 Tabs by mouth two (2) times daily (with meals).  Start on 5/5 Saturday 5/5/18   Alessandra Griffin NP   enoxaparin (LOVENOX) 80 mg/0.8 mL injection 70 mg by SubCUTAneous route every twelve (12) hours every twelve (12) hours. 5/4/18   Dao Meneses NP   senna-docusate (PERICOLACE) 8.6-50 mg per tablet Take 1 Tab by mouth daily. Take until having regular bowel movements. 5/4/18   Dao Meneses NP   traMADol (ULTRAM) 50 mg tablet Take 1-2 Tabs by mouth every six (6) hours as needed. Max Daily Amount: 400 mg. 5/4/18   Dao Meneses NP   warfarin (COUMADIN) 5 mg tablet Take 5 mg by mouth at 6pm on 5/4/18. Dosing to be determined by NP/PA team. 5/4/18   Dao Meneses NP   furosemide (LASIX) 20 mg tablet TAKE 1 TABLET BY MOUTH EVERY DAY FOR EDEMA 4/2/18   Maryam ZARAGOZA MD   multivitamin with iron (DAILY MULTI-VITAMINS/IRON) tablet Take 1 Tab by mouth daily. Historical Provider   lisinopril (PRINIVIL, ZESTRIL) 20 mg tablet Take 1 Tab by mouth daily. 1/17/18   Hernan Clemons NP   nitroglycerin (NITROSTAT) 0.4 mg SL tablet DISSOLVE 1 TABLET UNDER THE TONGUE EVERY 5 MINUTES FOR 3 DOSES AS NEEDED FOR CHEST PAIN 1/8/18   Hernan Clemons NP   amLODIPine (NORVASC) 5 mg tablet TAKE 1 TABLET EVERY DAY FOR BLOOD PRESSURE 12/27/17   Maryam Gomez MD   ACETAMINOPHEN/DIPHENHYDRAMINE (ACETAMINOPHEN PM PO) Take  by mouth. Historical Provider   iron-vitamin C (VITRON-C) 65 mg iron- 125 mg TbEC Take 1 Tab by mouth three (3) times daily. Historical Provider   Biotin 2,500 mcg cap Take  by mouth. Historical Provider   ETANERCEPT (ENBREL SC) by SubCUTAneous route every seven (7) days. Historical Provider   amoxicillin (AMOXIL) 500 mg capsule Take 1 Cap by mouth as needed (take as directed one hr prior to dental procedures). 1/8/14   Anatoliy Pierre MD   atorvastatin (LIPITOR) 40 mg tablet Take 1 Tab by mouth nightly. 6/19/13   ERIC Galeano   aspirin 81 mg tablet Take 81 mg by mouth. Historical Provider   glipiZIDE (GLUCOTROL) 5 mg tablet Take 5 mg by mouth daily. Historical Provider   hydrochlorothiazide (HYDRODIURIL) 50 mg tablet Take 50 mg by mouth daily.     Historical Provider   calcium 600 mg Cap Take  by mouth. Historical Provider   ascorbic acid (VITAMIN C) 500 mg tablet Take  by mouth. Historical Provider       Allergies   Allergen Reactions    Sulfa Dyne Other (comments)       Review of Systems:   Consititutional: Denies fever or chills. Eyes:  Denies use of glasses or vision problems(cataracts). ENT:  Denies hearing or swallowing difficulty. CV: Denies CP, claudication, + HTN. Resp: Denies dyspnea, productive cough. : Denies dialysis or kidney problems. GI: Denies ulcers, esophageal strictures, liver problems. M/S: Denies joint or bone problems, or implanted artificial hardware. Skin: Denies varicose veins, + edema. Neuro: Denies strokes, or TIAs. Psych: Denies anxiety or depression. Endocrine: Denies thyroid problems, + diabetes. Heme/Lymphatic: Denies easy bruising or lymphedema. Objective:     VS:   Visit Vitals    /67    Pulse (!) 110    Temp 98.2 °F (36.8 °C)    Resp 16    Ht 5' 1.25\" (1.556 m)    Wt 147 lb 1 oz (66.7 kg)    SpO2 98%    BMI 27.56 kg/m2       Physical Exam:    General appearance: alert, cooperative, no distress  Head: normocephalic, without obvious abnormality; atraumatic  Eyes: conjunctivae/corneas clear; EOM's intact. Nose: nares normal; no drainage. Neck: no carotid bruit and no JVD  Lungs: clear to auscultation bilaterally  Heart: regular rate and rhythm; + murmur  Abdomen: soft, non-tender; bowel sounds normal  Extremities: moves all extremities; no weakness. Skin: Skin color normal; No varicose veins, 1+ edema.   Neurologic: Grossly normal      Labs:   Recent Labs      05/07/18   1549   05/05/18   0954   05/05/18   0939   WBC   --    --   6.4   --    --    HGB   --    --   8.8*   --    --    HCT   --    --   28.8*   --    --    PLT   --    --   102*   --    --    NA   --    --   139   --    --    K   --    --   4.6   --    --    BUN   --    --   17   --    --    CREA   --    --   0.93   --    -- GLU   --    --   95   --    --    GLUCPOC   --    --    --    --   109*   INR  7.2*   < >  2.3*   < >   --     < > = values in this interval not displayed. Assessment:     Active Problems:    Elevated INR (5/7/2018)        Plan:     1. Elevated INR: INR 7.2, hold coumadin and lovenox. Admit to observation. Give 2 FFP per Dr. Tracey Reagan. 2. S/p TMVR/TAVR: on ASA, holding anticoagulation, monitor  3. CAD s/p CABG in 2009: On ASA, statin, no BB until appropriate   4. Hx HTN: Reume home meds  5. DM: Resume home meds  6. HLD: resume statin  7. Hx PAF: in NSR, not on anticoag preop, monitor rhythm  8. Carotid stenosis: f/u with vascular surgery postop  9. RA: on enbrel weely  10. Dispo: Admit to observation, monitor INR.       Signed By: Sherlyn Henley NP     May 7, 2018

## 2018-05-07 NOTE — ED TRIAGE NOTES
Pt had INR checked this am and it was 6.7, last dose of coumadin was 2 days ago and Lovenox yesterday, no bleeding noted at this time, pt sent for repeat test

## 2018-05-07 NOTE — ED NOTES
TRANSFER - OUT REPORT:    Verbal report given to Kamille Powers RN (name) on Patricia Riley  being transferred to [facility](unit) for routine progression of care       Report consisted of patients Situation, Background, Assessment and   Recommendations(SBAR). Information from the following report(s) SBAR, Kardex, ED Summary, Intake/Output and MAR was reviewed with the receiving nurse. Lines:   Peripheral IV 05/07/18 Right Antecubital (Active)   Site Assessment Clean, dry, & intact 5/7/2018  3:51 PM   Phlebitis Assessment 0 5/7/2018  3:51 PM   Infiltration Assessment 0 5/7/2018  3:51 PM   Dressing Status Clean, dry, & intact 5/7/2018  3:51 PM   Dressing Type Transparent 5/7/2018  3:51 PM   Alcohol Cap Used No 5/7/2018  3:51 PM        Opportunity for questions and clarification was provided.

## 2018-05-07 NOTE — PROGRESS NOTES
TRANSFER - IN REPORT:    Verbal report received from [address] [place] [address], [facility] on Tristan Qiu  being received from ED (unit) for routine progression of care      Report consisted of patients Situation, Background, Assessment and Recommendations(SBAR). Information from the following report(s) SBAR, Kardex, ED Summary, MAR, Accordion, Recent Results, Med Rec Status and Cardiac Rhythm ST was reviewed with the receiving nurse. Opportunity for questions and clarification was provided. Assessment completed upon patients arrival to unit and care assumed. Primary Nurse Randy Gee RN and [address] [place] [address], RN performed a dual skin assessment on this patient No impairment noted.  Ivan score is 20

## 2018-05-07 NOTE — PROGRESS NOTES
Admission Medication Reconciliation:    Information obtained from: patient and family member    Significant PMH/Disease States:   Past Medical History:   Diagnosis Date    Bruit of right carotid artery     CAD (coronary artery disease)     DM (diabetes mellitus) ([address]     Dyslipidemia     Hypertension     Mitral regurgitation and aortic stenosis     Paroxysmal A-fib [facility])        Chief Complaint for this Admission:  elevated INR    Allergies:  Sulfa dyne    Prior to Admission Medications:   Prior to Admission Medications   Prescriptions Last Dose Informant Patient Reported? Taking? ACETAMINOPHEN/DIPHENHYDRAMINE (ACETAMINOPHEN PM PO)  Self Yes Yes   Sig: Take  by mouth. Biotin 2,500 mcg cap 2018 at am Self Yes Yes   Sig: Take 5,000 mcg by mouth daily. amLODIPine (NORVASC) 5 mg tablet 2018 at am Self No Yes   Sig: TAKE 1 TABLET EVERY DAY FOR BLOOD PRESSURE   amoxicillin (AMOXIL) 500 mg capsule  Self No No   Sig: Take 1 Cap by mouth as needed (take as directed one hr prior to dental procedures). ascorbic acid (VITAMIN C) 500 mg tablet 2018 at am Self Yes Yes   Sig: Take 500 mg by mouth daily. aspirin 81 mg tablet 2018 at am Self Yes Yes   Sig: Take 81 mg by mouth. atorvastatin (LIPITOR) 40 mg tablet 2018 at pm Self No Yes   Sig: Take 1 Tab by mouth nightly. calcium 600 mg Cap 2018 at lunch Self Yes Yes   Sig: Take 600 mg by mouth daily. With lunch   enoxaparin (LOVENOX) 80 mg/0.8 mL injection 2018 at 1800 Self No Yes   Si mg by SubCUTAneous route every twelve (12) hours every twelve (12) hours. furosemide (LASIX) 20 mg tablet 2018 at am Self No Yes   Sig: TAKE 1 TABLET BY MOUTH EVERY DAY FOR EDEMA   glipiZIDE (GLUCOTROL) 5 mg tablet 2018 at am Self Yes Yes   Sig: Take 5 mg by mouth daily. hydrochlorothiazide (HYDRODIURIL) 50 mg tablet 2018 at am Self Yes Yes   Sig: Take 50 mg by mouth daily.    iron-vitamin C (VITRON-C) 65 mg iron- 125 mg TbEC 5/7/2018 at am Self Yes Yes   Sig: Take 1 Tab by mouth three (3) times daily. lisinopril (PRINIVIL, ZESTRIL) 20 mg tablet 5/7/2018 at am Self No Yes   Sig: Take 1 Tab by mouth daily. metFORMIN (GLUCOPHAGE) 500 mg tablet 5/7/2018 at am Self No Yes   Sig: Take 2 Tabs by mouth two (2) times daily (with meals). Start on 5/5 Saturday   multivitamin with iron (DAILY MULTI-VITAMINS/IRON) tablet 5/7/2018 at am Self Yes Yes   Sig: Take 1 Tab by mouth daily. nitroglycerin (NITROSTAT) 0.4 mg SL tablet  Self No Yes   Sig: DISSOLVE 1 TABLET UNDER THE TONGUE EVERY 5 MINUTES FOR 3 DOSES AS NEEDED FOR CHEST PAIN   warfarin (COUMADIN) 5 mg tablet 5/5/2018 at 1800 Self No Yes   Sig: Take 5 mg by mouth at 6pm on 5/4/18. Dosing to be determined by NP/PA team.   Patient taking differently: Take 5 mg by mouth daily. Take 5 mg by mouth at 6pm on 5/5/18. Dosing to be determined by NP/PA team.      Facility-Administered Medications: None         Comments/Recommendations: Medication review done with patient and family member. Removed etanercept, senna/docusate, and tramadol. Clarified ascorbic acid, biotin, and calcium dose. Enoxaparin - last dose 5/6/18  Warfarin 5mg- last dose 5/5/18  Allergies reviewed.

## 2018-05-07 NOTE — TELEPHONE ENCOUNTER
[facility] nurse called with INR 6.4. Updated Dr. Tracey Reagan. Will have patient come to the ER for INR check, if INR >6 will admit for observation. I called the patient and spoke with her daughter. She stated that the patient had 5 mg of coumadin on 5/5 and 5/6 and took her last dose of lovenox on 5/6.

## 2018-05-07 NOTE — TELEPHONE ENCOUNTER
Cardiac Surgery Discharge - Follow up call placed to Yaima Martinez. Reviewed plan of care after discharge and encouraged Yaima Michelle to verbalize. Discussed precautions and reviewed medications, patient without questions regarding medications. Encouraged continued use of the incentive spirometer. Confirmed follow up appts and reinforced importance of wearing red reminder bracelet. Yaima Martinez is without questions or concerns.  Magalys Pierre RN

## 2018-05-07 NOTE — ED NOTES
2:49 PM  I have evaluated the patient as the Provider in Triage. I have reviewed Her vital signs and the triage nurse assessment. I have talked with the patient and any available family and advised that I am the provider in triage and have ordered the appropriate study to initiate their work up based on the clinical presentation during my assessment. I have advised that the patient will be accommodated in the Main ED as soon as possible. I have also requested to contact the triage nurse or myself immediately if the patient experiences any changes in their condition during this brief waiting period. Sent for abnormal INR - 6.7 per home health today. Sent for re-check. Last coumadin was May 5th.   Recently had valve replacement - JACQUELYN Zavala

## 2018-05-08 NOTE — PROGRESS NOTES
Bedside and Verbal shift change report given to [address] (oncoming nurse) by Sarah Friedman (offgoing nurse). Report included the following information SBAR, Kardex, Procedure Summary, Intake/Output and Recent Results     .

## 2018-05-08 NOTE — DISCHARGE INSTRUCTIONS
Cardiac Surgery Specialist    [address] [place] [address] [address]                                          Kiik Campos                                        [address], [address] [facility]  Office- [phone]  Fax- [phone]       Office- [phone]  Fax- [phone]  _____________________________________________________________  [facility]  Jessica Merrill PA-C    Name:Alicia Cadet     Discharge Date: 5/8/18      MEDICATIONS:  Please refer to your After Visit Summary for your medication list.     DO NOT TAKE ANY MEDICATIONS THAT ARE NOT ON THIS LIST    INSTRUCTIONS:  NO SMOKING OR TOBACCO PRODUCTS  Do not follow the activity/exercise instructions in your discharge book given to you as an inpatient. You have no activity restrictions. You may shower. Wash all incisions twice daily with mild soap and water. No lotions, ointments or powder. Call the office immediately for any redness, swelling, or drainage from your incision. Take your temperature daily and call for a temperature of 101 degrees or higher or for any symptoms that make you think you have and infection. Weigh yourself each morning. Call if you gain more than 5 pounds in 48 hours. Use the incentive spirometer 6-8 times a day-10 breaths each time. Walk several hundred feet several times daily. DIET  Eat an American Heart Association diet. If you are having trouble with your appetite, eat what you can. Try eating small, frequent meals throughout the day. ACTIVITY  1. You have no activity restrictions. You may resume your daily activities at home, based on your comfort level. You may also drive. FOLLOW UP  1.  Your first follow up appointment will be on 5/11/18 at [address] office is located in [address] [place] [address] on floor G-5. Your second follow up appointment will be in four weeks, on 6/5/18 at 1pm. You will need to have an ECHO prior to your appointment time. Our office will set that up for you. Please call our office at [phone] if you are unable to make either one of these appointments. 2. You will be receiving a call before your 3 day follow up appointment to begin cardiac rehab. They are located in the [address] [place] [address] on [facility]. Their phone number is [phone]. Please call if you have not been contacted 2-3 weeks after discharge from the hospital.  3. We will make an appointment for you with your cardiologist in 4-5 weeks. 4. Consult you primary care physician regarding your influenza &   pneumovax vaccines. 5.   Please bring all medications with you to your appointment.     Signature:___________________________________________________

## 2018-05-08 NOTE — PROGRESS NOTES
Bedside shift change report given to [facility], RN (oncoming nurse) by Gwendolyn Gee RN (offgoing nurse). Report included the following information SBAR, Kardex, Intake/Output, MAR, Accordion, Recent Results and Cardiac Rhythm Sinus Tach.

## 2018-05-08 NOTE — PROGRESS NOTES
Problem: Falls - Risk of  Goal: *Absence of Falls  Document Art Fall Risk and appropriate interventions in the flowsheet. Outcome: Progressing Towards Goal  Fall Risk Interventions:  Mobility Interventions: Patient to call before getting OOB         Medication Interventions: Patient to call before getting OOB, Teach patient to arise slowly    Elimination Interventions: Call light in reach, Patient to call for help with toileting needs    History of Falls Interventions: Investigate reason for fall, Door open when patient unattended        Comments: Patient steady on her feet.

## 2018-05-08 NOTE — PROGRESS NOTES
Problem: Falls - Risk of  Goal: *Absence of Falls  Document Art Fall Risk and appropriate interventions in the flowsheet. Outcome: Progressing Towards Goal  Bed is in the lowest position and wheels are locked, call bell is within reach, bathroom light is on during evening hours, gripper socks are on and patient has been instructed to call out for assistance if needed. As of now, patient is free from falls and will continue to be monitored.        Fall Risk Interventions:  Mobility Interventions: Communicate number of staff needed for ambulation/transfer, Patient to call before getting OOB         Medication Interventions: Patient to call before getting OOB, Teach patient to arise slowly, Assess postural VS orthostatic hypotension    Elimination Interventions: Call light in reach, Patient to call for help with toileting needs, Toileting schedule/hourly rounds    History of Falls Interventions: Door open when patient unattended, Investigate reason for fall

## 2018-05-08 NOTE — PROGRESS NOTES
Problem: Patient Education: Go to Patient Education Activity  Goal: Patient/Family Education  Outcome: Progressing Towards Goal  Patient understands to call for assistance.

## 2018-05-08 NOTE — PROGRESS NOTES
CSS FLOOR Progress Note    Admit Date: 2018    Subjective:   Pt seen with Dr. Jack Lopez. Afebrile, on RA. Denies complaints. Objective:     Visit Vitals    /75 (BP 1 Location: Left arm, BP Patient Position: At rest)    Pulse (!) 113    Temp 98.5 °F (36.9 °C)    Resp 20    Ht 5' 1.25\" (1.556 m)    Wt 146 lb 2.6 oz (66.3 kg)    SpO2 93%    BMI 27.62 kg/m2     Temp (24hrs), Av.3 °F (36.8 °C), Min:97.9 °F (36.6 °C), Max:98.6 °F (37 °C)      Last 24hr Input/Output:  No intake or output data in the 24 hours ending 18 1047     EKG/Rhythm:  SR       Oxygen:RA     CXR:  None       Admission Weight: Last Weight   Weight: 147 lb 1 oz (66.7 kg) Weight: 146 lb 2.6 oz (66.3 kg)       EXAM:  General: No obvious distress. Lungs:   Clear to auscultation bilaterally. Incision:  No erythema, drainage or swelling. Heart:  Regular rate and rhythm, S1, S2 normal, no murmur, click, rub or gallop. Abdomen:   Soft, non-tender. Bowel sounds normal. No masses,  No organomegaly. Extremities:  No edema. PPP   Neurologic:  Gross motor and sensory apparatus intact. Activity:  Ad zhen     Diet:  Cardiac     Lab Data Reviewed:   Recent Labs      18   0319   WBC  5.6   HGB  7.0*   HCT  22.8*   PLT  90*   NA  139   K  3.8   BUN  16   CREA  0.81   GLU  97   INR  2.9*         Assessment:     Active Problems:    Elevated INR (2018)             Plan/Recommendations/Medical Decision Makin. Elevated INR: INR down to 2.9 after 2 units FFP, hold coumadin and lovenox. 2. S/p TMVR/TAVR: hold ASA, holding anticoagulation, monitor. Check TTE to r/o pericardial effusion. 3. CAD s/p CABG in 2009: On ASA, statin, no BB until appropriate   4. Hx HTN: on norvasc. Lisinopril, lasix, HCTZ. 5. DM: on metformin, glipizide. 6. HLD: on statin  7. Hx PAF: in NSR, not on anticoag preop, monitor rhythm  8. Carotid stenosis: f/u with vascular surgery postop  9. RA: on enbrel weely  10.  Postop anemia s/t acute blood loss & recent bleeding from surgical site:  Monitor H/H.   11. Thrombocytopenia:  Trending down, 90K. Hold asa. 12. Elevated Tbili/LFTs:  Check TTE to r/o pericardial effusion. May explain why elevated INR. 13. Dispo: Eval TTE, discharge date TBD. Update: Prelim read on TTE shows no pericardial effusion, will eval when full report. [id number] [address]air for discharge. Needs home health care resumed. Check INR 5/9/18 in the home by [facility] nursing. Will hold all anticoagulation, including asa.   Recheck CMP & CBC as well in home.      Signed By: Gale Zhang NP

## 2018-05-08 NOTE — PROGRESS NOTES
[facility] [place]: reviewed chart independently and demographics with patient. Last saw PCP was June 2017. NP / Kailash Vizcaino / [phone]. Prescriptions are filled at [facility] and [facility]. Family support, transportation and emergency contact is daughter and . : Mariya Castillo: [phone] and Natali Rashid ( daughter- [phone]). No DME equipment needed. No care management needs. Reason for Admission:  Elevated INR                   RRAT Score:   18               Do you (patient/family) have any concerns for transition/discharge? No ; going home with  , independentl. Plan for utilizing home health:  no       Likelihood of readmission? low              Transition of Care Plan:  Home with  and daughter. Care Management Interventions  PCP Verified by CM: Yes  Last Visit to PCP: 06/08/17  Palliative Care Criteria Met (RRAT>21 & CHF Dx)?: No (RRAT=18)  Mode of Transport at Discharge: Other (see comment) (private auto with  and daughter)  Transition of Care Consult (CM Consult): Other (independent / home)  MyChart Signup: No  Discharge Durable Medical Equipment: No  Physical Therapy Consult: No  Occupational Therapy Consult: No  Speech Therapy Consult: No  Current Support Network: Lives with Spouse  Confirm Follow Up Transport: Family  Plan discussed with Pt/Family/Caregiver: Yes  [place] Resource Information Provided?: No  Discharge Location  Discharge Placement: Home     1344: order written for Home Health. Referral sent to resume [place] with [facility]. ( see orders).      Ruchi Dao RN BSN CM

## 2018-05-08 NOTE — PROGRESS NOTES
Cardiac Surgery Care Coordinator- Met with Cicero Landau, reviewed plan of care and offered emotional support. Reinforced Coumadin education and provided Mrs Deandre Andrews with the Coumadin education pamphlet. Mrs Carolina's sister had multiple questions regarding medications, will notify NP.      Magali Leggett RN

## 2018-05-11 NOTE — PROGRESS NOTES
Patient: Samantha Caban   Age: 69 y.o. Patient Care Team:  Jossy Gary NP as PCP - General (Nurse Practitioner)  Nicci June MD as Physician (Cardiology)    Diagnosis: The encounter diagnosis was Aortic valve stenosis, etiology of cardiac valve disease unspecified. Problem List:   Patient Active Problem List   Diagnosis Code    Coronary atherosclerosis of native coronary artery I25.10    Mitral valve disease I05.9    Aortic valve disorder I35.9    DM (diabetes mellitus) ([facility] [address] E11.9    Essential hypertension, benign I10    Dyslipidemia E78.5    History of MVR with cardiopulmonary bypass Z95.2    S/P AVR (aortic valve replacement) Z95.2    Paroxysmal atrial fibrillation (HCC) I48.0    Type 2 diabetes with nephropathy (HCC) E11.21    AS (aortic stenosis) I35.0    Elevated INR R79.1        Date of Surgery: TA-TMVR/TAVR on 5/2/18    HPI:  Pt is here for postop follow up. Pt was readmitted to hospital for elevated INR. Having some intermittent shooting pain in back. Getting PT at home, and felt she overdid it. TTE 5/8/18: SUMMARY:  Left ventricle: Systolic function was normal. Ejection fraction was  estimated to be 55 %. No obvious wall motion abnormalities identified in  the views obtained. Wall thickness was moderately to markedly increased. Ventricular septum: There was mild paradoxical motion. Right ventricle: The size was at the upper limits of normal.    Left atrium: The atrium was dilated. Aortic valve: A bioprosthesis was present. It exhibited normal function. Valve mean gradient was 17 mmHg. Tricuspid valve: There was mild to moderate regurgitation. Pulmonic valve: There was moderate regurgitation. Current Medications:   Current Outpatient Prescriptions   Medication Sig Dispense Refill    warfarin (COUMADIN) 2 mg tablet Take 1 Tab by mouth daily (with dinner).  Daily dosing to be determined by NP/MD team. 30 Tab 1    metFORMIN (GLUCOPHAGE) 500 mg tablet Take 2 Tabs by mouth two (2) times daily (with meals). Start on 5/5 Saturday 60 Tab 1    furosemide (LASIX) 20 mg tablet TAKE 1 TABLET BY MOUTH EVERY DAY FOR EDEMA 30 Tab 1    multivitamin with iron (DAILY MULTI-VITAMINS/IRON) tablet Take 1 Tab by mouth daily.  lisinopril (PRINIVIL, ZESTRIL) 20 mg tablet Take 1 Tab by mouth daily. 90 Tab 3    amLODIPine (NORVASC) 5 mg tablet TAKE 1 TABLET EVERY DAY FOR BLOOD PRESSURE 90 Tab 3    iron-vitamin C (VITRON-C) 65 mg iron- 125 mg TbEC Take 1 Tab by mouth three (3) times daily.  Biotin 2,500 mcg cap Take 5,000 mcg by mouth daily.  glipiZIDE (GLUCOTROL) 5 mg tablet Take 5 mg by mouth daily.  hydrochlorothiazide (HYDRODIURIL) 50 mg tablet Take 50 mg by mouth daily.  calcium 600 mg Cap Take 600 mg by mouth daily. With lunch      ascorbic acid (VITAMIN C) 500 mg tablet Take 500 mg by mouth daily.  nitroglycerin (NITROSTAT) 0.4 mg SL tablet DISSOLVE 1 TABLET UNDER THE TONGUE EVERY 5 MINUTES FOR 3 DOSES AS NEEDED FOR CHEST PAIN 50 Tab 0    amoxicillin (AMOXIL) 500 mg capsule Take 1 Cap by mouth as needed (take as directed one hr prior to dental procedures). (Patient not taking: Reported on 5/7/2018) 4 Cap prn    atorvastatin (LIPITOR) 40 mg tablet Take 1 Tab by mouth nightly. 90 Tab 3       Vitals: Blood pressure 130/64, pulse 86, temperature 97.9 °F (36.6 °C), temperature source Oral, resp. rate 16, height 5' 1\" (1.549 m), weight 145 lb (65.8 kg), SpO2 98 %. Allergies: is allergic to sulfa dyne. Physical Exam:  Wounds: clean, dry, no drainage. R groin stitch removed. Lungs: clear to auscultation bilaterally    Heart: regular rate and rhythm, S1, S2 normal, no murmur, click, rub or gallop    Extremities: 2+ LE edema     Assessment/Plan:   1. Elevated INR: INR 1.9 yesterday, taking 2 mg daily. Check INR today.   Plans to check w/ [facility] Sat 5/12.   2. S/p TA-TMVR/TAVR: hold ASA, back on coumadin, monitor.  TTE 5/8 showed good valve fxns. 3. CAD s/p CABG in 2009: On ASA, statin, no BB until appropriate   4. Hx HTN: on norvasc, Lisinopril, lasix, HCTZ. 5. DM: on metformin, glipizide.    6. HLD: on statin  7. Hx PAF: in NSR, not on anticoag preop, monitor rhythm. Cont coumadin. 8. Carotid stenosis: f/u with vascular surgery postop  9. RA: on enbrel weely  10. Postop anemia s/t acute blood loss & recent bleeding from surgical site:  Monitor H/H.   11. Thrombocytopenia: Check CBC today.   Hold asa. Back on coumadin. 12. Elevated Tbili/LFTs:  Check CMP today. No pericardial effusion on TTE. Pt is ready to start cardiac rehab.

## 2018-05-14 NOTE — PROGRESS NOTES
Reviewed labs from 5/11 w/ Pt. Platelets, LFTs, tbili better. Hgb 6.9, unchanged from hospital d/c of 7. Pt is on Iron/Vit C. Denies any bleeding. Will cont to hold asa & lovenox, resumed coumadin and INR 1.9 as of 5/12. Cont to monitor. Plans to check CMP & CBC on 5/28. Pt stated understanding.

## 2018-05-29 NOTE — TELEPHONE ENCOUNTER
Pt's step-daughter Verónica Nerier) called, she is concerned because Ms. Enmanuel Tiwari is loosing a pound every day and doesn't know if this is normal or if something is wrong. She is not on HIPPA, she is aware of this and the patient is there and able to speak with you. Advised of 24 hour return call policy.

## 2018-05-30 NOTE — TELEPHONE ENCOUNTER
Verified patient with two identifiers. Spoke with pt, she is having not cardiac symptoms, HH is coming to get vitals daily, pt's weight today to 131. Will call office if she if feeling lightheaded or any other symptoms. Pt verbalized understanding.

## 2018-06-05 NOTE — PROGRESS NOTES
Patient: Cicero Landau   Age: 69 y.o. Patient Care Team:  Doretha Alonzo NP as PCP - General (Nurse Practitioner)  Bandar Freedman MD as Physician (Cardiology)    PCP: Doretha Alonzo NP    Cardiologist: Steve Vora    Diagnosis/Reason for Consultation: The primary encounter diagnosis was Aortic valve stenosis, etiology of cardiac valve disease unspecified. Diagnoses of S/P AVR (aortic valve replacement), Mitral valve insufficiency, unspecified etiology, History of MVR with cardiopulmonary bypass, S/P CABG x 3, Type 2 diabetes mellitus with complication, unspecified whether long term insulin use ([facility] [address] and Essential hypertension, benign were also pertinent to this visit. Problem List:   Patient Active Problem List   Diagnosis Code    Coronary atherosclerosis of native coronary artery I25.10    Mitral valve disease I05.9    Aortic valve disorder I35.9    DM (diabetes mellitus) ([facility] [address] E11.9    Essential hypertension, benign I10    Dyslipidemia E78.5    History of MVR with cardiopulmonary bypass Z95.2    S/P AVR (aortic valve replacement) Z95.2    Paroxysmal atrial fibrillation (HCC) I48.0    Type 2 diabetes with nephropathy (HCC) E11.21    AS (aortic stenosis) I35.0    Elevated INR R79.1      HPI: 69 y.o.  female s/p TAVR (23mm Corevalve Evolut R via R femoral artery) and Darleen TMVR (Transcatheter Mitral valve replacement with 26mm S3 valve via TV/TS approach) on 5/2/2018. Intra-op complicated by hypoxia d/t ASD and subsequently it was closed w/ #25 Cardioform device. She is here for her 30 day post-op visit. Mrs. Deandre Andrews reports progressive improvements in her overall energy level almost daily. She denies any SOB/SONI w/ any activity thus far. She also denies any CP, chest tightness, lightheadedness, sycnope or falls. She sleeps on two pillows or ortho comfort but denies any PND. She feels rested when wakes each morning and no longer requires daily naps.   Her LE and abdominal edema have resolved and she has lost almost 20 lbs in the past month despite having a good appetite. She also has routine BMs that aren't bloody or tarry. She denies any unusual bruising or bleeding w/ warfarin. She has her Cardiac wellness intake appt next week. PMHx of MS, MR s/p MVR (#27 Medtronic Mosaic in 2009), AS s/p SAVR with patch closure of Aorta (#21 Medtronic Ultra Mosaic in 2009),  CAD s/p CABG X 3 (LIMA-LAD, SVG to OM1, Diag 1 in 2009), HTN, DM (oral regimen), HLD, PAF, Carotid stenosis, TIA X 2 (2003), CVA (incidental CT finding), psoriasis      NYHA Classification: I   Class I (Mild): No limitation of physical activity. Ordinary physical activity does not cause undue fatigue, palpitation, or dyspnea. Angina Classification: 0   Class 0: No symptoms      Past Medical History:   Diagnosis Date    Bruit of right carotid artery     CAD (coronary artery disease)     DM (diabetes mellitus) (HCC)     Dyslipidemia     Hypertension     Mitral regurgitation and aortic stenosis     Paroxysmal A-fib (HCC)        Past Surgical History:   Procedure Laterality Date    HX AORTIC VALVE REPLACEMENT      HX CORONARY ARTERY BYPASS GRAFT      HX MITRAL VALVE REPLACEMENT      TAVR      Social History   Substance Use Topics    Smoking status: Never Smoker    Smokeless tobacco: Never Used    Alcohol use 0.0 oz/week     0 Standard drinks or equivalent per week      Comment: Rare      No family history on file. Prior to Admission medications    Medication Sig Start Date End Date Taking? Authorizing Provider   calcium carbonate (CALCIUM 600 PO) Take 1 Tab by mouth Before breakfast, lunch, and dinner. Yes Historical Provider   warfarin (COUMADIN) 2 mg tablet Take 2 Tabs by mouth daily (with dinner).  Daily dosing to be determined by NP/MD team. 6/4/18  Yes Connie Gregg NP   furosemide (LASIX) 20 mg tablet TAKE 1 TABLET BY MOUTH EVERY DAY FOR EDEMA 4/2/18  Yes [address] Ailin Palacios MD lisinopril (PRINIVIL, ZESTRIL) 20 mg tablet Take 1 Tab by mouth daily. 1/17/18  Yes Hans Burdick NP   nitroglycerin (NITROSTAT) 0.4 mg SL tablet DISSOLVE 1 TABLET UNDER THE TONGUE EVERY 5 MINUTES FOR 3 DOSES AS NEEDED FOR CHEST PAIN 1/8/18  Yes Hans Burdick NP   amLODIPine (NORVASC) 5 mg tablet TAKE 1 TABLET EVERY DAY FOR BLOOD PRESSURE 12/27/17  Yes Maryam ZARAGOZA MD   iron-vitamin C (VITRON-C) 65 mg iron- 125 mg TbEC Take 1 Tab by mouth three (3) times daily. Yes Historical Provider   Biotin 2,500 mcg cap Take 5,000 mcg by mouth daily. Yes Historical Provider   glipiZIDE (GLUCOTROL) 5 mg tablet Take 5 mg by mouth daily. Yes Historical Provider   hydrochlorothiazide (HYDRODIURIL) 50 mg tablet Take 50 mg by mouth daily. Yes Historical Provider   ascorbic acid (VITAMIN C) 500 mg tablet Take 500 mg by mouth daily. Yes Historical Provider   naproxen sod/diphenhydramine (ALEVE PM PO) Take  by mouth as needed. Historical Provider   aspirin 81 mg chewable tablet Take 1 Tab by mouth daily. 6/6/18   Albert Mendes NP   metoprolol succinate (TOPROL-XL) 50 mg XL tablet Take 1 Tab by mouth daily. 6/6/18   Albert Mendes NP   metFORMIN (GLUCOPHAGE) 500 mg tablet Take 2 Tabs by mouth two (2) times daily (with meals). Start on 5/5 Saturday 5/5/18   Tracie Branham NP   amoxicillin (AMOXIL) 500 mg capsule Take 1 Cap by mouth as needed (take as directed one hr prior to dental procedures). 1/8/14   Jessica Zeng MD   atorvastatin (LIPITOR) 40 mg tablet Take 1 Tab by mouth nightly. 6/19/13   ERIC Galeano       Allergies   Allergen Reactions    Sulfa Dyne Hives       Current Medications:   Current Outpatient Prescriptions   Medication Sig Dispense Refill    calcium carbonate (CALCIUM 600 PO) Take 1 Tab by mouth Before breakfast, lunch, and dinner.  warfarin (COUMADIN) 2 mg tablet Take 2 Tabs by mouth daily (with dinner).  Daily dosing to be determined by NP/MD team. 45 Tab 1    furosemide (LASIX) 20 mg tablet TAKE 1 TABLET BY MOUTH EVERY DAY FOR EDEMA 30 Tab 1    lisinopril (PRINIVIL, ZESTRIL) 20 mg tablet Take 1 Tab by mouth daily. 90 Tab 3    nitroglycerin (NITROSTAT) 0.4 mg SL tablet DISSOLVE 1 TABLET UNDER THE TONGUE EVERY 5 MINUTES FOR 3 DOSES AS NEEDED FOR CHEST PAIN 50 Tab 0    amLODIPine (NORVASC) 5 mg tablet TAKE 1 TABLET EVERY DAY FOR BLOOD PRESSURE 90 Tab 3    iron-vitamin C (VITRON-C) 65 mg iron- 125 mg TbEC Take 1 Tab by mouth three (3) times daily.  Biotin 2,500 mcg cap Take 5,000 mcg by mouth daily.  glipiZIDE (GLUCOTROL) 5 mg tablet Take 5 mg by mouth daily.  hydrochlorothiazide (HYDRODIURIL) 50 mg tablet Take 50 mg by mouth daily.  ascorbic acid (VITAMIN C) 500 mg tablet Take 500 mg by mouth daily.  naproxen sod/diphenhydramine (ALEVE PM PO) Take  by mouth as needed.  aspirin 81 mg chewable tablet Take 1 Tab by mouth daily. 30 Tab 0    metoprolol succinate (TOPROL-XL) 50 mg XL tablet Take 1 Tab by mouth daily. 30 Tab 6    metFORMIN (GLUCOPHAGE) 500 mg tablet Take 2 Tabs by mouth two (2) times daily (with meals). Start on 5/5 Saturday 60 Tab 1    amoxicillin (AMOXIL) 500 mg capsule Take 1 Cap by mouth as needed (take as directed one hr prior to dental procedures). 4 Cap prn    atorvastatin (LIPITOR) 40 mg tablet Take 1 Tab by mouth nightly. 90 Tab 3       Vitals: Blood pressure 166/70, pulse (!) 113, temperature 98.4 °F (36.9 °C), temperature source Oral, resp. rate 20, height 5' 2\" (1.575 m), weight 131 lb (59.4 kg), SpO2 98 %. Pre 6 min walk VS: HR 87, RR 16, 124/64, 98% sat on RA    Allergies: is allergic to sulfa dyne.     Review of Systems: Pertinent Positives per HPI   [x]Total of 13 systems reviewed as follows:  Constitutional: Negative fever, negative chills  Eyes:              Negative for amauroses fugax  ENT:               Negative sore throat,oral absecess  Endocrine      Negative for thyroid replacement Rx; goiter  Respiratory:   Negative chronic cough,sputum production  Cards:            Negative for palpitations, varicosities, claudication, LE edema  GI:                   Negative for dysphagia, bleeding, nausea, vomiting, diarrhea, and abdominal pain  Genitourinary:  Negative for frequency, dysuria  Integument:     Negative for rash and pruritus  Hematologic:   Negative for easy bruising; bleeding dyscarsia  Musculoskel:    Negative for muscle weakness inhibiting ambulation  Neurological:  Negative for stroke, TIA, syncope, dizziness  Behavl/Psych: Negative for feelings of anxiety, depression     Cardiovascular Testing:   EKG 2/2/2018: SR 72 bpm. 1st AVB. Inferior T wave inversion    EKG today: SR 88 bpm. Inferior Q wave    TTE today:  Done today. Read not yet available    Physical Exam:  General: Well nourished well groomed woman appearing stated age accompanied by  & daughter  Neuro: A&OX3. WAN. PERRL. Steady un-assisted gait  Head:Normocephalic. Atraumatic. Symmetrical  Neck: Trachea Midline  Resp: CTA B. No Adv BS/cough/sputum/tachypnea with seated conversation  CV: S1S2 RRR. LIBERTY II/VI. No JVD/carotid bruits. Pink/warm/dry extremities. Trace LE peripheral edema  GI:Benign ab. Soft. NT/ND. Active BS  : Voids  Integ: Groin puncture sites w/o any s/s of infection/palpable mass. Well healed  Musculo/Skeletal: FROM in all major joints. Good muscle tone    Clinic Evaluation:   KCCQ-12: scanned into EMR    6 minute walk test: PreTest HR/02 sat:  See VS this visit - completed 6 min w/o difficulties             Post Test HR/02 sat:113 / 98% sat on RA             Distance Walked: 944 ft 5 inches    5 meter gait: 5.26 seconds    Assessment/Plan:   1. MS of bioprosthetic MVR (#27 Medtronic Mosaic in 1/2009) now s/p Darleen TMVR (26 mm S3) - Doing remarkably well. Encouraged to continue to increase cumulative daily activity. Warfarin for 3 months w/ goal INR 2.5-3.5 then ASA 81mg daily thereafter.  RTC at one yr anniversary for TTE per TVT Registry Guidelines. Has f/u w/ primary cards tomorrow. 2. AS s/p SAVR with patch closure of Aorta (#21 Medtronic Ultra Mosaic in 1/2009) now s/p TAVR (R TF 23mm Evolut R ) - Doing remarkably well. Encouraged to continue to increase daily cumulative activity. ASA 81mg daily for valve patency for life. RTC at one yr anniversary for TTE and eval per TVT Registry Guidelines. Further care per primary cards. 3. ASD Closure w/ #25 Cardioform closure device - typically needs ASA 81mg daily for 6 months but currently on warfarin for TMVR. Will transition to ASA 81mg daily only after 3 months. Needs TTE at 6 month william to assess closure device. 4. CAD s/p CABG X 3 (LIMA-LAD, SVG to OM1, Diag 1 in 1/2009) - grafts patent on most recent cath. BB/Statin/ASA per cards  5. HTN - a little high w/ activity today. Defer titration to primary cardiologist.  6. DM (oral regimen) - per PCP  7. HLD - statin per primary cards  8. PAF-SR for some time. Not anticoagulated for Afib but rather only for 3 months w/ TMVR. 9. Carotid stenosis - vascular f/u with repeat carotid dopplers due in May. No s/s of cerebral compromise currently. Known SCOTT 70% stenosis. No intervention needed at this point in time. 10. RA - Enbrel weekly per Rheum     She is a [employer]!

## 2018-06-05 NOTE — TELEPHONE ENCOUNTER
Lizzie Remy NP with Cardio Vascular Surgery called stated they are discharging pt today. She will be followed by Dr. Yasemin Cook. They would like us to monitor her Coumadin. Carmen's phone number is [phone].

## 2018-06-06 NOTE — PROGRESS NOTES
Chief Complaint   Patient presents with    Hypertension     3 MO. F/U     1. Have you been to the ER, urgent care clinic since your last visit? Hospitalized since your last visit? YES, [address] ON 5/5/18 FOR POST-OP COMPLICATION S/P TAVR. 2. Have you seen or consulted any other health care providers outside of the [address] [place] [address] since your last visit? Include any pap smears or colon screening. NO

## 2018-06-06 NOTE — MR AVS SNAPSHOT
Erick Poncear [address] [facility] 
[phone] Patient: Richard Lema MRN: [id number] [facility]:[birth year] Visit Information Date & Time Provider Department Dept. Phone Encounter #  
 6/6/2018  9:00 AM Prema Hernandez MD [facility] [phone] [id number] Your Appointments 6/11/2018 10:30 AM  
ANTICOAG NURSE with COUMADIN, [facility] Appt Note: INR d/c'd from Cardiac Surgery Spec. [id number] [facility]  
[phone] [address] [facility] Upcoming Health Maintenance Date Due Hepatitis C Screening 1950 FOOT EXAM Q1 7/23/1958 MICROALBUMIN Q1 7/23/1958 DTaP/Tdap/Td series (1 - Tdap) 7/23/1969 FOBT Q 1 YEAR AGE 50-75 7/23/1998 ZOSTER VACCINE AGE 60> 5/23/2008 Bone Densitometry (Dexa) Screening 7/23/2013 Pneumococcal 65+ Low/Medium Risk (1 of 2 - PCV13) 7/23/2013 EYE EXAM RETINAL OR DILATED Q1 6/25/2016 BREAST CANCER SCRN MAMMOGRAM 6/22/2017 GLAUCOMA SCREENING Q2Y 6/25/2017 MEDICARE YEARLY EXAM 3/28/2018 Influenza Age 5 to Adult 8/1/2018 HEMOGLOBIN A1C Q6M 10/23/2018 LIPID PANEL Q1 2/2/2019 Allergies as of 6/6/2018  Review Complete On: 6/6/2018 By: Yahir Campos Severity Noted Reaction Type Reactions Sulfa Dyne High 06/20/2012    Hives Current Immunizations  Never Reviewed No immunizations on file. Not reviewed this visit You Were Diagnosed With   
  
 Codes Comments ASHD (arteriosclerotic heart disease)    -  Primary ICD-10-CM: I25.10 ICD-9-CM: 414.00 S/P AVR (aortic valve replacement)     ICD-10-CM: T07.9 ICD-9-CM: V43.3 History of MVR with cardiopulmonary bypass     ICD-10-CM: Z95.2 ICD-9-CM: V15.1 Paroxysmal atrial fibrillation (HCC)     ICD-10-CM: I48.0 ICD-9-CM: 427.31   
 Mixed hyperlipidemia     ICD-10-CM: E78.2 ICD-9-CM: 272.2 Essential hypertension     ICD-10-CM: I10 
ICD-9-CM: 401.9 Stenosis of right carotid artery     ICD-10-CM: I65.21 ICD-9-CM: 433.10 Vitals BP Pulse Height(growth percentile) Weight(growth percentile) SpO2 BMI  
 120/60 (BP 1 Location: Right arm, BP Patient Position: Sitting) 70 5' 2\" (1.575 m) 131 lb 3.2 oz (59.5 kg) 98% 24 kg/m2 OB Status Smoking Status Postmenopausal Never Smoker Vitals History BMI and BSA Data Body Mass Index Body Surface Area  
 24 kg/m 2 1.61 m 2 Preferred Pharmacy Pharmacy Name Phone [facility] [phone] Your Updated Medication List  
  
   
This list is accurate as of 6/6/18  9:50 AM.  Always use your most recent med list.  
  
  
  
  
 ALEVE PM PO Take  by mouth as needed. amLODIPine 5 mg tablet Commonly known as:  Liz Raddle TAKE 1 TABLET EVERY DAY FOR BLOOD PRESSURE  
  
 amoxicillin 500 mg capsule Commonly known as:  AMOXIL Take 1 Cap by mouth as needed (take as directed one hr prior to dental procedures). aspirin 81 mg chewable tablet Take 1 Tab by mouth daily. atorvastatin 40 mg tablet Commonly known as:  LIPITOR Take 1 Tab by mouth nightly. Biotin 2,500 mcg Cap Take 5,000 mcg by mouth daily. CALCIUM 600 PO Take 1 Tab by mouth Before breakfast, lunch, and dinner. furosemide 20 mg tablet Commonly known as:  LASIX TAKE 1 TABLET BY MOUTH EVERY DAY FOR EDEMA  
  
 glipiZIDE 5 mg tablet Commonly known as:  Gay Fuel Take 5 mg by mouth daily. hydroCHLOROthiazide 50 mg tablet Commonly known as:  HYDRODIURIL Take 50 mg by mouth daily. lisinopril 20 mg tablet Commonly known as:  Erika Vic Take 1 Tab by mouth daily. metFORMIN 500 mg tablet Commonly known as:  GLUCOPHAGE  
 Take 2 Tabs by mouth two (2) times daily (with meals). Start on 5/5 Saturday  
  
 metoprolol succinate 50 mg XL tablet Commonly known as:  TOPROL-XL Take 1 Tab by mouth daily. nitroglycerin 0.4 mg SL tablet Commonly known as:  NITROSTAT  
DISSOLVE 1 TABLET UNDER THE TONGUE EVERY 5 MINUTES FOR 3 DOSES AS NEEDED FOR CHEST PAIN  
  
 VITAMIN C 500 mg tablet Generic drug:  ascorbic acid (vitamin C) Take 500 mg by mouth daily. VITRON-C 65 mg iron- 125 mg Tbec Generic drug:  iron,carbonyl-vitamin C Take 1 Tab by mouth three (3) times daily. warfarin 2 mg tablet Commonly known as:  COUMADIN Take 2 Tabs by mouth daily (with dinner). Daily dosing to be determined by NP/MD team.  
  
  
  
  
Prescriptions Sent to Pharmacy Refills  
 metoprolol succinate (TOPROL-XL) 50 mg XL tablet 6 Sig: Take 1 Tab by mouth daily. Class: Normal  
 [facility]  Ph #: [phone] Route: Oral  
  
To-Do List   
 06/11/2018 1:00 PM  
  Appointment with Navya Aponte RN at [address] [place] [address] ([phone]) Introducing [facility]! Chely Yoon introduces [employer] patient portal. Now you can access parts of your medical record, email your doctor's office, and request medication refills online. 1. In your internet browser, go to [email or web address] 2. Click on the First Time User? Click Here link in the Sign In box. You will see the New Member Sign Up page. 3. Enter your [employer] Access Code exactly as it appears below. You will not need to use this code after youve completed the sign-up process. If you do not sign up before the expiration date, you must request a new code. · [employer] Access Code: [email or web address] Expires: 8/1/2018  2:59 PM 
 
4. Enter the last four digits of your Social Security Number (xxxx) and Date of Birth (mm/dd/yyyy) as indicated and click Submit.  You will be taken to the next sign-up page. 5. Create a aitainment ID. This will be your aitainment login ID and cannot be changed, so think of one that is secure and easy to remember. 6. Create a aitainment password. You can change your password at any time. 7. Enter your Password Reset Question and Answer. This can be used at a later time if you forget your password. 8. Enter your e-mail address. You will receive e-mail notification when new information is available in [address]. 9. Click Sign Up. You can now view and download portions of your medical record. 10. Click the Download Summary menu link to download a portable copy of your medical information. If you have questions, please visit the Frequently Asked Questions section of the aitainment website. Remember, aitainment is NOT to be used for urgent needs. For medical emergencies, dial 911. Now available from your iPhone and Android! Please provide this summary of care documentation to your next provider. Your primary care clinician is listed as Emily Murrell. If you have any questions after today's visit, please call [phone].

## 2018-06-06 NOTE — PROGRESS NOTES
[address] [address]  [phone]     Subjective:      Karlie Rodriguez is a 69 y.o. female is here for routine f/u, post TMVR/TAVR on 5/2/18 by Dr. Tank Leggett and Dr. Aron Flower. Came to the emergency room for elevated INR 7.2 on 5/7/18. It has now normalized. She's doing well. Specifically  denies chest pain/ shortness of breath, orthopnea, PND, LE edema, palpitations, syncope, or presyncope. Patient Active Problem List    Diagnosis Date Noted    Elevated INR 05/07/2018    AS (aortic stenosis) 05/02/2018    Type 2 diabetes with nephropathy ([facility] [address] 03/14/2018    Paroxysmal atrial fibrillation ([facility] [address] 10/03/2016    Coronary atherosclerosis of native coronary artery 12/12/2012    Mitral valve disease 12/12/2012    Aortic valve disorder 12/12/2012    DM (diabetes mellitus) ([facility] [address] 12/12/2012    Essential hypertension, benign 12/12/2012    Dyslipidemia 12/12/2012    History of MVR with cardiopulmonary bypass 12/12/2012    S/P AVR (aortic valve replacement) 12/12/2012      Cliff Rivera NP  Past Medical History:   Diagnosis Date    Bruit of right carotid artery     CAD (coronary artery disease)     DM (diabetes mellitus) ([facility] [address]     Dyslipidemia     Hypertension     Mitral regurgitation and aortic stenosis     Paroxysmal A-fib (HCC)       Past Surgical History:   Procedure Laterality Date    HX AORTIC VALVE REPLACEMENT      HX CORONARY ARTERY BYPASS GRAFT      HX MITRAL VALVE REPLACEMENT      TAVR     Allergies   Allergen Reactions    Sulfa Dyne Hives      No family history on file. Social History     Social History    Marital status:      Spouse name: N/A    Number of children: N/A    Years of education: N/A     Occupational History    Not on file.      Social History Main Topics    Smoking status: Never Smoker    Smokeless tobacco: Never Used    Alcohol use 0.0 oz/week     0 Standard drinks or equivalent per week      Comment: Rare    Drug use: No    Sexual activity: Not on file     Other Topics Concern    Not on file     Social History Narrative      Current Outpatient Prescriptions   Medication Sig    naproxen sod/diphenhydramine (ALEVE PM PO) Take  by mouth as needed.  calcium carbonate (CALCIUM 600 PO) Take 1 Tab by mouth Before breakfast, lunch, and dinner.  warfarin (COUMADIN) 2 mg tablet Take 2 Tabs by mouth daily (with dinner). Daily dosing to be determined by NP/MD team.    metFORMIN (GLUCOPHAGE) 500 mg tablet Take 2 Tabs by mouth two (2) times daily (with meals). Start on 5/5 Saturday    furosemide (LASIX) 20 mg tablet TAKE 1 TABLET BY MOUTH EVERY DAY FOR EDEMA    lisinopril (PRINIVIL, ZESTRIL) 20 mg tablet Take 1 Tab by mouth daily.  nitroglycerin (NITROSTAT) 0.4 mg SL tablet DISSOLVE 1 TABLET UNDER THE TONGUE EVERY 5 MINUTES FOR 3 DOSES AS NEEDED FOR CHEST PAIN    amLODIPine (NORVASC) 5 mg tablet TAKE 1 TABLET EVERY DAY FOR BLOOD PRESSURE    iron-vitamin C (VITRON-C) 65 mg iron- 125 mg TbEC Take 1 Tab by mouth three (3) times daily.  Biotin 2,500 mcg cap Take 5,000 mcg by mouth daily.  amoxicillin (AMOXIL) 500 mg capsule Take 1 Cap by mouth as needed (take as directed one hr prior to dental procedures).  glipiZIDE (GLUCOTROL) 5 mg tablet Take 5 mg by mouth daily.  hydrochlorothiazide (HYDRODIURIL) 50 mg tablet Take 50 mg by mouth daily.  ascorbic acid (VITAMIN C) 500 mg tablet Take 500 mg by mouth daily.  multivitamin with iron (DAILY MULTI-VITAMINS/IRON) tablet Take 1 Tab by mouth daily.  atorvastatin (LIPITOR) 40 mg tablet Take 1 Tab by mouth nightly. No current facility-administered medications for this visit.           Review of Symptoms:  11 systems reviewed, negative other than as stated in the HPI    Physical ExamPhysical Exam:    Vitals:    06/06/18 0850   BP: 126/64   Pulse: 70   SpO2: 98%   Weight: 131 lb 3.2 oz (59.5 kg)   Height: 5' 2\" (1.575 m)     Body mass index is 24 kg/(m^2). General PE   Gen:  NAD  Mental Status - Alert. General Appearance - Not in acute distress. Chest and Lung Exam   Inspection: Accessory muscles - No use of accessory muscles in breathing. Auscultation:   Breath sounds: - Normal.   Cardiovascular   Inspection: Jugular vein - Bilateral - Inspection Normal.   Palpation/Percussion:   Apical Impulse: - Normal.   Auscultation: Rhythm - Regular. Heart Sounds - S1 WNL and S2 WNL. No S3 or S4. Murmurs & Other Heart Sounds: Auscultation of the heart reveals - 3/6 LIBERTY  Peripheral Vascular   Upper Extremity: Inspection - Bilateral - No Cyanotic nailbeds or Digital clubbing. Lower Extremity:   Palpation: Edema - Bilateral - No edema. R carotid bruit  Abdomen:   Soft, non-tender, bowel sounds are active. Neuro: A&O times 3, CN and motor grossly WNL    Labs:   Lab Results   Component Value Date/Time    Cholesterol, total 121 02/02/2018 10:39 AM    HDL Cholesterol 48 02/02/2018 10:39 AM    LDL, calculated 46 02/02/2018 10:39 AM    Triglyceride 137 02/02/2018 10:39 AM     No results found for: CPK, CPKX, CPX  Lab Results   Component Value Date/Time    Sodium 142 05/30/2018 09:08 AM    Potassium 4.5 05/30/2018 09:08 AM    Chloride 97 05/30/2018 09:08 AM    CO2 26 05/30/2018 09:08 AM    Anion gap 11 05/08/2018 03:19 AM    Glucose 95 05/30/2018 09:08 AM    BUN 21 05/30/2018 09:08 AM    Creatinine 1.01 (H) 05/30/2018 09:08 AM    BUN/Creatinine ratio 21 05/30/2018 09:08 AM    GFR est AA 66 05/30/2018 09:08 AM    GFR est non-AA 57 (L) 05/30/2018 09:08 AM    Calcium 9.9 05/30/2018 09:08 AM    Bilirubin, total 0.9 05/30/2018 09:08 AM    AST (SGOT) 37 05/30/2018 09:08 AM    Alk. phosphatase 93 05/30/2018 09:08 AM    Protein, total 7.6 05/30/2018 09:08 AM    Albumin 4.4 05/30/2018 09:08 AM    Globulin 3.8 05/08/2018 03:19 AM    A-G Ratio 1.4 05/30/2018 09:08 AM    ALT (SGPT) 17 05/30/2018 09:08 AM       EKG:  SR     Assessment:     Assessment:      1.  ALEJANDROD (arteriosclerotic heart disease)    2. S/P AVR (aortic valve replacement)    3. History of MVR with cardiopulmonary bypass    4. Paroxysmal atrial fibrillation (HCC)    5. Mixed hyperlipidemia    6. Essential hypertension    7. Stenosis of right carotid artery        Orders Placed This Encounter    naproxen sod/diphenhydramine (ALEVE PM PO)     Sig: Take  by mouth as needed. Plan:     Patient presents for follow up, feeling well and stable from cardiac standpoint. ASHD,s/p CABG X 3 (LIMA-LAD, SVG to OM1, Diag 1 in 1/2009) - grafts patent on most recent cath 3/18. Stable. Restart ASA, Toprol XL 5 mg  Continue statin    S/p TAVR (23mm Corevalve Evolut R via R femoral artery) and TMVR Darleen (Transcatheter Mitral valve replacement with 26mm S3 valve via TV/TS approach) on 5/2/2018. Intra-op complicated by hypoxia d/t ASD and subsequently it was closed w/ #25 cribriform device. Echo done yesterday 6/5/18 showed EF 75-80%, bioprosthetic MV/AV, mild MS, moderate TR  Saw CTS yesterday for f/u    HTN  Controlled with current therapy    HLD  2/18 LDL at goal, 46. On statin    PAF  Remains in sinus rhythm. Off amiodarone. On Coumadin    Carotid stenosis  R ICA 70 % stenosis per carotid duplex 2/18  No s/s of cerebral compromise currently  Had seen Dr Ray Rodriguez prior to valve sx --- no need for intervention at this time    DM  On oral agent    LE swelling, resolved  Wt down to 131 from 151. Will switch Lasix to PRN    Continue current care and f/u in 3 months. Jesi Blank NP       [facility]    6/6/2018         Patient seen, examined by me personally. Plan discussed as detailed. Agree with note as outlined by  NP. I confirm findings in history and physical exam. No additional findings noted. Agree with plan as outlined above. Doing well post surgery. giovana rstart low dose beta blocker. D/c lasix. Encouraged to participate in rehab.      Tim Trejo MD

## 2018-06-08 NOTE — TELEPHONE ENCOUNTER
6/8/2018 Cardiac Rehab: Called Ms. Karlie Rodriguez to remind of intake appointment on Monday, June 11, 2018. Confirmed appointment with patient. Provided patient with contact information for [facility]. Also, reminded patient to bring a list of current medications, a personal schedule, and to wear comfortable clothes and shoes.  Florencia Rojas

## 2018-06-11 NOTE — TELEPHONE ENCOUNTER
6/11/2018 Cardiac Wellness: Called Ms. Tristan Qiu to follow up with patient regarding the intake appointment scheduled at 1:00pm June 11, 2018. Ms. Tristan Qiu stated she showed up and \"the place was empty\", so she decided to leave. I called her around 1:30pm, but she had already left and was back at home.  I have put in a request for her to go to [facility] on June 20, 2018 @ [address] [facility]

## 2018-06-11 NOTE — PROGRESS NOTES
A full discussion of the nature of anticoagulants has been carried out. A benefit risk analysis has been presented to the patient, so that they understand the justification for choosing anticoagulation at this time. The need for frequent and regular monitoring, precise dosage adjustment and compliance is stressed. Side effects of potential bleeding are discussed. The patient should avoid any OTC items containing aspirin , naproxen or Ibuprofen, and should avoid great swings in general diet. Avoid alcohol consumption. Advised to notify the office if antibiotic or steroid therapy is initiated. Call if any signs of abnormal bleeding are present. Next PT/INR test in 2 weeks.

## 2018-06-20 NOTE — CARDIO/PULMONARY
Cardiopulmonary Rehab Orientation: Met with Alli Masters and her  for the initial session. Mrs. Gabriele Sawyer is a 69year-old patient of Dr. Irma Davila, who presents to rehab for cardiac conditioning and strengthening, S/P 5/2/2018 TAVR   LVEF 75-80%. History also includes  Diabetes,HTN,Dyslipidemia,HX MVR with Cardiopulmonary Bypass,AVR,PAF. Allergic to sulfa     Immunization for influenza vaccine UTD. Pt is nonsmoker. Lungs were CTA; denied any cough. No LE edema was present. Exercise at home includes none at present. Limitations: none at this time. Patient was given an educational notebook and viewed the cardiac rehab DVD. Psychosocial: lives in [place] [facility] with . Has one daughter who lives out of state. BMI  24.3 , based on height of 5'2.75\" and wt of 136lbs  . Patient's identified goals are:   1. Complete Cardiac Rehab  2. Maintain weight  3. Increase daily fruits and veggies from 0-1 to 3-4  4. Home exercise program      6MW in meters 260.     Plan: Return for first exercise session on Tuesday June 26 at 10:00

## 2018-06-25 NOTE — PROGRESS NOTES
A full discussion of the nature of anticoagulants has been carried out. A benefit risk analysis has been presented to the patient, so that they understand the justification for choosing anticoagulation at this time. The need for frequent and regular monitoring, precise dosage adjustment and compliance is stressed. Side effects of potential bleeding are discussed. The patient should avoid any OTC items containing aspirin, naproxen or ibuprofen, and should avoid great swings in general diet. Avoid alcohol consumption. Advised to notify the office if antibiotic or steroid therapy is initiated. Call if any signs of abnormal bleeding are present. Next PT/INR test in 1 month.

## 2018-07-03 NOTE — CARDIO/PULMONARY
Pt received a call last night and she states she has to deal with it this week and will not be coming in Today or Thursday

## 2018-07-12 NOTE — PROGRESS NOTES
NAME:  Holger Cobos :   1950 MRN:   [id number] PCP:  Luis Davalos NP 
 
    
 
Subjective: The patient is a 69y.o. year old female  who returns for a routine follow-up. Since the last visit, patient reports no change in exercise tolerance, chest pain, edema, medication intolerance, palpitations, shortness of breath, PND/orthopnea wheezing, sputum, syncope, dizziness or light headedness. Doing well. Past Medical History:  
Diagnosis Date  Bruit of right carotid artery  CAD (coronary artery disease)  DM (diabetes mellitus) ([facility] [address]  Dyslipidemia  Hypertension  Mitral regurgitation and aortic stenosis  Paroxysmal A-fib ([facility] [address] ICD-10-CM ICD-9-CM 1. Aortic valve disorder I35.9 424.1 2. Paroxysmal atrial fibrillation (HCC) I48.0 427.31 AMB POC EKG ROUTINE W/ 12 LEADS, INTER & REP 3. Mitral valve disease I05.9 394.9 4. Atherosclerosis of native coronary artery of native heart without angina pectoris I25.10 414.01   
5. S/P AVR (aortic valve replacement) Z95.2 V43.3 6. History of MVR with cardiopulmonary bypass Z95.2 V15.1 Social History Substance Use Topics  Smoking status: Never Smoker  Smokeless tobacco: Never Used  Alcohol use 0.0 oz/week  
  0 Standard drinks or equivalent per week Comment: Rare No family history on file. Review of Systems General: Pt denies excessive weight gain or loss. Pt is able to conduct ADL's HEENT: Denies blurred vision, headaches, epistaxis and difficulty swallowing. Respiratory: Denies shortness of breath, SONI, wheezing or stridor. Cardiovascular: Denies precordial pain, palpitations, edema or PND Gastrointestinal: Denies poor appetite, indigestion, abdominal pain or blood in stool Musculoskeletal: Denies pain or swelling from muscles or joints Neurologic: Denies tremor, paresthesias, or sensory motor disturbance Skin: Denies rash, itching or texture change. Objective:  
 
 
Vitals:  
 07/12/18 1501 07/12/18 1514 BP: 128/70 130/68 Pulse: 61 Resp: 18 SpO2: 98% Weight: 139 lb 14.4 oz (63.5 kg) Height: 5' 2.5\" (1.588 m) Body mass index is 25.18 kg/(m^2). Laisha Thorpe Mental Status - Alert. General Appearance - Not in acute distress. Chest and Lung Exam  Inspection: Accessory muscles - No use of accessory muscles in breathing. Auscultation:  
Breath sounds: - Normal. 
 
Cardiovascular  
Inspection: Jugular vein - Bilateral - Inspection Normal. 
Palpation/Percussion:  
Apical Impulse: - Normal. 
Auscultation: Rhythm - Regular. Heart Sounds - S1 WNL and S2 WNL. No S3 or S4. Murmurs & Other Heart Sounds: Auscultation of the heart reveals - No Murmurs. Peripheral Vascular  
Upper Extremity: Inspection - Bilateral - No Cyanotic nailbeds or Digital clubbing. Lower Extremity:  
Palpation: Edema - Bilateral - No edema. Data Review:  
 
EKG -EKG: normal EKG, normal sinus rhythm, unchanged from previous tracings. Medications reviewed Current Outpatient Prescriptions Medication Sig  
 naproxen sod/diphenhydramine (ALEVE PM PO) Take  by mouth as needed.  aspirin 81 mg chewable tablet Take 1 Tab by mouth daily.  calcium carbonate (CALCIUM 600 PO) Take 1 Tab by mouth Before breakfast, lunch, and dinner.  warfarin (COUMADIN) 2 mg tablet Take 2 Tabs by mouth daily (with dinner). Daily dosing to be determined by NP/MD team.  
 lisinopril (PRINIVIL, ZESTRIL) 20 mg tablet Take 1 Tab by mouth daily.  nitroglycerin (NITROSTAT) 0.4 mg SL tablet DISSOLVE 1 TABLET UNDER THE TONGUE EVERY 5 MINUTES FOR 3 DOSES AS NEEDED FOR CHEST PAIN  
 amLODIPine (NORVASC) 5 mg tablet TAKE 1 TABLET EVERY DAY FOR BLOOD PRESSURE  
 iron-vitamin C (VITRON-C) 65 mg iron- 125 mg TbEC Take 1 Tab by mouth three (3) times daily.  Biotin 2,500 mcg cap Take 5,000 mcg by mouth daily.   
 amoxicillin (AMOXIL) 500 mg capsule Take 1 Cap by mouth as needed (take as directed one hr prior to dental procedures).  atorvastatin (LIPITOR) 40 mg tablet Take 1 Tab by mouth nightly.  glipiZIDE (GLUCOTROL) 5 mg tablet Take 5 mg by mouth daily.  hydrochlorothiazide (HYDRODIURIL) 50 mg tablet Take 50 mg by mouth daily.  ascorbic acid (VITAMIN C) 500 mg tablet Take 500 mg by mouth daily.  metoprolol succinate (TOPROL-XL) 50 mg XL tablet Take 1 Tab by mouth daily.  metFORMIN (GLUCOPHAGE) 500 mg tablet Take 2 Tabs by mouth two (2) times daily (with meals). Start on 5/5 Saturday  furosemide (LASIX) 20 mg tablet TAKE 1 TABLET BY MOUTH EVERY DAY FOR EDEMA No current facility-administered medications for this visit. Assessment: ICD-10-CM ICD-9-CM 1. Aortic valve disorder I35.9 424.1 2. Paroxysmal atrial fibrillation (HCC) I48.0 427.31 AMB POC EKG ROUTINE W/ 12 LEADS, INTER & REP 3. Mitral valve disease I05.9 394.9 4. Atherosclerosis of native coronary artery of native heart without angina pectoris I25.10 414.01   
5. S/P AVR (aortic valve replacement) Z95.2 V43.3 6. History of MVR with cardiopulmonary bypass Z95.2 V15.1 Plan:  
 
Patient presents doing well and stable from cardiac standpoint. Recovering well from her TAVR, TMVR. Will finish 3 months of warfarin per protocol. Followed by valve clinic and cardiac rehab. Continue current care and follow up in 3 months.  
 
Delmi Palacios MD

## 2018-07-12 NOTE — PROGRESS NOTES
A full discussion of the nature of anticoagulants has been carried out. A benefit risk analysis has been presented to the patient, so that they understand the justification for choosing anticoagulation at this time. The need for frequent and regular monitoring, precise dosage adjustment and compliance is stressed. Side effects of potential bleeding are discussed. The patient should avoid any OTC items containing aspirin, naproxen or ibuprofen, and should avoid great swings in general diet. Avoid alcohol consumption. Advised to notify the office if antibiotic or steroid therapy is initiated. Call if any signs of abnormal bleeding are present. Next PT/INR test in 2 weeks.

## 2018-07-12 NOTE — PROGRESS NOTES
1. Have you been to the ER, urgent care clinic since your last visit? Hospitalized since your last visit? NO    2. Have you seen or consulted any other health care providers outside of the [address] [place] [address] since your last visit? Include any pap smears or colon screening. YES MAMOGRAM    1 MONTH FOLLOW UP. NO CARDIAC C/O.

## 2018-07-17 NOTE — CARDIO/PULMONARY
Cardiopulmonary Rehab Nursing Entry:    Pt arrived to [facility] with report that she will be withdrawing from the program due to concerns regarding insurance coverage and out of pocket expenses. She is currently a member of the [employer] and plans to continue exercise at the . Pt spoke with Veronica Ryan to obtain additional information regarding her bill.

## 2018-07-24 NOTE — PROGRESS NOTES
A full discussion of the nature of anticoagulants has been carried out. A benefit risk analysis has been presented to the patient, so that they understand the justification for choosing anticoagulation at this time. The need for frequent and regular monitoring, precise dosage adjustment and compliance is stressed. Side effects of potential bleeding are discussed. The patient should avoid any OTC items containing aspirin, naproxen or ibuprofen, and should avoid great swings in general diet. Avoid alcohol consumption. Advised to notify the office if antibiotic or steroid therapy is initiated. Call if any signs of abnormal bleeding are present.

## 2019-01-01 ENCOUNTER — APPOINTMENT (OUTPATIENT)
Dept: GENERAL RADIOLOGY | Age: 69
DRG: 291 | End: 2019-01-01
Attending: INTERNAL MEDICINE
Payer: COMMERCIAL

## 2019-01-01 ENCOUNTER — APPOINTMENT (OUTPATIENT)
Dept: GENERAL RADIOLOGY | Age: 69
DRG: 291 | End: 2019-01-01
Attending: EMERGENCY MEDICINE
Payer: COMMERCIAL

## 2019-01-01 ENCOUNTER — APPOINTMENT (OUTPATIENT)
Dept: NON INVASIVE DIAGNOSTICS | Age: 69
DRG: 291 | End: 2019-01-01
Attending: FAMILY MEDICINE
Payer: COMMERCIAL

## 2019-01-01 ENCOUNTER — APPOINTMENT (OUTPATIENT)
Dept: GENERAL RADIOLOGY | Age: 69
DRG: 291 | End: 2019-01-01
Attending: NURSE PRACTITIONER
Payer: COMMERCIAL

## 2019-01-01 ENCOUNTER — APPOINTMENT (OUTPATIENT)
Dept: NON INVASIVE DIAGNOSTICS | Age: 69
DRG: 291 | End: 2019-01-01
Attending: NURSE PRACTITIONER
Payer: COMMERCIAL

## 2019-01-01 ENCOUNTER — APPOINTMENT (OUTPATIENT)
Dept: GENERAL RADIOLOGY | Age: 69
DRG: 291 | End: 2019-01-01
Attending: THORACIC SURGERY (CARDIOTHORACIC VASCULAR SURGERY)
Payer: COMMERCIAL

## 2019-01-01 ENCOUNTER — APPOINTMENT (OUTPATIENT)
Dept: NON INVASIVE DIAGNOSTICS | Age: 69
DRG: 291 | End: 2019-01-01
Attending: THORACIC SURGERY (CARDIOTHORACIC VASCULAR SURGERY)
Payer: COMMERCIAL

## 2019-01-01 ENCOUNTER — APPOINTMENT (OUTPATIENT)
Dept: ULTRASOUND IMAGING | Age: 69
DRG: 291 | End: 2019-01-01
Attending: FAMILY MEDICINE
Payer: COMMERCIAL

## 2019-01-01 ENCOUNTER — APPOINTMENT (OUTPATIENT)
Dept: NON INVASIVE DIAGNOSTICS | Age: 69
DRG: 291 | End: 2019-01-01
Attending: SPECIALIST
Payer: COMMERCIAL

## 2019-01-01 ENCOUNTER — OFFICE VISIT (OUTPATIENT)
Dept: CARDIOLOGY CLINIC | Age: 69
End: 2019-01-01

## 2019-01-01 ENCOUNTER — HOSPITAL ENCOUNTER (INPATIENT)
Age: 69
LOS: 7 days | DRG: 291 | End: 2019-02-11
Attending: EMERGENCY MEDICINE | Admitting: FAMILY MEDICINE
Payer: COMMERCIAL

## 2019-01-01 ENCOUNTER — APPOINTMENT (OUTPATIENT)
Dept: CARDIAC CATH/INVASIVE PROCEDURES | Age: 69
DRG: 291 | End: 2019-01-01
Attending: NURSE PRACTITIONER
Payer: COMMERCIAL

## 2019-01-01 ENCOUNTER — HOME HEALTH ADMISSION (OUTPATIENT)
Dept: HOME HEALTH SERVICES | Facility: HOME HEALTH | Age: 69
End: 2019-01-01

## 2019-01-01 ENCOUNTER — APPOINTMENT (OUTPATIENT)
Dept: NON INVASIVE DIAGNOSTICS | Age: 69
DRG: 291 | End: 2019-01-01
Attending: INTERNAL MEDICINE
Payer: COMMERCIAL

## 2019-01-01 ENCOUNTER — TELEPHONE (OUTPATIENT)
Dept: CARDIOLOGY CLINIC | Age: 69
End: 2019-01-01

## 2019-01-01 VITALS
BODY MASS INDEX: 29.63 KG/M2 | HEIGHT: 62 IN | SYSTOLIC BLOOD PRESSURE: 121 MMHG | DIASTOLIC BLOOD PRESSURE: 44 MMHG | OXYGEN SATURATION: 98 % | WEIGHT: 161 LBS | RESPIRATION RATE: 23 BRPM | TEMPERATURE: 100 F | HEART RATE: 99 BPM

## 2019-01-01 VITALS
HEART RATE: 40 BPM | HEIGHT: 63 IN | DIASTOLIC BLOOD PRESSURE: 70 MMHG | OXYGEN SATURATION: 100 % | SYSTOLIC BLOOD PRESSURE: 170 MMHG | RESPIRATION RATE: 16 BRPM | WEIGHT: 147 LBS | BODY MASS INDEX: 26.05 KG/M2

## 2019-01-01 DIAGNOSIS — R09.02 HYPOXIA: ICD-10-CM

## 2019-01-01 DIAGNOSIS — I10 ESSENTIAL HYPERTENSION: ICD-10-CM

## 2019-01-01 DIAGNOSIS — I48.0 PAROXYSMAL ATRIAL FIBRILLATION (HCC): ICD-10-CM

## 2019-01-01 DIAGNOSIS — I25.10 ASHD (ARTERIOSCLEROTIC HEART DISEASE): ICD-10-CM

## 2019-01-01 DIAGNOSIS — E78.5 DYSLIPIDEMIA: ICD-10-CM

## 2019-01-01 DIAGNOSIS — Z95.2 HISTORY OF MVR WITH CARDIOPULMONARY BYPASS: ICD-10-CM

## 2019-01-01 DIAGNOSIS — D69.6 THROMBOCYTOPENIA (HCC): ICD-10-CM

## 2019-01-01 DIAGNOSIS — I50.9 ACUTE ON CHRONIC CONGESTIVE HEART FAILURE, UNSPECIFIED HEART FAILURE TYPE (HCC): Primary | ICD-10-CM

## 2019-01-01 DIAGNOSIS — Z95.2 S/P AVR (AORTIC VALVE REPLACEMENT): ICD-10-CM

## 2019-01-01 DIAGNOSIS — I25.10 ATHEROSCLEROSIS OF NATIVE CORONARY ARTERY OF NATIVE HEART WITHOUT ANGINA PECTORIS: Primary | ICD-10-CM

## 2019-01-01 DIAGNOSIS — R06.02 SOB (SHORTNESS OF BREATH): ICD-10-CM

## 2019-01-01 DIAGNOSIS — I10 ESSENTIAL HYPERTENSION, BENIGN: ICD-10-CM

## 2019-01-01 DIAGNOSIS — D50.9 MICROCYTIC ANEMIA: ICD-10-CM

## 2019-01-01 DIAGNOSIS — I35.9 AORTIC VALVE DISORDER: ICD-10-CM

## 2019-01-01 DIAGNOSIS — I82.90 THROMBUS: ICD-10-CM

## 2019-01-01 DIAGNOSIS — I35.0 AORTIC VALVE STENOSIS, ETIOLOGY OF CARDIAC VALVE DISEASE UNSPECIFIED: ICD-10-CM

## 2019-01-01 DIAGNOSIS — E78.2 MIXED HYPERLIPIDEMIA: ICD-10-CM

## 2019-01-01 LAB
ABO + RH BLD: NORMAL
ADMINISTERED INITIALS, ADMINIT: NORMAL
ALBUMIN SERPL-MCNC: 1.8 G/DL (ref 3.5–5)
ALBUMIN SERPL-MCNC: 1.8 G/DL (ref 3.5–5)
ALBUMIN SERPL-MCNC: 1.9 G/DL (ref 3.5–5)
ALBUMIN SERPL-MCNC: 1.9 G/DL (ref 3.5–5)
ALBUMIN SERPL-MCNC: 2 G/DL (ref 3.5–5)
ALBUMIN SERPL-MCNC: 2 G/DL (ref 3.5–5)
ALBUMIN SERPL-MCNC: 2.3 G/DL (ref 3.5–5)
ALBUMIN SERPL-MCNC: 2.7 G/DL (ref 3.5–5)
ALBUMIN SERPL-MCNC: 2.7 G/DL (ref 3.5–5)
ALBUMIN SERPL-MCNC: 2.9 G/DL (ref 3.5–5)
ALBUMIN SERPL-MCNC: 3 G/DL (ref 3.5–5)
ALBUMIN SERPL-MCNC: 4.5 G/DL (ref 3.5–4.8)
ALBUMIN/GLOB SERPL: 0.5 {RATIO} (ref 1.1–2.2)
ALBUMIN/GLOB SERPL: 0.7 {RATIO} (ref 1.1–2.2)
ALBUMIN/GLOB SERPL: 1.7 {RATIO} (ref 1.2–2.2)
ALP SERPL-CCNC: 103 U/L (ref 45–117)
ALP SERPL-CCNC: 107 U/L (ref 45–117)
ALP SERPL-CCNC: 111 U/L (ref 45–117)
ALP SERPL-CCNC: 149 U/L (ref 45–117)
ALP SERPL-CCNC: 167 U/L (ref 45–117)
ALP SERPL-CCNC: 53 IU/L (ref 39–117)
ALP SERPL-CCNC: 89 U/L (ref 45–117)
ALP SERPL-CCNC: 90 U/L (ref 45–117)
ALP SERPL-CCNC: 97 U/L (ref 45–117)
ALT SERPL-CCNC: 29 U/L (ref 12–78)
ALT SERPL-CCNC: 31 U/L (ref 12–78)
ALT SERPL-CCNC: 31 U/L (ref 12–78)
ALT SERPL-CCNC: 35 U/L (ref 12–78)
ALT SERPL-CCNC: 37 U/L (ref 12–78)
ALT SERPL-CCNC: 41 IU/L (ref 0–32)
ALT SERPL-CCNC: 43 U/L (ref 12–78)
ALT SERPL-CCNC: 43 U/L (ref 12–78)
ALT SERPL-CCNC: 45 U/L (ref 12–78)
ANION GAP SERPL CALC-SCNC: 14 MMOL/L (ref 5–15)
ANION GAP SERPL CALC-SCNC: 17 MMOL/L (ref 5–15)
ANION GAP SERPL CALC-SCNC: 18 MMOL/L (ref 5–15)
ANION GAP SERPL CALC-SCNC: 19 MMOL/L (ref 5–15)
ANION GAP SERPL CALC-SCNC: 20 MMOL/L (ref 5–15)
ANION GAP SERPL CALC-SCNC: 22 MMOL/L (ref 5–15)
APPEARANCE UR: ABNORMAL
APTT PPP: 105.4 SEC (ref 22.1–32)
APTT PPP: 124.3 SEC (ref 22.1–32)
APTT PPP: 26.2 SEC (ref 22.1–32)
APTT PPP: 28.1 SEC (ref 22.1–32)
APTT PPP: 30.7 SEC (ref 22.1–32)
APTT PPP: 32.7 SEC (ref 22.1–32)
APTT PPP: 39.9 SEC (ref 22.1–32)
APTT PPP: 40.4 SEC (ref 22.1–32)
APTT PPP: 42.8 SEC (ref 22.1–32)
APTT PPP: 49.3 SEC (ref 22.1–32)
APTT PPP: 49.5 SEC (ref 22.1–32)
APTT PPP: 51.4 SEC (ref 22.1–32)
APTT PPP: 53.9 SEC (ref 22.1–32)
APTT PPP: 55.4 SEC (ref 22.1–32)
APTT PPP: 56.5 SEC (ref 22.1–32)
APTT PPP: 56.9 SEC (ref 22.1–32)
APTT PPP: 57.5 SEC (ref 22.1–32)
APTT PPP: 57.5 SEC (ref 22.1–32)
APTT PPP: 57.9 SEC (ref 22.1–32)
APTT PPP: 58.7 SEC (ref 22.1–32)
APTT PPP: 63.3 SEC (ref 22.1–32)
APTT PPP: 70.2 SEC (ref 22.1–32)
APTT PPP: 72.9 SEC (ref 22.1–32)
ARTERIAL PATENCY WRIST A: ABNORMAL
ARTERIAL PATENCY WRIST A: NO
ARTERIAL PATENCY WRIST A: NO
ARTERIAL PATENCY WRIST A: YES
AST SERPL-CCNC: 39 U/L (ref 15–37)
AST SERPL-CCNC: 41 U/L (ref 15–37)
AST SERPL-CCNC: 43 U/L (ref 15–37)
AST SERPL-CCNC: 47 U/L (ref 15–37)
AST SERPL-CCNC: 47 U/L (ref 15–37)
AST SERPL-CCNC: 49 U/L (ref 15–37)
AST SERPL-CCNC: 55 U/L (ref 15–37)
AST SERPL-CCNC: 55 U/L (ref 15–37)
AST SERPL-CCNC: 61 IU/L (ref 0–40)
ATRIAL RATE: 65 BPM
B-OH-BUTYR SERPL-SCNC: 0.23 MMOL/L
BACTERIA SPEC CULT: NORMAL
BACTERIA URNS QL MICRO: NEGATIVE /HPF
BASE DEFICIT BLD-SCNC: 10 MMOL/L
BASE DEFICIT BLD-SCNC: 11 MMOL/L
BASE DEFICIT BLD-SCNC: 14 MMOL/L
BASE DEFICIT BLD-SCNC: 14 MMOL/L
BASE DEFICIT BLD-SCNC: 2 MMOL/L
BASE DEFICIT BLD-SCNC: 3 MMOL/L
BASE DEFICIT BLD-SCNC: 3 MMOL/L
BASE DEFICIT BLD-SCNC: 5 MMOL/L
BASE DEFICIT BLD-SCNC: 6 MMOL/L
BASE DEFICIT BLD-SCNC: 7 MMOL/L
BASE DEFICIT BLDV-SCNC: 5 MMOL/L
BASE DEFICIT BLDV-SCNC: 6 MMOL/L
BASE DEFICIT BLDV-SCNC: 7 MMOL/L
BASOPHILS # BLD: 0 K/UL (ref 0–0.1)
BASOPHILS NFR BLD: 0 % (ref 0–1)
BDY SITE: ABNORMAL
BILIRUB SERPL-MCNC: 0.8 MG/DL (ref 0.2–1)
BILIRUB SERPL-MCNC: 0.9 MG/DL (ref 0.2–1)
BILIRUB SERPL-MCNC: 0.9 MG/DL (ref 0–1.2)
BILIRUB SERPL-MCNC: 1.2 MG/DL (ref 0.2–1)
BILIRUB SERPL-MCNC: 1.3 MG/DL (ref 0.2–1)
BILIRUB SERPL-MCNC: 1.4 MG/DL (ref 0.2–1)
BILIRUB SERPL-MCNC: 1.5 MG/DL (ref 0.2–1)
BILIRUB UR QL: NEGATIVE
BLD PROD TYP BPU: NORMAL
BLOOD GROUP ANTIBODIES SERPL: NORMAL
BNP SERPL-MCNC: ABNORMAL PG/ML (ref 0–125)
BPU ID: NORMAL
BUN SERPL-MCNC: 25 MG/DL (ref 8–27)
BUN SERPL-MCNC: 74 MG/DL (ref 6–20)
BUN SERPL-MCNC: 76 MG/DL (ref 6–20)
BUN SERPL-MCNC: 77 MG/DL (ref 6–20)
BUN SERPL-MCNC: 78 MG/DL (ref 6–20)
BUN SERPL-MCNC: 78 MG/DL (ref 6–20)
BUN SERPL-MCNC: 80 MG/DL (ref 6–20)
BUN SERPL-MCNC: 81 MG/DL (ref 6–20)
BUN SERPL-MCNC: 82 MG/DL (ref 6–20)
BUN SERPL-MCNC: 87 MG/DL (ref 6–20)
BUN SERPL-MCNC: 87 MG/DL (ref 6–20)
BUN SERPL-MCNC: 95 MG/DL (ref 6–20)
BUN/CREAT SERPL: 19 (ref 12–28)
BUN/CREAT SERPL: 34 (ref 12–20)
BUN/CREAT SERPL: 34 (ref 12–20)
BUN/CREAT SERPL: 36 (ref 12–20)
BUN/CREAT SERPL: 37 (ref 12–20)
BUN/CREAT SERPL: 38 (ref 12–20)
BUN/CREAT SERPL: 39 (ref 12–20)
BUN/CREAT SERPL: 40 (ref 12–20)
CA-I BLD-SCNC: 1.14 MMOL/L (ref 1.12–1.32)
CA-I BLD-SCNC: 1.16 MMOL/L (ref 1.12–1.32)
CA-I BLD-SCNC: 1.23 MMOL/L (ref 1.12–1.32)
CALCIUM SERPL-MCNC: 8 MG/DL (ref 8.5–10.1)
CALCIUM SERPL-MCNC: 8.1 MG/DL (ref 8.5–10.1)
CALCIUM SERPL-MCNC: 8.2 MG/DL (ref 8.5–10.1)
CALCIUM SERPL-MCNC: 8.2 MG/DL (ref 8.5–10.1)
CALCIUM SERPL-MCNC: 8.3 MG/DL (ref 8.5–10.1)
CALCIUM SERPL-MCNC: 8.8 MG/DL (ref 8.5–10.1)
CALCIUM SERPL-MCNC: 9 MG/DL (ref 8.5–10.1)
CALCIUM SERPL-MCNC: 9 MG/DL (ref 8.5–10.1)
CALCIUM SERPL-MCNC: 9.4 MG/DL (ref 8.5–10.1)
CALCIUM SERPL-MCNC: 9.5 MG/DL (ref 8.5–10.1)
CALCIUM SERPL-MCNC: 9.5 MG/DL (ref 8.7–10.3)
CALCIUM SERPL-MCNC: 9.8 MG/DL (ref 8.5–10.1)
CALCULATED P AXIS, ECG09: 49 DEGREES
CALCULATED R AXIS, ECG10: 103 DEGREES
CALCULATED T AXIS, ECG11: -171 DEGREES
CHLORIDE SERPL-SCNC: 102 MMOL/L (ref 96–106)
CHLORIDE SERPL-SCNC: 104 MMOL/L (ref 97–108)
CHLORIDE SERPL-SCNC: 106 MMOL/L (ref 97–108)
CHLORIDE SERPL-SCNC: 107 MMOL/L (ref 97–108)
CHLORIDE SERPL-SCNC: 107 MMOL/L (ref 97–108)
CHLORIDE SERPL-SCNC: 108 MMOL/L (ref 97–108)
CHLORIDE SERPL-SCNC: 109 MMOL/L (ref 97–108)
CHLORIDE SERPL-SCNC: 110 MMOL/L (ref 97–108)
CHLORIDE SERPL-SCNC: 112 MMOL/L (ref 97–108)
CHLORIDE SERPL-SCNC: 112 MMOL/L (ref 97–108)
CHOLEST SERPL-MCNC: 129 MG/DL (ref 100–199)
CK SERPL-CCNC: 55 U/L (ref 24–173)
CO2 SERPL-SCNC: 12 MMOL/L (ref 21–32)
CO2 SERPL-SCNC: 13 MMOL/L (ref 21–32)
CO2 SERPL-SCNC: 14 MMOL/L (ref 21–32)
CO2 SERPL-SCNC: 15 MMOL/L (ref 21–32)
CO2 SERPL-SCNC: 16 MMOL/L (ref 21–32)
CO2 SERPL-SCNC: 17 MMOL/L (ref 20–29)
CO2 SERPL-SCNC: 18 MMOL/L (ref 21–32)
CO2 SERPL-SCNC: 19 MMOL/L (ref 21–32)
CO2 SERPL-SCNC: 20 MMOL/L (ref 21–32)
CO2 SERPL-SCNC: 21 MMOL/L (ref 21–32)
CO2 SERPL-SCNC: 22 MMOL/L (ref 21–32)
COLOR UR: ABNORMAL
CORTIS SERPL-MCNC: 39.2 UG/DL
CREAT SERPL-MCNC: 1.29 MG/DL (ref 0.57–1)
CREAT SERPL-MCNC: 2 MG/DL (ref 0.55–1.02)
CREAT SERPL-MCNC: 2.01 MG/DL (ref 0.55–1.02)
CREAT SERPL-MCNC: 2.02 MG/DL (ref 0.55–1.02)
CREAT SERPL-MCNC: 2.05 MG/DL (ref 0.55–1.02)
CREAT SERPL-MCNC: 2.07 MG/DL (ref 0.55–1.02)
CREAT SERPL-MCNC: 2.07 MG/DL (ref 0.55–1.02)
CREAT SERPL-MCNC: 2.09 MG/DL (ref 0.55–1.02)
CREAT SERPL-MCNC: 2.1 MG/DL (ref 0.55–1.02)
CREAT SERPL-MCNC: 2.13 MG/DL (ref 0.55–1.02)
CREAT SERPL-MCNC: 2.27 MG/DL (ref 0.55–1.02)
CREAT SERPL-MCNC: 2.29 MG/DL (ref 0.55–1.02)
CREAT SERPL-MCNC: 2.35 MG/DL (ref 0.55–1.02)
CREAT SERPL-MCNC: 2.76 MG/DL (ref 0.55–1.02)
CROSSMATCH RESULT,%XM: NORMAL
D50 ADMINISTERED, D50ADM: 0 ML
D50 ORDER, D50ORD: 0 ML
DAT POLY-SP REAG RBC QL: NORMAL
DIAGNOSIS, 93000: NORMAL
DIFFERENTIAL METHOD BLD: ABNORMAL
ECHO AO ROOT DIAM: 2.05 CM
ECHO AO ROOT DIAM: 2.68 CM
ECHO AV AREA PEAK VELOCITY: 1.2 CM2
ECHO AV AREA PEAK VELOCITY: 1.3 CM2
ECHO AV AREA VTI: 1.2 CM2
ECHO AV AREA VTI: 1.8 CM2
ECHO AV AREA/BSA PEAK VELOCITY: 0.7 CM2/M2
ECHO AV AREA/BSA VTI: 0.7 CM2/M2
ECHO AV MEAN GRADIENT: 21.9 MMHG
ECHO AV MEAN GRADIENT: 30 MMHG
ECHO AV MEAN GRADIENT: 9.9 MMHG
ECHO AV PEAK GRADIENT: 18.1 MMHG
ECHO AV PEAK GRADIENT: 44.8 MMHG
ECHO AV PEAK GRADIENT: 50.9 MMHG
ECHO AV PEAK VELOCITY: 212.69 CM/S
ECHO AV PEAK VELOCITY: 334.58 CM/S
ECHO AV PEAK VELOCITY: 356.73 CM/S
ECHO AV VTI: 39.01 CM
ECHO AV VTI: 63.27 CM
ECHO AV VTI: 72.82 CM
ECHO EST RA PRESSURE: 5 MMHG
ECHO LA MAJOR AXIS: 3.56 CM
ECHO LA MAJOR AXIS: 3.6 CM
ECHO LA MAJOR AXIS: 4.86 CM
ECHO LA TO AORTIC ROOT RATIO: 1.33
ECHO LA TO AORTIC ROOT RATIO: 1.76
ECHO LV E' LATERAL VELOCITY: 4.34 CM/S
ECHO LV E' LATERAL VELOCITY: 6.08 CM/S
ECHO LV E' SEPTAL VELOCITY: 3.89 CM/S
ECHO LV E' SEPTAL VELOCITY: 4.12 CM/S
ECHO LV E' SEPTAL VELOCITY: 6.77 CM/S
ECHO LV INTERNAL DIMENSION DIASTOLIC: 4.42 CM (ref 3.9–5.3)
ECHO LV INTERNAL DIMENSION DIASTOLIC: 4.52 CM (ref 3.9–5.3)
ECHO LV INTERNAL DIMENSION DIASTOLIC: 4.75 CM (ref 3.9–5.3)
ECHO LV INTERNAL DIMENSION SYSTOLIC: 2.85 CM
ECHO LV INTERNAL DIMENSION SYSTOLIC: 2.93 CM
ECHO LV INTERNAL DIMENSION SYSTOLIC: 3.69 CM
ECHO LV IVSD: 1.27 CM (ref 0.6–0.9)
ECHO LV IVSD: 1.42 CM (ref 0.6–0.9)
ECHO LV IVSD: 1.43 CM (ref 0.6–0.9)
ECHO LV MASS 2D: 285.3 G (ref 67–162)
ECHO LV MASS 2D: 289.8 G (ref 67–162)
ECHO LV MASS 2D: 304.8 G (ref 67–162)
ECHO LV MASS INDEX 2D: 163.7 G/M2 (ref 43–95)
ECHO LV MASS INDEX 2D: 172.8 G/M2 (ref 43–95)
ECHO LV MASS INDEX 2D: 178.2 G/M2 (ref 43–95)
ECHO LV POSTERIOR WALL DIASTOLIC: 1.31 CM (ref 0.6–0.9)
ECHO LV POSTERIOR WALL DIASTOLIC: 1.38 CM (ref 0.6–0.9)
ECHO LV POSTERIOR WALL DIASTOLIC: 1.42 CM (ref 0.6–0.9)
ECHO LVOT DIAM: 1.69 CM
ECHO LVOT DIAM: 1.9 CM
ECHO LVOT PEAK GRADIENT: 6.3 MMHG
ECHO LVOT PEAK GRADIENT: 7.6 MMHG
ECHO LVOT PEAK VELOCITY: 125.7 CM/S
ECHO LVOT PEAK VELOCITY: 138.22 CM/S
ECHO LVOT SV: 70.1 ML
ECHO LVOT SV: 86.4 ML
ECHO LVOT VTI: 30.44 CM
ECHO LVOT VTI: 31.39 CM
ECHO MV A VELOCITY: 167.36 CM/S
ECHO MV AREA PHT: 2 CM2
ECHO MV AREA PHT: 3.1 CM2
ECHO MV AREA VTI: 0.9 CM2
ECHO MV AREA VTI: 1 CM2
ECHO MV E DECELERATION TIME (DT): 241.3 MS
ECHO MV E VELOCITY: 2.15 CM/S
ECHO MV E/A RATIO: 0.01
ECHO MV E/E' LATERAL: 0.35
ECHO MV E/E' RATIO (AVERAGED): 0.45
ECHO MV E/E' SEPTAL: 0.55
ECHO MV MAX VELOCITY: 209.25 CM/S
ECHO MV MAX VELOCITY: 268.44 CM/S
ECHO MV MAX VELOCITY: 286.86 CM/S
ECHO MV MEAN GRADIENT: 14.6 MMHG
ECHO MV MEAN GRADIENT: 18.8 MMHG
ECHO MV MEAN GRADIENT: 8.8 MMHG
ECHO MV PEAK GRADIENT: 17.5 MMHG
ECHO MV PEAK GRADIENT: 28.8 MMHG
ECHO MV PEAK GRADIENT: 32.9 MMHG
ECHO MV PRESSURE HALF TIME (PHT): 111.1 MS
ECHO MV PRESSURE HALF TIME (PHT): 70 MS
ECHO MV VTI: 64.2 CM
ECHO MV VTI: 70.53 CM
ECHO MV VTI: 97.08 CM
ECHO PULMONARY ARTERY SYSTOLIC PRESSURE (PASP): 47.7 MMHG
ECHO PULMONARY ARTERY SYSTOLIC PRESSURE (PASP): 60 MMHG
ECHO PV MAX VELOCITY: 116.65 CM/S
ECHO PV PEAK GRADIENT: 5.4 MMHG
ECHO RIGHT VENTRICULAR SYSTOLIC PRESSURE (RVSP): 47.7 MMHG
ECHO RV INTERNAL DIMENSION: 2.95 CM
ECHO RV TAPSE: 0.66 CM (ref 1.5–2)
ECHO RV TAPSE: 1.13 CM (ref 1.5–2)
ECHO RV TAPSE: 1.2 CM (ref 1.5–2)
ECHO RVOT PEAK VELOCITY: 58.65 CM/S
ECHO TV REGURGITANT MAX VELOCITY: 261.75 CM/S
ECHO TV REGURGITANT MAX VELOCITY: 326.8 CM/S
ECHO TV REGURGITANT MAX VELOCITY: 360.7 CM/S
ECHO TV REGURGITANT PEAK GRADIENT: 27.4 MMHG
ECHO TV REGURGITANT PEAK GRADIENT: 42.7 MMHG
ECHO TV REGURGITANT PEAK GRADIENT: 52 MMHG
EOSINOPHIL # BLD: 0 K/UL (ref 0–0.4)
EOSINOPHIL NFR BLD: 0 % (ref 0–7)
EPITH CASTS URNS QL MICRO: ABNORMAL /LPF
ERYTHROCYTE [DISTWIDTH] IN BLOOD BY AUTOMATED COUNT: 15.9 % (ref 11.5–14.5)
ERYTHROCYTE [DISTWIDTH] IN BLOOD BY AUTOMATED COUNT: 16 % (ref 11.5–14.5)
ERYTHROCYTE [DISTWIDTH] IN BLOOD BY AUTOMATED COUNT: 16.8 % (ref 11.5–14.5)
ERYTHROCYTE [DISTWIDTH] IN BLOOD BY AUTOMATED COUNT: 19.2 % (ref 11.5–14.5)
ERYTHROCYTE [DISTWIDTH] IN BLOOD BY AUTOMATED COUNT: 19.6 % (ref 11.5–14.5)
ERYTHROCYTE [DISTWIDTH] IN BLOOD BY AUTOMATED COUNT: 20.3 % (ref 11.5–14.5)
ERYTHROCYTE [DISTWIDTH] IN BLOOD BY AUTOMATED COUNT: 21.1 % (ref 11.5–14.5)
ERYTHROCYTE [DISTWIDTH] IN BLOOD BY AUTOMATED COUNT: 22.1 % (ref 11.5–14.5)
ERYTHROCYTE [DISTWIDTH] IN BLOOD BY AUTOMATED COUNT: 22.5 % (ref 11.5–14.5)
EST. AVERAGE GLUCOSE BLD GHB EST-MCNC: ABNORMAL MG/DL
FERRITIN SERPL-MCNC: 1117 NG/ML (ref 8–252)
FIBRINOGEN PPP-MCNC: 800 MG/DL (ref 200–475)
FIBRINOGEN PPP-MCNC: >800 MG/DL (ref 200–475)
GAS FLOW.O2 O2 DELIVERY SYS: ABNORMAL L/MIN
GAS FLOW.O2 SETTING OXYMISER: 10 L/M
GAS FLOW.O2 SETTING OXYMISER: 12 BPM
GAS FLOW.O2 SETTING OXYMISER: 16 BPM
GAS FLOW.O2 SETTING OXYMISER: 16 BPM
GAS FLOW.O2 SETTING OXYMISER: 18 BPM
GAS FLOW.O2 SETTING OXYMISER: 18 BPM
GAS FLOW.O2 SETTING OXYMISER: 2 L/M
GAS FLOW.O2 SETTING OXYMISER: 22 BPM
GAS FLOW.O2 SETTING OXYMISER: 22 BPM
GAS FLOW.O2 SETTING OXYMISER: 3 L/M
GAS FLOW.O2 SETTING OXYMISER: 30 L/M
GAS FLOW.O2 SETTING OXYMISER: 30 L/M
GAS FLOW.O2 SETTING OXYMISER: 40 L/M
GAS FLOW.O2 SETTING OXYMISER: 6 L/M
GAS FLOW.O2 SETTING OXYMISER: 75 L/M
GLOBULIN SER CALC-MCNC: 2.7 G/DL (ref 1.5–4.5)
GLOBULIN SER CALC-MCNC: 3.4 G/DL (ref 2–4)
GLOBULIN SER CALC-MCNC: 3.8 G/DL (ref 2–4)
GLOBULIN SER CALC-MCNC: 4 G/DL (ref 2–4)
GLOBULIN SER CALC-MCNC: 4.2 G/DL (ref 2–4)
GLOBULIN SER CALC-MCNC: 4.2 G/DL (ref 2–4)
GLOBULIN SER CALC-MCNC: 4.3 G/DL (ref 2–4)
GLSCOM COMMENTS: NORMAL
GLUCOSE BLD STRIP.AUTO-MCNC: 125 MG/DL (ref 65–100)
GLUCOSE BLD STRIP.AUTO-MCNC: 128 MG/DL (ref 65–100)
GLUCOSE BLD STRIP.AUTO-MCNC: 135 MG/DL (ref 65–100)
GLUCOSE BLD STRIP.AUTO-MCNC: 149 MG/DL (ref 65–100)
GLUCOSE BLD STRIP.AUTO-MCNC: 149 MG/DL (ref 65–100)
GLUCOSE BLD STRIP.AUTO-MCNC: 152 MG/DL (ref 65–100)
GLUCOSE BLD STRIP.AUTO-MCNC: 153 MG/DL (ref 65–100)
GLUCOSE BLD STRIP.AUTO-MCNC: 165 MG/DL (ref 65–100)
GLUCOSE BLD STRIP.AUTO-MCNC: 176 MG/DL (ref 65–100)
GLUCOSE BLD STRIP.AUTO-MCNC: 181 MG/DL (ref 65–100)
GLUCOSE BLD STRIP.AUTO-MCNC: 191 MG/DL (ref 65–100)
GLUCOSE BLD STRIP.AUTO-MCNC: 194 MG/DL (ref 65–100)
GLUCOSE BLD STRIP.AUTO-MCNC: 203 MG/DL (ref 65–100)
GLUCOSE BLD STRIP.AUTO-MCNC: 211 MG/DL (ref 65–100)
GLUCOSE BLD STRIP.AUTO-MCNC: 214 MG/DL (ref 65–100)
GLUCOSE BLD STRIP.AUTO-MCNC: 225 MG/DL (ref 65–100)
GLUCOSE BLD STRIP.AUTO-MCNC: 233 MG/DL (ref 65–100)
GLUCOSE BLD STRIP.AUTO-MCNC: 236 MG/DL (ref 65–100)
GLUCOSE BLD STRIP.AUTO-MCNC: 239 MG/DL (ref 65–100)
GLUCOSE BLD STRIP.AUTO-MCNC: 243 MG/DL (ref 65–100)
GLUCOSE BLD STRIP.AUTO-MCNC: 246 MG/DL (ref 65–100)
GLUCOSE BLD STRIP.AUTO-MCNC: 253 MG/DL (ref 65–100)
GLUCOSE BLD STRIP.AUTO-MCNC: 255 MG/DL (ref 65–100)
GLUCOSE BLD STRIP.AUTO-MCNC: 256 MG/DL (ref 65–100)
GLUCOSE BLD STRIP.AUTO-MCNC: 256 MG/DL (ref 65–100)
GLUCOSE BLD STRIP.AUTO-MCNC: 268 MG/DL (ref 65–100)
GLUCOSE BLD STRIP.AUTO-MCNC: 278 MG/DL (ref 65–100)
GLUCOSE BLD STRIP.AUTO-MCNC: 282 MG/DL (ref 65–100)
GLUCOSE BLD STRIP.AUTO-MCNC: 67 MG/DL (ref 65–100)
GLUCOSE BLD STRIP.AUTO-MCNC: 81 MG/DL (ref 65–100)
GLUCOSE BLD STRIP.AUTO-MCNC: 84 MG/DL (ref 65–100)
GLUCOSE BLD STRIP.AUTO-MCNC: 87 MG/DL (ref 65–100)
GLUCOSE BLD STRIP.AUTO-MCNC: 91 MG/DL (ref 65–100)
GLUCOSE BLD STRIP.AUTO-MCNC: 91 MG/DL (ref 65–100)
GLUCOSE BLD STRIP.AUTO-MCNC: 93 MG/DL (ref 65–100)
GLUCOSE SERPL-MCNC: 127 MG/DL (ref 65–100)
GLUCOSE SERPL-MCNC: 140 MG/DL (ref 65–100)
GLUCOSE SERPL-MCNC: 152 MG/DL (ref 65–100)
GLUCOSE SERPL-MCNC: 153 MG/DL (ref 65–100)
GLUCOSE SERPL-MCNC: 155 MG/DL (ref 65–100)
GLUCOSE SERPL-MCNC: 157 MG/DL (ref 65–100)
GLUCOSE SERPL-MCNC: 163 MG/DL (ref 65–100)
GLUCOSE SERPL-MCNC: 216 MG/DL (ref 65–100)
GLUCOSE SERPL-MCNC: 222 MG/DL (ref 65–100)
GLUCOSE SERPL-MCNC: 251 MG/DL (ref 65–100)
GLUCOSE SERPL-MCNC: 72 MG/DL (ref 65–100)
GLUCOSE SERPL-MCNC: 73 MG/DL (ref 65–100)
GLUCOSE SERPL-MCNC: 75 MG/DL (ref 65–99)
GLUCOSE SERPL-MCNC: 82 MG/DL (ref 65–100)
GLUCOSE UR STRIP.AUTO-MCNC: NEGATIVE MG/DL
GLUCOSE, GLC: 211 MG/DL
GLUCOSE, GLC: 246 MG/DL
GLUCOSE, GLC: 253 MG/DL
HAPTOGLOB SERPL-MCNC: 36 MG/DL (ref 30–200)
HBA1C MFR BLD: <3.5 % (ref 4.2–6.3)
HCO3 BLD-SCNC: 11.4 MMOL/L (ref 22–26)
HCO3 BLD-SCNC: 11.4 MMOL/L (ref 22–26)
HCO3 BLD-SCNC: 14.2 MMOL/L (ref 22–26)
HCO3 BLD-SCNC: 17.3 MMOL/L (ref 22–26)
HCO3 BLD-SCNC: 17.6 MMOL/L (ref 22–26)
HCO3 BLD-SCNC: 18.6 MMOL/L (ref 22–26)
HCO3 BLD-SCNC: 19 MMOL/L (ref 22–26)
HCO3 BLD-SCNC: 19.6 MMOL/L (ref 22–26)
HCO3 BLD-SCNC: 19.6 MMOL/L (ref 22–26)
HCO3 BLD-SCNC: 19.7 MMOL/L (ref 22–26)
HCO3 BLD-SCNC: 20.5 MMOL/L (ref 22–26)
HCO3 BLD-SCNC: 20.7 MMOL/L (ref 22–26)
HCO3 BLD-SCNC: 21.2 MMOL/L (ref 22–26)
HCO3 BLD-SCNC: 21.7 MMOL/L (ref 22–26)
HCO3 BLD-SCNC: 22.3 MMOL/L (ref 22–26)
HCO3 BLD-SCNC: 23.3 MMOL/L (ref 22–26)
HCO3 BLD-SCNC: 24.7 MMOL/L (ref 22–26)
HCO3 BLD-SCNC: 24.9 MMOL/L (ref 22–26)
HCO3 BLDV-SCNC: 18.7 MMOL/L (ref 23–28)
HCO3 BLDV-SCNC: 19 MMOL/L (ref 23–28)
HCO3 BLDV-SCNC: 19.2 MMOL/L (ref 23–28)
HCO3 BLDV-SCNC: 19.4 MMOL/L (ref 23–28)
HCO3 BLDV-SCNC: 20 MMOL/L (ref 23–28)
HCO3 BLDV-SCNC: 20.1 MMOL/L (ref 23–28)
HCO3 BLDV-SCNC: 21.6 MMOL/L (ref 23–28)
HCT VFR BLD AUTO: 20.2 % (ref 35–47)
HCT VFR BLD AUTO: 20.7 % (ref 35–47)
HCT VFR BLD AUTO: 21.5 % (ref 35–47)
HCT VFR BLD AUTO: 22.2 % (ref 35–47)
HCT VFR BLD AUTO: 22.5 % (ref 35–47)
HCT VFR BLD AUTO: 22.6 % (ref 35–47)
HCT VFR BLD AUTO: 23.3 % (ref 35–47)
HCT VFR BLD AUTO: 23.5 % (ref 35–47)
HCT VFR BLD AUTO: 23.8 % (ref 35–47)
HCT VFR BLD AUTO: 23.9 % (ref 35–47)
HCT VFR BLD AUTO: 24.6 % (ref 35–47)
HCT VFR BLD AUTO: 24.7 % (ref 35–47)
HCT VFR BLD AUTO: 24.8 % (ref 35–47)
HCT VFR BLD AUTO: 24.9 % (ref 35–47)
HCT VFR BLD AUTO: 25.2 % (ref 35–47)
HCT VFR BLD AUTO: 26.9 % (ref 35–47)
HDLC SERPL-MCNC: 56 MG/DL
HGB BLD-MCNC: 6.1 G/DL (ref 11.5–16)
HGB BLD-MCNC: 6.7 G/DL (ref 11.5–16)
HGB BLD-MCNC: 6.8 G/DL (ref 11.5–16)
HGB BLD-MCNC: 6.8 G/DL (ref 11.5–16)
HGB BLD-MCNC: 6.9 G/DL (ref 11.5–16)
HGB BLD-MCNC: 7.1 G/DL (ref 11.5–16)
HGB BLD-MCNC: 7.2 G/DL (ref 11.5–16)
HGB BLD-MCNC: 7.3 G/DL (ref 11.5–16)
HGB BLD-MCNC: 7.4 G/DL (ref 11.5–16)
HGB BLD-MCNC: 7.4 G/DL (ref 11.5–16)
HGB BLD-MCNC: 7.8 G/DL (ref 11.5–16)
HGB BLD-MCNC: 8 G/DL (ref 11.5–16)
HGB BLD-MCNC: 8.4 G/DL (ref 11.5–16)
HGB BLD-MCNC: 8.4 G/DL (ref 11.5–16)
HGB UR QL STRIP: NEGATIVE
HIGH TARGET, HITG: 130 MG/DL
IMM GRANULOCYTES # BLD AUTO: 0.1 K/UL (ref 0–0.04)
IMM GRANULOCYTES # BLD AUTO: 0.2 K/UL (ref 0–0.04)
IMM GRANULOCYTES NFR BLD AUTO: 1 % (ref 0–0.5)
INR PPP: 1.1 (ref 0.9–1.1)
INR PPP: 1.2 (ref 0.9–1.1)
INSPIRATION.DURATION SETTING TIME VENT: 0.98 SEC
INSPIRATION.DURATION SETTING TIME VENT: 0.98 SEC
INSULIN ADMINSTERED, INSADM: 5.6 UNITS/HOUR
INSULIN ADMINSTERED, INSADM: 7.6 UNITS/HOUR
INSULIN ADMINSTERED, INSADM: 7.7 UNITS/HOUR
INSULIN ORDER, INSORD: 5.6 UNITS/HOUR
INSULIN ORDER, INSORD: 7.6 UNITS/HOUR
INSULIN ORDER, INSORD: 7.7 UNITS/HOUR
INTERPRETATION, 910389: NORMAL
INTERPRETATION: NORMAL
IRON SATN MFR SERPL: 4 % (ref 20–50)
IRON SATN MFR SERPL: 6 % (ref 20–50)
IRON SATN MFR SERPL: 8 % (ref 20–50)
IRON SERPL-MCNC: 11 UG/DL (ref 35–150)
IRON SERPL-MCNC: 17 UG/DL (ref 35–150)
IRON SERPL-MCNC: 22 UG/DL (ref 35–150)
KETONES UR QL STRIP.AUTO: NEGATIVE MG/DL
LACTATE SERPL-SCNC: 0.6 MMOL/L (ref 0.4–2)
LACTATE SERPL-SCNC: 0.6 MMOL/L (ref 0.4–2)
LACTATE SERPL-SCNC: 0.7 MMOL/L (ref 0.4–2)
LACTATE SERPL-SCNC: 1 MMOL/L (ref 0.4–2)
LACTATE SERPL-SCNC: 1 MMOL/L (ref 0.4–2)
LACTATE SERPL-SCNC: 1.7 MMOL/L (ref 0.4–2)
LDH SERPL L TO P-CCNC: 410 U/L (ref 81–246)
LDH SERPL L TO P-CCNC: 425 U/L (ref 81–246)
LDH SERPL L TO P-CCNC: 467 U/L (ref 81–246)
LDH SERPL L TO P-CCNC: 529 U/L (ref 81–246)
LDH SERPL L TO P-CCNC: 582 U/L (ref 81–246)
LDH SERPL L TO P-CCNC: 604 U/L (ref 81–246)
LDLC SERPL CALC-MCNC: 50 MG/DL (ref 0–99)
LEUKOCYTE ESTERASE UR QL STRIP.AUTO: ABNORMAL
LOW TARGET, LOT: 95 MG/DL
LYMPHOCYTES # BLD: 0.4 K/UL (ref 0.8–3.5)
LYMPHOCYTES # BLD: 0.4 K/UL (ref 0.8–3.5)
LYMPHOCYTES # BLD: 0.6 K/UL (ref 0.8–3.5)
LYMPHOCYTES # BLD: 0.6 K/UL (ref 0.8–3.5)
LYMPHOCYTES NFR BLD: 2 % (ref 12–49)
LYMPHOCYTES NFR BLD: 3 % (ref 12–49)
LYMPHOCYTES NFR BLD: 3 % (ref 12–49)
LYMPHOCYTES NFR BLD: 4 % (ref 12–49)
MAGNESIUM SERPL-MCNC: 1.9 MG/DL (ref 1.6–2.4)
MAGNESIUM SERPL-MCNC: 2.1 MG/DL (ref 1.6–2.4)
MAGNESIUM SERPL-MCNC: 2.2 MG/DL (ref 1.6–2.4)
MAGNESIUM SERPL-MCNC: 2.3 MG/DL (ref 1.6–2.4)
MAGNESIUM SERPL-MCNC: 2.3 MG/DL (ref 1.6–2.4)
MCH RBC QN AUTO: 20.6 PG (ref 26–34)
MCH RBC QN AUTO: 20.6 PG (ref 26–34)
MCH RBC QN AUTO: 22.1 PG (ref 26–34)
MCH RBC QN AUTO: 22.3 PG (ref 26–34)
MCH RBC QN AUTO: 22.5 PG (ref 26–34)
MCH RBC QN AUTO: 23.1 PG (ref 26–34)
MCH RBC QN AUTO: 23.3 PG (ref 26–34)
MCH RBC QN AUTO: 23.7 PG (ref 26–34)
MCH RBC QN AUTO: 24.4 PG (ref 26–34)
MCHC RBC AUTO-ENTMCNC: 30.1 G/DL (ref 30–36.5)
MCHC RBC AUTO-ENTMCNC: 30.2 G/DL (ref 30–36.5)
MCHC RBC AUTO-ENTMCNC: 30.6 G/DL (ref 30–36.5)
MCHC RBC AUTO-ENTMCNC: 31.2 G/DL (ref 30–36.5)
MCHC RBC AUTO-ENTMCNC: 31.5 G/DL (ref 30–36.5)
MCHC RBC AUTO-ENTMCNC: 31.6 G/DL (ref 30–36.5)
MCHC RBC AUTO-ENTMCNC: 32.5 G/DL (ref 30–36.5)
MCHC RBC AUTO-ENTMCNC: 32.9 G/DL (ref 30–36.5)
MCHC RBC AUTO-ENTMCNC: 33.3 G/DL (ref 30–36.5)
MCV RBC AUTO: 67.2 FL (ref 80–99)
MCV RBC AUTO: 68.2 FL (ref 80–99)
MCV RBC AUTO: 68.5 FL (ref 80–99)
MCV RBC AUTO: 71 FL (ref 80–99)
MCV RBC AUTO: 71.5 FL (ref 80–99)
MCV RBC AUTO: 71.5 FL (ref 80–99)
MCV RBC AUTO: 72.8 FL (ref 80–99)
MCV RBC AUTO: 73.3 FL (ref 80–99)
MCV RBC AUTO: 78.2 FL (ref 80–99)
MINUTES UNTIL NEXT BG, NBG: 60 MIN
MONOCYTES # BLD: 0.6 K/UL (ref 0–1)
MONOCYTES # BLD: 0.7 K/UL (ref 0–1)
MONOCYTES # BLD: 0.7 K/UL (ref 0–1)
MONOCYTES # BLD: 1 K/UL (ref 0–1)
MONOCYTES NFR BLD: 3 % (ref 5–13)
MONOCYTES NFR BLD: 4 % (ref 5–13)
MONOCYTES NFR BLD: 5 % (ref 5–13)
MONOCYTES NFR BLD: 5 % (ref 5–13)
MULTIPLIER, MUL: 0.03
MULTIPLIER, MUL: 0.04
MULTIPLIER, MUL: 0.05
NEUTS SEG # BLD: 12.7 K/UL (ref 1.8–8)
NEUTS SEG # BLD: 14.6 K/UL (ref 1.8–8)
NEUTS SEG # BLD: 18.4 K/UL (ref 1.8–8)
NEUTS SEG # BLD: 20.6 K/UL (ref 1.8–8)
NEUTS SEG NFR BLD: 91 % (ref 32–75)
NEUTS SEG NFR BLD: 94 % (ref 32–75)
NITRITE UR QL STRIP.AUTO: NEGATIVE
NRBC # BLD: 0 K/UL (ref 0–0.01)
NRBC # BLD: 0.02 K/UL (ref 0–0.01)
NRBC # BLD: 0.03 K/UL (ref 0–0.01)
NRBC # BLD: 0.05 K/UL (ref 0–0.01)
NRBC # BLD: 0.05 K/UL (ref 0–0.01)
NRBC # BLD: 0.06 K/UL (ref 0–0.01)
NRBC # BLD: 0.07 K/UL (ref 0–0.01)
NRBC BLD-RTO: 0 PER 100 WBC
NRBC BLD-RTO: 0.1 PER 100 WBC
NRBC BLD-RTO: 0.2 PER 100 WBC
NRBC BLD-RTO: 0.2 PER 100 WBC
NRBC BLD-RTO: 0.3 PER 100 WBC
NRBC BLD-RTO: 0.3 PER 100 WBC
NRBC BLD-RTO: 0.4 PER 100 WBC
NRBC BLD-RTO: 0.5 PER 100 WBC
NRBC BLD-RTO: 0.6 PER 100 WBC
O2/TOTAL GAS SETTING VFR VENT: 100 %
O2/TOTAL GAS SETTING VFR VENT: 50 %
O2/TOTAL GAS SETTING VFR VENT: 60 %
O2/TOTAL GAS SETTING VFR VENT: 80 %
O2/TOTAL GAS SETTING VFR VENT: 80 %
ORDER INITIALS, ORDINIT: NORMAL
P-R INTERVAL, ECG05: 218 MS
PCO2 BLD: 20 MMHG (ref 35–45)
PCO2 BLD: 20.8 MMHG (ref 35–45)
PCO2 BLD: 24.4 MMHG (ref 35–45)
PCO2 BLD: 25.8 MMHG (ref 35–45)
PCO2 BLD: 28.8 MMHG (ref 35–45)
PCO2 BLD: 29.5 MMHG (ref 35–45)
PCO2 BLD: 31.4 MMHG (ref 35–45)
PCO2 BLD: 31.7 MMHG (ref 35–45)
PCO2 BLD: 33.4 MMHG (ref 35–45)
PCO2 BLD: 35.4 MMHG (ref 35–45)
PCO2 BLD: 36 MMHG (ref 35–45)
PCO2 BLD: 36.6 MMHG (ref 35–45)
PCO2 BLD: 37.8 MMHG (ref 35–45)
PCO2 BLD: 40.5 MMHG (ref 35–45)
PCO2 BLD: 41.9 MMHG (ref 35–45)
PCO2 BLD: 46.9 MMHG (ref 35–45)
PCO2 BLD: 50.9 MMHG (ref 35–45)
PCO2 BLD: 55.6 MMHG (ref 35–45)
PCO2 BLDV: 32.7 MMHG (ref 41–51)
PCO2 BLDV: 34.1 MMHG (ref 41–51)
PCO2 BLDV: 36.1 MMHG (ref 41–51)
PCO2 BLDV: 36.2 MMHG (ref 41–51)
PCO2 BLDV: 37 MMHG (ref 41–51)
PCO2 BLDV: 37.6 MMHG (ref 41–51)
PCO2 BLDV: 45.8 MMHG (ref 41–51)
PDF IMAGE, 910387: NORMAL
PEEP RESPIRATORY: 10 CMH2O
PEEP RESPIRATORY: 5 CMH2O
PEEP RESPIRATORY: 6 CMH2O
PEEP RESPIRATORY: 8 CMH2O
PERIPHERAL SMEAR,PSM: NORMAL
PH BLD: 7.24 [PH] (ref 7.35–7.45)
PH BLD: 7.26 [PH] (ref 7.35–7.45)
PH BLD: 7.29 [PH] (ref 7.35–7.45)
PH BLD: 7.3 [PH] (ref 7.35–7.45)
PH BLD: 7.31 [PH] (ref 7.35–7.45)
PH BLD: 7.34 [PH] (ref 7.35–7.45)
PH BLD: 7.35 [PH] (ref 7.35–7.45)
PH BLD: 7.35 [PH] (ref 7.35–7.45)
PH BLD: 7.36 [PH] (ref 7.35–7.45)
PH BLD: 7.37 [PH] (ref 7.35–7.45)
PH BLD: 7.37 [PH] (ref 7.35–7.45)
PH BLD: 7.38 [PH] (ref 7.35–7.45)
PH BLD: 7.39 [PH] (ref 7.35–7.45)
PH BLD: 7.41 [PH] (ref 7.35–7.45)
PH BLD: 7.41 [PH] (ref 7.35–7.45)
PH BLD: 7.44 [PH] (ref 7.35–7.45)
PH BLD: 7.44 [PH] (ref 7.35–7.45)
PH BLD: 7.46 [PH] (ref 7.35–7.45)
PH BLDV: 7.28 [PH] (ref 7.32–7.42)
PH BLDV: 7.32 [PH] (ref 7.32–7.42)
PH BLDV: 7.35 [PH] (ref 7.32–7.42)
PH BLDV: 7.36 [PH] (ref 7.32–7.42)
PH BLDV: 7.39 [PH] (ref 7.32–7.42)
PH UR STRIP: 5 [PH] (ref 5–8)
PHOSPHATE SERPL-MCNC: 5.3 MG/DL (ref 2.6–4.7)
PHOSPHATE SERPL-MCNC: 5.4 MG/DL (ref 2.6–4.7)
PHOSPHATE SERPL-MCNC: 5.7 MG/DL (ref 2.6–4.7)
PHOSPHATE SERPL-MCNC: 6.1 MG/DL (ref 2.6–4.7)
PHOSPHATE SERPL-MCNC: 6.2 MG/DL (ref 2.6–4.7)
PIP ISTAT,IPIP: 18
PIP ISTAT,IPIP: 26
PIP ISTAT,IPIP: 26
PLATELET # BLD AUTO: 122 K/UL (ref 150–400)
PLATELET # BLD AUTO: 128 K/UL (ref 150–400)
PLATELET # BLD AUTO: 129 K/UL (ref 150–400)
PLATELET # BLD AUTO: 137 K/UL (ref 150–400)
PLATELET # BLD AUTO: 154 K/UL (ref 150–400)
PLATELET # BLD AUTO: 235 K/UL (ref 150–400)
PLATELET # BLD AUTO: 54 K/UL (ref 150–400)
PLATELET # BLD AUTO: 72 K/UL (ref 150–400)
PLATELET # BLD AUTO: 78 K/UL (ref 150–400)
PLATELET # BLD AUTO: 86 K/UL (ref 150–400)
PLATELET COMMENTS,PCOM: ABNORMAL
PO2 BLD: 131 MMHG (ref 80–100)
PO2 BLD: 141 MMHG (ref 80–100)
PO2 BLD: 159 MMHG (ref 80–100)
PO2 BLD: 180 MMHG (ref 80–100)
PO2 BLD: 199 MMHG (ref 80–100)
PO2 BLD: 253 MMHG (ref 80–100)
PO2 BLD: 308 MMHG (ref 80–100)
PO2 BLD: 41 MMHG (ref 80–100)
PO2 BLD: 51 MMHG (ref 80–100)
PO2 BLD: 55 MMHG (ref 80–100)
PO2 BLD: 56 MMHG (ref 80–100)
PO2 BLD: 57 MMHG (ref 80–100)
PO2 BLD: 63 MMHG (ref 80–100)
PO2 BLD: 65 MMHG (ref 80–100)
PO2 BLD: 73 MMHG (ref 80–100)
PO2 BLD: 73 MMHG (ref 80–100)
PO2 BLD: 77 MMHG (ref 80–100)
PO2 BLD: 86 MMHG (ref 80–100)
PO2 BLDV: 31 MMHG (ref 25–40)
PO2 BLDV: 34 MMHG (ref 25–40)
PO2 BLDV: 34 MMHG (ref 25–40)
PO2 BLDV: 35 MMHG (ref 25–40)
PO2 BLDV: 39 MMHG (ref 25–40)
PO2 BLDV: 40 MMHG (ref 25–40)
POTASSIUM SERPL-SCNC: 2.9 MMOL/L (ref 3.5–5.1)
POTASSIUM SERPL-SCNC: 3 MMOL/L (ref 3.5–5.1)
POTASSIUM SERPL-SCNC: 3.2 MMOL/L (ref 3.5–5.1)
POTASSIUM SERPL-SCNC: 3.5 MMOL/L (ref 3.5–5.1)
POTASSIUM SERPL-SCNC: 3.7 MMOL/L (ref 3.5–5.1)
POTASSIUM SERPL-SCNC: 3.7 MMOL/L (ref 3.5–5.1)
POTASSIUM SERPL-SCNC: 3.9 MMOL/L (ref 3.5–5.1)
POTASSIUM SERPL-SCNC: 4.1 MMOL/L (ref 3.5–5.1)
POTASSIUM SERPL-SCNC: 4.1 MMOL/L (ref 3.5–5.1)
POTASSIUM SERPL-SCNC: 4.2 MMOL/L (ref 3.5–5.1)
POTASSIUM SERPL-SCNC: 4.2 MMOL/L (ref 3.5–5.1)
POTASSIUM SERPL-SCNC: 4.4 MMOL/L (ref 3.5–5.1)
POTASSIUM SERPL-SCNC: 4.5 MMOL/L (ref 3.5–5.1)
POTASSIUM SERPL-SCNC: 4.9 MMOL/L (ref 3.5–5.1)
POTASSIUM SERPL-SCNC: 4.9 MMOL/L (ref 3.5–5.2)
POTASSIUM SERPL-SCNC: 5.6 MMOL/L (ref 3.5–5.1)
PRESSURE SUPPORT SETTING VENT: 10 CMH2O
PRESSURE SUPPORT SETTING VENT: 6 CMH2O
PROT SERPL-MCNC: 5.6 G/DL (ref 6.4–8.2)
PROT SERPL-MCNC: 5.7 G/DL (ref 6.4–8.2)
PROT SERPL-MCNC: 5.8 G/DL (ref 6.4–8.2)
PROT SERPL-MCNC: 5.9 G/DL (ref 6.4–8.2)
PROT SERPL-MCNC: 5.9 G/DL (ref 6.4–8.2)
PROT SERPL-MCNC: 6.2 G/DL (ref 6.4–8.2)
PROT SERPL-MCNC: 7.1 G/DL (ref 6.4–8.2)
PROT SERPL-MCNC: 7.2 G/DL (ref 6–8.5)
PROT SERPL-MCNC: 7.3 G/DL (ref 6.4–8.2)
PROT UR STRIP-MCNC: NEGATIVE MG/DL
PROTHROMBIN TIME: 11 SEC (ref 9–11.1)
PROTHROMBIN TIME: 11.6 SEC (ref 9–11.1)
PROTHROMBIN TIME: 11.7 SEC (ref 9–11.1)
PROTHROMBIN TIME: 11.8 SEC (ref 9–11.1)
PROTHROMBIN TIME: 11.9 SEC (ref 9–11.1)
PROTHROMBIN TIME: 12.4 SEC (ref 9–11.1)
Q-T INTERVAL, ECG07: 444 MS
QRS DURATION, ECG06: 124 MS
QTC CALCULATION (BEZET), ECG08: 461 MS
RBC # BLD AUTO: 2.75 M/UL (ref 3.8–5.2)
RBC # BLD AUTO: 2.96 M/UL (ref 3.8–5.2)
RBC # BLD AUTO: 3.05 M/UL (ref 3.8–5.2)
RBC # BLD AUTO: 3.07 M/UL (ref 3.8–5.2)
RBC # BLD AUTO: 3.08 M/UL (ref 3.8–5.2)
RBC # BLD AUTO: 3.44 M/UL (ref 3.8–5.2)
RBC # BLD AUTO: 3.47 M/UL (ref 3.8–5.2)
RBC # BLD AUTO: 3.49 M/UL (ref 3.8–5.2)
RBC # BLD AUTO: 3.55 M/UL (ref 3.8–5.2)
RBC #/AREA URNS HPF: ABNORMAL /HPF (ref 0–5)
RBC MORPH BLD: ABNORMAL
SAO2 % BLD: 100 % (ref 92–97)
SAO2 % BLD: 76 % (ref 92–97)
SAO2 % BLD: 87 % (ref 92–97)
SAO2 % BLD: 87 % (ref 92–97)
SAO2 % BLD: 89 % (ref 92–97)
SAO2 % BLD: 90 % (ref 92–97)
SAO2 % BLD: 91 % (ref 92–97)
SAO2 % BLD: 91 % (ref 92–97)
SAO2 % BLD: 92 % (ref 92–97)
SAO2 % BLD: 94 % (ref 92–97)
SAO2 % BLD: 94 % (ref 92–97)
SAO2 % BLD: 97 % (ref 92–97)
SAO2 % BLD: 99 % (ref 92–97)
SAO2 % BLDV: 57 % (ref 65–88)
SAO2 % BLDV: 60 % (ref 65–88)
SAO2 % BLDV: 63 % (ref 65–88)
SAO2 % BLDV: 64 % (ref 65–88)
SAO2 % BLDV: 71 % (ref 65–88)
SAO2 % BLDV: 72 % (ref 65–88)
SERVICE CMNT-IMP: ABNORMAL
SERVICE CMNT-IMP: NORMAL
SODIUM SERPL-SCNC: 134 MMOL/L (ref 136–145)
SODIUM SERPL-SCNC: 135 MMOL/L (ref 136–145)
SODIUM SERPL-SCNC: 137 MMOL/L (ref 136–145)
SODIUM SERPL-SCNC: 140 MMOL/L (ref 136–145)
SODIUM SERPL-SCNC: 141 MMOL/L (ref 134–144)
SODIUM SERPL-SCNC: 141 MMOL/L (ref 136–145)
SODIUM SERPL-SCNC: 146 MMOL/L (ref 136–145)
SODIUM SERPL-SCNC: 146 MMOL/L (ref 136–145)
SODIUM SERPL-SCNC: 147 MMOL/L (ref 136–145)
SODIUM SERPL-SCNC: 148 MMOL/L (ref 136–145)
SODIUM SERPL-SCNC: 149 MMOL/L (ref 136–145)
SODIUM SERPL-SCNC: 150 MMOL/L (ref 136–145)
SP GR UR REFRACTOMETRY: 1.01 (ref 1–1.03)
SPECIMEN EXP DATE BLD: NORMAL
SPECIMEN TYPE: ABNORMAL
STATUS OF UNIT,%ST: NORMAL
THERAPEUTIC RANGE,PTTT: ABNORMAL SECS (ref 58–77)
THERAPEUTIC RANGE,PTTT: NORMAL SECS (ref 58–77)
TIBC SERPL-MCNC: 248 UG/DL (ref 250–450)
TIBC SERPL-MCNC: 274 UG/DL (ref 250–450)
TIBC SERPL-MCNC: 276 UG/DL (ref 250–450)
TOTAL RESP. RATE, ITRR: 18
TOTAL RESP. RATE, ITRR: 19
TOTAL RESP. RATE, ITRR: 20
TOTAL RESP. RATE, ITRR: 21
TOTAL RESP. RATE, ITRR: 22
TRIGL SERPL-MCNC: 115 MG/DL (ref 0–149)
TROPONIN I SERPL-MCNC: <0.05 NG/ML
TROPONIN I SERPL-MCNC: <0.05 NG/ML
TSH SERPL DL<=0.05 MIU/L-ACNC: 0.29 UIU/ML (ref 0.36–3.74)
UNIT DIVISION, %UDIV: 0
UR CULT HOLD, URHOLD: NORMAL
UROBILINOGEN UR QL STRIP.AUTO: 0.2 EU/DL (ref 0.2–1)
VANCOMYCIN SERPL-MCNC: 14.6 UG/ML
VENTILATION MODE VENT: ABNORMAL
VENTRICULAR RATE, ECG03: 65 BPM
VIT B12 SERPL-MCNC: 1202 PG/ML (ref 193–986)
VLDLC SERPL CALC-MCNC: 23 MG/DL (ref 5–40)
VOLUME CONTROL IVLC: YES
VOLUME CONTROL IVLC: YES
VT SETTING VENT: 380 ML
VT SETTING VENT: 450 ML
WBC # BLD AUTO: 11.3 K/UL (ref 3.6–11)
WBC # BLD AUTO: 13.9 K/UL (ref 3.6–11)
WBC # BLD AUTO: 14 K/UL (ref 3.6–11)
WBC # BLD AUTO: 15.7 K/UL (ref 3.6–11)
WBC # BLD AUTO: 16 K/UL (ref 3.6–11)
WBC # BLD AUTO: 16.5 K/UL (ref 3.6–11)
WBC # BLD AUTO: 18.3 K/UL (ref 3.6–11)
WBC # BLD AUTO: 20.2 K/UL (ref 3.6–11)
WBC # BLD AUTO: 21.9 K/UL (ref 3.6–11)
WBC URNS QL MICRO: ABNORMAL /HPF (ref 0–4)
YEAST BUDDING URNS QL: PRESENT

## 2019-01-01 PROCEDURE — 74011250636 HC RX REV CODE- 250/636

## 2019-01-01 PROCEDURE — 85018 HEMOGLOBIN: CPT

## 2019-01-01 PROCEDURE — 36415 COLL VENOUS BLD VENIPUNCTURE: CPT

## 2019-01-01 PROCEDURE — 77030008683 HC TU ET CUF COVD -A

## 2019-01-01 PROCEDURE — 93005 ELECTROCARDIOGRAM TRACING: CPT

## 2019-01-01 PROCEDURE — 85610 PROTHROMBIN TIME: CPT

## 2019-01-01 PROCEDURE — 80053 COMPREHEN METABOLIC PANEL: CPT

## 2019-01-01 PROCEDURE — 74011000250 HC RX REV CODE- 250: Performed by: THORACIC SURGERY (CARDIOTHORACIC VASCULAR SURGERY)

## 2019-01-01 PROCEDURE — 85027 COMPLETE CBC AUTOMATED: CPT

## 2019-01-01 PROCEDURE — 65610000003 HC RM ICU SURGICAL

## 2019-01-01 PROCEDURE — 85730 THROMBOPLASTIN TIME PARTIAL: CPT

## 2019-01-01 PROCEDURE — 74011250636 HC RX REV CODE- 250/636: Performed by: ANESTHESIOLOGY

## 2019-01-01 PROCEDURE — 36600 WITHDRAWAL OF ARTERIAL BLOOD: CPT

## 2019-01-01 PROCEDURE — 74011250636 HC RX REV CODE- 250/636: Performed by: THORACIC SURGERY (CARDIOTHORACIC VASCULAR SURGERY)

## 2019-01-01 PROCEDURE — 74011000258 HC RX REV CODE- 258: Performed by: HOSPITALIST

## 2019-01-01 PROCEDURE — 74011000258 HC RX REV CODE- 258: Performed by: NURSE PRACTITIONER

## 2019-01-01 PROCEDURE — 74011250636 HC RX REV CODE- 250/636: Performed by: FAMILY MEDICINE

## 2019-01-01 PROCEDURE — 77030010937 HC CLP LIG BSC -I: Performed by: INTERNAL MEDICINE

## 2019-01-01 PROCEDURE — 83615 LACTATE (LD) (LDH) ENZYME: CPT

## 2019-01-01 PROCEDURE — 74011636637 HC RX REV CODE- 636/637: Performed by: THORACIC SURGERY (CARDIOTHORACIC VASCULAR SURGERY)

## 2019-01-01 PROCEDURE — 74011000250 HC RX REV CODE- 250: Performed by: NURSE PRACTITIONER

## 2019-01-01 PROCEDURE — 83010 ASSAY OF HAPTOGLOBIN QUANT: CPT

## 2019-01-01 PROCEDURE — 77010033678 HC OXYGEN DAILY

## 2019-01-01 PROCEDURE — 80069 RENAL FUNCTION PANEL: CPT

## 2019-01-01 PROCEDURE — 94760 N-INVAS EAR/PLS OXIMETRY 1: CPT

## 2019-01-01 PROCEDURE — 82803 BLOOD GASES ANY COMBINATION: CPT

## 2019-01-01 PROCEDURE — 84484 ASSAY OF TROPONIN QUANT: CPT

## 2019-01-01 PROCEDURE — 71045 X-RAY EXAM CHEST 1 VIEW: CPT

## 2019-01-01 PROCEDURE — C9113 INJ PANTOPRAZOLE SODIUM, VIA: HCPCS | Performed by: THORACIC SURGERY (CARDIOTHORACIC VASCULAR SURGERY)

## 2019-01-01 PROCEDURE — 83605 ASSAY OF LACTIC ACID: CPT

## 2019-01-01 PROCEDURE — 74011250636 HC RX REV CODE- 250/636: Performed by: HOSPITALIST

## 2019-01-01 PROCEDURE — 74011250637 HC RX REV CODE- 250/637: Performed by: INTERNAL MEDICINE

## 2019-01-01 PROCEDURE — 30233N1 TRANSFUSION OF NONAUTOLOGOUS RED BLOOD CELLS INTO PERIPHERAL VEIN, PERCUTANEOUS APPROACH: ICD-10-PCS | Performed by: EMERGENCY MEDICINE

## 2019-01-01 PROCEDURE — 5A1945Z RESPIRATORY VENTILATION, 24-96 CONSECUTIVE HOURS: ICD-10-PCS | Performed by: ANESTHESIOLOGY

## 2019-01-01 PROCEDURE — 82010 KETONE BODYS QUAN: CPT

## 2019-01-01 PROCEDURE — 74011000250 HC RX REV CODE- 250: Performed by: FAMILY MEDICINE

## 2019-01-01 PROCEDURE — 74011250637 HC RX REV CODE- 250/637: Performed by: THORACIC SURGERY (CARDIOTHORACIC VASCULAR SURGERY)

## 2019-01-01 PROCEDURE — 77010033711 HC HIGH FLOW OXYGEN

## 2019-01-01 PROCEDURE — 74011250636 HC RX REV CODE- 250/636: Performed by: NURSE PRACTITIONER

## 2019-01-01 PROCEDURE — 77030008477 HC STYL SATN SLP COVD -A

## 2019-01-01 PROCEDURE — 99284 EMERGENCY DEPT VISIT MOD MDM: CPT

## 2019-01-01 PROCEDURE — 83880 ASSAY OF NATRIURETIC PEPTIDE: CPT

## 2019-01-01 PROCEDURE — 74011000250 HC RX REV CODE- 250: Performed by: INTERNAL MEDICINE

## 2019-01-01 PROCEDURE — P9016 RBC LEUKOCYTES REDUCED: HCPCS

## 2019-01-01 PROCEDURE — 93306 TTE W/DOPPLER COMPLETE: CPT

## 2019-01-01 PROCEDURE — 76040000019: Performed by: INTERNAL MEDICINE

## 2019-01-01 PROCEDURE — 77030029065 HC DRSG HEMO QCLOT ZMED -B

## 2019-01-01 PROCEDURE — 74011250637 HC RX REV CODE- 250/637: Performed by: REGISTERED NURSE

## 2019-01-01 PROCEDURE — 65660000000 HC RM CCU STEPDOWN

## 2019-01-01 PROCEDURE — 94640 AIRWAY INHALATION TREATMENT: CPT

## 2019-01-01 PROCEDURE — 0BH17EZ INSERTION OF ENDOTRACHEAL AIRWAY INTO TRACHEA, VIA NATURAL OR ARTIFICIAL OPENING: ICD-10-PCS | Performed by: ANESTHESIOLOGY

## 2019-01-01 PROCEDURE — 82962 GLUCOSE BLOOD TEST: CPT

## 2019-01-01 PROCEDURE — 83735 ASSAY OF MAGNESIUM: CPT

## 2019-01-01 PROCEDURE — 77030019563 HC DEV ATTCH FEED HOLL -A

## 2019-01-01 PROCEDURE — 86880 COOMBS TEST DIRECT: CPT

## 2019-01-01 PROCEDURE — 74011000250 HC RX REV CODE- 250

## 2019-01-01 PROCEDURE — 96374 THER/PROPH/DIAG INJ IV PUSH: CPT

## 2019-01-01 PROCEDURE — P9040 RBC LEUKOREDUCED IRRADIATED: HCPCS

## 2019-01-01 PROCEDURE — 77030034850

## 2019-01-01 PROCEDURE — 77030008771 HC TU NG SALEM SUMP -A

## 2019-01-01 PROCEDURE — 74011250636 HC RX REV CODE- 250/636: Performed by: INTERNAL MEDICINE

## 2019-01-01 PROCEDURE — 80202 ASSAY OF VANCOMYCIN: CPT

## 2019-01-01 PROCEDURE — 84100 ASSAY OF PHOSPHORUS: CPT

## 2019-01-01 PROCEDURE — 74011250637 HC RX REV CODE- 250/637: Performed by: FAMILY MEDICINE

## 2019-01-01 PROCEDURE — 84132 ASSAY OF SERUM POTASSIUM: CPT

## 2019-01-01 PROCEDURE — 74011250636 HC RX REV CODE- 250/636: Performed by: PHYSICIAN ASSISTANT

## 2019-01-01 PROCEDURE — 77030008713 HC TU FEED GASTMY CRPK -B

## 2019-01-01 PROCEDURE — 74011000250 HC RX REV CODE- 250: Performed by: PHYSICIAN ASSISTANT

## 2019-01-01 PROCEDURE — 0W3P8ZZ CONTROL BLEEDING IN GASTROINTESTINAL TRACT, VIA NATURAL OR ARTIFICIAL OPENING ENDOSCOPIC: ICD-10-PCS | Performed by: INTERNAL MEDICINE

## 2019-01-01 PROCEDURE — 86022 PLATELET ANTIBODIES: CPT

## 2019-01-01 PROCEDURE — 86923 COMPATIBILITY TEST ELECTRIC: CPT

## 2019-01-01 PROCEDURE — 77030014008 HC SPNG HEMSTAT J&J -C

## 2019-01-01 PROCEDURE — 31500 INSERT EMERGENCY AIRWAY: CPT

## 2019-01-01 PROCEDURE — 36430 TRANSFUSION BLD/BLD COMPNT: CPT

## 2019-01-01 PROCEDURE — 74011636637 HC RX REV CODE- 636/637: Performed by: FAMILY MEDICINE

## 2019-01-01 PROCEDURE — 94002 VENT MGMT INPAT INIT DAY: CPT

## 2019-01-01 PROCEDURE — 87040 BLOOD CULTURE FOR BACTERIA: CPT

## 2019-01-01 PROCEDURE — 74011000250 HC RX REV CODE- 250: Performed by: HOSPITALIST

## 2019-01-01 PROCEDURE — 94003 VENT MGMT INPAT SUBQ DAY: CPT

## 2019-01-01 PROCEDURE — 93312 ECHO TRANSESOPHAGEAL: CPT

## 2019-01-01 PROCEDURE — 86900 BLOOD TYPING SEROLOGIC ABO: CPT

## 2019-01-01 PROCEDURE — 77030029684 HC NEB SM VOL KT MONA -A

## 2019-01-01 PROCEDURE — 85025 COMPLETE CBC W/AUTO DIFF WBC: CPT

## 2019-01-01 PROCEDURE — 77030018798 HC PMP KT ENTRL FED COVD -A

## 2019-01-01 PROCEDURE — 83540 ASSAY OF IRON: CPT

## 2019-01-01 PROCEDURE — 02HP32Z INSERTION OF MONITORING DEVICE INTO PULMONARY TRUNK, PERCUTANEOUS APPROACH: ICD-10-PCS | Performed by: ANESTHESIOLOGY

## 2019-01-01 PROCEDURE — 74011250637 HC RX REV CODE- 250/637: Performed by: HOSPITALIST

## 2019-01-01 PROCEDURE — 74011636637 HC RX REV CODE- 636/637: Performed by: NURSE PRACTITIONER

## 2019-01-01 PROCEDURE — 76770 US EXAM ABDO BACK WALL COMP: CPT

## 2019-01-01 PROCEDURE — 83036 HEMOGLOBIN GLYCOSYLATED A1C: CPT

## 2019-01-01 PROCEDURE — 5A1935Z RESPIRATORY VENTILATION, LESS THAN 24 CONSECUTIVE HOURS: ICD-10-PCS | Performed by: THORACIC SURGERY (CARDIOTHORACIC VASCULAR SURGERY)

## 2019-01-01 PROCEDURE — 81001 URINALYSIS AUTO W/SCOPE: CPT

## 2019-01-01 PROCEDURE — 85049 AUTOMATED PLATELET COUNT: CPT

## 2019-01-01 PROCEDURE — 77030013140 HC MSK NEB VYRM -A

## 2019-01-01 PROCEDURE — 77030013169 SET IV BLD ICUM -A

## 2019-01-01 PROCEDURE — 82542 COL CHROMOTOGRAPHY QUAL/QUAN: CPT

## 2019-01-01 PROCEDURE — 82728 ASSAY OF FERRITIN: CPT

## 2019-01-01 PROCEDURE — 93308 TTE F-UP OR LMTD: CPT

## 2019-01-01 PROCEDURE — 85384 FIBRINOGEN ACTIVITY: CPT

## 2019-01-01 PROCEDURE — C1751 CATH, INF, PER/CENT/MIDLINE: HCPCS

## 2019-01-01 PROCEDURE — 82607 VITAMIN B-12: CPT

## 2019-01-01 PROCEDURE — 5A09357 ASSISTANCE WITH RESPIRATORY VENTILATION, LESS THAN 24 CONSECUTIVE HOURS, CONTINUOUS POSITIVE AIRWAY PRESSURE: ICD-10-PCS | Performed by: THORACIC SURGERY (CARDIOTHORACIC VASCULAR SURGERY)

## 2019-01-01 PROCEDURE — 82533 TOTAL CORTISOL: CPT

## 2019-01-01 PROCEDURE — 71046 X-RAY EXAM CHEST 2 VIEWS: CPT

## 2019-01-01 PROCEDURE — 77030012879 HC MSK CPAP FLL FAC PHIL -B

## 2019-01-01 PROCEDURE — 77030026918 HC ADMN ST IV BLD BD -A

## 2019-01-01 PROCEDURE — 77030020291 HC FLEXSEAL FMS BMS -C

## 2019-01-01 PROCEDURE — 94660 CPAP INITIATION&MGMT: CPT

## 2019-01-01 PROCEDURE — 74011250636 HC RX REV CODE- 250/636: Performed by: EMERGENCY MEDICINE

## 2019-01-01 PROCEDURE — 84443 ASSAY THYROID STIM HORMONE: CPT

## 2019-01-01 PROCEDURE — 74011000258 HC RX REV CODE- 258: Performed by: FAMILY MEDICINE

## 2019-01-01 PROCEDURE — 74018 RADEX ABDOMEN 1 VIEW: CPT

## 2019-01-01 RX ORDER — NOREPINEPHRINE BITARTRATE/D5W 8 MG/250ML
2-16 PLASTIC BAG, INJECTION (ML) INTRAVENOUS
Status: DISCONTINUED | OUTPATIENT
Start: 2019-01-01 | End: 2019-01-01 | Stop reason: HOSPADM

## 2019-01-01 RX ORDER — SODIUM CHLORIDE 450 MG/100ML
100 INJECTION, SOLUTION INTRAVENOUS CONTINUOUS
Status: DISCONTINUED | OUTPATIENT
Start: 2019-01-01 | End: 2019-01-01

## 2019-01-01 RX ORDER — INSULIN LISPRO 100 [IU]/ML
INJECTION, SOLUTION INTRAVENOUS; SUBCUTANEOUS EVERY 6 HOURS
Status: DISCONTINUED | OUTPATIENT
Start: 2019-01-01 | End: 2019-01-01 | Stop reason: HOSPADM

## 2019-01-01 RX ORDER — SODIUM BICARBONATE 1 MEQ/ML
50 SYRINGE (ML) INTRAVENOUS ONCE
Status: COMPLETED | OUTPATIENT
Start: 2019-01-01 | End: 2019-01-01

## 2019-01-01 RX ORDER — ATORVASTATIN CALCIUM 20 MG/1
20 TABLET, FILM COATED ORAL DAILY
Status: DISCONTINUED | OUTPATIENT
Start: 2019-01-01 | End: 2019-01-01

## 2019-01-01 RX ORDER — SODIUM CHLORIDE 9 MG/ML
100 INJECTION, SOLUTION INTRAVENOUS CONTINUOUS
Status: DISPENSED | OUTPATIENT
Start: 2019-01-01 | End: 2019-01-01

## 2019-01-01 RX ORDER — NITROGLYCERIN 0.4 MG/1
0.4 TABLET SUBLINGUAL
Status: DISCONTINUED | OUTPATIENT
Start: 2019-01-01 | End: 2019-01-01 | Stop reason: HOSPADM

## 2019-01-01 RX ORDER — ATORVASTATIN CALCIUM 20 MG/1
20 TABLET, FILM COATED ORAL EVERY EVENING
COMMUNITY

## 2019-01-01 RX ORDER — AMIODARONE HYDROCHLORIDE 200 MG/1
200 TABLET ORAL EVERY EVENING
Status: DISCONTINUED | OUTPATIENT
Start: 2019-01-01 | End: 2019-01-01

## 2019-01-01 RX ORDER — SUCCINYLCHOLINE CHLORIDE 20 MG/ML
INJECTION INTRAMUSCULAR; INTRAVENOUS
Status: DISPENSED
Start: 2019-01-01 | End: 2019-01-01

## 2019-01-01 RX ORDER — SUCCINYLCHOLINE CHLORIDE 20 MG/ML
120 INJECTION INTRAMUSCULAR; INTRAVENOUS
Status: COMPLETED | OUTPATIENT
Start: 2019-01-01 | End: 2019-01-01

## 2019-01-01 RX ORDER — SODIUM BICARBONATE 650 MG/1
650 TABLET ORAL 2 TIMES DAILY
Status: DISCONTINUED | OUTPATIENT
Start: 2019-01-01 | End: 2019-01-01

## 2019-01-01 RX ORDER — SODIUM CHLORIDE 9 MG/ML
250 INJECTION, SOLUTION INTRAVENOUS AS NEEDED
Status: DISCONTINUED | OUTPATIENT
Start: 2019-01-01 | End: 2019-01-01

## 2019-01-01 RX ORDER — NALOXONE HYDROCHLORIDE 0.4 MG/ML
0.4 INJECTION, SOLUTION INTRAMUSCULAR; INTRAVENOUS; SUBCUTANEOUS
Status: ACTIVE | OUTPATIENT
Start: 2019-01-01 | End: 2019-01-01

## 2019-01-01 RX ORDER — VANCOMYCIN/0.9 % SOD CHLORIDE 1.5G/250ML
1500 PLASTIC BAG, INJECTION (ML) INTRAVENOUS ONCE
Status: COMPLETED | OUTPATIENT
Start: 2019-01-01 | End: 2019-01-01

## 2019-01-01 RX ORDER — BUMETANIDE 0.25 MG/ML
INJECTION INTRAMUSCULAR; INTRAVENOUS
Status: COMPLETED
Start: 2019-01-01 | End: 2019-01-01

## 2019-01-01 RX ORDER — SODIUM BICARBONATE 84 MG/ML
50 INJECTION, SOLUTION INTRAVENOUS
Status: DISCONTINUED | OUTPATIENT
Start: 2019-01-01 | End: 2019-01-01 | Stop reason: SDUPTHER

## 2019-01-01 RX ORDER — POTASSIUM CHLORIDE 29.8 MG/ML
20 INJECTION INTRAVENOUS
Status: COMPLETED | OUTPATIENT
Start: 2019-01-01 | End: 2019-01-01

## 2019-01-01 RX ORDER — SUCCINYLCHOLINE CHLORIDE 20 MG/ML
INJECTION INTRAMUSCULAR; INTRAVENOUS
Status: COMPLETED
Start: 2019-01-01 | End: 2019-01-01

## 2019-01-01 RX ORDER — PROPOFOL 10 MG/ML
INJECTION, EMULSION INTRAVENOUS
Status: COMPLETED
Start: 2019-01-01 | End: 2019-01-01

## 2019-01-01 RX ORDER — ACETAMINOPHEN 10 MG/ML
1000 INJECTION, SOLUTION INTRAVENOUS
Status: DISPENSED | OUTPATIENT
Start: 2019-01-01 | End: 2019-01-01

## 2019-01-01 RX ORDER — ROCURONIUM BROMIDE 10 MG/ML
0.6 INJECTION, SOLUTION INTRAVENOUS
Status: COMPLETED | OUTPATIENT
Start: 2019-01-01 | End: 2019-01-01

## 2019-01-01 RX ORDER — CALCIUM CHLORIDE INJECTION 100 MG/ML
1 INJECTION, SOLUTION INTRAVENOUS ONCE
Status: COMPLETED | OUTPATIENT
Start: 2019-01-01 | End: 2019-01-01

## 2019-01-01 RX ORDER — FUROSEMIDE 10 MG/ML
40 INJECTION INTRAMUSCULAR; INTRAVENOUS ONCE
Status: COMPLETED | OUTPATIENT
Start: 2019-01-01 | End: 2019-01-01

## 2019-01-01 RX ORDER — FOLIC ACID 1 MG/1
1 TABLET ORAL DAILY
Status: DISCONTINUED | OUTPATIENT
Start: 2019-01-01 | End: 2019-01-01 | Stop reason: HOSPADM

## 2019-01-01 RX ORDER — PHENYLEPHRINE HYDROCHLORIDE 10 MG/ML
INJECTION INTRAVENOUS
Status: DISPENSED
Start: 2019-01-01 | End: 2019-01-01

## 2019-01-01 RX ORDER — THERA TABS 400 MCG
1 TAB ORAL DAILY
COMMUNITY

## 2019-01-01 RX ORDER — SODIUM BICARBONATE 1 MEQ/ML
100 SYRINGE (ML) INTRAVENOUS ONCE
Status: COMPLETED | OUTPATIENT
Start: 2019-01-01 | End: 2019-01-01

## 2019-01-01 RX ORDER — SODIUM BICARBONATE 1 MEQ/ML
SYRINGE (ML) INTRAVENOUS
Status: COMPLETED
Start: 2019-01-01 | End: 2019-01-01

## 2019-01-01 RX ORDER — FENTANYL CITRATE 50 UG/ML
100 INJECTION, SOLUTION INTRAMUSCULAR; INTRAVENOUS
Status: ACTIVE | OUTPATIENT
Start: 2019-01-01 | End: 2019-01-01

## 2019-01-01 RX ORDER — SODIUM CHLORIDE 450 MG/100ML
10 INJECTION, SOLUTION INTRAVENOUS CONTINUOUS
Status: DISCONTINUED | OUTPATIENT
Start: 2019-01-01 | End: 2019-01-01 | Stop reason: HOSPADM

## 2019-01-01 RX ORDER — ATORVASTATIN CALCIUM 20 MG/1
20 TABLET, FILM COATED ORAL
Status: DISCONTINUED | OUTPATIENT
Start: 2019-01-01 | End: 2019-01-01

## 2019-01-01 RX ORDER — SODIUM CHLORIDE 9 MG/ML
9 INJECTION, SOLUTION INTRAVENOUS CONTINUOUS
Status: DISCONTINUED | OUTPATIENT
Start: 2019-01-01 | End: 2019-01-01 | Stop reason: HOSPADM

## 2019-01-01 RX ORDER — ATROPINE SULFATE 0.1 MG/ML
0.5 INJECTION INTRAVENOUS
Status: DISCONTINUED | OUTPATIENT
Start: 2019-01-01 | End: 2019-01-01

## 2019-01-01 RX ORDER — EPINEPHRINE 0.1 MG/ML
1 INJECTION INTRACARDIAC; INTRAVENOUS
Status: DISCONTINUED | OUTPATIENT
Start: 2019-01-01 | End: 2019-01-01

## 2019-01-01 RX ORDER — HYDROMORPHONE HYDROCHLORIDE 2 MG/ML
0.5 INJECTION, SOLUTION INTRAMUSCULAR; INTRAVENOUS; SUBCUTANEOUS
Status: DISCONTINUED | OUTPATIENT
Start: 2019-01-01 | End: 2019-01-01 | Stop reason: HOSPADM

## 2019-01-01 RX ORDER — MAGNESIUM SULFATE 1 G/100ML
1 INJECTION INTRAVENOUS ONCE
Status: COMPLETED | OUTPATIENT
Start: 2019-01-01 | End: 2019-01-01

## 2019-01-01 RX ORDER — DEXTROMETHORPHAN/PSEUDOEPHED 2.5-7.5/.8
1.2 DROPS ORAL
Status: DISCONTINUED | OUTPATIENT
Start: 2019-01-01 | End: 2019-01-01

## 2019-01-01 RX ORDER — INSULIN LISPRO 100 [IU]/ML
INJECTION, SOLUTION INTRAVENOUS; SUBCUTANEOUS
Status: DISCONTINUED | OUTPATIENT
Start: 2019-01-01 | End: 2019-01-01

## 2019-01-01 RX ORDER — AMLODIPINE BESYLATE 10 MG/1
TABLET ORAL
Qty: 90 TAB | Refills: 3 | Status: SHIPPED | OUTPATIENT
Start: 2019-01-01

## 2019-01-01 RX ORDER — GLIPIZIDE 5 MG/1
5 TABLET, FILM COATED, EXTENDED RELEASE ORAL
COMMUNITY

## 2019-01-01 RX ORDER — DEXTROSE 50 % IN WATER (D50W) INTRAVENOUS SYRINGE
25-50 AS NEEDED
Status: DISCONTINUED | OUTPATIENT
Start: 2019-01-01 | End: 2019-01-01 | Stop reason: HOSPADM

## 2019-01-01 RX ORDER — AMLODIPINE BESYLATE 5 MG/1
10 TABLET ORAL DAILY
Status: DISCONTINUED | OUTPATIENT
Start: 2019-01-01 | End: 2019-01-01

## 2019-01-01 RX ORDER — HEPARIN SODIUM 10000 [USP'U]/100ML
12-25 INJECTION, SOLUTION INTRAVENOUS
Status: DISCONTINUED | OUTPATIENT
Start: 2019-01-01 | End: 2019-01-01 | Stop reason: SDUPTHER

## 2019-01-01 RX ORDER — ONDANSETRON 2 MG/ML
4 INJECTION INTRAMUSCULAR; INTRAVENOUS
Status: DISCONTINUED | OUTPATIENT
Start: 2019-01-01 | End: 2019-01-01 | Stop reason: HOSPADM

## 2019-01-01 RX ORDER — SODIUM CHLORIDE 0.9 % (FLUSH) 0.9 %
5-40 SYRINGE (ML) INJECTION AS NEEDED
Status: DISCONTINUED | OUTPATIENT
Start: 2019-01-01 | End: 2019-01-01 | Stop reason: HOSPADM

## 2019-01-01 RX ORDER — ARFORMOTEROL TARTRATE 15 UG/2ML
15 SOLUTION RESPIRATORY (INHALATION)
Status: DISCONTINUED | OUTPATIENT
Start: 2019-01-01 | End: 2019-01-01 | Stop reason: HOSPADM

## 2019-01-01 RX ORDER — FUROSEMIDE 10 MG/ML
60 INJECTION INTRAMUSCULAR; INTRAVENOUS 2 TIMES DAILY
Status: DISCONTINUED | OUTPATIENT
Start: 2019-01-01 | End: 2019-01-01

## 2019-01-01 RX ORDER — DIPHENHYDRAMINE HYDROCHLORIDE 50 MG/ML
50 INJECTION, SOLUTION INTRAMUSCULAR; INTRAVENOUS ONCE
Status: ACTIVE | OUTPATIENT
Start: 2019-01-01 | End: 2019-01-01

## 2019-01-01 RX ORDER — MORPHINE SULFATE 2 MG/ML
2 INJECTION, SOLUTION INTRAMUSCULAR; INTRAVENOUS
Status: DISCONTINUED | OUTPATIENT
Start: 2019-01-01 | End: 2019-01-01 | Stop reason: SDUPTHER

## 2019-01-01 RX ORDER — BUMETANIDE 0.25 MG/ML
1 INJECTION INTRAMUSCULAR; INTRAVENOUS ONCE
Status: DISCONTINUED | OUTPATIENT
Start: 2019-01-01 | End: 2019-01-01

## 2019-01-01 RX ORDER — HEPARIN SODIUM 5000 [USP'U]/ML
5000 INJECTION, SOLUTION INTRAVENOUS; SUBCUTANEOUS EVERY 8 HOURS
Status: DISCONTINUED | OUTPATIENT
Start: 2019-01-01 | End: 2019-01-01

## 2019-01-01 RX ORDER — ETOMIDATE 2 MG/ML
10 INJECTION INTRAVENOUS ONCE
Status: COMPLETED | OUTPATIENT
Start: 2019-01-01 | End: 2019-01-01

## 2019-01-01 RX ORDER — GUAIFENESIN 100 MG/5ML
81 LIQUID (ML) ORAL DAILY
Status: DISCONTINUED | OUTPATIENT
Start: 2019-01-01 | End: 2019-01-01

## 2019-01-01 RX ORDER — INSULIN GLARGINE 100 [IU]/ML
1-50 INJECTION, SOLUTION SUBCUTANEOUS
Status: DISCONTINUED | OUTPATIENT
Start: 2019-01-01 | End: 2019-01-01 | Stop reason: HOSPADM

## 2019-01-01 RX ORDER — ETOMIDATE 2 MG/ML
INJECTION INTRAVENOUS
Status: COMPLETED
Start: 2019-01-01 | End: 2019-01-01

## 2019-01-01 RX ORDER — ETANERCEPT 50 MG/ML
50 SOLUTION SUBCUTANEOUS
COMMUNITY
Start: 2018-01-01

## 2019-01-01 RX ORDER — MIDAZOLAM HYDROCHLORIDE 1 MG/ML
.25-1 INJECTION, SOLUTION INTRAMUSCULAR; INTRAVENOUS
Status: ACTIVE | OUTPATIENT
Start: 2019-01-01 | End: 2019-01-01

## 2019-01-01 RX ORDER — HEPARIN SODIUM 1000 [USP'U]/ML
2000 INJECTION, SOLUTION INTRAVENOUS; SUBCUTANEOUS ONCE
Status: COMPLETED | OUTPATIENT
Start: 2019-01-01 | End: 2019-01-01

## 2019-01-01 RX ORDER — BUMETANIDE 0.25 MG/ML
2 INJECTION INTRAMUSCULAR; INTRAVENOUS EVERY 12 HOURS
Status: DISCONTINUED | OUTPATIENT
Start: 2019-01-01 | End: 2019-01-01

## 2019-01-01 RX ORDER — PROPOFOL 10 MG/ML
0-50 VIAL (ML) INTRAVENOUS
Status: DISCONTINUED | OUTPATIENT
Start: 2019-01-01 | End: 2019-01-01 | Stop reason: HOSPADM

## 2019-01-01 RX ORDER — SODIUM BICARBONATE 650 MG/1
1300 TABLET ORAL 3 TIMES DAILY
Status: DISCONTINUED | OUTPATIENT
Start: 2019-01-01 | End: 2019-01-01

## 2019-01-01 RX ORDER — POTASSIUM CHLORIDE 29.8 MG/ML
INJECTION INTRAVENOUS
Status: COMPLETED
Start: 2019-01-01 | End: 2019-01-01

## 2019-01-01 RX ORDER — SODIUM BICARBONATE 1 MEQ/ML
50 SYRINGE (ML) INTRAVENOUS
Status: COMPLETED | OUTPATIENT
Start: 2019-01-01 | End: 2019-01-01

## 2019-01-01 RX ORDER — MAGNESIUM SULFATE 100 %
4 CRYSTALS MISCELLANEOUS AS NEEDED
Status: DISCONTINUED | OUTPATIENT
Start: 2019-01-01 | End: 2019-01-01 | Stop reason: HOSPADM

## 2019-01-01 RX ORDER — NYSTATIN 100000 [USP'U]/G
POWDER TOPICAL 2 TIMES DAILY
Status: DISCONTINUED | OUTPATIENT
Start: 2019-01-01 | End: 2019-01-01 | Stop reason: HOSPADM

## 2019-01-01 RX ORDER — BALSAM PERU/CASTOR OIL
OINTMENT (GRAM) TOPICAL 2 TIMES DAILY
Status: DISCONTINUED | OUTPATIENT
Start: 2019-01-01 | End: 2019-01-01 | Stop reason: HOSPADM

## 2019-01-01 RX ORDER — POLYETHYLENE GLYCOL 3350 17 G/17G
17 POWDER, FOR SOLUTION ORAL DAILY
Status: DISCONTINUED | OUTPATIENT
Start: 2019-01-01 | End: 2019-01-01

## 2019-01-01 RX ORDER — INSULIN GLARGINE 100 [IU]/ML
10 INJECTION, SOLUTION SUBCUTANEOUS DAILY
Status: DISCONTINUED | OUTPATIENT
Start: 2019-01-01 | End: 2019-01-01

## 2019-01-01 RX ORDER — SODIUM CHLORIDE 0.9 % (FLUSH) 0.9 %
5-40 SYRINGE (ML) INJECTION EVERY 8 HOURS
Status: DISCONTINUED | OUTPATIENT
Start: 2019-01-01 | End: 2019-01-01 | Stop reason: HOSPADM

## 2019-01-01 RX ORDER — MIDAZOLAM HYDROCHLORIDE 1 MG/ML
INJECTION, SOLUTION INTRAMUSCULAR; INTRAVENOUS
Status: COMPLETED
Start: 2019-01-01 | End: 2019-01-01

## 2019-01-01 RX ORDER — DOBUTAMINE HYDROCHLORIDE 200 MG/100ML
0-10 INJECTION INTRAVENOUS
Status: DISCONTINUED | OUTPATIENT
Start: 2019-01-01 | End: 2019-01-01 | Stop reason: HOSPADM

## 2019-01-01 RX ORDER — FACIAL-BODY WIPES
10 EACH TOPICAL DAILY PRN
Status: DISCONTINUED | OUTPATIENT
Start: 2019-01-01 | End: 2019-01-01 | Stop reason: HOSPADM

## 2019-01-01 RX ORDER — MIDAZOLAM HYDROCHLORIDE 1 MG/ML
2 INJECTION, SOLUTION INTRAMUSCULAR; INTRAVENOUS ONCE
Status: COMPLETED | OUTPATIENT
Start: 2019-01-01 | End: 2019-01-01

## 2019-01-01 RX ORDER — AMIODARONE HYDROCHLORIDE 200 MG/1
200 TABLET ORAL
COMMUNITY
Start: 2018-01-01

## 2019-01-01 RX ORDER — SITAGLIPTIN AND METFORMIN HYDROCHLORIDE 50; 1000 MG/1; MG/1
TABLET, FILM COATED, EXTENDED RELEASE ORAL
Refills: 0 | COMMUNITY
Start: 2018-01-01

## 2019-01-01 RX ORDER — BUDESONIDE 0.5 MG/2ML
500 INHALANT ORAL
Status: DISCONTINUED | OUTPATIENT
Start: 2019-01-01 | End: 2019-01-01 | Stop reason: HOSPADM

## 2019-01-01 RX ORDER — HEPARIN SODIUM 5000 [USP'U]/ML
4000 INJECTION, SOLUTION INTRAVENOUS; SUBCUTANEOUS ONCE
Status: ACTIVE | OUTPATIENT
Start: 2019-01-01 | End: 2019-01-01

## 2019-01-01 RX ORDER — FLUMAZENIL 0.1 MG/ML
0.2 INJECTION INTRAVENOUS
Status: ACTIVE | OUTPATIENT
Start: 2019-01-01 | End: 2019-01-01

## 2019-01-01 RX ORDER — BUMETANIDE 0.25 MG/ML
2 INJECTION INTRAMUSCULAR; INTRAVENOUS ONCE
Status: COMPLETED | OUTPATIENT
Start: 2019-01-01 | End: 2019-01-01

## 2019-01-01 RX ADMIN — SODIUM BICARBONATE 100 MEQ: 84 INJECTION INTRAVENOUS at 16:32

## 2019-01-01 RX ADMIN — POTASSIUM CHLORIDE 20 MEQ: 29.8 INJECTION, SOLUTION INTRAVENOUS at 19:14

## 2019-01-01 RX ADMIN — FOLIC ACID 1 MG: 1 TABLET ORAL at 09:03

## 2019-01-01 RX ADMIN — ARFORMOTEROL TARTRATE 15 MCG: 15 SOLUTION RESPIRATORY (INHALATION) at 09:11

## 2019-01-01 RX ADMIN — Medication 10 ML: at 22:21

## 2019-01-01 RX ADMIN — SODIUM CHLORIDE 9 ML/HR: 900 INJECTION, SOLUTION INTRAVENOUS at 19:51

## 2019-01-01 RX ADMIN — PIPERACILLIN SODIUM,TAZOBACTAM SODIUM 3.38 G: 3; .375 INJECTION, POWDER, FOR SOLUTION INTRAVENOUS at 04:53

## 2019-01-01 RX ADMIN — SODIUM CHLORIDE 40 MG: 9 INJECTION, SOLUTION INTRAMUSCULAR; INTRAVENOUS; SUBCUTANEOUS at 22:13

## 2019-01-01 RX ADMIN — Medication 2 MCG/MIN: at 02:32

## 2019-01-01 RX ADMIN — PROPOFOL 30 MCG/KG/MIN: 10 INJECTION, EMULSION INTRAVENOUS at 10:35

## 2019-01-01 RX ADMIN — CHLOROTHIAZIDE SODIUM 500 MG: 500 INJECTION, POWDER, LYOPHILIZED, FOR SOLUTION INTRAVENOUS at 15:30

## 2019-01-01 RX ADMIN — BUMETANIDE 2 MG: 0.25 INJECTION INTRAMUSCULAR; INTRAVENOUS at 09:05

## 2019-01-01 RX ADMIN — BUMETANIDE 1 MG/HR: 0.25 INJECTION INTRAMUSCULAR; INTRAVENOUS at 22:06

## 2019-01-01 RX ADMIN — ARFORMOTEROL TARTRATE 15 MCG: 15 SOLUTION RESPIRATORY (INHALATION) at 21:20

## 2019-01-01 RX ADMIN — Medication 10 ML: at 06:19

## 2019-01-01 RX ADMIN — BUMETANIDE 1 MG/HR: 0.25 INJECTION INTRAMUSCULAR; INTRAVENOUS at 15:04

## 2019-01-01 RX ADMIN — MINERAL OIL AND WHITE PETROLATUM: 150; 830 OINTMENT OPHTHALMIC at 10:14

## 2019-01-01 RX ADMIN — HEPARIN SODIUM AND DEXTROSE 12 UNITS/KG/HR: 10000; 5 INJECTION INTRAVENOUS at 15:14

## 2019-01-01 RX ADMIN — INSULIN LISPRO 2 UNITS: 100 INJECTION, SOLUTION INTRAVENOUS; SUBCUTANEOUS at 16:47

## 2019-01-01 RX ADMIN — BUMETANIDE 2 MG: 0.25 INJECTION INTRAMUSCULAR; INTRAVENOUS at 10:18

## 2019-01-01 RX ADMIN — SODIUM CHLORIDE 9 ML/HR: 900 INJECTION, SOLUTION INTRAVENOUS at 02:16

## 2019-01-01 RX ADMIN — ETOMIDATE 10 MG: 2 INJECTION INTRAVENOUS at 15:47

## 2019-01-01 RX ADMIN — CASTOR OIL AND BALSAM, PERU: 788; 87 OINTMENT TOPICAL at 18:36

## 2019-01-01 RX ADMIN — POTASSIUM CHLORIDE 20 MEQ: 400 INJECTION, SOLUTION INTRAVENOUS at 11:14

## 2019-01-01 RX ADMIN — POTASSIUM CHLORIDE 20 MEQ: 400 INJECTION, SOLUTION INTRAVENOUS at 04:24

## 2019-01-01 RX ADMIN — SODIUM CHLORIDE 10 ML/HR: 450 INJECTION, SOLUTION INTRAVENOUS at 00:10

## 2019-01-01 RX ADMIN — Medication 10 ML: at 15:10

## 2019-01-01 RX ADMIN — SODIUM CHLORIDE 10 ML/HR: 450 INJECTION, SOLUTION INTRAVENOUS at 06:19

## 2019-01-01 RX ADMIN — INSULIN LISPRO 2 UNITS: 100 INJECTION, SOLUTION INTRAVENOUS; SUBCUTANEOUS at 06:54

## 2019-01-01 RX ADMIN — POTASSIUM CHLORIDE 20 MEQ: 400 INJECTION, SOLUTION INTRAVENOUS at 01:44

## 2019-01-01 RX ADMIN — Medication 10 ML: at 22:15

## 2019-01-01 RX ADMIN — MIDAZOLAM HYDROCHLORIDE 2 MG: 1 INJECTION, SOLUTION INTRAMUSCULAR; INTRAVENOUS at 15:29

## 2019-01-01 RX ADMIN — PROPOFOL 30 MCG/KG/MIN: 10 INJECTION, EMULSION INTRAVENOUS at 05:35

## 2019-01-01 RX ADMIN — FOLIC ACID 1 MG: 1 TABLET ORAL at 09:15

## 2019-01-01 RX ADMIN — VANCOMYCIN HYDROCHLORIDE 1500 MG: 10 INJECTION, POWDER, LYOPHILIZED, FOR SOLUTION INTRAVENOUS at 16:06

## 2019-01-01 RX ADMIN — IRON SUCROSE 300 MG: 20 INJECTION, SOLUTION INTRAVENOUS at 16:31

## 2019-01-01 RX ADMIN — Medication 10 ML: at 23:51

## 2019-01-01 RX ADMIN — BUDESONIDE 500 MCG: 0.5 INHALANT RESPIRATORY (INHALATION) at 21:35

## 2019-01-01 RX ADMIN — POTASSIUM CHLORIDE 20 MEQ: 29.8 INJECTION, SOLUTION INTRAVENOUS at 02:45

## 2019-01-01 RX ADMIN — PROPOFOL 50 MCG/KG/MIN: 10 INJECTION, EMULSION INTRAVENOUS at 02:47

## 2019-01-01 RX ADMIN — PIPERACILLIN SODIUM,TAZOBACTAM SODIUM 3.38 G: 3; .375 INJECTION, POWDER, FOR SOLUTION INTRAVENOUS at 20:21

## 2019-01-01 RX ADMIN — AMLODIPINE BESYLATE 10 MG: 5 TABLET ORAL at 08:55

## 2019-01-01 RX ADMIN — HEPARIN SODIUM AND DEXTROSE 13 UNITS/KG/HR: 10000; 5 INJECTION INTRAVENOUS at 17:40

## 2019-01-01 RX ADMIN — HEPARIN SODIUM AND DEXTROSE 14 UNITS/KG/HR: 10000; 5 INJECTION INTRAVENOUS at 18:14

## 2019-01-01 RX ADMIN — SODIUM CHLORIDE 40 MG: 9 INJECTION, SOLUTION INTRAMUSCULAR; INTRAVENOUS; SUBCUTANEOUS at 08:46

## 2019-01-01 RX ADMIN — Medication 10 ML: at 14:36

## 2019-01-01 RX ADMIN — INSULIN LISPRO 2 UNITS: 100 INJECTION, SOLUTION INTRAVENOUS; SUBCUTANEOUS at 22:33

## 2019-01-01 RX ADMIN — PIPERACILLIN SODIUM,TAZOBACTAM SODIUM 3.38 G: 3; .375 INJECTION, POWDER, FOR SOLUTION INTRAVENOUS at 20:17

## 2019-01-01 RX ADMIN — VANCOMYCIN HYDROCHLORIDE 750 MG: 750 INJECTION, POWDER, LYOPHILIZED, FOR SOLUTION INTRAVENOUS at 19:08

## 2019-01-01 RX ADMIN — Medication 10 ML: at 13:34

## 2019-01-01 RX ADMIN — POTASSIUM CHLORIDE 20 MEQ: 29.8 INJECTION, SOLUTION INTRAVENOUS at 07:36

## 2019-01-01 RX ADMIN — CASTOR OIL AND BALSAM, PERU: 788; 87 OINTMENT TOPICAL at 17:48

## 2019-01-01 RX ADMIN — NYSTATIN: 100000 POWDER TOPICAL at 08:49

## 2019-01-01 RX ADMIN — CHLOROTHIAZIDE SODIUM 500 MG: 500 INJECTION, POWDER, LYOPHILIZED, FOR SOLUTION INTRAVENOUS at 12:30

## 2019-01-01 RX ADMIN — Medication 10 ML: at 05:58

## 2019-01-01 RX ADMIN — PROPOFOL 40 MCG/KG/MIN: 10 INJECTION, EMULSION INTRAVENOUS at 21:11

## 2019-01-01 RX ADMIN — ARFORMOTEROL TARTRATE 15 MCG: 15 SOLUTION RESPIRATORY (INHALATION) at 07:42

## 2019-01-01 RX ADMIN — POTASSIUM CHLORIDE 20 MEQ: 400 INJECTION, SOLUTION INTRAVENOUS at 02:40

## 2019-01-01 RX ADMIN — CISATRACURIUM BESYLATE 2 MCG/KG/MIN: 2 INJECTION INTRAVENOUS at 22:05

## 2019-01-01 RX ADMIN — HEPARIN SODIUM AND DEXTROSE 15 UNITS/KG/HR: 10000; 5 INJECTION INTRAVENOUS at 19:52

## 2019-01-01 RX ADMIN — SODIUM BICARBONATE 50 MEQ: 84 INJECTION INTRAVENOUS at 15:29

## 2019-01-01 RX ADMIN — CASTOR OIL AND BALSAM, PERU: 788; 87 OINTMENT TOPICAL at 10:13

## 2019-01-01 RX ADMIN — SUCCINYLCHOLINE CHLORIDE 120 MG: 20 INJECTION, SOLUTION INTRAMUSCULAR; INTRAVENOUS at 15:47

## 2019-01-01 RX ADMIN — SODIUM BICARBONATE 1300 MG: 650 TABLET ORAL at 09:01

## 2019-01-01 RX ADMIN — ARFORMOTEROL TARTRATE 15 MCG: 15 SOLUTION RESPIRATORY (INHALATION) at 21:29

## 2019-01-01 RX ADMIN — ARFORMOTEROL TARTRATE 15 MCG: 15 SOLUTION RESPIRATORY (INHALATION) at 07:43

## 2019-01-01 RX ADMIN — INSULIN LISPRO 3 UNITS: 100 INJECTION, SOLUTION INTRAVENOUS; SUBCUTANEOUS at 12:24

## 2019-01-01 RX ADMIN — INSULIN LISPRO 2 UNITS: 100 INJECTION, SOLUTION INTRAVENOUS; SUBCUTANEOUS at 23:00

## 2019-01-01 RX ADMIN — PIPERACILLIN SODIUM,TAZOBACTAM SODIUM 3.38 G: 3; .375 INJECTION, POWDER, FOR SOLUTION INTRAVENOUS at 12:34

## 2019-01-01 RX ADMIN — SODIUM CHLORIDE 40 MG: 9 INJECTION, SOLUTION INTRAMUSCULAR; INTRAVENOUS; SUBCUTANEOUS at 09:32

## 2019-01-01 RX ADMIN — CALCIUM CHLORIDE 1 G: 100 INJECTION, SOLUTION INTRAVENOUS at 16:41

## 2019-01-01 RX ADMIN — ACETAMINOPHEN 1000 MG: 10 INJECTION, SOLUTION INTRAVENOUS at 09:38

## 2019-01-01 RX ADMIN — Medication 10 ML: at 15:09

## 2019-01-01 RX ADMIN — FUROSEMIDE 60 MG: 10 INJECTION, SOLUTION INTRAMUSCULAR; INTRAVENOUS at 17:52

## 2019-01-01 RX ADMIN — BUMETANIDE 0.5 MG/HR: 0.25 INJECTION INTRAMUSCULAR; INTRAVENOUS at 20:41

## 2019-01-01 RX ADMIN — PHENYLEPHRINE HYDROCHLORIDE 100 MCG/MIN: 10 INJECTION INTRAVENOUS at 12:01

## 2019-01-01 RX ADMIN — Medication 20 ML: at 09:47

## 2019-01-01 RX ADMIN — ARFORMOTEROL TARTRATE 15 MCG: 15 SOLUTION RESPIRATORY (INHALATION) at 21:38

## 2019-01-01 RX ADMIN — MINERAL OIL AND WHITE PETROLATUM: 150; 830 OINTMENT OPHTHALMIC at 21:55

## 2019-01-01 RX ADMIN — VANCOMYCIN HYDROCHLORIDE 1000 MG: 1 INJECTION, POWDER, LYOPHILIZED, FOR SOLUTION INTRAVENOUS at 19:08

## 2019-01-01 RX ADMIN — CASTOR OIL AND BALSAM, PERU: 788; 87 OINTMENT TOPICAL at 09:41

## 2019-01-01 RX ADMIN — PIPERACILLIN SODIUM,TAZOBACTAM SODIUM 3.38 G: 3; .375 INJECTION, POWDER, FOR SOLUTION INTRAVENOUS at 19:06

## 2019-01-01 RX ADMIN — IRON SUCROSE 300 MG: 20 INJECTION, SOLUTION INTRAVENOUS at 16:11

## 2019-01-01 RX ADMIN — Medication 10 ML: at 16:12

## 2019-01-01 RX ADMIN — PIPERACILLIN SODIUM,TAZOBACTAM SODIUM 3.38 G: 3; .375 INJECTION, POWDER, FOR SOLUTION INTRAVENOUS at 21:09

## 2019-01-01 RX ADMIN — SODIUM CHLORIDE 5.6 UNITS/HR: 900 INJECTION, SOLUTION INTRAVENOUS at 11:58

## 2019-01-01 RX ADMIN — SODIUM CHLORIDE 9 ML/HR: 900 INJECTION, SOLUTION INTRAVENOUS at 16:26

## 2019-01-01 RX ADMIN — Medication 5 ML: at 18:05

## 2019-01-01 RX ADMIN — SODIUM CHLORIDE 40 MG: 9 INJECTION, SOLUTION INTRAMUSCULAR; INTRAVENOUS; SUBCUTANEOUS at 20:42

## 2019-01-01 RX ADMIN — SODIUM BICARBONATE 50 MEQ: 84 INJECTION INTRAVENOUS at 15:05

## 2019-01-01 RX ADMIN — BUDESONIDE 500 MCG: 0.5 INHALANT RESPIRATORY (INHALATION) at 11:17

## 2019-01-01 RX ADMIN — PROPOFOL 50 MCG/KG/MIN: 10 INJECTION, EMULSION INTRAVENOUS at 06:26

## 2019-01-01 RX ADMIN — Medication 10 ML: at 06:00

## 2019-01-01 RX ADMIN — NYSTATIN: 100000 POWDER TOPICAL at 18:26

## 2019-01-01 RX ADMIN — PIPERACILLIN SODIUM,TAZOBACTAM SODIUM 3.38 G: 3; .375 INJECTION, POWDER, FOR SOLUTION INTRAVENOUS at 03:45

## 2019-01-01 RX ADMIN — POTASSIUM CHLORIDE 20 MEQ: 400 INJECTION, SOLUTION INTRAVENOUS at 15:13

## 2019-01-01 RX ADMIN — ARFORMOTEROL TARTRATE 15 MCG: 15 SOLUTION RESPIRATORY (INHALATION) at 07:52

## 2019-01-01 RX ADMIN — VANCOMYCIN HYDROCHLORIDE 750 MG: 750 INJECTION, POWDER, LYOPHILIZED, FOR SOLUTION INTRAVENOUS at 20:21

## 2019-01-01 RX ADMIN — BUDESONIDE 500 MCG: 0.5 INHALANT RESPIRATORY (INHALATION) at 09:11

## 2019-01-01 RX ADMIN — PIPERACILLIN SODIUM,TAZOBACTAM SODIUM 3.38 G: 3; .375 INJECTION, POWDER, FOR SOLUTION INTRAVENOUS at 20:42

## 2019-01-01 RX ADMIN — ASPIRIN 81 MG CHEWABLE TABLET 81 MG: 81 TABLET CHEWABLE at 09:01

## 2019-01-01 RX ADMIN — INSULIN LISPRO 2 UNITS: 100 INJECTION, SOLUTION INTRAVENOUS; SUBCUTANEOUS at 13:05

## 2019-01-01 RX ADMIN — SODIUM CHLORIDE 10 ML/HR: 450 INJECTION, SOLUTION INTRAVENOUS at 16:03

## 2019-01-01 RX ADMIN — INSULIN LISPRO 2 UNITS: 100 INJECTION, SOLUTION INTRAVENOUS; SUBCUTANEOUS at 17:30

## 2019-01-01 RX ADMIN — CHLOROTHIAZIDE SODIUM 500 MG: 500 INJECTION, POWDER, LYOPHILIZED, FOR SOLUTION INTRAVENOUS at 01:46

## 2019-01-01 RX ADMIN — PROPOFOL 50 MCG/KG/MIN: 10 INJECTION, EMULSION INTRAVENOUS at 22:01

## 2019-01-01 RX ADMIN — SUCCINYLCHOLINE CHLORIDE 120 MG: 20 INJECTION INTRAMUSCULAR; INTRAVENOUS at 15:47

## 2019-01-01 RX ADMIN — SODIUM BICARBONATE 650 MG: 650 TABLET ORAL at 18:02

## 2019-01-01 RX ADMIN — BUMETANIDE 0.5 MG/HR: 0.25 INJECTION INTRAMUSCULAR; INTRAVENOUS at 14:30

## 2019-01-01 RX ADMIN — PHENYLEPHRINE HYDROCHLORIDE 60 MCG/MIN: 10 INJECTION INTRAVENOUS at 21:30

## 2019-01-01 RX ADMIN — CASTOR OIL AND BALSAM, PERU: 788; 87 OINTMENT TOPICAL at 08:50

## 2019-01-01 RX ADMIN — BUDESONIDE 500 MCG: 0.5 INHALANT RESPIRATORY (INHALATION) at 21:20

## 2019-01-01 RX ADMIN — SODIUM CHLORIDE 40 MG: 9 INJECTION, SOLUTION INTRAMUSCULAR; INTRAVENOUS; SUBCUTANEOUS at 21:52

## 2019-01-01 RX ADMIN — BUDESONIDE 500 MCG: 0.5 INHALANT RESPIRATORY (INHALATION) at 07:43

## 2019-01-01 RX ADMIN — POTASSIUM CHLORIDE 20 MEQ: 29.8 INJECTION, SOLUTION INTRAVENOUS at 20:42

## 2019-01-01 RX ADMIN — ATORVASTATIN CALCIUM 20 MG: 20 TABLET, FILM COATED ORAL at 21:00

## 2019-01-01 RX ADMIN — Medication 50 MEQ: at 15:05

## 2019-01-01 RX ADMIN — POTASSIUM CHLORIDE 20 MEQ: 400 INJECTION, SOLUTION INTRAVENOUS at 08:49

## 2019-01-01 RX ADMIN — PHENYLEPHRINE HYDROCHLORIDE 50 MCG/MIN: 10 INJECTION INTRAVENOUS at 20:40

## 2019-01-01 RX ADMIN — PIPERACILLIN SODIUM,TAZOBACTAM SODIUM 3.38 G: 3; .375 INJECTION, POWDER, FOR SOLUTION INTRAVENOUS at 04:09

## 2019-01-01 RX ADMIN — POLYETHYLENE GLYCOL 3350 17 G: 17 POWDER, FOR SOLUTION ORAL at 17:59

## 2019-01-01 RX ADMIN — MIDAZOLAM 2 MG: 1 INJECTION INTRAMUSCULAR; INTRAVENOUS at 15:29

## 2019-01-01 RX ADMIN — DOBUTAMINE HYDROCHLORIDE 3 MCG/KG/MIN: 200 INJECTION INTRAVENOUS at 20:33

## 2019-01-01 RX ADMIN — SODIUM CHLORIDE 10 ML/HR: 450 INJECTION, SOLUTION INTRAVENOUS at 02:16

## 2019-01-01 RX ADMIN — HEPARIN SODIUM 2000 UNITS: 1000 INJECTION INTRAVENOUS; SUBCUTANEOUS at 08:34

## 2019-01-01 RX ADMIN — Medication 10 ML: at 21:55

## 2019-01-01 RX ADMIN — PROPOFOL 50 MCG/KG/MIN: 10 INJECTION, EMULSION INTRAVENOUS at 16:14

## 2019-01-01 RX ADMIN — CALCIUM GLUCONATE 1 G: 98 INJECTION, SOLUTION INTRAVENOUS at 16:43

## 2019-01-01 RX ADMIN — PROPOFOL 30 MCG/KG/MIN: 10 INJECTION, EMULSION INTRAVENOUS at 19:39

## 2019-01-01 RX ADMIN — INSULIN LISPRO 3 UNITS: 100 INJECTION, SOLUTION INTRAVENOUS; SUBCUTANEOUS at 04:53

## 2019-01-01 RX ADMIN — CISATRACURIUM BESYLATE 2.5 MCG/KG/MIN: 2 INJECTION INTRAVENOUS at 08:35

## 2019-01-01 RX ADMIN — NYSTATIN: 100000 POWDER TOPICAL at 09:15

## 2019-01-01 RX ADMIN — SODIUM CHLORIDE 40 MG: 9 INJECTION, SOLUTION INTRAMUSCULAR; INTRAVENOUS; SUBCUTANEOUS at 09:15

## 2019-01-01 RX ADMIN — PHENYLEPHRINE HYDROCHLORIDE 40 MCG/MIN: 10 INJECTION INTRAVENOUS at 07:13

## 2019-01-01 RX ADMIN — PROPOFOL 30 MCG/KG/MIN: 10 INJECTION, EMULSION INTRAVENOUS at 04:44

## 2019-01-01 RX ADMIN — NYSTATIN: 100000 POWDER TOPICAL at 10:14

## 2019-01-01 RX ADMIN — BUMETANIDE 0.5 MG/HR: 0.25 INJECTION INTRAMUSCULAR; INTRAVENOUS at 11:07

## 2019-01-01 RX ADMIN — PROPOFOL 10 MCG/KG/MIN: 10 INJECTION, EMULSION INTRAVENOUS at 15:10

## 2019-01-01 RX ADMIN — PROPOFOL 30 MCG/KG/MIN: 10 INJECTION, EMULSION INTRAVENOUS at 20:38

## 2019-01-01 RX ADMIN — INSULIN LISPRO 2 UNITS: 100 INJECTION, SOLUTION INTRAVENOUS; SUBCUTANEOUS at 23:26

## 2019-01-01 RX ADMIN — BUMETANIDE 1 MG/HR: 0.25 INJECTION INTRAMUSCULAR; INTRAVENOUS at 21:27

## 2019-01-01 RX ADMIN — MAGNESIUM SULFATE HEPTAHYDRATE 1 G: 1 INJECTION, SOLUTION INTRAVENOUS at 01:45

## 2019-01-01 RX ADMIN — Medication 10 ML: at 22:30

## 2019-01-01 RX ADMIN — PHENYLEPHRINE HYDROCHLORIDE 100 MCG/MIN: 10 INJECTION INTRAVENOUS at 01:24

## 2019-01-01 RX ADMIN — AMIODARONE HYDROCHLORIDE 200 MG: 200 TABLET ORAL at 18:02

## 2019-01-01 RX ADMIN — Medication 10 ML: at 21:53

## 2019-01-01 RX ADMIN — BUMETANIDE 0.5 MG/HR: 0.25 INJECTION INTRAMUSCULAR; INTRAVENOUS at 19:51

## 2019-01-01 RX ADMIN — PIPERACILLIN SODIUM,TAZOBACTAM SODIUM 3.38 G: 3; .375 INJECTION, POWDER, FOR SOLUTION INTRAVENOUS at 12:17

## 2019-01-01 RX ADMIN — NYSTATIN: 100000 POWDER TOPICAL at 18:45

## 2019-01-01 RX ADMIN — SODIUM BICARBONATE 50 MEQ: 84 INJECTION INTRAVENOUS at 16:58

## 2019-01-01 RX ADMIN — BIVALIRUDIN 0.07 MG/KG/HR: 250 INJECTION, POWDER, LYOPHILIZED, FOR SOLUTION INTRAVENOUS at 10:24

## 2019-01-01 RX ADMIN — ARFORMOTEROL TARTRATE 15 MCG: 15 SOLUTION RESPIRATORY (INHALATION) at 21:35

## 2019-01-01 RX ADMIN — HEPARIN SODIUM AND DEXTROSE 15 UNITS/KG/HR: 10000; 5 INJECTION INTRAVENOUS at 20:18

## 2019-01-01 RX ADMIN — IRON SUCROSE 300 MG: 20 INJECTION, SOLUTION INTRAVENOUS at 17:13

## 2019-01-01 RX ADMIN — PROPOFOL 50 MCG/KG/MIN: 10 INJECTION, EMULSION INTRAVENOUS at 11:12

## 2019-01-01 RX ADMIN — SODIUM BICARBONATE 100 MEQ: 84 INJECTION INTRAVENOUS at 08:39

## 2019-01-01 RX ADMIN — Medication 10 ML: at 14:51

## 2019-01-01 RX ADMIN — INSULIN GLARGINE 10 UNITS: 100 INJECTION, SOLUTION SUBCUTANEOUS at 08:48

## 2019-01-01 RX ADMIN — FUROSEMIDE 60 MG: 10 INJECTION, SOLUTION INTRAMUSCULAR; INTRAVENOUS at 18:00

## 2019-01-01 RX ADMIN — FUROSEMIDE 40 MG: 10 INJECTION, SOLUTION INTRAMUSCULAR; INTRAVENOUS at 23:14

## 2019-01-01 RX ADMIN — Medication 25 MCG/HR: at 18:18

## 2019-01-01 RX ADMIN — SODIUM CHLORIDE 9 ML/HR: 900 INJECTION, SOLUTION INTRAVENOUS at 00:10

## 2019-01-01 RX ADMIN — BUMETANIDE 0.5 MG/HR: 0.25 INJECTION INTRAMUSCULAR; INTRAVENOUS at 20:40

## 2019-01-01 RX ADMIN — MAGNESIUM SULFATE HEPTAHYDRATE 1 G: 1 INJECTION, SOLUTION INTRAVENOUS at 08:45

## 2019-01-01 RX ADMIN — PROPOFOL 50 MCG/KG/MIN: 10 INJECTION, EMULSION INTRAVENOUS at 17:31

## 2019-01-01 RX ADMIN — FUROSEMIDE 40 MG: 10 INJECTION, SOLUTION INTRAMUSCULAR; INTRAVENOUS at 12:05

## 2019-01-01 RX ADMIN — FUROSEMIDE 60 MG: 10 INJECTION, SOLUTION INTRAMUSCULAR; INTRAVENOUS at 09:42

## 2019-01-01 RX ADMIN — PHENYLEPHRINE HYDROCHLORIDE 30 MCG/MIN: 10 INJECTION INTRAVENOUS at 08:27

## 2019-01-01 RX ADMIN — MORPHINE SULFATE 2 MG: 2 INJECTION, SOLUTION INTRAMUSCULAR; INTRAVENOUS at 16:53

## 2019-01-01 RX ADMIN — HEPARIN SODIUM 5000 UNITS: 5000 INJECTION INTRAVENOUS; SUBCUTANEOUS at 17:52

## 2019-01-01 RX ADMIN — POTASSIUM CHLORIDE 20 MEQ: 29.8 INJECTION, SOLUTION INTRAVENOUS at 06:45

## 2019-01-01 RX ADMIN — PHENYLEPHRINE HYDROCHLORIDE 100 MCG/MIN: 10 INJECTION INTRAVENOUS at 07:10

## 2019-01-01 RX ADMIN — PHENYLEPHRINE HYDROCHLORIDE 80 MCG/MIN: 10 INJECTION INTRAVENOUS at 15:03

## 2019-01-01 RX ADMIN — CISATRACURIUM BESYLATE 3 MCG/KG/MIN: 2 INJECTION INTRAVENOUS at 16:20

## 2019-01-01 RX ADMIN — BUMETANIDE 1 MG/HR: 0.25 INJECTION INTRAMUSCULAR; INTRAVENOUS at 10:06

## 2019-01-01 RX ADMIN — Medication 10 ML: at 05:55

## 2019-01-01 RX ADMIN — ALTEPLASE 100 MG: KIT at 12:27

## 2019-01-01 RX ADMIN — PIPERACILLIN SODIUM,TAZOBACTAM SODIUM 3.38 G: 3; .375 INJECTION, POWDER, FOR SOLUTION INTRAVENOUS at 12:08

## 2019-01-01 RX ADMIN — Medication 10 ML: at 22:22

## 2019-01-01 RX ADMIN — ROCURONIUM BROMIDE 43.8 MG: 10 INJECTION INTRAVENOUS at 16:12

## 2019-01-01 RX ADMIN — POTASSIUM CHLORIDE 20 MEQ: 29.8 INJECTION, SOLUTION INTRAVENOUS at 03:44

## 2019-01-01 RX ADMIN — VANCOMYCIN HYDROCHLORIDE 750 MG: 750 INJECTION, POWDER, LYOPHILIZED, FOR SOLUTION INTRAVENOUS at 20:41

## 2019-01-01 RX ADMIN — SODIUM CHLORIDE 40 MG: 9 INJECTION, SOLUTION INTRAMUSCULAR; INTRAVENOUS; SUBCUTANEOUS at 11:38

## 2019-01-01 RX ADMIN — MORPHINE SULFATE 2 MG: 2 INJECTION, SOLUTION INTRAMUSCULAR; INTRAVENOUS at 09:33

## 2019-01-01 RX ADMIN — FOLIC ACID 1 MG: 1 TABLET ORAL at 09:31

## 2019-01-01 RX ADMIN — AMLODIPINE BESYLATE 10 MG: 5 TABLET ORAL at 09:01

## 2019-01-01 RX ADMIN — BUMETANIDE 1 MG/HR: 0.25 INJECTION INTRAMUSCULAR; INTRAVENOUS at 08:40

## 2019-01-01 RX ADMIN — NYSTATIN: 100000 POWDER TOPICAL at 10:39

## 2019-01-01 RX ADMIN — INSULIN LISPRO 3 UNITS: 100 INJECTION, SOLUTION INTRAVENOUS; SUBCUTANEOUS at 17:47

## 2019-01-01 RX ADMIN — HEPARIN SODIUM AND DEXTROSE 15 UNITS/KG/HR: 10000; 5 INJECTION INTRAVENOUS at 06:44

## 2019-01-01 RX ADMIN — HEPARIN SODIUM AND DEXTROSE 15 UNITS/KG/HR: 10000; 5 INJECTION INTRAVENOUS at 17:12

## 2019-01-01 RX ADMIN — BUMETANIDE 0.5 MG/HR: 0.25 INJECTION INTRAMUSCULAR; INTRAVENOUS at 14:19

## 2019-01-01 RX ADMIN — SODIUM CHLORIDE 10 ML/HR: 450 INJECTION, SOLUTION INTRAVENOUS at 19:51

## 2019-01-01 RX ADMIN — CASTOR OIL AND BALSAM, PERU: 788; 87 OINTMENT TOPICAL at 09:15

## 2019-01-01 RX ADMIN — BUDESONIDE 500 MCG: 0.5 INHALANT RESPIRATORY (INHALATION) at 21:29

## 2019-01-01 RX ADMIN — ALTEPLASE 100 MG: KIT at 11:45

## 2019-01-01 RX ADMIN — POTASSIUM CHLORIDE 20 MEQ: 400 INJECTION, SOLUTION INTRAVENOUS at 03:29

## 2019-01-01 RX ADMIN — PHENYLEPHRINE HYDROCHLORIDE 30 MCG/MIN: 10 INJECTION INTRAVENOUS at 18:33

## 2019-01-01 RX ADMIN — POTASSIUM CHLORIDE 20 MEQ: 400 INJECTION, SOLUTION INTRAVENOUS at 10:12

## 2019-01-01 RX ADMIN — SODIUM CHLORIDE 0.6 MCG/KG/HR: 900 INJECTION, SOLUTION INTRAVENOUS at 15:21

## 2019-01-01 RX ADMIN — BUDESONIDE 500 MCG: 0.5 INHALANT RESPIRATORY (INHALATION) at 07:52

## 2019-01-01 RX ADMIN — INSULIN LISPRO 3 UNITS: 100 INJECTION, SOLUTION INTRAVENOUS; SUBCUTANEOUS at 11:43

## 2019-01-01 RX ADMIN — INSULIN LISPRO 3 UNITS: 100 INJECTION, SOLUTION INTRAVENOUS; SUBCUTANEOUS at 00:08

## 2019-01-01 RX ADMIN — PIPERACILLIN SODIUM,TAZOBACTAM SODIUM 3.38 G: 3; .375 INJECTION, POWDER, FOR SOLUTION INTRAVENOUS at 04:22

## 2019-01-01 RX ADMIN — PROPOFOL 1000 MG: 10 INJECTION, EMULSION INTRAVENOUS at 15:04

## 2019-01-01 RX ADMIN — POTASSIUM CHLORIDE 20 MEQ: 29.8 INJECTION, SOLUTION INTRAVENOUS at 09:36

## 2019-01-01 RX ADMIN — BUDESONIDE 500 MCG: 0.5 INHALANT RESPIRATORY (INHALATION) at 07:42

## 2019-01-01 RX ADMIN — DEXTROSE MONOHYDRATE 12.5 G: 25 INJECTION, SOLUTION INTRAVENOUS at 23:49

## 2019-01-01 RX ADMIN — Medication 10 ML: at 06:27

## 2019-01-01 RX ADMIN — PHENYLEPHRINE HYDROCHLORIDE 50 MCG/MIN: 10 INJECTION INTRAVENOUS at 19:46

## 2019-01-01 RX ADMIN — PIPERACILLIN SODIUM,TAZOBACTAM SODIUM 3.38 G: 3; .375 INJECTION, POWDER, FOR SOLUTION INTRAVENOUS at 11:44

## 2019-01-01 RX ADMIN — PIPERACILLIN SODIUM,TAZOBACTAM SODIUM 3.38 G: 3; .375 INJECTION, POWDER, FOR SOLUTION INTRAVENOUS at 04:45

## 2019-01-01 RX ADMIN — CASTOR OIL AND BALSAM, PERU: 788; 87 OINTMENT TOPICAL at 18:56

## 2019-01-01 RX ADMIN — NYSTATIN: 100000 POWDER TOPICAL at 09:42

## 2019-01-01 RX ADMIN — CASTOR OIL AND BALSAM, PERU: 788; 87 OINTMENT TOPICAL at 18:45

## 2019-01-01 RX ADMIN — Medication 10 ML: at 14:37

## 2019-01-01 RX ADMIN — ARFORMOTEROL TARTRATE 15 MCG: 15 SOLUTION RESPIRATORY (INHALATION) at 11:17

## 2019-01-01 RX ADMIN — INSULIN LISPRO 2 UNITS: 100 INJECTION, SOLUTION INTRAVENOUS; SUBCUTANEOUS at 07:15

## 2019-01-01 RX ADMIN — BUDESONIDE 500 MCG: 0.5 INHALANT RESPIRATORY (INHALATION) at 21:38

## 2019-01-01 RX ADMIN — CHLOROTHIAZIDE SODIUM 500 MG: 500 INJECTION, POWDER, LYOPHILIZED, FOR SOLUTION INTRAVENOUS at 11:20

## 2019-01-01 RX ADMIN — POTASSIUM CHLORIDE 20 MEQ: 400 INJECTION, SOLUTION INTRAVENOUS at 12:12

## 2019-01-01 RX ADMIN — HEPARIN SODIUM 5000 UNITS: 5000 INJECTION INTRAVENOUS; SUBCUTANEOUS at 01:46

## 2019-01-01 RX ADMIN — Medication 10 ML: at 11:41

## 2019-01-01 RX ADMIN — POTASSIUM CHLORIDE 20 MEQ: 400 INJECTION, SOLUTION INTRAVENOUS at 16:14

## 2019-01-01 RX ADMIN — SODIUM CHLORIDE 9 ML/HR: 900 INJECTION, SOLUTION INTRAVENOUS at 06:19

## 2019-01-01 RX ADMIN — CALCIUM CHLORIDE 1 G: 100 INJECTION, SOLUTION INTRAVENOUS at 02:20

## 2019-01-04 NOTE — PROGRESS NOTES
Hanane Kay DNP, ANP-BC Subjective/HPI:  
 
Verner Damme is a 77 y.o. female is here for routine f/u. Patient reports continued intermittent episodes of dyspnea on exertion since T AVR, denies lightheadedness dizziness or chest pain. Discussed with her blood pressure reading today she is compliant in taking medications, she does not have a home blood pressure monitoring device. She denies associated blurred vision headaches. PCP Provider Radha Sumner NP Past Medical History:  
Diagnosis Date  Bruit of right carotid artery  CAD (coronary artery disease)  DM (diabetes mellitus) ([facility] [address]  Dyslipidemia  Hypertension  Mitral regurgitation and aortic stenosis  Paroxysmal A-fib ([facility] [address] Past Surgical History:  
Procedure Laterality Date  HX AORTIC VALVE REPLACEMENT    
 HX CORONARY ARTERY BYPASS GRAFT    
 HX MITRAL VALVE REPLACEMENT    
 TAVR Allergies Allergen Reactions  Sulfa Dyne Hives History reviewed. No pertinent family history. Current Outpatient Medications Medication Sig  JANUMET XR 50-1,000 mg TM24 TK 2 TS PO IN THE EVENING  
 ENBREL 50 mg/mL (0.98 mL) injection  amiodarone (CORDARONE) 200 mg tablet  amLODIPine (NORVASC) 10 mg tablet TAKE 1 TABLET EVERY DAY FOR BLOOD PRESSURE  nitroglycerin (NITROSTAT) 0.4 mg SL tablet DISSOLVE 1 TABLET UNDER THE TONGUE EVERY 5 MINUTES FOR 3 DOSES AS NEEDED FOR CHEST PAIN  
 atorvastatin (LIPITOR) 40 mg tablet TAKE 1 TABLET BY MOUTH EVERY DAY FOR CHOLESTEROL  naproxen sod/diphenhydramine (ALEVE PM PO) Take  by mouth as needed.  aspirin 81 mg chewable tablet Take 1 Tab by mouth daily.  metoprolol succinate (TOPROL-XL) 50 mg XL tablet Take 1 Tab by mouth daily.  calcium carbonate (CALCIUM 600 PO) Take 1 Tab by mouth Before breakfast, lunch, and dinner.  metFORMIN (GLUCOPHAGE) 500 mg tablet Take 2 Tabs by mouth two (2) times daily (with meals). Start on 5/5 Saturday  lisinopril (PRINIVIL, ZESTRIL) 20 mg tablet Take 1 Tab by mouth daily.  iron-vitamin C (VITRON-C) 65 mg iron- 125 mg TbEC Take 1 Tab by mouth three (3) times daily.  Biotin 2,500 mcg cap Take 5,000 mcg by mouth daily.  amoxicillin (AMOXIL) 500 mg capsule Take 1 Cap by mouth as needed (take as directed one hr prior to dental procedures).  glipiZIDE (GLUCOTROL) 5 mg tablet Take 5 mg by mouth daily.  hydrochlorothiazide (HYDRODIURIL) 50 mg tablet Take 50 mg by mouth daily.  ascorbic acid (VITAMIN C) 500 mg tablet Take 500 mg by mouth daily.  warfarin (COUMADIN) 2 mg tablet Take 2 Tabs by mouth daily (with dinner). Daily dosing to be determined by NP/MD team. (Patient not taking: Reported on 1/4/2019)  furosemide (LASIX) 20 mg tablet TAKE 1 TABLET BY MOUTH EVERY DAY FOR EDEMA (Patient not taking: Reported on 1/4/2019) No current facility-administered medications for this visit. Vitals:  
 01/04/19 1030 01/04/19 1052 BP: 160/70 170/70 Pulse: (!) 40 Resp: 16 SpO2: 100% Weight: 147 lb (66.7 kg) Height: 5' 2.5\" (1.588 m) Social History Socioeconomic History  Marital status:  Spouse name: Not on file  Number of children: Not on file  Years of education: Not on file  Highest education level: Not on file Social Needs  Financial resource strain: Not on file  Food insecurity - worry: Not on file  Food insecurity - inability: Not on file  Transportation needs - medical: Not on file  Transportation needs - non-medical: Not on file Occupational History  Not on file Tobacco Use  Smoking status: Never Smoker  Smokeless tobacco: Never Used Substance and Sexual Activity  Alcohol use: Yes Alcohol/week: 0.0 oz  
  Comment: Rare  Drug use: No  
 Sexual activity: Not on file Other Topics Concern  Not on file Social History Narrative  Not on file I have reviewed the nurses notes, vitals, problem list, allergy list, medical history, family, social history and medications. Review of Symptoms: 
 
General: Pt denies excessive weight gain or loss. Pt is able to conduct ADL's HEENT: Denies blurred vision, headaches, epistaxis and difficulty swallowing. Respiratory: Denies shortness of breath, + SONI, denies wheezing or stridor. Cardiovascular: Denies precordial pain, palpitations, edema or PND Gastrointestinal: Denies poor appetite, indigestion, abdominal pain or blood in stool Musculoskeletal: Denies pain or swelling from muscles or joints Neurologic: Denies tremor, paresthesias, or sensory motor disturbance Skin: Denies rash, itching or texture change. Physical Exam:   
 
General: Well developed, in no acute distress, cooperative and alert HEENT: No carotid bruits, no JVD, trach is midline. Neck Supple, PEERL, EOM intact. Heart:  Normal S1/S2 negative S3 or S4. Regular, 2/6 systolic murmur aortic region, low pitched systolic murmur in the mitral region no gallop or rub.  
Respiratory: Clear bilaterally x 4, no wheezing or rales Abdomen:   Soft, non-tender, no masses, bowel sounds are active.  
Extremities:  No edema, normal cap refill, no cyanosis, atraumatic. Neuro: A&Ox3, speech clear, gait stable. Skin: Skin color is normal. No rashes or lesions. Non diaphoretic Vascular: 2+ pulses symmetric in all extremities Cardiographics ECG: Sinus Results for orders placed or performed during the hospital encounter of 05/02/18 EKG, 12 LEAD, INITIAL Result Value Ref Range Ventricular Rate 89 BPM  
 Atrial Rate 89 BPM  
 P-R Interval 200 ms QRS Duration 106 ms  
 Q-T Interval 382 ms QTC Calculation (Bezet) 464 ms Calculated P Axis 17 degrees Calculated R Axis 71 degrees Calculated T Axis -27 degrees Diagnosis Normal sinus rhythm ST & T wave abnormality, consider inferior ischemia When compared with ECG of 03-MAY-2018 06:26, No significant change was found Confirmed by Cesilia Cruz MD, Narda Muller ([id number]) on 5/4/2018 10:58:52 AM 
  
 
 
 
Cardiology Labs: 
Lab Results Component Value Date/Time Cholesterol, total 121 02/02/2018 10:39 AM  
 HDL Cholesterol 48 02/02/2018 10:39 AM  
 LDL, calculated 46 02/02/2018 10:39 AM  
 Triglyceride 137 02/02/2018 10:39 AM  
 
 
Lab Results Component Value Date/Time Sodium 142 05/30/2018 09:08 AM  
 Potassium 4.5 05/30/2018 09:08 AM  
 Chloride 97 05/30/2018 09:08 AM  
 CO2 26 05/30/2018 09:08 AM  
 Anion gap 11 05/08/2018 03:19 AM  
 Glucose 95 05/30/2018 09:08 AM  
 BUN 21 05/30/2018 09:08 AM  
 Creatinine 1.01 (H) 05/30/2018 09:08 AM  
 BUN/Creatinine ratio 21 05/30/2018 09:08 AM  
 GFR est AA 66 05/30/2018 09:08 AM  
 GFR est non-AA 57 (L) 05/30/2018 09:08 AM  
 Calcium 9.9 05/30/2018 09:08 AM  
 Bilirubin, total 0.9 05/30/2018 09:08 AM  
 AST (SGOT) 37 05/30/2018 09:08 AM  
 Alk. phosphatase 93 05/30/2018 09:08 AM  
 Protein, total 7.6 05/30/2018 09:08 AM  
 Albumin 4.4 05/30/2018 09:08 AM  
 Globulin 3.8 05/08/2018 03:19 AM  
 A-G Ratio 1.4 05/30/2018 09:08 AM  
 ALT (SGPT) 17 05/30/2018 09:08 AM  
  
 
 
 Assessment: 
 
 Assessment:  
 
Diagnoses and all orders for this visit: 1. Atherosclerosis of native coronary artery of native heart without angina pectoris -     AMB POC EKG ROUTINE W/ 12 LEADS, INTER & REP 
-     2D ECHO COMPLETE ADULT (TTE) W OR WO CONTR; Future -     STRESS TEST LEXISCAN/CARDIOLITE; Future 2. ASHD (arteriosclerotic heart disease) 3. Mixed hyperlipidemia 4. Essential hypertension 5. Paroxysmal atrial fibrillation (HCC) 6. S/P AVR (aortic valve replacement) 7. History of MVR with cardiopulmonary bypass Other orders 
-     amLODIPine (NORVASC) 10 mg tablet; TAKE 1 TABLET EVERY DAY FOR BLOOD PRESSURE 
 
 
  ICD-10-CM ICD-9-CM 1.  Atherosclerosis of native coronary artery of native heart without angina pectoris I25.10 414.01 AMB POC EKG ROUTINE W/ 12 LEADS, INTER & REP  
   2D ECHO COMPLETE ADULT (TTE) W OR WO CONTR  
   STRESS TEST LEXISCAN/CARDIOLITE 2. ASHD (arteriosclerotic heart disease) I25.10 414.00   
3. Mixed hyperlipidemia E78.2 272.2 4. Essential hypertension I10 401.9 5. Paroxysmal atrial fibrillation (HCC) I48.0 427.31   
6. S/P AVR (aortic valve replacement) Z95.2 V43.3 7. History of MVR with cardiopulmonary bypass Z95.2 V15.1 Orders Placed This Encounter  AMB POC EKG ROUTINE W/ 12 LEADS, INTER & REP Order Specific Question:   Reason for Exam: Answer:   routine  LEXISCAN/CARDIOLITE, Clinic Performed Standing Status:   Future Standing Expiration Date:   7/4/2019 Order Specific Question:   Reason for Exam: Answer:   Anthony Pace 2D ECHO COMPLETE ADULT (TTE) W OR WO CONTR Standing Status:   Future Standing Expiration Date:   1/4/2020 Order Specific Question:   Reason for Exam: Answer:   Polly Mcneal Order Specific Question:   Contrast Enhancement (Bubble Study, Definity, Optison) may be used if criteria listed in established evidence-based protocol has been identified. Answer: Yes  JANUMET XR 50-1,000 mg TM24 Sig: TK 2 TS PO IN THE EVENING Refill:  0  
 ENBREL 50 mg/mL (0.98 mL) injection  amiodarone (CORDARONE) 200 mg tablet  amLODIPine (NORVASC) 10 mg tablet Sig: TAKE 1 TABLET EVERY DAY FOR BLOOD PRESSURE Dispense:  90 Tab Refill:  3 Generic For:*NORVASC 5MG   N O T I C E    PRESCRIPTION PREVIOUSLY AUTHORIZED BY DOCTOR:LILLI DE LEON [phone] Plan: 1. Atherosclerotic heart disease: Presenting with dyspnea on exertion history of CABG Lexiscan to evaluate for ischemia 2. Dyspnea on exertion with known valvular heart disease status post TAVR echocardiogram ordered 3. Hypertension: Repeat 160/68, amlodipine increased to 10 mg, follow-up 1 month for blood pressure check. 4.  Hyperlipidemia: On atorvastatin 40 mg continue current therapy due for lipids for this year lab slip provided 5. Paroxysmal atrial fibrillation: Has maintained sinus rhythm with amiodarone, anticoagulated with aspirin. Previous use of warfarin for TAVR protocol has been completed. Rodolfo Petersen Follow-up in 6 months unless abnormal echo or stress test. 
Maria Victoria Escobar NP This note was created using voice recognition software. Despite editing, there may be syntax errors. [address] [facility] Cardiology 1/4/2019 Patient seen, examined by me personally. Plan discussed as detailed. Agree with note as outlined by  NP. I confirm findings in history and physical exam. No additional findings noted. Agree with plan as outlined above.   
 
Fernando Ortega MD

## 2019-01-04 NOTE — PROGRESS NOTES
Chief Complaint Patient presents with  Follow-up 6 month;  Shortness of Breath  
  pt states she had surgery in May 2018 and is having some SOB  Leg Swelling  
  bilateral; noted during EKG 1. Have you been to the ER, urgent care clinic since your last visit? Hospitalized since your last visit? No 
 
2. Have you seen or consulted any other health care providers outside of the [address] [place] [address] since your last visit? Include any pap smears or colon screening.  No

## 2019-01-07 NOTE — PROGRESS NOTES
Spoke with patient regarding LAB results. Verified patient with two identifiers. Advised pt to reduce atorvastatin to 20 mg daily. Repeat labs in 3 months, keep hydrated.

## 2019-01-07 NOTE — PROGRESS NOTES
Viji: Please call patient. 1.  Liver enzymes slightly elevated cholesterol is excellent can reduce atorvastatin from 40 mg down to 20 mg. 
2.  Slight elevation in creatinine, make sure she remains well hydrated,  
Repeat complete metabolic panel and lipid panel in 3 months. Avoid use of Tylenol/acetaminophen-containing products and alcohol.

## 2019-02-04 PROBLEM — R06.02 SOB (SHORTNESS OF BREATH): Status: ACTIVE | Noted: 2019-01-01

## 2019-02-04 NOTE — ED PROVIDER NOTES
77 y.o. female with past medical history significant for DM, HTN, dyslipidemia, mitral regurgitation, CAD, paroxysmal A fib who presents via private vehicle with chief complaint of low hemoglobin. Pt reports that she was sent to the ED by her PCP for low hemoglobin. Pt has hx of anemia and has required blood transfusions in the past. Pt was anticoagulated after being d/c home from the hospital after her TAVR in May 2018, but is no longer at this time. Pt denies hx of GI bleed. Denies blood in her stool, vaginal bleeding or hematuria. Pt also c/o intermittent SONI since her TAVR 9 months ago, as well as increased leg swelling, cough and 1 episode of vomiting a few days ago. Pt is on a fluid pill. Pt denies fever, chills, diarrhea, abdominal pain. There are no other acute medical concerns at this time. Social hx: denies tobacco use, +rare EtOH consumption PCP: Mandie Zabala NP Note written by Alison Interiano, as dictated by Alexandre Tapia DO 10:09 AM 
 
 
 
 
  
 
Past Medical History:  
Diagnosis Date  Bruit of right carotid artery  CAD (coronary artery disease)  DM (diabetes mellitus) ([facility] [address]  Dyslipidemia  Hypertension  Mitral regurgitation and aortic stenosis  Paroxysmal A-fib ([facility] [address] Past Surgical History:  
Procedure Laterality Date  HX AORTIC VALVE REPLACEMENT    
 HX CORONARY ARTERY BYPASS GRAFT    
 HX MITRAL VALVE REPLACEMENT    
 TAVR History reviewed. No pertinent family history. Social History Socioeconomic History  Marital status:  Spouse name: Not on file  Number of children: Not on file  Years of education: Not on file  Highest education level: Not on file Social Needs  Financial resource strain: Not on file  Food insecurity - worry: Not on file  Food insecurity - inability: Not on file  Transportation needs - medical: Not on file  Transportation needs - non-medical: Not on file Occupational History  Not on file Tobacco Use  Smoking status: Never Smoker  Smokeless tobacco: Never Used Substance and Sexual Activity  Alcohol use: Yes Alcohol/week: 0.0 oz  
  Comment: Rare  Drug use: No  
 Sexual activity: Not on file Other Topics Concern  Not on file Social History Narrative  Not on file ALLERGIES: Sulfamethoxazole Review of Systems Constitutional: Negative for activity change, appetite change, chills and fever. HENT: Negative for congestion, rhinorrhea, sinus pressure, sneezing and sore throat. Eyes: Negative for photophobia and visual disturbance. Respiratory: Positive for cough and shortness of breath. Cardiovascular: Positive for leg swelling. Negative for chest pain. Gastrointestinal: Positive for vomiting (resolved). Negative for abdominal pain, blood in stool, constipation, diarrhea and nausea. Genitourinary: Negative for difficulty urinating, dysuria, flank pain, frequency, hematuria, menstrual problem, urgency, vaginal bleeding and vaginal discharge. Musculoskeletal: Negative for arthralgias, back pain, myalgias and neck pain. Skin: Positive for pallor. Negative for rash and wound. Neurological: Negative for syncope, weakness, numbness and headaches. Psychiatric/Behavioral: Negative for self-injury and suicidal ideas. All other systems reviewed and are negative. Vitals:  
 02/04/19 0948 02/04/19 1037 02/04/19 1400 BP:  140/52 141/60 Pulse: 70 Resp:  16 Temp:  97.9 °F (36.6 °C) 97.8 °F (36.6 °C) SpO2: (!) 89%  96% Physical Exam  
Constitutional: She is oriented to person, place, and time. She appears well-developed. No distress. Chronically ill appearing. HENT:  
Head: Normocephalic and atraumatic. Eyes: Conjunctivae and EOM are normal. Pupils are equal, round, and reactive to light. Significant conjunctival pallor. Neck: Neck supple. Cardiovascular: Normal rate and regular rhythm. Murmur heard. Pulmonary/Chest: Effort normal and breath sounds normal.  
Abdominal: Soft. She exhibits no distension. There is no tenderness. Musculoskeletal:  
2+ bilateral lower extremity pitting edema, nontender, without erythema Neurological: She is alert and oriented to person, place, and time. Skin: Skin is warm and dry. She is not diaphoretic. Nursing note and vitals reviewed. Note written by Alison Rowland, as dictated by Sergio Leggett DO 10:24 AM 
 
 
MDM Number of Diagnoses or Management Options Acute on chronic congestive heart failure, unspecified heart failure type [facility]): Hypoxia:  
 
  PROGRESS NOTE: 
2:37 PM 
78 yo female presents with new O2 req and increasing SONI and bilateral lower extremity swelling Labs show hemoglobin 7.2, similar to multiple measures from 6.9-8.5 over the last few months. Platelets 612, WBC 39.8, bicarb 13, anion gap 17, BUN 77, creatinine 2.27, K+ 5.6, LFTs otherwise unremarkable. Nl INR. BNP markedly elevated at >27473, negative troponin. CXR shows bilateral airspace opacities concerning for pulmonary edema. Given dose of IV lasix. She remained stable and significantly more comfortable on supplemental oxygen. Procedures ED EKG interpretation: 
Rhythm: nl sinus rhythm with 1st degree AV block. Rate (approx.): 65; Axis: normal; ST/T wave: T wave inversions inferolaterally; similar when compared to study from 1/4/2019. Note written by Alison Rowland, as dictated by Sergio Leggett DO 10:27 AM 
 
Hospitalist Brunilda for Admission 1:39 PM 
 
ED Room Number: ER25/25 Patient Name and age:  Ismael Campos 77 y.o.  female Working Diagnosis: 1. Acute on chronic congestive heart failure, unspecified heart failure type ([address] 2. Hypoxia Readmission: no 
Isolation Requirements:  no 
Recommended Level of Care:  telemetry Code Status:  Full Code Other:  New CHF exacerbation with oxygen requirement, got lasix and stable on 2L. Cardiology consulted. CONSULT NOTE: 
1:57 PM Sameera Chadwick DO spoke with Dr. Darylene Richard, Consult for Cardiology. Discussed available diagnostic tests and clinical findings. Dr. Darylene Richard agrees with the plan for hospitalist admit.

## 2019-02-04 NOTE — PROGRESS NOTES
Admission Medication Reconciliation: 
 
Information obtained from: patient, written list 
 
Significant PMH/Disease States:  
Past Medical History:  
Diagnosis Date  Bruit of right carotid artery  CAD (coronary artery disease)  DM (diabetes mellitus) ([facility] [address]  Dyslipidemia  Hypertension  Mitral regurgitation and aortic stenosis  [facility] ([facility] Chief Complaint for this Admission: Chief Complaint Patient presents with  Abnormal Lab Results  Shortness of Breath Allergies:  Sulfamethoxazole Prior to Admission Medications:  
Prior to Admission Medications Prescriptions Last Dose Informant Patient Reported? Taking? ENBREL 50 mg/mL (0.98 mL) injection 2/3/2019  Yes No  
Si mg by SubCUTAneous route every seven (7) days. JANUMET XR 50-1,000 mg TM24 2/3/2019 at Unknown time  Yes Yes Sig: TK 2 TS PO IN THE EVENING  
amLODIPine (NORVASC) 10 mg tablet 2019 at Unknown time  No Yes Sig: TAKE 1 TABLET EVERY DAY FOR BLOOD PRESSURE  
amiodarone (CORDARONE) 200 mg tablet 2/3/2019 at pm  Yes Yes Sig: Take 200 mg by mouth nightly. ascorbic acid (VITAMIN C) 500 mg tablet 2019 at Unknown time Self Yes Yes Sig: Take 500 mg by mouth daily. aspirin 81 mg chewable tablet 2019 at Unknown time  No Yes Sig: Take 1 Tab by mouth daily. atorvastatin (LIPITOR) 20 mg tablet 2/3/2019 at pm  Yes Yes Sig: Take 20 mg by mouth every evening. calcium carbonate (CALCIUM 600 PO) 2019 at Unknown time  Yes Yes Sig: Take 1 Tab by mouth Before breakfast, lunch, and dinner. glipiZIDE SR (GLUCOTROL XL) 5 mg CR tablet 2019  Yes Yes Sig: Take 5 mg by mouth every Tuesday and Thursday. hydrochlorothiazide (HYDRODIURIL) 50 mg tablet 2019 at Unknown time Self Yes Yes Sig: Take 50 mg by mouth daily. iron-vitamin C (VITRON-C) 65 mg iron- 125 mg TbEC 2019 at Unknown time Self Yes Yes Sig: Take 1 Tab by mouth three (3) times daily. lisinopril (PRINIVIL, ZESTRIL) 20 mg tablet 2/4/2019 at Unknown time Self No Yes Sig: Take 1 Tab by mouth daily. nitroglycerin (NITROSTAT) 0.4 mg SL tablet   No Yes Sig: DISSOLVE 1 TABLET UNDER THE TONGUE EVERY 5 MINUTES FOR 3 DOSES AS NEEDED FOR CHEST PAIN  
therapeutic multivitamin (THERAGRAN) tablet 2/4/2019 at Unknown time  Yes Yes Sig: Take 1 Tab by mouth daily. Facility-Administered Medications: None Comments/Recommendations:  
Removed: amoxicillin,biotin, furosemide, glipizide, metformin, metoprolol, naproxen, warfarin, Added: multivitamin, glipizide XL Changed: atorvastatin 40 to 20 mg, Enbrel to qweek on Sunday

## 2019-02-04 NOTE — ED TRIAGE NOTES
Pt was referred to ED by PCP, Dr. Tucker Blum, for low hgb. Pt c/o sob. O2 sats 89% on RA. Pt placed on NC 2L in waiting room

## 2019-02-05 NOTE — PROGRESS NOTES
Hospitalist Progress Note Yessenia Gore MD. Cell: [phone] NAME:  Colin Booker :  1950 MRN:  [id number] Date of Service:  2019 Summary:  The patient is a 43-year-old female with past medical history of diabetes, hypertension, dyslipidemia, mitral valve regurgitation, coronary artery disease, paroxysmal atrial fibrillation, history of TAVR in 2018, history of diabetes, who presents to the hospital with shortness of breath. Assessment/Plan: 
Worsening SOB: Aetiology appears to be multifactorial including anemia, acute on chronic systolic heart failure CXR shows fairly severe bilateral interstitial and airspace opacities fairly symmetric. Continue lasix Acute on chronic systolic heart failure with NYHA class IV on admission. Echo: Ef 41-45%, Right ventricular systolic function mod to severely reduced Continue diuresis with lasix Daily weights, pending cardiology evaluation S/p TAVR Acute on chronic kidney injury stage 3 with AG metabolic acidosis Suspect component of cardio renal syndrome Consult nephrology for evaluation. Check lactic acid Start sodium bicarbonate tabs. Severe anemia with hb on admission of 7.2 s/p 2 unit of prbc Consult hematology re advise and opinion. Obtain iron panel, ferritin, LDH, haptoglobin, stool occult DM type 2: ISS as per protocol Accu checks. U/A no ketones Code status: Full DVT prophylaxsis: On heparin, D/c and start SCD Dispo: tbd. Discussed extensively with family at bed side. Will  Discuss advance directives tomorrow Interval History/Subjective: 
 
F/u for worsening SOB No acute overnight event. States SOB is much better. Denies chest pain. Review of Systems: 
Pertinent items are noted in HPI. Objective: VITALS:  
 Last 24hrs VS reviewed since prior progress note. Most recent are: 
Visit Vitals /50 (BP 1 Location: Left arm, BP Patient Position: At rest) Pulse 90 Temp 97.5 °F (36.4 °C) Resp 19 Ht 5' 2\" (1.575 m) Wt 71.7 kg (158 lb 1.1 oz) SpO2 91% BMI 28.91 kg/m² Intake/Output Summary (Last 24 hours) at 2/5/2019 1604 Last data filed at 2/5/2019 1120 Gross per 24 hour Intake 797 ml Output 150 ml Net 647 ml PHYSICAL EXAM: 
General: No acute distress, cooperative, pleasant EENT: EOMI. Anicteric sclerae. Oral mucous moist, oropharynx benign Resp: CTA bilaterally. No wheezing/rhonchi/rales. No accessory muscle use CV: Regular rhythm, normal rate, Systolic murmurs, gallops, rubs GI: Soft, non distended, non tender. normoactive bowel sounds, no hepatosplenomegaly Extremities: 3 + edema up to the knee, warm, 2+ pulses throughout Neurologic: Moves all extremities. AAOx3, CN II-XII grossly intact Psych: Good insight. Not anxious nor agitated. Skin: Good Turgor, no rashes or ulcers Lab Data Personally Reviewed: (see below) Medications list Personally Reviewed:  x YES  NO  
 
_______________________________________________________________________ Care Plan discussed with:  Patient/Family and Nurse Total NON critical care TIME:  30 minutes Sadiq Trejo MD  
 
Procedures: see electronic medical records for all procedures/Xrays and details which were not copied into this note but were reviewed prior to creation of Plan. LABS: 
Recent Labs 02/05/19 
1507 02/05/19 
0432 02/04/19 
1309 WBC  --  16.0* 13.9* HGB 7.4* 6.1* 7.2* HCT 23.5* 20.2* 23.9*  
PLT  --  122* 129* Recent Labs 02/05/19 0432 02/04/19 
1840 02/04/19 
1049  135* 134* K 4.5 4.9 5.6*  
 106 104 CO2 12* 15* 13* BUN 76* 74* 77* CREA 2.00* 2.07* 2.27* * 155* 140* CA 9.8 9.4 9.5 MG  --   --  2.2 Recent Labs 02/05/19 
0432 02/04/19 
1049 SGOT 47* 55* ALT 43 45 AP 97 103 TBILI 0.8 0.9 TP 7.1 7.3 ALB 2.9* 3.0*  
GLOB 4.2* 4.3* Recent Labs 02/04/19 
1840 02/04/19 
1309 INR 1.2* 1.1 PTP 11.6* 11.0 Recent Labs 02/04/19 
1840 02/04/19 
1049 TIBC 248* 276 PSAT 4* 8* No results found for: FOL, RBCF No results for input(s): PH, PCO2, PO2 in the last 72 hours. Recent Labs 02/04/19 1840 02/04/19 
1049 TROIQ <0.05 <0.05 Lab Results Component Value Date/Time Cholesterol, total 129 01/04/2019 11:56 AM  
 HDL Cholesterol 56 01/04/2019 11:56 AM  
 LDL, calculated 50 01/04/2019 11:56 AM  
 Triglyceride 115 01/04/2019 11:56 AM  
 
Lab Results Component Value Date/Time Glucose (POC) 176 (H) 02/05/2019 11:26 AM  
 Glucose (POC) 165 (H) 02/05/2019 06:40 AM  
 Glucose (POC) 236 (H) 02/04/2019 11:21 PM  
 Glucose (POC) 135 (H) 02/04/2019 05:49 PM  
 Glucose (POC) 105 (H) 06/20/2018 09:40 AM  
 
Lab Results Component Value Date/Time  Color YELLOW/STRAW 02/04/2019 05:06 PM  
 Appearance CLOUDY (A) 02/04/2019 05:06 PM  
 Specific gravity 1.015 02/04/2019 05:06 PM  
 pH (UA) 5.0 02/04/2019 05:06 PM  
 Protein NEGATIVE  02/04/2019 05:06 PM  
 Glucose NEGATIVE  02/04/2019 05:06 PM  
 Ketone NEGATIVE  02/04/2019 05:06 PM  
 Bilirubin NEGATIVE  02/04/2019 05:06 PM  
 Urobilinogen 0.2 02/04/2019 05:06 PM  
 Nitrites NEGATIVE  02/04/2019 05:06 PM  
 Leukocyte Esterase MODERATE (A) 02/04/2019 05:06 PM  
 Epithelial cells MODERATE (A) 02/04/2019 05:06 PM  
 Bacteria NEGATIVE  02/04/2019 05:06 PM  
 WBC 5-10 02/04/2019 05:06 PM  
 RBC 0-5 02/04/2019 05:06 PM

## 2019-02-05 NOTE — PROGRESS NOTES
Dictated Recent metfortmin use Here with AGMA, sumaya (sumaya seems better) 
 
- lactate level (?WAYNE) 
- ketone 
- NaHCO3 iv 
 
- follow

## 2019-02-05 NOTE — ED NOTES
TRANSFER - OUT REPORT: 
 
Verbal report given to RN on Lior Trinidad  being transferred to  (unit) for routine progression of care Report consisted of patients Situation, Background, Assessment and  
Recommendations(SBAR). Information from the following report(s) ED Summary was reviewed with the receiving nurse. Lines:  
Peripheral IV 02/04/19 Left Antecubital (Active) Site Assessment Clean, dry, & intact 2/4/2019 10:41 AM  
Phlebitis Assessment 0 2/4/2019 10:41 AM  
Infiltration Assessment 0 2/4/2019 10:41 AM  
Dressing Status Clean, dry, & intact 2/4/2019 10:41 AM  
Dressing Type Transparent 2/4/2019 10:41 AM  
Hub Color/Line Status Pink;Flushed;Patent 2/4/2019 10:41 AM  
Action Taken Blood drawn 2/4/2019 10:41 AM  
   
Peripheral IV 02/04/19 Left Antecubital (Active) Site Assessment Clean, dry, & intact 2/4/2019  1:17 PM  
Phlebitis Assessment 0 2/4/2019  1:17 PM  
Infiltration Assessment 0 2/4/2019  1:17 PM  
Dressing Status Clean, dry, & intact 2/4/2019  1:17 PM  
Dressing Type Transparent 2/4/2019  1:17 PM  
Hub Color/Line Status Pink;Flushed;Patent 2/4/2019  1:17 PM  
Action Taken Blood drawn 2/4/2019  1:17 PM  
  
 
Opportunity for questions and clarification was provided. Patient transported with: 
 Monitor Registered Nurse

## 2019-02-05 NOTE — PROGRESS NOTES
Bedside shift change report given to Trina (oncoming nurse) by Wendy Ruvalcaba (offgoing nurse). Report included the following information SBAR, Kardex, Accordion and Cardiac Rhythm SR, BBB.

## 2019-02-05 NOTE — ROUTINE PROCESS
2488:  Will administer scheduled Lasix after blood complete. 0935:  Saturation 88% on 2L NC. Pt resting comfortably at this time. Will give schedule Lasix now. 1000: Increase rate of transfusion for fear 4 hour will be over and blood transfusion not completely transfused. 1600:  Pt's family stress; voiced MD said he will be back in 10 minutes but \"it's 20 minutes and he's not back. Her daughter is anxious wanting updates. \"

## 2019-02-05 NOTE — CARDIO/PULMONARY
Cardiac Rehab: Living with Heart Failure Booklet given to Debra Aviles. Met with the pt., her  and daughter. Educated using teach back method. Discussed diagnosis definition and assessed patient understanding. Reviewed importance of daily weight monitoring and Low Sodium diet (4319-3160 mg. Daily). Stressed the importance of reading food labels to better limit sodium. Encouraged activity and rest periods within symptom limitations and as ordered by physician. Reviewed lasix, purpose of medication, potential side effects, compliance, and what to do if dose is missed. Discussed importance of reporting signs and symptoms of exacerbation, and when to report them to the doctor, to prevent re-hospitalization. Debra Aviles was encouraged to keep all appointments with doctor. Debra Aviles did participate in Cardiac Rehab at [facility] last spring post TAVR/TMVR but now exercises at the [facility]. Cheri Levin RN Debra Dhaliwals could benefit from further education on the following HF topics: medications

## 2019-02-05 NOTE — NURSE NAVIGATOR
Chart reviewed by Heart Failure Nurse Navigator. Heart Failure database completed. EF:  41/45 ACEi/ARB/ARNi: Lisinopril prior to admission BB: Not indicated Aldosterone Antagonist: Not indicated Obstructive Sleep Apnea Screening: No 
 STOP-BANG score: 
 Referred to Sleep Medicine: CRT Not indicated NYHA Functional Class IV Heart Failure Teach Back in Patient Education. Heart Failure Avoiding Triggers on Discharge Instructions. Cardiologist: RCA Post discharge follow up phone call to be made within 48-72 hours of discharge.

## 2019-02-05 NOTE — H&P
[facility] [facility] HISTORY AND PHYSICAL Mishel Bustillos 
MR#: [id number] : 1950 ACCOUNT #: [de-identified] ADMIT DATE: 2019 CHIEF COMPLAINT:  Shortness of breath. HISTORY OF PRESENT ILLNESS:  The patient is a 58-year-old female with past medical history of diabetes, hypertension, dyslipidemia, mitral valve regurgitation, coronary artery disease, paroxysmal atrial fibrillation, history of TAVR in 2018, history of diabetes, who presents to the hospital with the above-mentioned symptoms. History is obtained from the patient and her  who is present at the bedside. The patient reports that for the past week or so, she has been getting increasingly short of breath. About 2 weeks back, she developed what she describes as a cold. Reports that she started experiencing some runny nose, sore throat, cough, did not seek medical attention. Symptoms got better, but her shortness of breath continued. The patient reports that she went to her primary care physician and was found to have worsening anemia and was told to come to the hospital or to monitor things at home. She chose to monitor things at home, but continued to feel short of breath. Today, got concerned and decided to come to the hospital.  Patient also reports that she has had swelling in both her legs. Patient reports that she has a history of chronic anemia as a child and has received multiple blood transfusions. The patient reports \"nobody could figure out why that was. \"  The patient reports that she has undergone multiple and extensive testing to figure out the cause for anemia. The patient reports that she also had some nausea associated with her symptom, has been having some cough, which is nonproductive in nature. The patient denies any hematemesis, melena, hemoptysis, or hematuria associated with her symptoms.   The patient is not sure whether she has a history of CHF and cannot recollect when her last echocardiogram was. Per chart, patient's last echocardiogram was in 06/2018 which showed hyperdynamic systolic function with increased wall thickness and moderate concentric hypertrophy, concern for restrictive cardiomyopathy and decreased left ventricular diastolic compliance. The patient denies any other complaints or problems. Denies any fever or chills. Denies any headache, blurry vision, sore throat, trouble swallowing, trouble with speech, chest pain, abdominal pain, constipation, diarrhea, urinary symptoms, focal or generalized neurological weakness, recent travel, sick contacts, falls, injuries, hematemesis, melena, hemoptysis, hematuria, or any other concerns or problems. The patient reports that she has been taking her diabetes medication on a regular basis. PAST MEDICAL HISTORY:  See above. HOME MEDICATIONS:  Currently, the patient is taking amlodipine 10 mg daily, aspirin 81 mg daily, glipizide 5 mg Tuesday and Thursday only, calcium, hydrochlorothiazide 50 mg daily, lisinopril 20 mg daily, vitamin C, multivitamin, Lipitor 20 mg daily, Janumet 50/1000 mg 2 tablets daily, and amiodarone 200 mg daily. SOCIAL HISTORY:  Denies tobacco abuse, alcohol use, IV drug abuse. ALLERGIES:  SULFA ANTIBIOTICS. REVIEW OF SYSTEMS:  All systems were reviewed and were found to be essentially negative except for the symptoms mentioned above. FAMILY HISTORY:  Discussed and was found to be noncontributory. PHYSICAL EXAMINATION: 
VITAL SIGNS:  Pulse rate of 70, pulse ox is 89% on room air, blood pressure 138/76, temperature not recorded in the chart. GENERAL:  Alert x3, awake, mildly distressed, pleasant female, appears to be stated age. HEENT:  Pupils equal, reactive to light. Dry mucous membranes. Tympanic membranes clear. NECK:  Supple. CHEST:  Diffuse crackles bilaterally in the lung bases. COR:  S1, S2 were heard. A II/VI holosystolic murmur heard at the right upper sternal border. ABDOMEN:  Soft, nontender, nondistended. Bowel sounds are physiologic. EXTREMITIES:  No clubbing. No cyanosis; 1+ pitting edema. NEUROPSYCHIATRIC:  Pleasant mood and affect. Cranial nerves II-XII grossly intact. No focal neurological deficits. SKIN:  Warm. LABORATORY DATA:  White count 13.9, hemoglobin 7.2, hematocrit 43.9, platelets 212. INR 1.1. Sodium 134, potassium 5.6, chloride 104, bicarbonate 13, anion gap 17, glucose 140, BUN 77, creatinine 2.27, calcium 9.5, bilirubin total 0.9, ALT 45, AST 55, alkaline phosphatase 103. Troponin less than 0.05. BNP greater than 35,000. X-ray of the chest shows of fairly severe bilateral interstitial airspace opacities, fairly symmetric distribution. This finding is most likely due to severe pulmonary edema. EKG shows sinus rhythm with nonspecific ST changes. ASSESSMENT AND PLAN: 
1. Acute hypoxic respiratory failure, resolving, most likely secondary from congestive heart failure exacerbation. The patient appears to be in congestive heart failure exacerbation, New [place] Heart Association class IV. Baseline appears to be around [address] class II. Patient is in acute-on-chronic diastolic congestive heart failure New [place] Heart Association class IV. The patient will be started on gentle intravenous diuresis. Strict intake and output, daily weights, cycle troponins, nitroglycerin, continue aspirin, and continue to closely monitor. We will provide oxygen support. Get an arterial blood gas as the patient was hypoxic on arrival and we will closely monitor on telemetry. We will get cardiology consult and further intervention will be per hospital course. Echocardiogram has been ordered. We will reassess as needed. 2.  High anion gap lactic acidosis, unclear etiology.   The patient does not appear to be in diabetic ketoacidosis, is alert, awake. Blood glucose appears to be not significantly high. A urinalysis as well as an arterial blood gas was not done in the emergency room, which has been ordered. We will continue to closely monitor. Repeat BMP in 3-4 hours. If continues to be elevated, may consider further intervention and diagnostics. We will hold metformin for now. Further intervention will be per hospital course. No obvious signs of infection. 3.  Hyperkalemia. Electrocardiogram does not show any significantly acute changes at this point of time. It should improve with diuresis. We will get a BMP in 2 hours. If continues to be elevated, may consider starting the patient on Kayexalate. The patient on telemetry monitoring for now. Further intervention will be per hospital course. Reassess as needed. We will give the patient 1 amp of calcium gluconate. 4.  Acute-on-chronic anemia. Patient with longstanding history of anemia. Reports she has never had any gastrointestinal bleed. I will transfuse 1 unit packed red blood cells. We will get a stool occult blood, iron panel, and continue to closely monitor. Repeat hemoglobin and hematocrit in the morning. Further intervention will be per hospital course. Continue to monitor. 5.  Acute kidney injury, most likely secondary to #1. We will avoid nephrotoxic medications, renally dose all other medication. Repeat labs in the morning. We will get a renal ultrasound and further intervention will be per hospital course. May consider getting a nephrology consult, especially in light of diuretics. Further intervention will be per hospital course. 6.  History of transcatheter aortic valve replacement, patient off Coumadin. Continue to closely monitor. 7.  Diabetes. The patient will be on sliding scale NovoLog insulin, Accu-Chek, diet control, close monitoring.   A urinalysis and arterial blood gas have been ordered. Any signs of diabetic ketoacidosis will result in the patient going on insulin drip and further intervention. 8.  History of paroxysmal atrial fibrillation. Continue amiodarone. Further intervention will be per hospital course. The patient is not on oral anticoagulation. We will discuss with Cardiology. 9.  Gastrointestinal and deep venous thrombosis prophylaxis. Patient is on heparin. Analilia Miner MD MM/RAFAEL 
D: 02/04/2019 14:46    
T: 02/04/2019 21:50 
JOB #: [id number]

## 2019-02-05 NOTE — ED NOTES
Assumed care of patient with verbal bedside report. Patient is resting on the stretcher with O2 attached running.

## 2019-02-06 NOTE — CONSULTS
CSS 
Consult Subjective:  
  
Radha Carver is a 77 y.o.  female who was referred for evaluation of CHF, prev TAVR/TMVR on 5/2/18, well known to our office, by Dr. Marika Carcamo. Pt has prior PMH significant for MS/MR s/p MVR (#27 Medtronic Mosaic in 2009), s/p SAVR with patch closure of aorta (#21 Medtronic Ultra Mosaic in 2009), CAD s/p CABG x 3 (LIMA-LAD, SVG to OM1, Diag 1 in 2009), HTN, DM, HLD, PAF, carotid stenosis, TIA x 2 (2003), CVA, chronic anemia (received several transfusions in past), RA and psoriasis. Pt reports shortly after [place] feeling more SOB with activity and fatigue, along with increased edema. Family noted that her \"coloring was off\" and she physically did not look as well. She discussed her symptoms with Dr. Nohemi Amaya office who wanted to perform a stress test in January but pt did not feel well enough to complete it. About 2 weeks ago, she began to experience worsening SOB, edema, fatigue and dry cough. She was seen by her PCP and given antibiotics for respiratory infection. She returned to their office 2/4/19 since her symptoms were not improving, actually becoming worse, had labs drawn which revealed low hemoglobin and pt was instructed to go straight to the ED. Since her admission, pt's SOB has become progressively worse. She is currently on a non-re breather which was added overnight due to worsening respiratory status. She denies any CP, palpitations, fever, chills. In addition to her significant anemia, her lab work on admission has revealed a metabolic acidosis, chest xray w/ severe pulm edema, a pBNP > 35,000 and acute kidney injury. She has received blood transfusions and been given diuretics without relief. She had a TTE on 2/4/19 with concerns for possible thrombosis of her mitral valve. Cardiac Testing Cardiac catheterization 3/6/18: CORONARY CIRCULATION: The coronary circulation is right dominant.  Distal left main: There was a 75 % stenosis. The lesion was hazy. Ostial LAD: 
Normal. 1st obtuse marginal: There was a 100 % stenosis. 2nd obtuse marginal: Normal. Graft to the mid LAD: The graft was a LIMA. It was normal. Graft to the 1st diagonal: The graft was a saphenous vein graft from the aorta. It is a Y graft to D1 and OM1 Aortic Pressure (S/D/M): 148/61/96 Pulmonary Artery (S/D/M): 70/24/41 Pulmonary Capillary Wedge: 25/35/23 Right Atrium (a/v/M): 6/6/0 Right Ventricle (s/edp): 33/8/-- 
 
ECHO 2/4/19:  
Left Ventricle Normal cavity size. Moderate concentric hypertrophy observed. There is moderately decreased systolic function. The estimated ejection fraction is 41 - 45% and 40-45. Unable to assess diastolic function. Left Atrium Normal cavity size. No atrial septal defect present. No patent foramen ovale visualized. Right Ventricle Normal cavity size. Global systolic function is moderately to severely reduced. Right Atrium Normal size. Aortic Valve No regurgitation. Aortic valve is prosthetic. Aortic valve peak gradient is 44.8 mmHg. Aortic valve mean gradient is 21.9 mmHg. Aortic valve area is 1.2 cm2. There is mild to moderate aortic stenosis. The peak aortic velocity was measured in the apical view. There is a TAVR present. Prosthetic valve insufficiency not present. The prosthetic valve is abnormal. The prosthetic valve has the following abnormalities: stenosis or obstruction. Mitral Valve Mitral valve is prosthetic. 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 The prosthetic valve appears abnormal. The prosthetic valve has the following abnormalities: stenosis or obstruction. Mild regurgitation. Tricuspid Valve Tricuspid valve not well visualized. No stenosis. Non-specific thickening. No prolapse. Severe tricuspid valve regurgitation. Pulmonary arterial systolic pressure is 42.3 mmHg. Moderate pulmonary hypertension. Pulmonic Valve The pulmonic valve was not well visualized.  Mild to moderate regurgitation. Aorta Normal aortic root, ascending aortic, and aortic arch. Normal aortic root. Pericardium No evidence of pericardial effusion. Past Medical History:  
Diagnosis Date  Anemia  Bruit of right carotid artery  CAD (coronary artery disease)  CHF (congestive heart failure) [facility]  CVA (cerebral vascular accident) ([facility]  DM [facility]diabetes mellitus) [facility]  Dyslipidemia  Hypertension  Mitral regurgitation and aortic stenosis  Paroxysmal A-fib ([facility]  Psoriasis  TIA (transient ischemic attack) Past Surgical History:  
Procedure Laterality Date  HX AORTIC VALVE REPLACEMENT    
 HX CORONARY ARTERY BYPASS GRAFT    
 HX MITRAL VALVE REPLACEMENT    
 TAVR Social History Tobacco Use  Smoking status: Never Smoker  Smokeless tobacco: Never Used Substance Use Topics  Alcohol use: Yes Alcohol/week: 0.0 oz  
  Comment: Rare History reviewed. No pertinent family history. Prior to Admission medications Medication Sig Start Date End Date Taking? Authorizing Provider  
atorvastatin (LIPITOR) 20 mg tablet Take 20 mg by mouth every evening. Yes Provider, Historical  
glipiZIDE SR (GLUCOTROL XL) 5 mg CR tablet Take 5 mg by mouth every Tuesday and Thursday. Yes Provider, Historical  
therapeutic multivitamin (THERAGRAN) tablet Take 1 Tab by mouth daily. Yes Provider, Historical  
JANUMET XR 50-1,000 mg TM24 TK 2 TS PO IN THE EVENING 11/26/18  Yes Provider, Historical  
amiodarone (CORDARONE) 200 mg tablet Take 200 mg by mouth nightly. 10/3/18  Yes Provider, Historical  
amLODIPine (NORVASC) 10 mg tablet TAKE 1 TABLET EVERY DAY FOR BLOOD PRESSURE 1/4/19  Yes Katherine Aguilar NP  
nitroglycerin (NITROSTAT) 0.4 mg SL tablet DISSOLVE 1 TABLET UNDER THE TONGUE EVERY 5 MINUTES FOR 3 DOSES AS NEEDED FOR CHEST PAIN 11/30/18  Yes Janice Li MD  
aspirin 81 mg chewable tablet Take 1 Tab by mouth daily.  6/6/18  Yes Immanuel Gonzalez, NP  
calcium carbonate (CALCIUM 600 PO) Take 1 Tab by mouth Before breakfast, lunch, and dinner. Yes Provider, Historical  
lisinopril (PRINIVIL, ZESTRIL) 20 mg tablet Take 1 Tab by mouth daily. 1/17/18  Yes Lelia Birmingham NP  
iron-vitamin C (VITRON-C) 65 mg iron- 125 mg TbEC Take 1 Tab by mouth three (3) times daily. Yes Provider, Historical  
hydrochlorothiazide (HYDRODIURIL) 50 mg tablet Take 50 mg by mouth daily. Yes Provider, Historical  
ascorbic acid (VITAMIN C) 500 mg tablet Take 500 mg by mouth daily. Yes Provider, Historical  
ENBREL 50 mg/mL (0.98 mL) injection 50 mg by SubCUTAneous route every seven (7) days. 10/17/18   Provider, Historical  
   
Allergies Allergen Reactions  Sulfamethoxazole Hives Review of Systems:  
Consititutional: Denies fever or chills. Eyes:  Denies use of glasses or vision problems (cataracts). ENT:  Denies hearing or swallowing difficulty. CV: Denies CP, claudication. Resp: Denies productive cough. : Denies dialysis or kidney problems. GI: Denies ulcers, esophageal strictures, liver problems. M/S: Denies joint or bone problems, or implanted artificial hardware. Skin: Denies varicose veins Neuro: hx of CVA, TIA Psych: Denies anxiety or depression. Endocrine: Denies thyroid problems Heme/Lymphatic: Denies easy bruising or lymphedema. Objective:  
 
Physical Exam:   
Visit Vitals /51 (BP 1 Location: Left arm, BP Patient Position: Head of bed elevated (Comment degrees)) Pulse 88 Temp 98.6 °F (37 °C) Resp 24 Ht 5' 2\" (1.575 m) Wt 163 lb 12.8 oz (74.3 kg) SpO2 96% BMI 29.96 kg/m² General appearance: alert, cooperative, no distress, appears stated age Head: Normocephalic, without obvious abnormality, atraumatic Eyes: conjunctivae/corneas clear. PERRL, EOM's intact. Neck: supple, symmetrical, trachea midline, no adenopathy, thyroid: not enlarged, symmetric, no tenderness/mass/nodules, no carotid bruit Lungs: coarse bilat Heart: regular rate and rhythm, S1, S2 normal, 3/6 murmur, no click, rub or gallop Abdomen: soft, non-tender. Bowel sounds normal. No masses,  no organomegaly Extremities: extremities normal, atraumatic, no cyanosis. +2-3 edema bilat Neurologic: Grossly normal 
 
Labs:  
Recent Labs 02/06/19 
1128  02/06/19 
0400  02/04/19 
1840 WBC  --   --  20.2*   < >  --   
HGB  --   --  6.8*   < >  --   
HCT  --   --  20.7*   < >  --   
PLT  --   --  137*   < >  --   
NA  --   --  140   < > 135* K  --   --  4.1   < > 4.9 BUN  --   --  78*   < > 74* CREA  --   --  2.10*   < > 2.07* GLU  --   --  153*   < > 155* GLUCPOC 203*   < >  --    < >  --   
INR  --   --   --   --  1.2*  
 < > = values in this interval not displayed. Diagnostics:  
PA and lateral: IMPRESSION: 
1. Fairly severe bilateral interstitial and airspace opacities fairly symmetric 
distribution. These findings are most likely due to severe pulmonary edema. Pneumonia is not excluded. Carotid doppler 10/25/2017: 70% or greater stenosis in the right internal carotid artery. <50% stenosis in the left internal carotid artery. No significant stenosis in the external carotid arteries bilaterally. Antegrade flow in both vertebral arteries. Normal flow in both subclavian arteries. Tortuous vessels and shadowing from calcific plaque made this a technically difficult study. PFTS-FEV1 4/23/18: 1.31L, 71% predicted EKG: Sinus rhythm with 1st degree AV block ST & T wave abnormality, consider inferolateral ischemia When compared with ECG of 04-MAY-2018 09:43,  
T wave inversion more evident in Lateral leads Assessment:  
 
Principal Problem: 
  SOB (shortness of breath) (2/4/2019) Plan: 1. Acute on chronic diastolic CHF, NYHA Class IV on admit: EF 40-45%, needs DEVORA.  Unresponsive to diuretics but attempting to increase IV bumex dose per renal, most likely needs CVVH for volume removal. Transferring to ICU for PA catheter, further CHF management 2. S/p TAVR/TMVR 5/2018: Dr. Sunil Jaramillo and Dr. So Maxwell reviewed TTE, concerned about possible thrombus of her MV. Start heparin gtt. Needs DEVORA. Transfer to ICU for further management, monitoring 3. Acute hypoxic respiratory failure: on non rebreather - will switch to bipap as long as she tolerates, most likely needs intubation for DEVORA and further resp management 4. Metabolic, lactic acidosis: renal assisting to manage, on sodium bicarb PO. Lactate 1.7 5. Acute on chronic anemia: history of, prev workup by Dr. Latanya Gonzalez with hematology w/ unclear etiology. Transfuse prn. Receiving IV iron. Hematology consulted but has not seen pt yet. On PO iron, MVI at home 6. SILVINO: renal consulted/following. Trying increase bumex IV and if unresponsive will start CVVH 7. Leukocytosis: on Zosyn. Currently afebrile, UA with mod leuks, no bacteria. Cont abx, may need to broaden abx and pan culture, will discuss 8. DM: on glipizide, Janumet at home. SSI here, consult DTC 
 
9. Hx of PAF: was on amio, holding here due to pulmonary contraindications. No anticoagulation, had been on coumadin after valve procedure - had supratherapeutic INR prompting readmission. Been off coumadin for several months 10. HTN: on norvasc, lisinopril, HCTZ at home 11. Hx of CAD, s/p CABG: on ASA, statin, no BB currently - had been on metoprolol as recently as 1/4/19 but not on PTA med list? 
 
12. HLD: on statin 13. Prior CVA, TIA: on ASA, statin 14. Hx of carotid stenosis: f/u with vascular surgery 15. RA: on enbrel weekly PTA 16. Dispo: critically ill and high risk for deterioration, transfer immediately to ICU for further treatment Signed By: Pravin Coombs NP Pt seen and examined discussed with Dr Allison Rodríguez Echo reviewed and consult by Yolanda Grant confirmed Her mitral valve has either pannus or thrombosis Needs ICU care and heparin drip DEVORA pending May need intubation February 6, 2019

## 2019-02-06 NOTE — PROGRESS NOTES
Spoke with Shweta Best NP about PICC order. Reports patient has central line at this time. Will await further orders.

## 2019-02-06 NOTE — PROGRESS NOTES
Bedside DEVORA: 
 
I was asked to perform a bedside DEVORA to assess valvular function. LV EF 55%. No wall motion abnormalities. AV not well visualized. Bioprosthetic mitral valve with layered thrombus on leaflets and moderate stenosis (mean gradient 7mmhg). These findings were discussed with Dr. Tony Felder who was present for the procedure.

## 2019-02-06 NOTE — ROUTINE PROCESS
Report received from nurse hernandes. Patient is in the bed increase work of breathing on a non rebreather. Discussion of moving patient to a higher level of care for BiPap? Dr. Karyn Luna to see patient 0800, new orders for lactic acid and other labs received. Blood drawn and taken to lab. [address] [facility] Dr. Amey Schaumann cardiologist to see patient. Extensive interview of the previous events reviewed. Plans to move patient to ICU and consult the \"valve team\"  Patient made NPO until further plans are defined. 1100 dr. Enedina Maloney to see patient. 1200 new orders for heparin drip and adams received. Labs drawn with an attempt to start a 2nd IV. PICC team consulted for PICC,  Room assigned in CVICU, report called, adams placed with assistance of Brigitte, room ready, patient taken to CVICU by Venice Swan and Radha Harp, PCT on monitor.

## 2019-02-06 NOTE — PROGRESS NOTES
Hospitalist Progress Note Kimi Brooke MD. Cell: [phone] NAME:  Zina Dorman :  1950 MRN:  [id number] Date of Service:  2019 Summary:  The patient is a 70-year-old female with past medical history of diabetes, hypertension, dyslipidemia, mitral valve regurgitation, coronary artery disease, paroxysmal atrial fibrillation, history of TAVR in 2018, history of diabetes, who presents to the hospital with shortness of breath. Assessment/Plan: 
Worsening SOB: Aetiology appears to be multifactorial including anemia, acute on chronic systolic heart failure CXR shows fairly severe bilateral interstitial and airspace opacities fairly symmetric. Acute hypoxic respiratory failure overnight requiring NRM. Transfer to ICU for closer monitoring Acute on chronic systolic heart failure with NYHA class IV on admission. Echo: Ef 41-45%, Right ventricular systolic function mod to severely reduced. Prosthesis is abnormal consistent with stenosis or obstruction Spoke with Dr Annmarie Reddy and will contact cardiac surgery. Management as per cardiac surgery and cardiology S/p TMVR/TAVR on 18 by Dr Darryl Espana Acute on chronic kidney injury stage 3 with AG metabolic acidosis Suspect component of cardio renal syndrome Consult nephrology for evaluation. Check lactic acid Start sodium bicarbonate tabs. Management as per nephrology Severe anemia with hb on admission of 7.2 s/p 2 unit of prbc Hb Iron sat 5, serum iron 17 and TIBC 274. LD: 410 Inview of  
Possible due to mechanical hemolysis Check peripheral smear to evaluate for Schistocytes. Hem consul pending DM type 2: ISS as per protocol Accu checks. U/A no ketones Leucocytosis: Start empiric broad spectrum abx. Obtain blood culture Code status: Full DVT prophylaxsis: On heparin, D/c and start SCD Dispo: tbd. Patient is critically sick and at risk for clinical deterioration. Transfer to ICU Interval History/Subjective: 
 
F/u for worsening SOB Patient desaturated overnight requiring NRM. Poor historian Review of Systems: 
Pertinent items are noted in HPI. Objective: VITALS:  
Last 24hrs VS reviewed since prior progress note. Most recent are: 
Visit Vitals /65 (BP 1 Location: Left arm, BP Patient Position: At rest) Pulse 68 Temp 100.1 °F (37.8 °C) Resp 24 Ht 5' 2\" (1.575 m) Wt 74.3 kg (163 lb 12.8 oz) SpO2 100% BMI 29.96 kg/m² Intake/Output Summary (Last 24 hours) at 2/6/2019 1702 Last data filed at 2/6/2019 1326 Gross per 24 hour Intake 289.6 ml Output 700 ml Net -410.4 ml PHYSICAL EXAM: 
General: No acute distress, cooperative, pleasant. Ill looking EENT: EOMI. Anicteric sclerae. Oral mucous moist, oropharynx benign Resp: CTA bilaterally. No wheezing/rhonchi/rales. No accessory muscle use CV: Regular rhythm, normal rate, Systolic murmurs++, gallops, rubs GI: Soft, non distended, non tender. normoactive bowel sounds, no hepatosplenomegaly Extremities: 3 + edema up to the knee, warm, 2+ pulses throughout Neurologic: Moves all extremities. AAOx3, CN II-XII grossly intact Psych: Good insight. Not anxious nor agitated. Skin: Good Turgor, no rashes or ulcers Lab Data Personally Reviewed: (see below) Medications list Personally Reviewed:  x YES  NO  
 
_______________________________________________________________________ Care Plan discussed with:  Patient/Family and Nurse Total NON critical care TIME:  30 minutes Candis Liu MD  
 
Procedures: see electronic medical records for all procedures/Xrays and details which were not copied into this note but were reviewed prior to creation of Plan. LABS: 
Recent Labs 02/06/19 
1410 02/06/19 
0400 WBC 21.9* 20.2*  
 HGB 7.8* 6.8* HCT 24.8* 20.7*  137* Recent Labs 02/06/19 
1410 02/06/19 
0400 02/05/19 0432 02/04/19 
1049  140 137   < > 134* K 3.9 4.1 4.5   < > 5.6*  
 107 107   < > 104 CO2 16* 14* 12*   < > 13* BUN 82* 78* 76*   < > 77* CREA 2.05* 2.10* 2.00*   < > 2.27* * 153* 157*   < > 140* CA 9.0 9.0 9.8   < > 9.5 MG  --   --   --   --  2.2 PHOS 5.7* 5.4*  --   --   --   
 < > = values in this interval not displayed. Recent Labs 02/06/19 
1410 02/06/19 
0400 02/05/19 0432 02/04/19 
1049 SGOT  --   --  47* 55* ALT  --   --  43 45 AP  --   --  97 103 TBILI  --   --  0.8 0.9 TP  --   --  7.1 7.3 ALB 2.7* 2.7* 2.9* 3.0*  
GLOB  --   --  4.2* 4.3* Recent Labs 02/06/19 
1355 02/04/19 
1840 02/04/19 
1309 INR  --  1.2* 1.1 PTP  --  11.6* 11.0 APTT 30.7  --   --   
  
Recent Labs 02/06/19 
0400 02/05/19 
0432 02/04/19 
1840 TIBC  --  274 248* PSAT  --  6* 4*  
FERR 1,117*  --   -- No results found for: FOL, RBCF No results for input(s): PH, PCO2, PO2 in the last 72 hours. Recent Labs 02/04/19 1840 02/04/19 
1049 TROIQ <0.05 <0.05 Lab Results Component Value Date/Time Cholesterol, total 129 01/04/2019 11:56 AM  
 HDL Cholesterol 56 01/04/2019 11:56 AM  
 LDL, calculated 50 01/04/2019 11:56 AM  
 Triglyceride 115 01/04/2019 11:56 AM  
 
Lab Results Component Value Date/Time Glucose (POC) 149 (H) 02/06/2019 03:46 PM  
 Glucose (POC) 203 (H) 02/06/2019 11:28 AM  
 Glucose (POC) 181 (H) 02/06/2019 06:54 AM  
 Glucose (POC) 225 (H) 02/05/2019 09:53 PM  
 Glucose (POC) 214 (H) 02/05/2019 04:32 PM  
 
Lab Results Component Value Date/Time  Color YELLOW/STRAW 02/04/2019 05:06 PM  
 Appearance CLOUDY (A) 02/04/2019 05:06 PM  
 Specific gravity 1.015 02/04/2019 05:06 PM  
 pH (UA) 5.0 02/04/2019 05:06 PM  
 Protein NEGATIVE  02/04/2019 05:06 PM  
 Glucose NEGATIVE  02/04/2019 05:06 PM  
 Ketone NEGATIVE  02/04/2019 05:06 PM  
 Bilirubin NEGATIVE  02/04/2019 05:06 PM  
 Urobilinogen 0.2 02/04/2019 05:06 PM  
 Nitrites NEGATIVE  02/04/2019 05:06 PM  
 Leukocyte Esterase MODERATE (A) 02/04/2019 05:06 PM  
 Epithelial cells MODERATE (A) 02/04/2019 05:06 PM  
 Bacteria NEGATIVE  02/04/2019 05:06 PM  
 WBC 5-10 02/04/2019 05:06 PM  
 RBC 0-5 02/04/2019 05:06 PM

## 2019-02-06 NOTE — PROGRESS NOTES
Called hospitalist nurse to escalate being seen by provider sooner than later for decline in condition during the night.

## 2019-02-06 NOTE — ROUTINE PROCESS
Bedside and Verbal shift change report given to [employer] (oncoming nurse) by Toni Short (offgoing nurse). Report included the following information SBAR, ED Summary, Intake/Output, MAR, Recent Results and Cardiac Rhythm NSR, BBB, depressed ST.

## 2019-02-06 NOTE — ROUTINE PROCESS
Bedside and Verbal shift change report given to cornelius Harris (oncoming nurse) by Lisbeth Franks (offgoing nurse). Report included the following information SBAR, Kardex, Procedure Summary, Intake/Output, MAR and Recent Results.

## 2019-02-06 NOTE — CONSULTS
[facility] [place] [address] [address] W: [phone]  F: [phone] for Consult:  
Saumya Briones is a 77 y.o. female who is seen in consultation at the request of Dr. Teodoro Ramirez for evaluation of anemia History of Present Illness:  
 
Patient is a 77 y.o. female with a PMH as below who was admitted on 2/4/19 for low hemoglobin by per PCP and shortness of breath. Upon arrival to ER Hgb was 7.2g/dL. Chart reviewed and available records indicate anemia dating back to 2009 and typically ranges between 7-8g/dL. Per patient, she has had increased shortness of breath over the past week. Patient states she has always had anemia since she was a child and has required prbc transfusions in the past. She takes an iron pill daily for this. Patient denies any history of GIB. Denies dark tarry stools. Occult blood was ordered but not obtained as she has not had a bowel movement since admission. Iron studies were completed and show iron level of 11, iron % sat of 6, and TIBC of 274. Vitamin b12 not deficient. Patient denies recent night sweats, illness, or fevers. No unexplained weight loss. Past Medical History:  
Diagnosis Date  Anemia  Bruit of right carotid artery  CAD (coronary artery disease)  CHF (congestive heart failure) ([address]  CVA (cerebral vascular accident) [facility]  DM (diabetes mellitus) ([facility]  Dyslipidemia  Hypertension  Mitral regurgitation and aortic stenosis  Paroxysmal A-fib ([facility] [address]  Psoriasis  TIA (transient ischemic attack) Past Surgical History:  
Procedure Laterality Date  HX AORTIC VALVE REPLACEMENT    
 HX CORONARY ARTERY BYPASS GRAFT    
 HX MITRAL VALVE REPLACEMENT    
 TAVR Social History Tobacco Use  Smoking status: Never Smoker  Smokeless tobacco: Never Used Substance Use Topics  Alcohol use: Yes Alcohol/week: 0.0 oz  
  Comment: Rare History reviewed. No pertinent family history. Current Facility-Administered Medications Medication Dose Route Frequency  0.9% sodium chloride infusion 250 mL  250 mL IntraVENous PRN  
 iron sucrose (VENOFER) 300 mg in 0.9% sodium chloride 250 mL IVPB  300 mg IntraVENous Q24H  piperacillin-tazobactam (ZOSYN) 3.375 g in 0.9% sodium chloride (MBP/ADV) 100 mL  3.375 g IntraVENous Q8H  
 heparin (porcine) injection 4,000 Units  4,000 Units IntraVENous ONCE  
 heparin 25,000 units in D5W 250 ml infusion  12-25 Units/kg/hr IntraVENous TITRATE  PHENYLephrine (MIKE-SYNEPHRINE) 30 mg in 0.9% sodium chloride 250 mL infusion   mcg/min IntraVENous TITRATE  bumetanide (BUMEX) 0.25 mg/mL infusion  0.5 mg/hr IntraVENous CONTINUOUS  
 niCARdipine (CARDENE) 25 mg in 0.9% sodium chloride 250 mL infusion  0-15 mg/hr IntraVENous TITRATE  dexmedeTOMidine (PRECEDEX) 400 mcg in 0.9% sodium chloride 100 mL infusion  0.2-0.9 mcg/kg/hr IntraVENous TITRATE  
 0.45% sodium chloride infusion  10 mL/hr IntraVENous CONTINUOUS  
 0.9% sodium chloride infusion  9 mL/hr IntraVENous CONTINUOUS  
 ondansetron (ZOFRAN) injection 4 mg  4 mg IntraVENous Q6H PRN  
 vancomycin (VANCOCIN) 1500 mg in  ml infusion  1,500 mg IntraVENous ONCE  
 vancomycin dosing by pharmacy   Other Rx Dosing/Monitoring  morphine injection 2 mg  2 mg IntraVENous Q4H PRN  
 [START ON 2/7/2019] insulin glargine (LANTUS) injection 10 Units  10 Units SubCUTAneous DAILY  sodium bicarbonate 8.4 % (1 mEq/mL) injection 50 mEq  50 mEq IntraVENous NOW  
 0.9% sodium chloride infusion 250 mL  250 mL IntraVENous PRN  
 bisacodyl (DULCOLAX) suppository 10 mg  10 mg Rectal DAILY PRN  
 aspirin chewable tablet 81 mg  81 mg Oral DAILY  sodium chloride (NS) flush 5-40 mL  5-40 mL IntraVENous Q8H  
 sodium chloride (NS) flush 5-40 mL  5-40 mL IntraVENous PRN  
 nitroglycerin (NITROSTAT) tablet 0.4 mg  0.4 mg SubLINGual Q5MIN PRN  
  glucose chewable tablet 16 g  4 Tab Oral PRN  
 dextrose (D50W) injection syrg 12.5-25 g  25-50 mL IntraVENous PRN  
 glucagon (GLUCAGEN) injection 1 mg  1 mg IntraMUSCular PRN  
 insulin lispro (HUMALOG) injection   SubCUTAneous AC&HS Allergies Allergen Reactions  Sulfamethoxazole Hives Review of Systems: A complete review of systems was obtained, negative except as described above. Physical Exam:  
 
Visit Vitals /65 (BP 1 Location: Left arm, BP Patient Position: At rest) Pulse 96 Temp 99.4 °F (37.4 °C) Resp 22 Ht 5' 2\" (1.575 m) Wt 163 lb 12.8 oz (74.3 kg) SpO2 (!) 88% BMI 29.96 kg/m² General: On O2 mask and visibly dyspneic Eyes: PERRL, anicteric sclerae HENT: Atraumatic Neck: Supple Lymphatic: No cervical, supraclavicular, or inguinal adenopathy CV: Normal rate,  no peripheral edema, on tele GI: Soft, nontender, nondistended, no masses, no hepatomegaly, no splenomegaly MS: Moves all 4 extremities Skin: Warm, dry Psych: Alert, oriented, appropriate affect, normal judgment/insight Results:  
 
Lab Results Component Value Date/Time WBC 21.9 (H) 02/06/2019 02:10 PM  
 HGB 7.8 (L) 02/06/2019 02:10 PM  
 HCT 24.8 (L) 02/06/2019 02:10 PM  
 PLATELET 373 55/81/1843 02:10 PM  
 MCV 71.5 (L) 02/06/2019 02:10 PM  
 ABS. NEUTROPHILS PENDING 02/06/2019 02:10 PM  
 
Lab Results Component Value Date/Time Sodium 141 02/06/2019 02:10 PM  
 Potassium 3.9 02/06/2019 02:10 PM  
 Chloride 106 02/06/2019 02:10 PM  
 CO2 16 (L) 02/06/2019 02:10 PM  
 Glucose 152 (H) 02/06/2019 02:10 PM  
 BUN 82 (H) 02/06/2019 02:10 PM  
 Creatinine 2.05 (H) 02/06/2019 02:10 PM  
 GFR est AA 29 (L) 02/06/2019 02:10 PM  
 GFR est non-AA 24 (L) 02/06/2019 02:10 PM  
 Calcium 9.0 02/06/2019 02:10 PM  
 Glucose (POC) 149 (H) 02/06/2019 03:46 PM  
 
Lab Results Component Value Date/Time  Bilirubin, total 0.8 02/05/2019 04:32 AM  
 ALT (SGPT) 43 02/05/2019 04:32 AM  
 AST (SGOT) 47 (H) 02/05/2019 04:32 AM  
 Alk. phosphatase 97 02/05/2019 04:32 AM  
 Protein, total 7.1 02/05/2019 04:32 AM  
 Albumin 2.7 (L) 02/06/2019 02:10 PM  
 Globulin 4.2 (H) 02/05/2019 04:32 AM  
 
 
Records reviewed and summarized above. Radiology report(s) reviewed above. Assessment:  
1) Anemia, microcytic Hgb upon admission was 7.2g/dL, now s/p 2 units of prbc; 7.8g/dL today She has profound iron deficiency No clear bleeding LD is elevated Acute GIB is a concern as is possible valve dysfunction and intravascular hemolysis which can lead to urinary iron losses Additional labs added Consider IV iron and ECHO to assess the prosthetic valve 2) Leukocytosis 13.9 upon admission and now up to 21.9 Neutrophilic and reactive to acute inflammation Follow On IV abx 3) Shortness of breath Secondary to acute HF Cardiology following Transfuse as necessary as parameters above Suggest ECHO to assess the prosthetic valve 4) CHF 
EF 41-45% Per cardiology She had a transcatheter aortic placement on 5/2018 5) Stage 3 acute on chronic kidney injury Nephrology following Plan: · Trend CBC daily and transfuse as indicated to maintain Hgb >7g/dL · Continue IV Venofer 200-300 mg x 3 doses if her volume status permits · Additional work-up to include haptoglobin, Alyce and peripheral smear · Fecal occult pending · Folic acid daily · Consult GI 
· Consider TTE I appreciate the opportunity to participate in Ms. John Jeong john.  
 
Signed By: Charmaine Camejo MD

## 2019-02-06 NOTE — PROCEDURES
Central Line Procedure Note Indication: Inadequate venous access Risks, benefits, alternatives explained and patient agrees to proceed. Patient positioned in Trendelenburg. 7-Step Sterility Protocol followed (cap, mask sterile gown, sterile gloves, large sterile sheet, hand hygiene, 2% chlorhexidine for cutaneous antisepsis). 5 mL 1% Lidocaine placed at insertion site. The right internal jugular cannulated x 1 attempt utilizing the Seldinger technique and ultrasound guidance. Venous cannulation confirmed with column drop test.   
Catheter secured & Biopatch applied. Sterile throughout. Sterile Tegaderm placed. Patient without complaints. PAC placed without event. CXR pending.

## 2019-02-06 NOTE — CONSULTS
Cardiology Consult Note Patient Name: Derek Donovan  : 1950 MRN: [id number] Date: 2019  Time: 10:46 AM 
 
Admit Diagnosis: SOB (shortness of breath) [R06.02] Primary Cardiologist: Dr. Fiorella Wang MD    Consulting Cardiologist: Mable Harrell III, M.D. Milly Mokelley for Consult: CHF exacerbation Requesting MD: Dr. Geo Christopher MD 
 
HPI: 
Derek Donovan is a 77 y.o. female admitted on 2019  for SOB (shortness of breath) [R06.02]. Has PMH of CAD s/p CABG with MVR 9 years ago, MR, AS and  TMVR and TAVR 2018, HTN, PAF on amiodarone,  HLD, DM, chronic CARLI. Presented to ER 19 with chief c/o SOB. Notified by PCP to come to ER on 19 after labwork results reviewed by PCP. Reports that at [facility] she was able to walk around stores without SOB. However, did see Dr. Fiorella Wang in 2019 for routine follow up and stated she had been having intermittent episodes of SONI since TAVR. A stress test and echo were ordered but she didn't have it done as she was too sick to go for testing. Reports she saw her primary care provider 2 weeks ago with cold symptoms and was started on po antibiotics. Says SOB has progressively worsened over last 2 weeks. Additionally reports worsened BLE edema. No chest pain. Pt noted to have reduction in EF to 41-45% this admission from 75-80% in 2019 with increased AV gradient as well. Cardiology consulted for concerns for CHF. Subjective:  Reports dyspnea, no chest pain. + BLE edema. Assessment and Plan 1. Dyspnea/Acute respiratory failure:   
-Dyspneic at rest 
-Requiring NRM 
-Repeat ABG 
-Suspect multifactorial etiology- marked anemia, CHF 
-Recommend transfer to ICU for closer monitoring- d/w Dr. Isaak Stratton. 2.. Acute CHF/mixed, systolic/diastolic:  NYHA  IV 
-EF 56-95%, mod LV concentric hypertrophy 
-RV systolic function reduced -Crackles bilaterally and marked BLE edema 
-Bumex 2 mg IV BID 
-Daily weights 
-No ace-I 
-Suspect valvular dysfunction contributing 3. AS/MR Hx of SAVR and now TAVR/TMVR 5/2018 by dr. Jean Pierre Leung and Dr. Fernanda Nichole. 
-Now with mild-mos AS, AV prosthesis with abnormality c/w obstruction or stenosis. (change from echo 6/5/18)- concern for possible thrombus on valve 
-Aortic valve mean gradient is 21.9 mmHg. Aortic valve area is 1.2 cm2.  
-Mild MR: prosthesis abnormal (change from echo 6/5/18) -DEVORA tentatively scheduled for tomorrow at 9:30 AM with Dr. Mitchel Price  
-CT surgery to see. Dr. Gaston Agent spoke with Dr. Jean Pierre Leung- needs anscott 4. Hx of PAF: currently NSR 
-Stop amiodarone- concern for pulmonary effects 
-Not on OAC PTA- heparin gtt for #3 5. Hx of CAD: s/p CABG 9 years ago 
-On ASA, statin 
-No BB for now. 
-No chest pain 
-Stress test was planned outpt but on back burner for now. 6. Hx of HTN: 
-BP controlled 
-currently on amlodipine. 7. CKD: GFR 23 
-Nephrology following. 8. Anemia, chronic:  Hgb 6.8 
-Agree with transfusion 
-defer management to primary team. 
 
Pt with progressive worsening of dyspnea and now with new changes noted on echo (reduced EF and AV/MV abnormalities). Consult CT surgery. Transfer to ICU for swan and closer monitoring. Repeat ABG pending. Tentatively scheduled bedside DEVORA for tomorrow with Dr. Mitchel Price at 9:30 AM. Concern for thrombus on valve- will start heparin gtt. Cardiology attending: seen and examined. Agree with assess and plan Progressive josue and leg edema for 2wks or so. Given abx for ?bronchitis with no improvement. Renal function and oxygenation have worsened since admit in spite of diuresis. Severe anemia. Chest rales throughout anterior and post, cv rrr 2/6 as murmur, ext 1+edema. Severe biventricular failure, exacerbated by severe anemia and worsening renal function.  Concern about valve dysfunction, paritcularily mitral with increased gradients and rv dysfunction. Have spoken to dr Fady Jasmine and will start heparin. Likely need intubation and swan. Review of Symptoms: 
Constitutional: Negative for fever, chills, weight loss, malaise/fatigue. HEENT: Negative for nosebleeds, vision changes. Respiratory: +dyspnea Cardiovascular: Negative for chest pain Positive leg swelling,  
Gastrointestinal: Negative for nausea, vomiting, Genitourinary: Negative for  hematuria. Musculoskeletal: Negative for myalgias Skin: Negative for rash. Heme: no bleeding. Neurological: Negative for speech changes and focal weakness Previous treatment/evaluation includes Coronary Artery Bypass Graft, echocardiogram and TAVR, TMVR, CABG . Cardiac risk factors: . Past Medical History:  
Diagnosis Date  Bruit of right carotid artery  CAD (coronary artery disease)  DM (diabetes mellitus) ([facility] [address]  Dyslipidemia  Hypertension  Mitral regurgitation and aortic stenosis  Paroxysmal A-fib ([facility] [address] Past Surgical History:  
Procedure Laterality Date  HX AORTIC VALVE REPLACEMENT    
 HX CORONARY ARTERY BYPASS GRAFT    
 HX MITRAL VALVE REPLACEMENT    
 TAVR Current Facility-Administered Medications Medication Dose Route Frequency  0.9% sodium chloride infusion 250 mL  250 mL IntraVENous PRN  
 iron sucrose (VENOFER) 300 mg in 0.9% sodium chloride 250 mL IVPB  300 mg IntraVENous Q24H  
 bumetanide (BUMEX) injection 2 mg  2 mg IntraVENous Q12H  
 sodium bicarbonate tablet 1,300 mg  1,300 mg Oral TID  
 0.9% sodium chloride infusion 250 mL  250 mL IntraVENous PRN  
 atorvastatin (LIPITOR) tablet 20 mg  20 mg Oral QHS  polyethylene glycol (MIRALAX) packet 17 g  17 g Oral DAILY  bisacodyl (DULCOLAX) suppository 10 mg  10 mg Rectal DAILY PRN  
 amiodarone (CORDARONE) tablet 200 mg  200 mg Oral QPM  
 amLODIPine (NORVASC) tablet 10 mg  10 mg Oral DAILY  aspirin chewable tablet 81 mg  81 mg Oral DAILY  sodium chloride (NS) flush 5-40 mL  5-40 mL IntraVENous Q8H  
 sodium chloride (NS) flush 5-40 mL  5-40 mL IntraVENous PRN  
 nitroglycerin (NITROSTAT) tablet 0.4 mg  0.4 mg SubLINGual Q5MIN PRN  
 glucose chewable tablet 16 g  4 Tab Oral PRN  
 dextrose (D50W) injection syrg 12.5-25 g  25-50 mL IntraVENous PRN  
 glucagon (GLUCAGEN) injection 1 mg  1 mg IntraMUSCular PRN  
 insulin lispro (HUMALOG) injection   SubCUTAneous AC&HS Allergies Allergen Reactions  Sulfamethoxazole Hives History reviewed. No pertinent family history. Social History Socioeconomic History  Marital status:  Spouse name: Not on file  Number of children: Not on file  Years of education: Not on file  Highest education level: Not on file Tobacco Use  Smoking status: Never Smoker  Smokeless tobacco: Never Used Substance and Sexual Activity  Alcohol use: Yes Alcohol/week: 0.0 oz  
  Comment: Rare  Drug use: No  
 
 
Objective: 
  Physical Exam 
 
Vitals:  
Vitals:  
 02/06/19 0031 02/06/19 0422 02/06/19 3373 02/06/19 4262 BP: 160/61 129/69  137/67 Pulse: 92 90  89 Resp: 25 24  22 Temp: 97.8 °F (36.6 °C) 98.7 °F (37.1 °C)  98.6 °F (37 °C) SpO2: 95% 95%  94% Weight:   163 lb 12.8 oz (74.3 kg) Height:      
 
 
General:    Alert, markedly dyspneic, increases with conversations Neck:   Supple, . Back:     Symmetric,    
Lungs:     Crackles up 2/3  Bilateral posterior lung fields. Crackles noted bilaterally anterior lung fields. Heart[de-identified]    Regular rate and rhythm, S1, S2 normal, grade II/VI systolic murmur best at RUSB Abdomen:     Soft, non-tender. Bowel sounds normal. No masses,  No   
  organomegaly. Extremities:   2+ BLE edema Vascular:   Pulses - 2+ Skin:   Skin color normal. No rashes or lesions Neurologic:   CN II-XII grossly intact. Telemetry: normal sinus rhythm ECG: NSR, 1st degree AV block. t wave inversion lateral leads Data Review:  
 
Radiology:  
Recent Labs 02/04/19 
1840 02/04/19 
1049 TROIQ <0.05 <0.05 Recent Labs 02/06/19 
0400 02/05/19 
0432  137  
K 4.1 4.5  
 107 CO2 14* 12* BUN 78* 76* CREA 2.10* 2.00* * 157* PHOS 5.4*  --   
CA 9.0 9.8 Recent Labs 02/06/19 
0400 02/05/19 
1507 02/05/19 
4557 WBC 20.2*  --  16.0* HGB 6.8* 7.4* 6.1*  
HCT 20.7* 23.5* 20.2* *  --  122* Recent Labs 02/05/19 
0432 02/04/19 
1840 02/04/19 
1309 02/04/19 
1049 PTP  --  11.6* 11.0  --   
INR  --  1.2* 1.1  --   
SGOT 47*  --   --  55* AP 97  --   --  103 No results for input(s): CHOL, LDLC in the last 72 hours. No lab exists for component: TGL, HDLC,  HBA1C No results for input(s): CRP, TSH, TSHEXT in the last 72 hours. No lab exists for component: ESR Thank you very much for this referral. I appreciate the opportunity to participate in this patient's care. I will follow along with above stated plan. Vilma Pina. ANDREA Mcmahon [facility] [address], [address] [facility] 
   [phone] Sutter Davis Hospitalrosemary Jorje, NP

## 2019-02-06 NOTE — PROCEDURES
Intubation Note Called to bedside secondary to  impending respiratory failure. Patient pre-oxygenated with 100% oxygen. Smooth RSI with cricoid pressure. Etomidate 10 mg + Succinylcholine 120 mg IV. DVL x 1 atraumatic with Herzog. Posterior pharynx was clear at the time of OETT placement. 7.5 ETT taped and secured at 21 cm at the teeth. 
 
+ Bilateral BS, + Chest rise, + ETCO2, dentition unchanged. VSS. CXR pending.  
 
Care turned over to covering ICU Attending MD.

## 2019-02-06 NOTE — PROGRESS NOTES
Patient seen and examined for valve team, and echo reviewed, and discussed with Dr. Isidro Pizarro. Concern for mitral bioprosthetic obstruction - will get DEVORA asap for determination of thrombus vs pannus - and if thrombus would consider lytics if no contraindications.

## 2019-02-06 NOTE — CONSULTS
[address] Name:  Ana Cuba 
MR#:  [id number] :  1950 ACCOUNT #:  [de-identified] DATE OF SERVICE: 
 
 
ATTENDING PHYSICIAN:  Ariadna London. Zara Mancini MD 
 
REASON FOR CONSULTATION:  Elevated serum creatinine and 
metabolic acidosis. HISTORY OF PRESENT ILLNESS:  This is a 77-year-old white 
female with following medical problems: 1. Diabetes mellitus (recently treated with metformin). 2.  Hypertension. 3.  Dyslipidemia. 4.  Mitral valve regurgitation. 5.  Coronary artery disease. 6.  Paroxysmal atrial fibrillation. 7.  Status post TAVR (2018). 8.  Anemia. I was asked to see the patient in regards to an elevated 
serum creatinine and metabolic acidosis. This is a 77-year-old white female who indicates no previous 
noted history of renal insufficiency, denies the following: 
hematuria, proteinuria, nephrolithiasis and pyelonephritis. She has had no difficulty initiating or maintaining her 
stream of urine. She does not smoke. There is no family 
history of kidney disease. She had a transfusion (this 
admission), no previous history of transfusion, however. No 
history of black tarry bowel movements, no bright red blood 
per rectum. She has no previous history of hepatitis or 
jaundice. She does not use nonsteroidal anti-inflammatory 
agent in excess. She does not use herbal supplements. She presented to the hospital yesterday quite anemic, was 
found to have an elevated serum creatinine measured at 2.27 
mg/dL. For reference, her serum creatinine in 2019 
was 1.29 mg/dL. Her serum creatinine has improved since 
presentation. Her anion gap has remained relatively stable, 
her total CO2 has remained low (12 mEq/L). She was started 
on oral sodium bicarbonate. As noted above, her outpatient medications include Janumet. She indicates a longstanding history of diabetes mellitus. Current inpatient medications:  Include the following; 1. Amiodarone. 2.  Amlodipine 10 mg daily. 3.  Aspirin. 4.  Atorvastatin. 5.  Furosemide 60 mg b.i.d. 6.  Sodium bicarbonate tablets 650 mg b.i.d. SOCIAL HISTORY:  She is nonsmoker. FAMILY HISTORY:  Negative as above. REVIEW OF SYSTEMS:  Negative except as noted above. PHYSICAL EXAMINATION: 
GENERAL:  Elderly appearing female in a chair. VITAL SIGNS:  Most recent blood pressure documented is 141/50, pulse 90, oxygen saturation 91%. HEENT:  Head is normocephalic. Eyes show no scleral 
icterus. NECK:  Appeared supple. LUNGS:  Clear to auscultation. HEART:  Cardiovascular exam shows no pericardial effusion. ABDOMEN:  Soft, nondistended. EXTREMITIES:  Showed trace ankle edema. SKIN:  Warm to touch. NEUROLOGIC:  She is awake and alert. Answers questions 
appropriately. LABORATORY DATA:  Of note, sodium 137, potassium 4.5, 
chloride and bicarbonate 107 and 12 with a BUN and 
creatinine of 76 and 2.0. Her Bun in January was 25, 
glucose 214, anion gap is 18, AST and ALT are 47 and 43. Urinalysis done yesterday showed a pH of 5, specific gravity 
of 1.015, negative for protein, negative for blood, few 
yeast.  Renal ultrasound was read as normal. 
 
ASSESSMENT AND PLAN:  The patient has an anion gap metabolic 
acidosis (her arterial pH was 7.37, pCO2 of 20, pO2 of 56). She was recently on metformin. She has renal failure. The 
possibility of a metformin-associated lactic acidosis needs 
to be considered. She is on sodium bicarbonate (orally). I have ordered serum ketones and lactic acid. I have asked 
to be called in any event the lactic acid is elevated. I 
have written for some intravenous sodium bicarbonate, she 
can continue on oral sodium bicarbonate (as well as the 
furosemide at present). Her serum creatinine appears to be improving, hopefully she 
is \"on amend\" since she feels a bit better. She is quite azotemic, and I am a little bit suspicious that 
this, coupled with a low iron saturation reflects some 
gastrointestinal blood loss. Thanks for the consult. Please see  _____ for more. My partner is going to followup in the aeden Patel MD 
 
 
[id number] 
D:  02/05/2019 18:23 T:  02/06/2019 1:19 
JOB #:  [id number] CC:

## 2019-02-06 NOTE — PROGRESS NOTES
CM attempted to meet with patient and family, noted patient's  was tearful from the news of his wife needing a cardiac surgery during this admission. CM answered family's questions and provided support. CM will meet with family when they are emotionally stable. Alexandra Briones MSA, RN, CRM.

## 2019-02-06 NOTE — DIABETES MGMT
DTC Consult Note Recommendations/ Comments: Consult received for assessment of home management. Note pt is going to have cardiac surgery. Pt and family very emotional at this time. DTC to follow and see pt when appropriate. Blood sugars 176-225 mg/dl. Pt is NPO. If appropriate, please consider adding (if pt is not starting insulin drip) adding Lantus 10 units. DTC to follow. Current hospital DM medication: correction scale Humalog with high sensitivity. Chart reviewed on Radha Thompson. Patient is a 77 y.o. female with known diabetes on Glipizide 5 mg on Tuesdays and Thursdays and Janumet  mg in the evening at home. A1c:  
Lab Results Component Value Date/Time Hemoglobin A1c <3.5 (L) 02/04/2019 01:09 PM  
 Hemoglobin A1c 5.2 04/23/2018 12:20 PM  
 
 
Recent Glucose Results:  
Lab Results Component Value Date/Time  (H) 02/06/2019 04:00 AM  
 GLUCPOC 203 (H) 02/06/2019 11:28 AM  
 GLUCPOC 181 (H) 02/06/2019 06:54 AM  
 GLUCPOC 225 (H) 02/05/2019 09:53 PM  
  
 
Lab Results Component Value Date/Time Creatinine 2.10 (H) 02/06/2019 04:00 AM  
 
Estimated Creatinine Clearance: 23.5 mL/min (A) (based on SCr of 2.1 mg/dL (H)). Active Orders Diet DIET NPO  
  
 
PO intake: No data found. Will continue to follow as needed. Thank you Ritchie Linda RD, CDE Time spent: 10 minutes

## 2019-02-06 NOTE — PROGRESS NOTES
1250:  TRANSFER - IN REPORT: 
 
Verbal report received from RONNI Whitehead(name) on Josefina Coil  being received from [facility](unit) for change in patient condition(Non-rebreather) Report consisted of patients Situation, Background, Assessment and  
Recommendations(SBAR). Information from the following report(s) SBAR, Kardex, Intake/Output, MAR and Recent Results was reviewed with the receiving nurse. Opportunity for questions and clarification was provided. Assessment completed upon patients arrival to unit and care assumed. 1326:  Patient arrived from [facility] SOB, tachypneac and on 100% non-rebreather. Orders from Dr. Iza Samano to intubate. Orders from Mahesh Martínez NP to place central lines, SWAN and A-line. Blood that was started in [facility] finished infusing. 1430:  Patient intubated. MAC, SWAN and A-line placed by Dr. Adi Romero. 1515:  Labs drawn, heparin drip started. Bumex drip increased to 1mg/hr per Dr. Laureano Zimmerman. Diuril to be given. Omar on. 
 
1547:  2 amps bicarb given for post intubation gas. 1605:  DEVORA done by Dr. August Arguello. Dr. Carlito Mcdowell at bedside, thrombus on mitral valve. Dr. Laureano Zimmerman updated. Urine ouput from time adams was put in until 1500 was 1.1L. Called hematology to update them on patient's condition and to ask when a physician would see the patient. I was assured that the on-call physician would be seeing the patient this evening. [id number]:  2nd unit of PRBC given. 1700:  1amp bicarb given. 2mg morphine given, patient awake. 1731:  Precedex attempted for sedation. Patient was still awake on 1.2mcg of precedex and after 2mg of morphine. Telephone orders from ANDREA FARIAS to place patient on propofol. 1840:  Dr. Alondra Torres (GI) consulted for low HGB. Updated on phone, will round tomorrow. 1940:  Updated Dr. Laureano Zimmerman on patient's status. Telephone orders to decrease bumex drip to 0.5 at midnight. 2000:  Bedside and Verbal shift change report given to KAROL VAUGHN RN (oncoming nurse) by Natalie Trejo RN (offgoing nurse). Report included the following information SBAR, Kardex, Intake/Output, MAR, Recent Results and Cardiac Rhythm NSR.

## 2019-02-06 NOTE — CONSULTS
pls refer to Dr Jessy Pettit consult note Ale Gleason MD 
[facility] Office :[phone] Fax: [phone]

## 2019-02-06 NOTE — PROGRESS NOTES
Cardiac Surgery Care Coordinator- Met with Fairy Olszewski family. Provided contact number and offered emotional support. Family will be going home for the day.  Екатерина Echevarria RN

## 2019-02-06 NOTE — PROGRESS NOTES
Pharmacist Note - Vancomycin Dosing Consult provided for this 77 y.o. female for indication of sepsis 
- Echo showing ? MV thrombus  ? Endocarditis - SILVINO on CKD Antibiotic regimen: Vanc and Zosyn Recent Labs 19 
0400 19 
0432 19 
1840 19 
1309 WBC 20.2* 16.0*  --  13.9*  
CREA 2.10* 2.00* 2.07*  --   
BUN 78* 76* 74*  --   
 
Frequency of BMP: daily Height: 157.5 cm Weight: 74.3 kg Est CrCl: 20-25 ml/min; UO: - ml/kg/hr Temp (24hrs), Av.2 °F (36.8 °C), Min:97.5 °F (36.4 °C), Max:99.4 °F (37.4 °C) Cultures: 
none Goal trough = 15 - 20 mcg/mL Therapy will be initiated with a loading dose of 1500 mg IV x 1. Currently, a q36h dosing interval is predicted to yield therapeutic levels. Will check labs in the am and reassess. Pharmacy to follow patient daily and order levels / make dose adjustments as appropriate.

## 2019-02-06 NOTE — PROGRESS NOTES
[facility] 
 [address] [address], [address] [facility] Phone: [phone]   [phone]   
  
Nephrology Progress Note Ynes Mendoza     1950     [id number] Date of Admission : 2/4/2019 02/06/19 CC:  Follow up for SILVINO Assessment and Plan SILVINO on CKD · 2/2 Decompensated CHF · Aggressive diuresis and if inadequate response ==> will need CRRT  
· Start Bumex gtt and IV diuril 500 mg · Discussed w/ pt and family CKD Stage II-III: 
· Baseline Cr 1.2-1.3 mg/dl Acute HFrEF  
· MS, MR s/p MVR in 2009 and TMVR 5/2/18,  
· AS s/p SAVR with patch closure of Aorta  in 2009 and TAVR 5/2/18 · CAD s/p CABG X 3 
· Echo showing ? MV thrombus  ? Endocarditis · Check Blood Cx Metabolic acidosis : 
· Normal lactate · Hyperchloremic acidosis · Continue p.o Bicarb Severe Anemia · Prior w/u With Dr Rios Eldridge negative and was needing transfusions · Now w/ Thrombocytopenia · Transfuse RBC cautiously in the setting of severe CHF · Severe iron def : ordered Venofer Type II DM Leucocytosis · Exclude bacteremia · Possible Pulm source Interval History: 
Seen and examined multiple times since this morning Discussed w/ cardiology team and nursing staff Jiménez just placed and she had 1L UOP since this am  
She is requiring high flow O2 Cr stable Bicarb slightly better Review of Systems: A comprehensive review of systems was negative except for that written in the HPI. Current Medications:  
Current Facility-Administered Medications Medication Dose Route Frequency  0.9% sodium chloride infusion 250 mL  250 mL IntraVENous PRN  
 iron sucrose (VENOFER) 300 mg in 0.9% sodium chloride 250 mL IVPB  300 mg IntraVENous Q24H  
 sodium bicarbonate tablet 1,300 mg  1,300 mg Oral TID  piperacillin-tazobactam (ZOSYN) 3.375 g in 0.9% sodium chloride (MBP/ADV) 100 mL  3.375 g IntraVENous Q8H  
  heparin (porcine) injection 4,000 Units  4,000 Units IntraVENous ONCE  
 heparin 25,000 units in D5W 250 ml infusion  12-25 Units/kg/hr IntraVENous TITRATE  etomidate (AMIDATE) 2 mg/mL injection  succinylcholine (ANECTINE) 20 mg/mL injection  propofol (DIPRIVAN) 10 mg/mL injection  PHENYLephrine (MIKE-SYNEPHRINE) 30 mg in 0.9% sodium chloride 250 mL infusion   mcg/min IntraVENous TITRATE  bumetanide (BUMEX) 0.25 mg/mL infusion  0.5 mg/hr IntraVENous CONTINUOUS  chlorothiazide (DIURIL) 500 mg in sterile water (preservative free) 18 mL injection  500 mg IntraVENous ONCE  
 0.9% sodium chloride infusion 250 mL  250 mL IntraVENous PRN  
 atorvastatin (LIPITOR) tablet 20 mg  20 mg Oral QHS  polyethylene glycol (MIRALAX) packet 17 g  17 g Oral DAILY  bisacodyl (DULCOLAX) suppository 10 mg  10 mg Rectal DAILY PRN  
 amLODIPine (NORVASC) tablet 10 mg  10 mg Oral DAILY  aspirin chewable tablet 81 mg  81 mg Oral DAILY  sodium chloride (NS) flush 5-40 mL  5-40 mL IntraVENous Q8H  
 sodium chloride (NS) flush 5-40 mL  5-40 mL IntraVENous PRN  
 nitroglycerin (NITROSTAT) tablet 0.4 mg  0.4 mg SubLINGual Q5MIN PRN  
 glucose chewable tablet 16 g  4 Tab Oral PRN  
 dextrose (D50W) injection syrg 12.5-25 g  25-50 mL IntraVENous PRN  
 glucagon (GLUCAGEN) injection 1 mg  1 mg IntraMUSCular PRN  
 insulin lispro (HUMALOG) injection   SubCUTAneous AC&HS Allergies Allergen Reactions  Sulfamethoxazole Hives Objective: 
Vitals:   
Vitals:  
 02/06/19 1326 02/06/19 1330 02/06/19 1335 02/06/19 1341 BP: 132/59 122/54 152/65 Pulse: 92 92 96 96 Resp: 28 23 17 22 Temp:      
SpO2: (!) 86% (!) 88% (!) 69% (!) 88% Weight:      
Height:      
 
Intake and Output: 
02/06 0701 - 02/06 1900 In: -  
Out: 500 [Urine:500] 02/04 1901 - 02/06 0700 In: 203 Out: 451 [Urine:451] Physical Examination: 
General: resp distress Neck:  JVD + Resp:  DIFFUSE rales + CV:  Tachy , Murmur + 
GI:  Soft, NT Neurologic:  Non focal 
Skin:  No Rash :  Jiménez + 
 
[]    High complexity decision making was performed 
[]    Patient is at high-risk of decompensation with multiple organ involvement Lab Data Personally Reviewed: I have reviewed all the pertinent labs, microbiology data and radiology studies during assessment. Recent Labs 02/06/19 
0400 02/05/19 
0432 02/04/19 
1840 02/04/19 
1309 02/04/19 
1049  137 135*  --  134* K 4.1 4.5 4.9  --  5.6*  
 107 106  --  104 CO2 14* 12* 15*  --  13* * 157* 155*  --  140* BUN 78* 76* 74*  --  77* CREA 2.10* 2.00* 2.07*  --  2.27* CA 9.0 9.8 9.4  --  9.5 MG  --   --   --   --  2.2 PHOS 5.4*  --   --   --   --   
ALB 2.7* 2.9*  --   --  3.0*  
SGOT  --  47*  --   --  55* ALT  --  43  --   --  45 INR  --   --  1.2* 1.1  --   
 
Recent Labs 02/06/19 
0400 02/05/19 
1507 02/05/19 
0432 02/04/19 
1309 WBC 20.2*  --  16.0* 13.9* HGB 6.8* 7.4* 6.1* 7.2* HCT 20.7* 23.5* 20.2* 23.9*  
*  --  122* 129* No results found for: SDES Lab Results Component Value Date/Time Culture result: ESCHERICHIA COLI (A) 04/23/2018 12:19 PM  
 
Recent Results (from the past 24 hour(s)) HGB & HCT Collection Time: 02/05/19  3:07 PM  
Result Value Ref Range HGB 7.4 (L) 11.5 - 16.0 g/dL HCT 23.5 (L) 35.0 - 47.0 % GLUCOSE, POC Collection Time: 02/05/19  4:32 PM  
Result Value Ref Range Glucose (POC) 214 (H) 65 - 100 mg/dL Performed by Dayle Leventhal GLUCOSE, POC Collection Time: 02/05/19  9:53 PM  
Result Value Ref Range Glucose (POC) 225 (H) 65 - 100 mg/dL Performed by Ag Noel   
POC G3 - PUL Collection Time: 02/05/19 11:16 PM  
Result Value Ref Range FIO2 (POC) 100 % pH (POC) 7.347 (L) 7.35 - 7.45    
 pCO2 (POC) 20.8 (L) 35.0 - 45.0 MMHG  
 pO2 (POC) 73 (L) 80 - 100 MMHG  
 HCO3 (POC) 11.4 (L) 22 - 26 MMOL/L  
 sO2 (POC) 94 92 - 97 % Base deficit (POC) 14 mmol/L Site LEFT RADIAL Device: Non rebreather Flow rate (POC) 75 L/M Allens test (POC) YES Specimen type (POC) ARTERIAL    
CBC WITH AUTOMATED DIFF Collection Time: 02/06/19  4:00 AM  
Result Value Ref Range WBC 20.2 (H) 3.6 - 11.0 K/uL  
 RBC 3.08 (L) 3.80 - 5.20 M/uL HGB 6.8 (L) 11.5 - 16.0 g/dL HCT 20.7 (L) 35.0 - 47.0 % MCV 67.2 (L) 80.0 - 99.0 FL  
 MCH 22.1 (L) 26.0 - 34.0 PG  
 MCHC 32.9 30.0 - 36.5 g/dL  
 RDW 16.8 (H) 11.5 - 14.5 % PLATELET 668 (L) 744 - 400 K/uL NRBC 0.2 (H) 0  WBC ABSOLUTE NRBC 0.05 (H) 0.00 - 0.01 K/uL NEUTROPHILS 91 (H) 32 - 75 % LYMPHOCYTES 3 (L) 12 - 49 % MONOCYTES 5 5 - 13 % EOSINOPHILS 0 0 - 7 % BASOPHILS 0 0 - 1 % IMMATURE GRANULOCYTES 1 (H) 0.0 - 0.5 % ABS. NEUTROPHILS 18.4 (H) 1.8 - 8.0 K/UL  
 ABS. LYMPHOCYTES 0.6 (L) 0.8 - 3.5 K/UL  
 ABS. MONOCYTES 1.0 0.0 - 1.0 K/UL  
 ABS. EOSINOPHILS 0.0 0.0 - 0.4 K/UL  
 ABS. BASOPHILS 0.0 0.0 - 0.1 K/UL  
 ABS. IMM. GRANS. 0.2 (H) 0.00 - 0.04 K/UL  
 DF SMEAR SCANNED    
 PLATELET COMMENTS Large Platelets RBC COMMENTS TARGET CELLS 
PRESENT 
    
 RBC COMMENTS ANISOCYTOSIS 1+ 
    
 RBC COMMENTS MICROCYTOSIS 2+ 
    
 RBC COMMENTS HYPOCHROMIA 2+ 
    
 RBC COMMENTS OVALOCYTES PRESENT FERRITIN Collection Time: 02/06/19  4:00 AM  
Result Value Ref Range Ferritin 1,117 (H) 8 - 252 NG/ML  
RENAL FUNCTION PANEL Collection Time: 02/06/19  4:00 AM  
Result Value Ref Range Sodium 140 136 - 145 mmol/L Potassium 4.1 3.5 - 5.1 mmol/L Chloride 107 97 - 108 mmol/L  
 CO2 14 (LL) 21 - 32 mmol/L Anion gap 19 (H) 5 - 15 mmol/L Glucose 153 (H) 65 - 100 mg/dL BUN 78 (H) 6 - 20 MG/DL Creatinine 2.10 (H) 0.55 - 1.02 MG/DL  
 BUN/Creatinine ratio 37 (H) 12 - 20 GFR est AA 28 (L) >60 ml/min/1.73m2 GFR est non-AA 23 (L) >60 ml/min/1.73m2 Calcium 9.0 8.5 - 10.1 MG/DL  Phosphorus 5.4 (H) 2.6 - 4.7 MG/DL  
 Albumin 2.7 (L) 3.5 - 5.0 g/dL GLUCOSE, POC Collection Time: 02/06/19  6:54 AM  
Result Value Ref Range Glucose (POC) 181 (H) 65 - 100 mg/dL Performed by Radha Butt VITAMIN B12 Collection Time: 02/06/19  8:49 AM  
Result Value Ref Range Vitamin B12 1,202 (H) 193 - 986 pg/mL LACTIC ACID Collection Time: 02/06/19  8:49 AM  
Result Value Ref Range Lactic acid 1.7 0.4 - 2.0 MMOL/L  
GLUCOSE, POC Collection Time: 02/06/19 11:28 AM  
Result Value Ref Range Glucose (POC) 203 (H) 65 - 100 mg/dL Performed by Brent Amor   
POC G3 - PUL Collection Time: 02/06/19 12:15 PM  
Result Value Ref Range pH (POC) 7.371 7.35 - 7.45    
 pCO2 (POC) 24.4 (L) 35.0 - 45.0 MMHG  
 pO2 (POC) 41 (LL) 80 - 100 MMHG  
 HCO3 (POC) 14.2 (L) 22 - 26 MMOL/L  
 sO2 (POC) 76 (L) 92 - 97 % Base deficit (POC) 11 mmol/L Site LEFT RADIAL Device: AMBU Flow rate (POC) 10 L/M Allens test (POC) YES Specimen type (POC) ARTERIAL I have reviewed the flowsheets. Chart and Pertinent Notes have been reviewed. No change in PMH ,family and social history from Consult note.  
 
 
Donovan Mabry MD

## 2019-02-07 NOTE — PROGRESS NOTES
TRANSFER - IN REPORT: 
 
Verbal report received from [facility]) on Lorita Must Report consisted of patients Situation, Background, Assessment and  
Recommendations(SBAR). Information from the following report(s) SBAR was reviewed with the receiving nurse. Opportunity for questions and clarification was provided. Assessment completed upon patients arrival to unit and care assumed.

## 2019-02-07 NOTE — PROGRESS NOTES
TRANSFER - OUT REPORT: 
 
Verbal report given to [facility]) on Kisha Ramírez Report consisted of patients Situation, Background, Assessment and  
Recommendations(SBAR). Information from the following report(s) SBAR and Procedure Summary was reviewed with the receiving nurse. Opportunity for questions and clarification was provided.

## 2019-02-07 NOTE — PROGRESS NOTES
[facility] [place] [facility] [place] [address] [address], [address] W: [phone]  F: [phone] for Consult:  
Chad Huertas is a 77 y.o. female who is seen in consultation at the request of Dr. Janny Castillo for evaluation of anemia History of Present Illness:  
 
Patient is a 77 y.o. female with a PMH as below who was admitted on 2/4/19 for low hemoglobin by per PCP and shortness of breath. Upon arrival to ER Hgb was 7.2g/dL. Chart reviewed and available records indicate anemia dating back to 2009 and typically ranges between 7-8g/dL. Per patient, she has had increased shortness of breath over the past week. Patient states she has always had anemia since she was a child and has required prbc transfusions in the past. She takes an iron pill daily for this. Patient denies any history of GIB. Denies dark tarry stools. Occult blood was ordered but not obtained as she has not had a bowel movement since admission. Iron studies were completed and show iron level of 11, iron % sat of 6, and TIBC of 274. Vitamin b12 not deficient. Patient denies recent night sweats, illness, or fevers. No unexplained weight loss. Interval history Patient is now intubated. She is receiving TPA for a valve thrombus. Has had an EGD. No visible bleeding so far. Family at bed side Past Medical History:  
Diagnosis Date  Anemia  Bruit of right carotid artery  CAD (coronary artery disease)  CHF (congestive heart failure) ([address]  CVA (cerebral vascular accident) ([address]  DM (diabetes mellitus) ([address]  Dyslipidemia  Hypertension  Mitral regurgitation and aortic stenosis  Paroxysmal A-fib ([address]  Psoriasis  TIA (transient ischemic attack) Past Surgical History:  
Procedure Laterality Date  HX AORTIC VALVE REPLACEMENT    
 HX CORONARY ARTERY BYPASS GRAFT    
 HX MITRAL VALVE REPLACEMENT    
 TAVR Social History Tobacco Use  
  Smoking status: Never Smoker  Smokeless tobacco: Never Used Substance Use Topics  Alcohol use: Yes Alcohol/week: 0.0 oz  
  Comment: Rare History reviewed. No pertinent family history. Current Facility-Administered Medications Medication Dose Route Frequency  balsam peru-castor oil (VENELEX) ointment   Topical BID  nystatin (MYCOSTATIN) 100,000 unit/gram powder   Topical BID  arformoterol (BROVANA) neb solution 15 mcg  15 mcg Nebulization BID RT And  
 budesonide (PULMICORT) 500 mcg/2 ml nebulizer suspension  500 mcg Nebulization BID RT  
 0.9% sodium chloride infusion  100 mL/hr IntraVENous CONTINUOUS  
 sodium chloride (NS) flush 5-40 mL  5-40 mL IntraVENous Q8H  
 sodium chloride (NS) flush 5-40 mL  5-40 mL IntraVENous PRN  
 midazolam (VERSED) injection 0.25-10 mg  0.25-10 mg IntraVENous Multiple  fentaNYL citrate (PF) injection 100 mcg  100 mcg IntraVENous Multiple  naloxone (NARCAN) injection 0.4 mg  0.4 mg IntraVENous Multiple  flumazenil (ROMAZICON) 0.1 mg/mL injection 0.2 mg  0.2 mg IntraVENous Multiple  simethicone (MYLICON) 66MK/2.7LK oral drops 80 mg  1.2 mL Oral Multiple  diphenhydrAMINE (BENADRYL) injection 50 mg  50 mg IntraVENous ONCE  
 atropine injection 0.5 mg  0.5 mg IntraVENous ONCE PRN  
 EPINEPHrine (ADRENALIN) 0.1 mg/mL syringe 1 mg  1 mg Endoscopically ONCE PRN  
 0.9% sodium chloride infusion 250 mL  250 mL IntraVENous PRN  
 iron sucrose (VENOFER) 300 mg in 0.9% sodium chloride 250 mL IVPB  300 mg IntraVENous Q24H  piperacillin-tazobactam (ZOSYN) 3.375 g in 0.9% sodium chloride (MBP/ADV) 100 mL  3.375 g IntraVENous Q8H  
 heparin 25,000 units in D5W 250 ml infusion  12-25 Units/kg/hr IntraVENous TITRATE  PHENYLephrine (MIKE-SYNEPHRINE) 30 mg in 0.9% sodium chloride 250 mL infusion   mcg/min IntraVENous TITRATE  bumetanide (BUMEX) 0.25 mg/mL infusion  0.5 mg/hr IntraVENous CONTINUOUS  
  niCARdipine (CARDENE) 25 mg in 0.9% sodium chloride 250 mL infusion  0-15 mg/hr IntraVENous TITRATE  
 0.45% sodium chloride infusion  10 mL/hr IntraVENous CONTINUOUS  
 0.9% sodium chloride infusion  9 mL/hr IntraVENous CONTINUOUS  
 ondansetron (ZOFRAN) injection 4 mg  4 mg IntraVENous Q6H PRN  
 vancomycin dosing by pharmacy   Other Rx Dosing/Monitoring  morphine injection 2 mg  2 mg IntraVENous Q4H PRN  
 insulin glargine (LANTUS) injection 10 Units  10 Units SubCUTAneous DAILY  folic acid (FOLVITE) tablet 1 mg  1 mg Oral DAILY  propofol (DIPRIVAN) infusion  0-50 mcg/kg/min IntraVENous TITRATE  insulin lispro (HUMALOG) injection   SubCUTAneous Q6H  
 0.9% sodium chloride infusion 250 mL  250 mL IntraVENous PRN  
 bisacodyl (DULCOLAX) suppository 10 mg  10 mg Rectal DAILY PRN  
 aspirin chewable tablet 81 mg  81 mg Oral DAILY  sodium chloride (NS) flush 5-40 mL  5-40 mL IntraVENous Q8H  
 sodium chloride (NS) flush 5-40 mL  5-40 mL IntraVENous PRN  
 nitroglycerin (NITROSTAT) tablet 0.4 mg  0.4 mg SubLINGual Q5MIN PRN  
 glucose chewable tablet 16 g  4 Tab Oral PRN  
 dextrose (D50W) injection syrg 12.5-25 g  25-50 mL IntraVENous PRN  
 glucagon (GLUCAGEN) injection 1 mg  1 mg IntraMUSCular PRN Allergies Allergen Reactions  Sulfamethoxazole Hives Review of Systems: A complete review of systems was obtained, negative except as described above. Physical Exam:  
 
Visit Vitals /45 (BP 1 Location: Left arm, BP Patient Position: Supine) Pulse 66 Temp 99.9 °F (37.7 °C) Resp 24 Ht 5' 2\" (1.575 m) Wt 154 lb 12.2 oz (70.2 kg) SpO2 100% BMI 28.31 kg/m² General: Intubated and sedated Lymphatic: No cervical, supraclavicular, or inguinal adenopathy CV: Normal rate, no murmurs GI: Soft, nontender, nondistended, no masses, no hepatomegaly, no splenomegaly MS: No edema Skin: Warm, dry Psych: sedated Results:  
 
Lab Results Component Value Date/Time WBC 18.3 (H) 02/07/2019 04:20 AM  
 HGB 8.0 (L) 02/07/2019 10:43 AM  
 HCT 24.7 (L) 02/07/2019 10:43 AM  
 PLATELET 395 05/87/2506 04:20 AM  
 MCV 71.0 (L) 02/07/2019 04:20 AM  
 ABS. NEUTROPHILS 20.6 (H) 02/06/2019 02:10 PM  
 
Lab Results Component Value Date/Time Sodium 146 (H) 02/07/2019 04:20 AM  
 Potassium 2.9 (L) 02/07/2019 04:20 AM  
 Chloride 106 02/07/2019 04:20 AM  
 CO2 20 (L) 02/07/2019 04:20 AM  
 Glucose 82 02/07/2019 04:20 AM  
 BUN 80 (H) 02/07/2019 04:20 AM  
 Creatinine 2.07 (H) 02/07/2019 04:20 AM  
 GFR est AA 29 (L) 02/07/2019 04:20 AM  
 GFR est non-AA 24 (L) 02/07/2019 04:20 AM  
 Calcium 8.3 (L) 02/07/2019 04:20 AM  
 Glucose (POC) 93 02/07/2019 10:48 AM  
 
Lab Results Component Value Date/Time Bilirubin, total 1.5 (H) 02/07/2019 04:20 AM  
 ALT (SGPT) 43 02/07/2019 04:20 AM  
 AST (SGOT) 39 (H) 02/07/2019 04:20 AM  
 Alk. phosphatase 90 02/07/2019 04:20 AM  
 Protein, total 5.7 (L) 02/07/2019 04:20 AM  
 Albumin 2.3 (L) 02/07/2019 04:20 AM  
 Globulin 3.4 02/07/2019 04:20 AM  
 
Lab Results Component Value Date/Time Iron % saturation 6 (L) 02/05/2019 04:32 AM  
 TIBC 274 02/05/2019 04:32 AM  
 Ferritin 1,117 (H) 02/06/2019 04:00 AM  
 Vitamin B12 1,202 (H) 02/06/2019 08:49 AM  
 Haptoglobin 36 02/07/2019 04:20 AM  
 FRANKLIN Poly NEG 02/07/2019 04:20 AM  
  (H) 02/07/2019 04:20 AM  
 TSH 0.29 (L) 02/07/2019 10:43 AM  
 
Lab Results Component Value Date/Time INR 1.2 (H) 02/07/2019 10:43 AM  
 aPTT 40.4 (H) 02/07/2019 11:45 AM  
 Fibrinogen >800 (H) 02/07/2019 10:43 AM  
 
 
Records reviewed and summarized above. Radiology report(s) reviewed above. ECHO- Mitral valve is prosthetic. Mild mitral valve regurgitation. 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 Prosthesis is abnormal consistent with stenosis or obstruction. Non-specific thickening of the tricuspid valve. Severe tricuspid valve regurgitation is present. Moderate pulmonary hypertension is present Assessment:  
1) Anemia, microcytic Hgb upon admission was 7.2g/dL She has profound iron deficiency ( noted that ferritin is markedly elevated but that is likely reactive, TSAT of 6% is suggestive of iron deficiency) No clear bleeding source on EGD Work up reviewed and noted and elevated LD, normal haptoglobin ( despite inflammation), Bb slightly elevated, LD markedly elevated ( largely due to myocardial injury and less likely due to significant hemolysis) Anemia is multifactorial and causes include possible occult blood loss, low grade intravascular hemolysis secondary to valve obstruction and secondary acute Heart failure Management is supportive and with her critically ill condition would transfuse as necessary to maintain a Hb of > 7 g/dl Continue folate replacement Plans for Colonoscopy on hold until no. 2 is addressed Discussed celiac disease work up with Dr. David De Anda 2) Prosthetic valve thrombus Discussed with cardiac surgery PA pressures remained high despite heparin and TPA was deemed necessary Family understands that there is a risk of serious bleeding We will support with transfusions as necessary 3) Leukocytosis Neutrophilic and reactive to acute inflammation Follow On IV abx 4) Acute on chronic HF Per cardiology 5) Stage 3 acute on chronic kidney injury Nephrology following Plan: · TPA as decided by cardiac surgery and cardiology · Daily cbc, cmp, coags including Fibrinogen · Folic acid · Transfuse to keep Hb > 7 g/dl I appreciate the opportunity to participate in Ms. Bella Dobbins care.  
 
Signed By: Nav Gill MD

## 2019-02-07 NOTE — PROGRESS NOTES
[facility] 
 [address] [facility], [facility] [address] [facility] Phone: [phone]   [phone]   
  
Nephrology Progress Note Florinda Peña     1950     [id number] Date of Admission : 2/4/2019 02/07/19 CC:  Follow up for SILVINO Assessment and Plan SILVINO on CKD · 2/2 Decompensated CHF · Responding to Bumex gtt but remains in severe CHF · CRRT can improve Pulm edema faster · Regardless, she is expected to have worsening renal function over the next few days · Will d/w CSS about next steps · Continue Bumex gtt and aggressive replacement of lytes Lytes : 
· Ordered electrolyte replacement protocol · Labs Q12hr while on Bumex gtt CKD Stage II-III: 
· Baseline Cr 1.2-1.3 mg/dl Acute decompensated HFrEF  
· MS, MR s/p MVR in 2009 and TMVR 5/2/18,  
· AS s/p SAVR with patch closure of Aorta  in 2009 and TAVR 5/2/18 · Hx of CAD s/p CABG X 3 
· DEVORA confirmed thrombus on MV. ? Subacute · Blood cx pending · Thrombolytics being considered · CI and CO stable Metabolic acidosis : 
· 2 more amps of Bicarb now Severe Anemia / Thrombocytopenia · Hemolysis screen pending. Hapto 36 · Per Heme Type II DM Leucocytosis Interval History: 
Seen and examined Stable overnight  
UOP ~ 150 cc/ hr in last 24 hrs On Omar On Vent needing 805 Fio2 CI good PA pressures high. PCWP not done. CVP ~ 9-11 now Review of Systems: Review of systems not obtained due to patient factors. Current Medications:  
Current Facility-Administered Medications Medication Dose Route Frequency  balsam peru-castor oil (VENELEX) ointment   Topical BID  nystatin (MYCOSTATIN) 100,000 unit/gram powder   Topical BID  potassium chloride 20 mEq in 50 ml IVPB  20 mEq IntraVENous Q1H  
 magnesium sulfate 1 g/100 ml IVPB (premix or compounded)  1 g IntraVENous ONCE  
 0.9% sodium chloride infusion 250 mL  250 mL IntraVENous PRN  
  iron sucrose (VENOFER) 300 mg in 0.9% sodium chloride 250 mL IVPB  300 mg IntraVENous Q24H  piperacillin-tazobactam (ZOSYN) 3.375 g in 0.9% sodium chloride (MBP/ADV) 100 mL  3.375 g IntraVENous Q8H  
 heparin 25,000 units in D5W 250 ml infusion  12-25 Units/kg/hr IntraVENous TITRATE  PHENYLephrine (MIKE-SYNEPHRINE) 30 mg in 0.9% sodium chloride 250 mL infusion   mcg/min IntraVENous TITRATE  bumetanide (BUMEX) 0.25 mg/mL infusion  0.5 mg/hr IntraVENous CONTINUOUS  
 niCARdipine (CARDENE) 25 mg in 0.9% sodium chloride 250 mL infusion  0-15 mg/hr IntraVENous TITRATE  
 0.45% sodium chloride infusion  10 mL/hr IntraVENous CONTINUOUS  
 0.9% sodium chloride infusion  9 mL/hr IntraVENous CONTINUOUS  
 ondansetron (ZOFRAN) injection 4 mg  4 mg IntraVENous Q6H PRN  
 vancomycin dosing by pharmacy   Other Rx Dosing/Monitoring  morphine injection 2 mg  2 mg IntraVENous Q4H PRN  
 insulin glargine (LANTUS) injection 10 Units  10 Units SubCUTAneous DAILY  folic acid (FOLVITE) tablet 1 mg  1 mg Oral DAILY  propofol (DIPRIVAN) infusion  0-50 mcg/kg/min IntraVENous TITRATE  insulin lispro (HUMALOG) injection   SubCUTAneous Q6H  
 0.9% sodium chloride infusion 250 mL  250 mL IntraVENous PRN  
 bisacodyl (DULCOLAX) suppository 10 mg  10 mg Rectal DAILY PRN  
 aspirin chewable tablet 81 mg  81 mg Oral DAILY  sodium chloride (NS) flush 5-40 mL  5-40 mL IntraVENous Q8H  
 sodium chloride (NS) flush 5-40 mL  5-40 mL IntraVENous PRN  
 nitroglycerin (NITROSTAT) tablet 0.4 mg  0.4 mg SubLINGual Q5MIN PRN  
 glucose chewable tablet 16 g  4 Tab Oral PRN  
 dextrose (D50W) injection syrg 12.5-25 g  25-50 mL IntraVENous PRN  
 glucagon (GLUCAGEN) injection 1 mg  1 mg IntraMUSCular PRN Allergies Allergen Reactions  Sulfamethoxazole Hives Objective: 
Vitals:   
Vitals:  
 02/07/19 0400 02/07/19 0500 02/07/19 0600 02/07/19 0700 BP: 105/45 Pulse: 71 70 71 71 Resp: 19 17 22 15  
 Temp: 99 °F (37.2 °C) 98.8 °F (37.1 °C) 99.3 °F (37.4 °C) 99.5 °F (37.5 °C) SpO2: 97% 100% 100% 100% Weight:      
Height:      
 
Intake and Output: 
No intake/output data recorded. 02/05 1901 - 02/07 0700 In: 2759.7 [P.O.:300; I.V.:1860.1] Out: Justa Coronador [[id number]] Physical Examination: 
General: On vent Neck:  Lines + Resp:  Diminished B/L  
CV:  Tachy , Murmur + 
GI:  Soft, NT Neurologic:  Sedated Skin:  Ecchymosis :  Jiménez + 
 
[]    High complexity decision making was performed 
[]    Patient is at high-risk of decompensation with multiple organ involvement Lab Data Personally Reviewed: I have reviewed all the pertinent labs, microbiology data and radiology studies during assessment. Recent Labs 02/07/19 
0420 02/06/19 
1410 02/06/19 
0400 02/05/19 
0432 02/04/19 
1840 02/04/19 
1309 02/04/19 
1049 * 141 140 137 135*  --  134* K 2.9* 3.9 4.1 4.5 4.9  --  5.6*  
 106 107 107 106  --  104 CO2 20* 16* 14* 12* 15*  --  13* GLU 82 152* 153* 157* 155*  --  140* BUN 80* 82* 78* 76* 74*  --  77* CREA 2.07* 2.05* 2.10* 2.00* 2.07*  --  2.27* CA 8.3* 9.0 9.0 9.8 9.4  --  9.5 MG  --   --   --   --   --   --  2.2 PHOS  --  5.7* 5.4*  --   --   --   --   
ALB 2.3* 2.7* 2.7* 2.9*  --   --  3.0*  
SGOT 39*  --   --  47*  --   --  55* ALT 43  --   --  43  --   --  45 INR  --   --   --   --  1.2* 1.1  --   
 
Recent Labs 02/06/19 
1410 02/06/19 
0400 02/05/19 
1507 02/05/19 
0432 02/04/19 
1309 WBC 21.9* 20.2*  --  16.0* 13.9* HGB 7.8* 6.8* 7.4* 6.1* 7.2* HCT 24.8* 20.7* 23.5* 20.2* 23.9*  
 137*  --  122* 129* No results found for: SDES Lab Results Component Value Date/Time Culture result: NO GROWTH AFTER 12 HOURS 02/06/2019 03:58 PM  
 Culture result: ESCHERICHIA COLI (A) 04/23/2018 12:19 PM  
 
Recent Results (from the past 24 hour(s)) VITAMIN B12 Collection Time: 02/06/19  8:49 AM  
Result Value Ref Range Vitamin B12 1,202 (H) 193 - 986 pg/mL LACTIC ACID Collection Time: 02/06/19  8:49 AM  
Result Value Ref Range Lactic acid 1.7 0.4 - 2.0 MMOL/L  
GLUCOSE, POC Collection Time: 02/06/19 11:28 AM  
Result Value Ref Range Glucose (POC) 203 (H) 65 - 100 mg/dL Performed by Lauren Ortez   
POC G3 - PUL Collection Time: 02/06/19 12:15 PM  
Result Value Ref Range pH (POC) 7.371 7.35 - 7.45    
 pCO2 (POC) 24.4 (L) 35.0 - 45.0 MMHG  
 pO2 (POC) 41 (LL) 80 - 100 MMHG  
 HCO3 (POC) 14.2 (L) 22 - 26 MMOL/L  
 sO2 (POC) 76 (L) 92 - 97 % Base deficit (POC) 11 mmol/L Site LEFT RADIAL Device: AMBU Flow rate (POC) 10 L/M Allens test (POC) YES Specimen type (POC) ARTERIAL    
PTT Collection Time: 02/06/19  1:55 PM  
Result Value Ref Range aPTT 30.7 22.1 - 32.0 sec  
 aPTT, therapeutic range     58.0 - 77.0 SECS  
CBC WITH AUTOMATED DIFF Collection Time: 02/06/19  2:10 PM  
Result Value Ref Range WBC 21.9 (H) 3.6 - 11.0 K/uL  
 RBC 3.47 (L) 3.80 - 5.20 M/uL HGB 7.8 (L) 11.5 - 16.0 g/dL HCT 24.8 (L) 35.0 - 47.0 % MCV 71.5 (L) 80.0 - 99.0 FL  
 MCH 22.5 (L) 26.0 - 34.0 PG  
 MCHC 31.5 30.0 - 36.5 g/dL  
 RDW 19.2 (H) 11.5 - 14.5 % PLATELET 750 815 - 287 K/uL NRBC 0.3 (H) 0  WBC ABSOLUTE NRBC 0.07 (H) 0.00 - 0.01 K/uL NEUTROPHILS 94 (H) 32 - 75 % LYMPHOCYTES 2 (L) 12 - 49 % MONOCYTES 3 (L) 5 - 13 % EOSINOPHILS 0 0 - 7 % BASOPHILS 0 0 - 1 % IMMATURE GRANULOCYTES 1 (H) 0.0 - 0.5 % ABS. NEUTROPHILS 20.6 (H) 1.8 - 8.0 K/UL  
 ABS. LYMPHOCYTES 0.4 (L) 0.8 - 3.5 K/UL  
 ABS. MONOCYTES 0.7 0.0 - 1.0 K/UL  
 ABS. EOSINOPHILS 0.0 0.0 - 0.4 K/UL  
 ABS. BASOPHILS 0.0 0.0 - 0.1 K/UL  
 ABS. IMM. GRANS. 0.2 (H) 0.00 - 0.04 K/UL  
 DF AUTOMATED    
 RBC COMMENTS OVALOCYTES PRESENT 
    
 RBC COMMENTS PENNY CELLS 
PRESENT 
    
 RBC COMMENTS ANISOCYTOSIS 1+ 
    
 RBC COMMENTS HYPOCHROMIA 1+ RENAL FUNCTION PANEL  
 Collection Time: 02/06/19  2:10 PM  
Result Value Ref Range Sodium 141 136 - 145 mmol/L Potassium 3.9 3.5 - 5.1 mmol/L Chloride 106 97 - 108 mmol/L  
 CO2 16 (L) 21 - 32 mmol/L Anion gap 19 (H) 5 - 15 mmol/L Glucose 152 (H) 65 - 100 mg/dL BUN 82 (H) 6 - 20 MG/DL Creatinine 2.05 (H) 0.55 - 1.02 MG/DL  
 BUN/Creatinine ratio 40 (H) 12 - 20 GFR est AA 29 (L) >60 ml/min/1.73m2 GFR est non-AA 24 (L) >60 ml/min/1.73m2 Calcium 9.0 8.5 - 10.1 MG/DL Phosphorus 5.7 (H) 2.6 - 4.7 MG/DL Albumin 2.7 (L) 3.5 - 5.0 g/dL POC VENOUS BLOOD GAS Collection Time: 02/06/19  3:15 PM  
Result Value Ref Range Device: VENT    
 FIO2 (POC) 100 %  
 pH, venous (POC) 7.321 7.32 - 7.42    
 pCO2, venous (POC) 36.1 (L) 41 - 51 MMHG  
 pO2, venous (POC) 40 25 - 40 mmHg HCO3, venous (POC) 18.7 (L) 23.0 - 28.0 MMOL/L  
 sO2, venous (POC) 71 65 - 88 % Base deficit, venous (POC) 7 mmol/L Mode ASSIST CONTROL Tidal volume 450 ml Set Rate 12 bpm  
 PEEP/CPAP (POC) 5 cmH2O Allens test (POC) NO Total resp. rate 20 Site [facility] Specimen type (POC) VENOUS BLOOD    
POC G3 - PUL Collection Time: 02/06/19  3:19 PM  
Result Value Ref Range FIO2 (POC) 100 % pH (POC) 7.347 (L) 7.35 - 7.45    
 pCO2 (POC) 37.8 35.0 - 45.0 MMHG  
 pO2 (POC) 199 (H) 80 - 100 MMHG  
 HCO3 (POC) 20.7 (L) 22 - 26 MMOL/L  
 sO2 (POC) 100 (H) 92 - 97 % Base deficit (POC) 5 mmol/L Site DRAWN FROM ARTERIAL LINE Device: VENT Mode ASSIST CONTROL Tidal volume 450 ml Set Rate 12 bpm  
 PEEP/CPAP (POC) 5 cmH2O Allens test (POC) NO Specimen type (POC) ARTERIAL Total resp. rate 20 GLUCOSE, POC Collection Time: 02/06/19  3:46 PM  
Result Value Ref Range Glucose (POC) 149 (H) 65 - 100 mg/dL Performed by Calvin Azarr CULTURE, BLOOD, PAIRED Collection Time: 02/06/19  3:58 PM  
Result Value Ref Range Special Requests: NO SPECIAL REQUESTS Culture result: NO GROWTH AFTER 12 HOURS    
GLUCOSE, POC Collection Time: 02/06/19  6:33 PM  
Result Value Ref Range Glucose (POC) 153 (H) 65 - 100 mg/dL Performed by Sudhir Treviño GLUCOSE, POC Collection Time: 02/06/19 10:30 PM  
Result Value Ref Range Glucose (POC) 152 (H) 65 - 100 mg/dL Performed by Madeleine Dennis PTT Collection Time: 02/06/19 10:49 PM  
Result Value Ref Range aPTT 57.5 (H) 22.1 - 32.0 sec  
 aPTT, therapeutic range     58.0 - 77.0 SECS  
GLUCOSE, POC Collection Time: 02/07/19  4:19 AM  
Result Value Ref Range Glucose (POC) 91 65 - 100 mg/dL Performed by Madeleine Dennis LACTIC ACID Collection Time: 02/07/19  4:20 AM  
Result Value Ref Range Lactic acid 0.7 0.4 - 2.0 MMOL/L  
NT-PRO BNP Collection Time: 02/07/19  4:20 AM  
Result Value Ref Range NT pro-BNP >35,000 (H) 0 - 950 PG/ML  
METABOLIC PANEL, COMPREHENSIVE Collection Time: 02/07/19  4:20 AM  
Result Value Ref Range Sodium 146 (H) 136 - 145 mmol/L Potassium 2.9 (L) 3.5 - 5.1 mmol/L Chloride 106 97 - 108 mmol/L  
 CO2 20 (L) 21 - 32 mmol/L Anion gap 20 (H) 5 - 15 mmol/L Glucose 82 65 - 100 mg/dL BUN 80 (H) 6 - 20 MG/DL Creatinine 2.07 (H) 0.55 - 1.02 MG/DL  
 BUN/Creatinine ratio 39 (H) 12 - 20 GFR est AA 29 (L) >60 ml/min/1.73m2 GFR est non-AA 24 (L) >60 ml/min/1.73m2 Calcium 8.3 (L) 8.5 - 10.1 MG/DL Bilirubin, total 1.5 (H) 0.2 - 1.0 MG/DL  
 ALT (SGPT) 43 12 - 78 U/L  
 AST (SGOT) 39 (H) 15 - 37 U/L Alk. phosphatase 90 45 - 117 U/L Protein, total 5.7 (L) 6.4 - 8.2 g/dL Albumin 2.3 (L) 3.5 - 5.0 g/dL Globulin 3.4 2.0 - 4.0 g/dL A-G Ratio 0.7 (L) 1.1 - 2.2 HAPTOGLOBIN Collection Time: 02/07/19  4:20 AM  
Result Value Ref Range Haptoglobin 36 30 - 200 mg/dL DIRECT KAVEH Collection Time: 02/07/19  4:20 AM  
Result Value Ref Range FRANKLIN Poly NEG   
POC G3 - PUL  Collection Time: 02/07/19  4:27 AM  
 Result Value Ref Range FIO2 (POC) 60 % pH (POC) 7.337 (L) 7.35 - 7.45    
 pCO2 (POC) 35.4 35.0 - 45.0 MMHG  
 pO2 (POC) 65 (L) 80 - 100 MMHG  
 HCO3 (POC) 19.0 (L) 22 - 26 MMOL/L  
 sO2 (POC) 91 (L) 92 - 97 % Base deficit (POC) 7 mmol/L Site DRAWN FROM ARTERIAL LINE Device: VENT Mode ASSIST CONTROL Tidal volume 450 ml Set Rate 12 bpm  
 PEEP/CPAP (POC) 5 cmH2O  
 PIP (POC) 26 Allens test (POC) N/A Specimen type (POC) ARTERIAL Total resp. rate 20 POC VENOUS BLOOD GAS Collection Time: 02/07/19  4:31 AM  
Result Value Ref Range Device: VENT    
 FIO2 (POC) 80 %  
 pH, venous (POC) 7.320 7.32 - 7.42    
 pCO2, venous (POC) 37.6 (L) 41 - 51 MMHG  
 HCO3, venous (POC) 19.4 (L) 23.0 - 28.0 MMOL/L Base deficit, venous (POC) 7 mmol/L Mode ASSIST CONTROL Tidal volume 450 ml Set Rate 12 bpm  
 PEEP/CPAP (POC) 5 cmH2O  
 PIP (POC) 18 Allens test (POC) N/A Total resp. rate 20 Site [facility] [employer] Specimen type (POC) VENOUS BLOOD    
POC VENOUS BLOOD GAS Collection Time: 02/07/19  4:35 AM  
Result Value Ref Range Device: VENT    
 FIO2 (POC) 60 %  
 pH, venous (POC) 7.319 (L) 7.32 - 7.42    
 pCO2, venous (POC) 37.0 (L) 41 - 51 MMHG  
 pO2, venous (POC) 35 25 - 40 mmHg HCO3, venous (POC) 19.0 (L) 23.0 - 28.0 MMOL/L  
 sO2, venous (POC) 63 (L) 65 - 88 % Base deficit, venous (POC) 7 mmol/L Mode ASSIST CONTROL Tidal volume 450 ml Set Rate 12 bpm  
 PEEP/CPAP (POC) 5 cmH2O  
 PIP (POC) 26 Allens test (POC) N/A Total resp. rate 21 Site [facility] [employer] Specimen type (POC) VENOUS BLOOD    
PTT Collection Time: 02/07/19  6:18 AM  
Result Value Ref Range aPTT 49.3 (H) 22.1 - 32.0 sec  
 aPTT, therapeutic range     58.0 - 77.0 SECS I have reviewed the flowsheets. Chart and Pertinent Notes have been reviewed. No change in PMH ,family and social history from Consult note.  
 
 
Ehsan Malave MD

## 2019-02-07 NOTE — PROGRESS NOTES
2000: Report received from Laney Kessler RN, tony dual RN verified and care assumed of patient. 0000: Heparin drip increased for PTT 57.5 
 
0035: RT weaned vent FIO2 to 70%. 0330: Patient bathed, linen changed, cxr performed. 0400: Reassessed. 0415: RT weaned FIO2 to 60%. 0425: Patients breathing appears labored. ABG drawn and SVO2 recalibrated. PH 7.337 CO2 35.4 pO2 65 HCO# 19 BE -7 sO2 91%  FIO2 back to 80% 1685: Patients daughter called for update. Update provided and opportunity provided for questions and clarification. D[place] of [place] today and will [place] at [address] [place] at noon. 0645: Dr. Garrick Tabares bedside. 0700: Dr. Giuseppe Singh bedside. 0745: Spoke with Dr. Sonia Rao and updated him on overnight events and Sirena and Juan rounding. Continue to await heme/onc suggestion on TPA therapy. Heme/onc paged to Dr. John Macias cell phone at his request.  Will contact GI to see if they would like to scope patient prior to thrombolytic therapy. GI paged. 0800:Bedside shift change report given to Gaby Cherry RN (oncoming nurse) by Bree Villalobos RN (offgoing nurse). Report included the following information SBAR, OR Summary, Procedure Summary, Intake/Output, Recent Results and Cardiac Rhythm NSR, AVB 1st degree, depressed ST segment.

## 2019-02-07 NOTE — PROGRESS NOTES
[facility] ICU Progress Note Critical Care Note Admit Date: 2019 Procedure:  Procedure(s): ESOPHAGOGASTRODUODENOSCOPY (EGD) Subjective:  
Pt seen with Dr. Daphnie Dutton. Intubated, sedated. Fio2 80%. Tmax 100.1. On bumex 0.5, heparin, britt 40, propofol 50. Objective:  
Vitals: 
Blood pressure 105/45, pulse 70, temperature 99.5 °F (37.5 °C), resp. rate 15, height 5' 2\" (1.575 m), weight 154 lb 12.2 oz (70.2 kg), SpO2 100 %. Temp (24hrs), Av.3 °F (37.4 °C), Min:97.8 °F (36.6 °C), Max:100.1 °F (37.8 °C) Hemodynamics: 
 CO: CO (l/min): 5.9 l/min CI: CI (l/min/m2): 3.4 l/min/m2 CVP: CVP (mmHg): 13 mmHg (19) SVR: SVR (dyne*sec)/cm5: 1383 (dyne*sec)/cm5 (19 0400) PAP Systolic: PAP Systolic: 67 (56/89/25 8871) PAP Diastolic: PAP Diastolic: 25 (91/99/24 6363) PVR:   
 SV02: SVO2 (%): 72 % (19) SCV02:   
 
EKG/Rhythm:  NSR w/ 1st deg block Ventilator: 
Ventilator Volumes Vt Set (ml): 450 ml (19) Vt Exhaled (Machine Breath) (ml): 512 ml (19 075) Ve Observed (l/min): 9.8 l/min (19 075) Oxygen Therapy: 
Oxygen Therapy O2 Sat (%): 100 % (19) Pulse via Oximetry: 70 beats per minute (19) O2 Device: Endotracheal tube;Ventilator (19) O2 Flow Rate (L/min): 4 l/min (19 1602) FIO2 (%): 80 % (02/07/19 0700) CXR:   
CXR Results  (Last 48 hours) 19 0447  XR CHEST PORT Final result Impression:  IMPRESSION: No significant change. Narrative:  INDICATION:  CHF, intubated EXAM: CXR Portable. FINDINGS: Portable chest shows satisfactory NG tube placement in the stomach  
with otherwise stable support lines/devices since yesterday. There is no  
apparent pneumothorax. Lungs show unchanged bilateral prominent airspace  
disease/edema. Heart size is obscured. There is no midline shift. 19 1449  XR CHEST PORT Final result Impression:  IMPRESSION:   
Right IJ Chalmers-Hang catheter and ET tube as described. No pneumothorax. Narrative:  INDICATION:    Lines, ETT   
   
EXAMINATION:  AP CHEST, PORTABLE  
   
COMPARISON: Earlier today FINDINGS: Single AP portable view of the chest at 1437 hours demonstrates  
interval placement of a right IJ Chalmers-Hang catheter, tip in the main pulmonary  
outflow tract. ET tube is in satisfactory position. There is no pneumothorax. There is chronic cardiomegaly with evidence of CABG. The cardiomediastinal  
silhouette is stable. Diffuse bilateral interstitial and patchy airspace  
opacities persist.  
   
  
 02/06/19 0103  XR CHEST PORT Final result Impression:  IMPRESSION:  
   
Overall stable bilateral interstitial and patchy airspace opacities. Narrative:  EXAM:  XR CHEST PORT INDICATION: Increased dyspnea and desaturation. COMPARISON: 2/4/2019 at 1056 hours TECHNIQUE: Portable AP upright chest view at 0050 hours FINDINGS: Sternal wires and a prosthetic aortic valve are again noted. Cardiac  
monitoring wires overlie the thorax. There is stable cardiac silhouette  
enlargement. There overall stable bilateral interstitial and patchy airspace opacities. There  
is no pleural effusion or pneumothorax. The bones and upper abdomen are stable. Admission Weight: Last Weight Weight: 147 lb (66.7 kg) Weight: 154 lb 12.2 oz (70.2 kg) Intake / Output / Drain: 
Current Shift: No intake/output data recorded. Last 24 hrs.:  
 
Intake/Output Summary (Last 24 hours) at 2/7/2019 4379 Last data filed at 2/7/2019 0700 Gross per 24 hour Intake 2759.69 ml Output 3840 ml Net -1080.31 ml EXAM: 
General:  Intubated, sedated. Lungs:   Clear to auscultation bilaterally. Heart:  Regular rate and rhythm, S1, S2 normal, no murmur, click, rub or gallop. Abdomen:   Soft, non-tender. Bowel sounds hypoactive. No masses,  No organomegaly. Extremities:  No edema. PPP. Neurologic:  Gross motor and sensory apparatus intact. Labs:  
Recent Labs 19 
0420 19 
0419  19 
1840 WBC 18.3*  --    < >  --   
HGB 8.4*  --    < >  --   
HCT 25.2*  --    < >  --   
  --    < >  --   
*  --    < > 135*  
K 2.9*  --    < > 4.9 BUN 80*  --    < > 74* CREA 2.07*  --    < > 2.07* GLU 82  --    < > 155* GLUCPOC  --  91   < >  --   
INR  --   --   --  1.2*  
 < > = values in this interval not displayed. Assessment:  
 
Principal Problem: 
  SOB (shortness of breath) (2019) Plan/Recommendations/Medical Decision Makin. Acute on chronic diastolic CHF, NYHA Class IV on admit: EF 40-45%, needs DEVORA, plan for today follow TPA. Cont bumex gtt per renal, most likely needs CVVH for volume removal.  No BB/ACE until appropriate.   
  
2. S/p TAVR/TMVR 2018: Dr. Karyle Spies and Dr. Hank Delong reviewed TTE, concerned about possible thrombus of her MV. Cont heparin gtt. Plan to give TPA Today, w /DEVORA to possible follow this afternoon.   
  
3. Acute hypoxic respiratory failure: Now intubated. On 80% fi02. Vent bundle. Diuresis. Start steroid nebs.   
  
4. Metabolic, lactic acidosis: renal assisting to manage, give IV bicarb this morning. Lactate improved 0.7 -- trend.   
  
5. Acute on chronic anemia: history of, prev workup by Dr. Shanthi Marques with hematology w/ unclear etiology. Transfuse prn. Receiving IV iron. Hematology following. Cont IV venofer. Trend H/H. Consult GI -- upper endoscopy today?  
  
6. SILVINO: renal consulted/following. On bumex gtt, likely needs CVVH.   
  
7. Leukocytosis: on Zosyn/vanco. Currently afebrile, UA with mod leuks, no bacteria. Blood cx 2/6 NGTD.  
  
 8. DM: cont lantus, SSI per orders. BS q6h. DTC consult.   
  
9. Hx of PAF: was on amio, holding here due to pulmonary contraindications. No anticoagulation, had been on coumadin after valve procedure - had supratherapeutic INR prompting readmission. Been off coumadin for several months  
  
10. HTN: Holding BP meds until appropriate.   
  
11. Hx of CAD, s/p CABG: on ASA,  no BB currently - had been on metoprolol as recently as 1/4/19 but not on PTA med list?  Holding statin for now.   
  
12. HLD: holding statin for now.  
  
13. Prior CVA, TIA: on ASA, holding statin for now 
  
14. Hx of carotid stenosis: f/u with vascular surgery 
  
15. RA: on enbrel weekly PTA 
  
16. Dispo: critically ill and high risk for deterioration,  Family updated by cardiology. Update:  Long conversation w/ Neeru, pharmacy staff (Reji Nolasco) about TPA dosing. Agreed upon 10 mg bolus, followed by 50 mg/hr x 1, then 20 mg/hr x 2 hr. Balancing risks/benefits of short course of therapy at higher dose. Signed By: Christie Alex NP Pt seen and examined Clinical course reviewed Discussed with Dr Diego Drake At this point she has shown little improvement with heparin and no improvement in PAp Would recommend TPA this was discussed with family regarding risks Would repeat DEVORA following TPA Critical care time 30 min

## 2019-02-07 NOTE — PROGRESS NOTES
Problem: Falls - Risk of 
Goal: *Absence of Falls Document Natalia Varnerbryanna Fall Risk and appropriate interventions in the flowsheet. Outcome: Progressing Towards Goal 
Fall Risk Interventions: 
Mobility Interventions: Communicate number of staff needed for ambulation/transfer, Strengthening exercises (ROM-active/passive) Medication Interventions: Evaluate medications/consider consulting pharmacy Elimination Interventions: Patient to call for help with toileting needs, Toileting schedule/hourly rounds Problem: Pressure Injury - Risk of 
Goal: *Prevention of pressure injury Document Ivan Scale and appropriate interventions in the flowsheet. Outcome: Progressing Towards Goal 
Pressure Injury Interventions: 
Sensory Interventions: Assess changes in LOC, Check visual cues for pain, Keep linens dry and wrinkle-free, Minimize linen layers, Turn and reposition approx. every two hours (pillows and wedges if needed) Moisture Interventions: Minimize layers Activity Interventions: Assess need for specialty bed, Pressure redistribution bed/mattress(bed type) Mobility Interventions: Float heels, HOB 30 degrees or less, Pressure redistribution bed/mattress (bed type), Turn and reposition approx. every two hours(pillow and wedges) Nutrition Interventions: Document food/fluid/supplement intake, Offer support with meals,snacks and hydration, Discuss nutritional consult with provider Friction and Shear Interventions: Apply protective barrier, creams and emollients, Lift sheet, Minimize layers, HOB 30 degrees or less Problem: Heart Failure: Day 3 Goal: *Oxygen saturation within defined limits Outcome: Not Progressing Towards Goal 
Mechanical ventilation

## 2019-02-07 NOTE — PROCEDURES
[address] [facility] [address] Esophago- Gastroduodenoscopy (EGD) Procedure Note Lakhani Blades 1950 
[id number] Procedure: Endoscopic Gastroduodenoscopy --diagnostic, with control of bleeding Indication:  anemia Pre-operative Diagnosis: see indication above Post-operative Diagnosis: see findings below : Alverto Hull. Kathrin Sousa MD 
 
Referring Provider:  Aneta Wen NP Anesthesia/Sedation:  MAC anesthesia Propofol Procedure Details After informed consent was obtained for the procedure, with all risks and benefits of procedure explained the patient was taken to the endoscopy suite and placed in the left lateral decubitus position. Following sequential administration of sedation as per above, the endoscope was inserted into the mouth and advanced under direct vision to second portion of the duodenum. A careful inspection was made as the gastroscope was withdrawn, including a retroflexed view of the proximal stomach; findings and interventions are described below. Findings:  
Esophagus: in the distal esophagus, there was mucosal injury likely secondary to orogastric tube placement. There was superficial mucosal disruption. Two Resolution 360 clips were placed to close this region in the setting of anticoagulation and anticipated thrombolysis. Stomach: normal 
Duodenum: normal to second portion Therapies: as above Specimens: none EBL: None Complications:   None; patient tolerated the procedure well. Impression: 1. Distal esophagitis - likely OG tube trauma. Empiric hemoclip placement in setting of anticoagulation and anticipated thrombolysis 2. This finding does not explain patient's underlying anemia Recommendations: 1. From a GI perspective, it is okay to proceed with plans for thrombolysis and ongoing anticoagulation.  Patient had an unremarkable colonoscopy in 2018. This was performed as part of a work up for chronic iron deficiency anemia. 2. It does not appear that she has undergone celiac disease testing. Duodenal biopsies were deferred today as the patient was on a heparin gtt. We can send celiac antibody testing next, which should be included as part of her anemia work up 3. If she develops overt lower GI bleeding following thrombolysis, we should order a CTA abdomen/pelvis with GI bleed protocol. If she has upper GI bleeding seen in her orogastric tube output, then we can consider repeat EGD. Additionally, video capsule endoscopy can be considered. 4. Will follow along with you. Signed By: Shannan Burton.  Kyle Cowan MD   
 2/7/2019  10:08 AM

## 2019-02-07 NOTE — PROGRESS NOTES
Spoke with dr Gretchen Larsen after seeing pt this am. Pa pressures still elevated so appears heparin is not working. Plan to proceed with thrombolysis if GI feels is reasonable given anemia. Called  with update. Explained that at this point thrombolysis is only option to try and improve her situation. Risks of stroke and bleeding discussed. He agrees with proceeding. Dr Brook Brock or Sylvia Ladnin will be available this afternoon to do fup DEVORA if necessary.

## 2019-02-07 NOTE — PROGRESS NOTES
Hospitalist Progress Note Brandon Hall MD. Cell: [phone] NAME:  Radha Carver :  1950 MRN:  [id number] Date of Service:  2019 Summary:  The patient is a 47-year-old female with past medical history of diabetes, hypertension, dyslipidemia, mitral valve regurgitation, coronary artery disease, paroxysmal atrial fibrillation, history of TAVR in 2018, history of diabetes, who presents to the hospital with shortness of breath. Assessment/Plan: 
Worsening SOB: Aetiology appears to be multifactorial including anemia, acute on chronic systolic heart failure CXR shows fairly severe bilateral interstitial and airspace opacities fairly symmetric. Acute hypoxic respiratory failure S/p mechanical intubation Vent management as per intensivist 
 
Acute on chronic systolic heart failure with NYHA class IV on admission. Echo: Ef 41-45%, Right ventricular systolic function mod to severely reduced. Prosthesis is abnormal consistent with stenosis or obstruction Management as per cardiac surgery and cardiology S/p TMVR/TAVR on 18 by Dr Sudhakar Arrieta S/p DEVORA with concern for possible thrombus on mitral Valve On heparin gtt. Further management as per CTS Acute on chronic kidney injury stage 3 with AG metabolic acidosis Suspect component of cardio renal syndrome Management as per Nephrology. Severe anemia with hb on admission of 7.2 s/p 2 unit of prbc Hb Iron sat 5, serum iron 17 and TIBC 274. LD: 410 Transfuse PRN Hb< 7. 
Hem input appreciated. DM type 2: ISS as per protocol Accu checks. U/A no ketones Leucocytosis: Continue broad spectrum abx.  
blood culture no growth so far Code status: Full DVT prophylaxsis: On heparin gtt Dispo: Continue ICU. Can switch service to CTS. Discussed with Dr Allyssa George. We will sign off Interval History/Subjective: 
 
F/u for worsening SOB S/p mechanical intubation Review of Systems: 
Pertinent items are noted in HPI. Objective: VITALS:  
Last 24hrs VS reviewed since prior progress note. Most recent are: 
Visit Vitals /45 (BP 1 Location: Left arm, BP Patient Position: Supine) Pulse 68 Temp 100.2 °F (37.9 °C) Resp 12 Ht 5' 2\" (1.575 m) Wt 70.2 kg (154 lb 12.2 oz) SpO2 100% BMI 28.31 kg/m² Intake/Output Summary (Last 24 hours) at 2/7/2019 1711 Last data filed at 2/7/2019 1700 Gross per 24 hour Intake 2880.04 ml Output 3265 ml Net -384.96 ml PHYSICAL EXAM: 
General: sedated EENT: EOMI. Anicteric sclerae. Oral mucous moist, oropharynx benign Resp: S/p mechanical intubation CV: Regular rhythm, normal rate, Systolic murmurs++, gallops, rubs GI: Soft, non distended, non tender. normoactive bowel sounds, no hepatosplenomegaly Extremities: 3 + edema up to the knee, warm, 2+ pulses throughout Neurologic: Moves all extremities. AAOx3, CN II-XII grossly intact Psych: Good insight. Not anxious nor agitated. Skin: Good Turgor, no rashes or ulcers Lab Data Personally Reviewed: (see below) Medications list Personally Reviewed:  x YES  NO  
 
_______________________________________________________________________ Care Plan discussed with:  Patient/Family and Nurse Total NON critical care TIME:  30 minutes Franklyn Hughes MD  
 
Procedures: see electronic medical records for all procedures/Xrays and details which were not copied into this note but were reviewed prior to creation of Plan. LABS: 
Recent Labs 02/07/19 
1043 02/07/19 
0420 02/06/19 
1410 WBC  --  18.3* 21.9* HGB 8.0* 8.4* 7.8* HCT 24.7* 25.2* 24.8* PLT  --  154 235 Recent Labs 02/07/19 
1400 02/07/19 
0420 02/06/19 
1410 * 146* 141  
K 3.5 2.9* 3.9 * 106 106 CO2 22 20* 16*  
BUN 81* 80* 82* CREA 2.13* 2.07* 2.05* GLU 72 82 152* CA 8.3* 8.3* 9.0 MG 2.2 2.1  --   
PHOS 6.1* 6.2* 5.7* Recent Labs 02/07/19 
1400 02/07/19 
0420 02/06/19 
1410  02/05/19 
7067 SGOT  --  39*  --   --  47* ALT  --  43  --   --  43  
AP  --  90  --   --  97  
TBILI  --  1.5*  --   --  0.8 TP  --  5.7*  --   --  7.1 ALB 2.0* 2.3* 2.7*   < > 2.9*  
GLOB  --  3.4  --   --  4.2*  
 < > = values in this interval not displayed. Recent Labs 02/07/19 
1145 02/07/19 
1043 02/07/19 
0937  02/04/19 
1840 INR  --  1.2*  --   --  1.2* PTP  --  12.4*  --   --  11.6* APTT 40.4* 70.2* 105.4*   < >  --   
 < > = values in this interval not displayed. Recent Labs 02/06/19 
0400 02/05/19 
0432 02/04/19 
1840 TIBC  --  274 248* PSAT  --  6* 4*  
FERR 1,117*  --   -- No results found for: FOL, RBCF No results for input(s): PH, PCO2, PO2 in the last 72 hours. Recent Labs 02/04/19 
1840 TROIQ <0.05 Lab Results Component Value Date/Time Cholesterol, total 129 01/04/2019 11:56 AM  
 HDL Cholesterol 56 01/04/2019 11:56 AM  
 LDL, calculated 50 01/04/2019 11:56 AM  
 Triglyceride 115 01/04/2019 11:56 AM  
 
Lab Results Component Value Date/Time Glucose (POC) 81 02/07/2019 04:21 PM  
 Glucose (POC) 93 02/07/2019 10:48 AM  
 Glucose (POC) 87 02/07/2019 08:52 AM  
 Glucose (POC) 91 02/07/2019 04:19 AM  
 Glucose (POC) 152 (H) 02/06/2019 10:30 PM  
 
Lab Results Component Value Date/Time  Color YELLOW/STRAW 02/04/2019 05:06 PM  
 Appearance CLOUDY (A) 02/04/2019 05:06 PM  
 Specific gravity 1.015 02/04/2019 05:06 PM  
 pH (UA) 5.0 02/04/2019 05:06 PM  
 Protein NEGATIVE  02/04/2019 05:06 PM  
 Glucose NEGATIVE  02/04/2019 05:06 PM  
 Ketone NEGATIVE  02/04/2019 05:06 PM  
 Bilirubin NEGATIVE  02/04/2019 05:06 PM  
 Urobilinogen 0.2 02/04/2019 05:06 PM  
 Nitrites NEGATIVE  02/04/2019 05:06 PM  
 Leukocyte Esterase MODERATE (A) 02/04/2019 05:06 PM  
 Epithelial cells MODERATE (A) 02/04/2019 05:06 PM  
 Bacteria NEGATIVE  02/04/2019 05:06 PM  
 WBC 5-10 02/04/2019 05:06 PM  
 RBC 0-5 02/04/2019 05:06 PM

## 2019-02-07 NOTE — DIABETES MGMT
DTC Consult Follow-up Note Recommendations/ Comments: Consult received for assessment of home management. Pt is intubated and sedated and not appropriate to be seen at this time. Blood sugars  mg/dl. Noted Lantus 10 units ordered. FBS this morning was 87 mg/dl and pt received Lantus 10 untis this morning. If pt has hypoglycemia, may consider D5. DTC will continue to follow. Current hospital DM medication: correction scale Humalog with high sensitivity and Lantus 10 units,  
 
Chart reviewed on Nita Hernandez. Patient is a 77 y.o. female with known diabetes on Glipizide 5 mg on Tuesdays and Thursdays and Janumet  mg in the evening at home. A1c:  
Lab Results Component Value Date/Time Hemoglobin A1c <3.5 (L) 02/04/2019 01:09 PM  
 Hemoglobin A1c 5.2 04/23/2018 12:20 PM  
 
 
Recent Glucose Results:  
Lab Results Component Value Date/Time GLU 82 02/07/2019 04:20 AM  
  (H) 02/06/2019 02:10 PM  
 GLUCPOC 87 02/07/2019 08:52 AM  
 GLUCPOC 91 02/07/2019 04:19 AM  
 GLUCPOC 152 (H) 02/06/2019 10:30 PM  
  
 
Lab Results Component Value Date/Time Creatinine 2.07 (H) 02/07/2019 04:20 AM  
 
Estimated Creatinine Clearance: 23.2 mL/min (A) (based on SCr of 2.07 mg/dL (H)). Active Orders Diet DIET NPO  
  
 
PO intake:  
Patient Vitals for the past 72 hrs: 
 % Diet Eaten 02/06/19 0922 20 % Will continue to follow as needed. Thank you Ankita Taylor RD, CDE Time spent: 10 minutes

## 2019-02-07 NOTE — PROGRESS NOTES
Critical Care Note [facility] 1045 1. Acute on chronic diastolic CHF, NYHA Class IV on admit: EF 40-45%, needs DEVORA, plan for today follow TPA. Cont bumex gtt per renal, most likely needs CVVH for volume removal.  No BB/ACE until appropriate.   
  
2. S/p TAVR/TMVR 5/2018: Dr. Melanie Alexander and Dr. Jossy Shanks reviewed TTE, concerned about possible thrombus of her MV. Cont heparin gtt. Plan to give TPA Today, w /DEVORA to possible follow this afternoon.   
  
3. Acute hypoxic respiratory failure: Now intubated. On 80% fi02. Vent bundle. Diuresis. Start steroid nebs.   
  
4. Metabolic, lactic acidosis: renal assisting to manage, give IV bicarb this morning. Lactate improved 0.7 -- trend.   
  
5. Acute on chronic anemia: history of, prev workup by Dr. Neftaly Man with hematology w/ unclear etiology. Transfuse prn. Receiving IV iron. Hematology following. Cont IV venofer. Trend H/H. Consult GI -- upper endoscopy today?  
  
6. SILVINO: renal consulted/following. On bumex gtt, likely needs CVVH.   
  
7. Leukocytosis: on Zosyn/vanco. Currently afebrile, UA with mod leuks, no bacteria. Blood cx 2/6 NGTD. 
  
8. DM: cont lantus, SSI per orders. BS q6h. DTC consult.   
  
9. Hx of PAF: was on amio, holding here due to pulmonary contraindications. No anticoagulation, had been on coumadin after valve procedure - had supratherapeutic INR prompting readmission. Been off coumadin for several months  
  
10. HTN: Holding BP meds until appropriate.   
  
11. Hx of CAD, s/p CABG: on ASA,  no BB currently - had been on metoprolol as recently as 1/4/19 but not on PTA med list?  Holding statin for now.   
  
12. HLD: holding statin for now.  
  
13. Prior CVA, TIA: on ASA, holding statin for now 
  
14. Hx of carotid stenosis: f/u with vascular surgery 
  
15. RA: on enbrel weekly PTA 
  
16.  Dispo: critically ill and high risk for deterioration,  Family updated by cardiology.  
  
 Update:  Long conversation w/ Neeru, pharmacy staff (Jeremiah Santiago) about TPA dosing. Agreed upon 10 mg bolus, followed by 50 mg/hr x 1, then 20 mg/hr x 2 hr. Balancing risks/benefits of short course of therapy at higher dose.    
  
 
 
 
IMPRESSION Little improvement on heparin and meets indications for TPA despite risks which were discussed with family Not a candidate for open MVR Patient Vitals for the past 4 hrs: 
 Temp Pulse Resp SpO2  
02/07/19 1330 99.9 °F (37.7 °C) 65 16 100 % 02/07/19 1315 99.9 °F (37.7 °C) 66 24 100 % 02/07/19 1300 99.9 °F (37.7 °C) 65 15 100 % 02/07/19 1245 99.9 °F (37.7 °C) 64 13 100 % 02/07/19 1230 99.9 °F (37.7 °C) 67 12 100 % 02/07/19 1200 100 °F (37.8 °C) 67 13 100 % 02/07/19 1126  66 15 100 % 02/07/19 1117    100 % 02/07/19 1100  66 14 100 % 02/07/19 1001  73 18 100 % Intake/Output Summary (Last 24 hours) at 2/7/2019 1352 Last data filed at 2/7/2019 1200 Gross per 24 hour Intake 2675.01 ml Output 4215 ml Net -1539.99 ml Hemodynamics: 
 CO: CO (l/min): 4.9 l/min CI: CI (l/min/m2): 2.8 l/min/m2 CVP: CVP (mmHg): 13 mmHg (02/07/19 1330) SVR: SVR (dyne*sec)/cm5: 800 (dyne*sec)/cm5 (02/07/19 1200) PAP Systolic: PAP Systolic: 58 (30/51/14 9788) PAP Diastolic: PAP Diastolic: 24 (41/64/34 3828) PVR:   
 SV02: SVO2 (%): 72 % (02/07/19 1315) SCV02:   
 
Gen sedated Chest course bilateral BS Cardiac RRR Chest xrays reviewed HCT Date/Time Value Ref Range Status 02/07/2019 10:43 AM 24.7 (L) 35.0 - 47.0 % Final  
 
WBC Date/Time Value Ref Range Status 02/07/2019 04:20 AM 18.3 (H) 3.6 - 11.0 K/uL Final  
  Comment:  
  Due to mathematical rounding between the [address], and the new [employer] [facility] analyzers, the reported automated differential may vary by up to +/- 0.5% per cell line. This finding may produce a result that is 100% +/- 3%, which is clinically insignificant. Labs reviewed for last 24 hours Plan TPA infusion Repeat DEVORA this afternoon Critical care time 30 minutes Medical decision making- High complexity Risk of morbidity and mortality - high CPT code 93022

## 2019-02-07 NOTE — PROGRESS NOTES
GI  
 
Full consult note to follow. Patient seen and examined. We will proceed with EGD this morning prior to TPA administration to rule out any high risk lesion in upper GI tract that may have contributed to patient's underlying anemia. Patient is intubated and sedated in CVICU. Consent obtained from patient's  over the phone by nursing staff. Sonny Melendez MD

## 2019-02-07 NOTE — PROGRESS NOTES
Cardiology Progress Note Admit Date: 2/4/2019 Admit Diagnosis: SOB (shortness of breath) [R06.02] Date: 2/7/2019     Time: 0730 Subjective: Intubated, sedated and on mechanical ventilation. Now on vasopressor support (britt-synephrine) Assessment and Plan  
  
1. Acute CHF/mixed, HFmrEF and HFpEF:  NYHA  IV 
-EF 41-45%, mod LV concentric hypertrophy 
-RV systolic function reduced 
-Bumex gtt per renal. May need CRRT 
-No BB or ace-I for now 
-Suspect valvular dysfunction contributing 
-C. I. 3.4, SVO2 72 this a.m. 
  
3. AS/MR Hx of SAVR and now TAVR/TMVR 5/2018 by dr. Wilton León and Dr. Missy Isbell. 
-Now with mild-mos AS, AV prosthesis with abnormality c/w obstruction or stenosis. (change from echo 6/5/18)- concern for possible thrombus on valve 
-AV mean gradient is 21.9 mmHg. AV area is 1.2 cm2.  
-Mild MR: prosthesis abnormal (change from echo 6/5/18) -DEVORA this afternoon- will be done after thrombolytics if given. Await GI input. 4. Hx of PAF: currently NSR 
-Off amiodarone- concern for pulmonary effects 
-Not on [facility] [place]- heparin gtt stopped at 10AM today 
  
5. Hx of CAD: s/p CABG 9 years ago 
-On ASA, statin held currently 
-No BB for now. -Stress test was planned outpt but obviously on back burner 
   
6. CKD: stage II-III 
-Nephrology following. Worsened renal function expected over next couple weeks.  
  
7. Anemia, chronic: hgb improved to 8.4 from 6.8 s/p PRBC transfusions 
-IV iron 
-GI consulted- EGD planned today. 8. Hx of HTN: bp meds on hold. Now requiring vasopressor support. After GI evaluations, L DEVORA today after TPA infusion Cardiology attending: seen and examined earlier today. Agree with assess and plan Pa pressures not significantly improved with heparin and good diuresis. Renal function abnormal but fairly stable.  Chest clear anteriorly, cv rrr 2/6 as murmur, ext edema gone. 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 Plan for TPA today. Objective: 
  
 Physical Exam: 
             
Visit Vitals /45 (BP 1 Location: Left arm, BP Patient Position: Supine) Pulse 67 Temp 100 °F (37.8 °C) Resp 13 Ht 5' 2\" (1.575 m) Wt 154 lb 12.2 oz (70.2 kg) SpO2 100% BMI 28.31 kg/m² General Appearance:   sedated, intubated on ventilator. Ears/Nose/Mouth/Throat:     
  
    Neck:  Supple. Chest:    Lungs clear to auscultation bilaterally. Cardiovascular:   Regular rate and rhythm, S1, S2 normal, grade I-II/VI systolic murmur. Abdomen:    Soft, non-distended. Extremities:  No edema bilaterally. Skin:  Warm and dry. Telemetry: NSR Data Review:  
 Labs:   
Recent Results (from the past 24 hour(s)) POC VENOUS BLOOD GAS Collection Time: 02/06/19  3:15 PM  
Result Value Ref Range Device: VENT    
 FIO2 (POC) 100 %  
 pH, venous (POC) 7.321 7.32 - 7.42    
 pCO2, venous (POC) 36.1 (L) 41 - 51 MMHG  
 pO2, venous (POC) 40 25 - 40 mmHg HCO3, venous (POC) 18.7 (L) 23.0 - 28.0 MMOL/L  
 sO2, venous (POC) 71 65 - 88 % Base deficit, venous (POC) 7 mmol/L Mode ASSIST CONTROL Tidal volume 450 ml Set Rate 12 bpm  
 PEEP/CPAP (POC) 5 cmH2O Allens test (POC) NO Total resp. rate 20 [facility] Spec[facility] type (POC) VENOUS BLOOD    
POC G3 - PUL Collection Time: 02/06/19  3:19 PM  
Result Value Ref Range FIO2 (POC) 100 % pH (POC) 7.347 (L) 7.35 - 7.45    
 pCO2 (POC) 37.8 35.0 - 45.0 MMHG  
 pO2 (POC) 199 (H) 80 - 100 MMHG  
 HCO3 (POC) 20.7 (L) 22 - 26 MMOL/L  
 sO2 (POC) 100 (H) 92 - 97 % Base deficit (POC) 5 mmol/L Site DRAWN FROM ARTERIAL LINE Device: VENT Mode ASSIST CONTROL Tidal volume 450 ml Set Rate 12 bpm  
 PEEP/CPAP (POC) 5 cmH2O Allens test (POC) NO Specimen type (POC) ARTERIAL Total resp. rate 20 GLUCOSE, POC  
 Collection Time: 02/06/19  3:46 PM  
Result Value Ref Range Glucose (POC) 149 (H) 65 - 100 mg/dL Performed by Femi Acosta CULTURE, BLOOD, PAIRED Collection Time: 02/06/19  3:58 PM  
Result Value Ref Range Special Requests: NO SPECIAL REQUESTS Culture result: NO GROWTH AFTER 12 HOURS    
GLUCOSE, POC Collection Time: 02/06/19  6:33 PM  
Result Value Ref Range Glucose (POC) 153 (H) 65 - 100 mg/dL Performed by Femi Acosta GLUCOSE, POC Collection Time: 02/06/19 10:30 PM  
Result Value Ref Range Glucose (POC) 152 (H) 65 - 100 mg/dL Performed by Ladarius Lopez PTT Collection Time: 02/06/19 10:49 PM  
Result Value Ref Range aPTT 57.5 (H) 22.1 - 32.0 sec  
 aPTT, therapeutic range     58.0 - 77.0 SECS  
GLUCOSE, POC Collection Time: 02/07/19  4:19 AM  
Result Value Ref Range Glucose (POC) 91 65 - 100 mg/dL Performed by Ladarius Lopez LACTIC ACID Collection Time: 02/07/19  4:20 AM  
Result Value Ref Range Lactic acid 0.7 0.4 - 2.0 MMOL/L  
NT-PRO BNP Collection Time: 02/07/19  4:20 AM  
Result Value Ref Range NT pro-BNP >35,000 (H) 0 - 125 PG/ML  
CBC W/O DIFF Collection Time: 02/07/19  4:20 AM  
Result Value Ref Range WBC 18.3 (H) 3.6 - 11.0 K/uL  
 RBC 3.55 (L) 3.80 - 5.20 M/uL HGB 8.4 (L) 11.5 - 16.0 g/dL HCT 25.2 (L) 35.0 - 47.0 % MCV 71.0 (L) 80.0 - 99.0 FL  
 MCH 23.7 (L) 26.0 - 34.0 PG  
 MCHC 33.3 30.0 - 36.5 g/dL  
 RDW 19.6 (H) 11.5 - 14.5 % PLATELET 685 262 - 519 K/uL NRBC 0.3 (H) 0  WBC ABSOLUTE NRBC 0.05 (H) 0.00 - 0.01 K/uL METABOLIC PANEL, COMPREHENSIVE Collection Time: 02/07/19  4:20 AM  
Result Value Ref Range Sodium 146 (H) 136 - 145 mmol/L Potassium 2.9 (L) 3.5 - 5.1 mmol/L Chloride 106 97 - 108 mmol/L  
 CO2 20 (L) 21 - 32 mmol/L Anion gap 20 (H) 5 - 15 mmol/L Glucose 82 65 - 100 mg/dL BUN 80 (H) 6 - 20 MG/DL  Creatinine 2.07 (H) 0.55 - 1.02 MG/DL  
 BUN/Creatinine ratio 39 (H) 12 - 20 GFR est AA 29 (L) >60 ml/min/1.73m2 GFR est non-AA 24 (L) >60 ml/min/1.73m2 Calcium 8.3 (L) 8.5 - 10.1 MG/DL Bilirubin, total 1.5 (H) 0.2 - 1.0 MG/DL  
 ALT (SGPT) 43 12 - 78 U/L  
 AST (SGOT) 39 (H) 15 - 37 U/L Alk. phosphatase 90 45 - 117 U/L Protein, total 5.7 (L) 6.4 - 8.2 g/dL Albumin 2.3 (L) 3.5 - 5.0 g/dL Globulin 3.4 2.0 - 4.0 g/dL A-G Ratio 0.7 (L) 1.1 - 2.2 BETA-HYDROXYBUTYRATE Collection Time: 02/07/19  4:20 AM  
Result Value Ref Range B-hydroxybutyrate 0.23 <0.40 mmol/L  
HAPTOGLOBIN Collection Time: 02/07/19  4:20 AM  
Result Value Ref Range Haptoglobin 36 30 - 200 mg/dL DIRECT KAVEH Collection Time: 02/07/19  4:20 AM  
Result Value Ref Range FRANKLIN Poly NEG MAGNESIUM Collection Time: 02/07/19  4:20 AM  
Result Value Ref Range Magnesium 2.1 1.6 - 2.4 mg/dL PHOSPHORUS Collection Time: 02/07/19  4:20 AM  
Result Value Ref Range Phosphorus 6.2 (H) 2.6 - 4.7 MG/DL  
LD Collection Time: 02/07/19  4:20 AM  
Result Value Ref Range  (H) 81 - 246 U/L  
POC G3 - PUL Collection Time: 02/07/19  4:27 AM  
Result Value Ref Range FIO2 (POC) 60 % pH (POC) 7.337 (L) 7.35 - 7.45    
 pCO2 (POC) 35.4 35.0 - 45.0 MMHG  
 pO2 (POC) 65 (L) 80 - 100 MMHG  
 HCO3 (POC) 19.0 (L) 22 - 26 MMOL/L  
 sO2 (POC) 91 (L) 92 - 97 % Base deficit (POC) 7 mmol/L Site DRAWN FROM ARTERIAL LINE Device: VENT Mode ASSIST CONTROL Tidal volume 450 ml Set Rate 12 bpm  
 PEEP/CPAP (POC) 5 cmH2O  
 PIP (POC) 26 Allens test (POC) N/A Specimen type (POC) ARTERIAL Total resp. rate 20 POC VENOUS BLOOD GAS Collection Time: 02/07/19  4:31 AM  
Result Value Ref Range Device: VENT    
 FIO2 (POC) 80 %  
 pH, venous (POC) 7.320 7.32 - 7.42    
 pCO2, venous (POC) 37.6 (L) 41 - 51 MMHG  
 HCO3, venous (POC) 19.4 (L) 23.0 - 28.0 MMOL/L  Base deficit, venous (POC) 7 mmol/L  
 Mode ASSIST CONTROL Tidal volume 450 ml Set Rate 12 bpm  
 PEEP/CPAP (POC) 5 cmH2O  
 PIP (POC) 18 Allens test (POC) N/A Total resp. rate 20 Site [facility] [employer] Specimen type (POC) VENOUS BLOOD    
POC VENOUS BLOOD GAS Collection Time: 02/07/19  4:35 AM  
Result Value Ref Range Device: VENT    
 FIO2 (POC) 60 %  
 pH, venous (POC) 7.319 (L) 7.32 - 7.42    
 pCO2, venous (POC) 37.0 (L) 41 - 51 MMHG  
 pO2, venous (POC) 35 25 - 40 mmHg HCO3, venous (POC) 19.0 (L) 23.0 - 28.0 MMOL/L  
 sO2, venous (POC) 63 (L) 65 - 88 % Base deficit, venous (POC) 7 mmol/L Mode ASSIST CONTROL Tidal volume 450 ml Set Rate 12 bpm  
 PEEP/CPAP (POC) 5 cmH2O  
 PIP (POC) 26 Allens test (POC) N/A Total resp. rate 21 Site [facility] [employer] Specimen type (POC) VENOUS BLOOD    
PTT Collection Time: 02/07/19  6:18 AM  
Result Value Ref Range aPTT 49.3 (H) 22.1 - 32.0 sec  
 aPTT, therapeutic range     58.0 - 77.0 SECS  
GLUCOSE, POC Collection Time: 02/07/19  8:52 AM  
Result Value Ref Range Glucose (POC) 87 65 - 100 mg/dL Performed by [facility]   
PTT Collection Time: 02/07/19  9:37 AM  
Result Value Ref Range aPTT 105.4 (HH) 22.1 - 32.0 sec  
 aPTT, therapeutic range     58.0 - 77.0 SECS  
PTT Collection Time: 02/07/19 10:43 AM  
Result Value Ref Range aPTT 70.2 (H) 22.1 - 32.0 sec  
 aPTT, therapeutic range     58.0 - 77.0 SECS  
TSH 3RD GENERATION Collection Time: 02/07/19 10:43 AM  
Result Value Ref Range TSH 0.29 (L) 0.36 - 3.74 uIU/mL FIBRINOGEN Collection Time: 02/07/19 10:43 AM  
Result Value Ref Range Fibrinogen >800 (H) 200 - 475 mg/dL HGB & HCT Collection Time: 02/07/19 10:43 AM  
Result Value Ref Range HGB 8.0 (L) 11.5 - 16.0 g/dL HCT 24.7 (L) 35.0 - 47.0 % PROTHROMBIN TIME + INR Collection Time: 02/07/19 10:43 AM  
Result Value Ref Range INR 1.2 (H) 0.9 - 1.1 Prothrombin time 12.4 (H) 9.0 - 11.1 sec GLUCOSE, POC Collection Time: 02/07/19 10:48 AM  
Result Value Ref Range Glucose (POC) 93 65 - 100 mg/dL Performed by [facility]   
PTT Collection Time: 02/07/19 11:45 AM  
Result Value Ref Range aPTT 40.4 (H) 22.1 - 32.0 sec  
 aPTT, therapeutic range     58.0 - 77.0 SECS RENAL FUNCTION PANEL Collection Time: 02/07/19  2:00 PM  
Result Value Ref Range Sodium 149 (H) 136 - 145 mmol/L Potassium 3.5 3.5 - 5.1 mmol/L Chloride 109 (H) 97 - 108 mmol/L  
 CO2 22 21 - 32 mmol/L Anion gap 18 (H) 5 - 15 mmol/L Glucose 72 65 - 100 mg/dL BUN 81 (H) 6 - 20 MG/DL Creatinine 2.13 (H) 0.55 - 1.02 MG/DL  
 BUN/Creatinine ratio 38 (H) 12 - 20 GFR est AA 28 (L) >60 ml/min/1.73m2 GFR est non-AA 23 (L) >60 ml/min/1.73m2 Calcium 8.3 (L) 8.5 - 10.1 MG/DL Phosphorus 6.1 (H) 2.6 - 4.7 MG/DL Albumin 2.0 (L) 3.5 - 5.0 g/dL MAGNESIUM Collection Time: 02/07/19  2:00 PM  
Result Value Ref Range Magnesium 2.2 1.6 - 2.4 mg/dL Radiology:  
 
  
Current Facility-Administered Medications Medication Dose Route Frequency  balsam peru-castor oil (VENELEX) ointment   Topical BID  nystatin (MYCOSTATIN) 100,000 unit/gram powder   Topical BID  arformoterol (BROVANA) neb solution 15 mcg  15 mcg Nebulization BID RT And  
 budesonide (PULMICORT) 500 mcg/2 ml nebulizer suspension  500 mcg Nebulization BID RT  
 0.9% sodium chloride infusion  100 mL/hr IntraVENous CONTINUOUS  
 sodium chloride (NS) flush 5-40 mL  5-40 mL IntraVENous Q8H  
 sodium chloride (NS) flush 5-40 mL  5-40 mL IntraVENous PRN  
 simethicone (MYLICON) 12DL/4.6LJ oral drops 80 mg  1.2 mL Oral Multiple  diphenhydrAMINE (BENADRYL) injection 50 mg  50 mg IntraVENous ONCE  
 atropine injection 0.5 mg  0.5 mg IntraVENous ONCE PRN  
 EPINEPHrine (ADRENALIN) 0.1 mg/mL syringe 1 mg  1 mg Endoscopically ONCE PRN  potassium chloride 20 mEq in 50 ml IVPB  20 mEq IntraVENous Q1H  
  vancomycin random level @ 17:00 on 2/7 (with next blood draw)   Other ONCE  
 0.9% sodium chloride infusion 250 mL  250 mL IntraVENous PRN  
 iron sucrose (VENOFER) 300 mg in 0.9% sodium chloride 250 mL IVPB  300 mg IntraVENous Q24H  piperacillin-tazobactam (ZOSYN) 3.375 g in 0.9% sodium chloride (MBP/ADV) 100 mL  3.375 g IntraVENous Q8H  
 heparin 25,000 units in D5W 250 ml infusion  12-25 Units/kg/hr IntraVENous TITRATE  PHENYLephrine (MIKE-SYNEPHRINE) 30 mg in 0.9% sodium chloride 250 mL infusion   mcg/min IntraVENous TITRATE  bumetanide (BUMEX) 0.25 mg/mL infusion  0.5 mg/hr IntraVENous CONTINUOUS  
 niCARdipine (CARDENE) 25 mg in 0.9% sodium chloride 250 mL infusion  0-15 mg/hr IntraVENous TITRATE  
 0.45% sodium chloride infusion  10 mL/hr IntraVENous CONTINUOUS  
 0.9% sodium chloride infusion  9 mL/hr IntraVENous CONTINUOUS  
 ondansetron (ZOFRAN) injection 4 mg  4 mg IntraVENous Q6H PRN  
 vancomycin dosing by pharmacy   Other Rx Dosing/Monitoring  morphine injection 2 mg  2 mg IntraVENous Q4H PRN  
 insulin glargine (LANTUS) injection 10 Units  10 Units SubCUTAneous DAILY  folic acid (FOLVITE) tablet 1 mg  1 mg Oral DAILY  propofol (DIPRIVAN) infusion  0-50 mcg/kg/min IntraVENous TITRATE  insulin lispro (HUMALOG) injection   SubCUTAneous Q6H  
 0.9% sodium chloride infusion 250 mL  250 mL IntraVENous PRN  
 bisacodyl (DULCOLAX) suppository 10 mg  10 mg Rectal DAILY PRN  
 aspirin chewable tablet 81 mg  81 mg Oral DAILY  sodium chloride (NS) flush 5-40 mL  5-40 mL IntraVENous Q8H  
 sodium chloride (NS) flush 5-40 mL  5-40 mL IntraVENous PRN  
 nitroglycerin (NITROSTAT) tablet 0.4 mg  0.4 mg SubLINGual Q5MIN PRN  
 glucose chewable tablet 16 g  4 Tab Oral PRN  
 dextrose (D50W) injection syrg 12.5-25 g  25-50 mL IntraVENous PRN  
 glucagon (GLUCAGEN) injection 1 mg  1 mg IntraMUSCular PRN Marco Antonio Amaya. [facility]RE[facility] [address] [place] [address], [address] Shane Campos 
 [phone]

## 2019-02-07 NOTE — PROGRESS NOTES
Discussed ECHO findings with Cardiac surgery Noted concerns for a valve thrombus She will need anticoagulation with heparin gtt but Cardiac surgery feels that she may need TPA There is a high risk of bleeding with this We will follow closely and support as necessary. Patient has not been seen this morning and we will see her later today For any urgent questions please page me directly Claudetta Cruel MD, MS [facility]

## 2019-02-07 NOTE — PROGRESS NOTES
Resolution 360 clips x 2 placed in the Esophagus at the site of mucosal injury by Dr. Michelene Kehr. [facility] [id number] REF [id number] LOT [id number] EXP 11/27/21

## 2019-02-07 NOTE — PROGRESS NOTES
0745: Bedside and Verbal shift change report given to Akash George RN (oncoming nurse) by Gunnar Walls RN (offgoing nurse). Report included the following information SBAR, ED Summary, Procedure Summary, Intake/Output, MAR, Recent Results, Cardiac Rhythm SR  and Alarm Parameters . 0619: Dr. Sonia Rao and Dr. Giuseppe Singh at bedside, pt to receive tPA today. Dr. Giuseppe Singh will inform family 7724: Earnstine Bence, GI PA at bedside, updated on situation, he will put in order for EGD 
 
0910: Daughter, Lorne Castle, called unit. Updated on situation 
 
: Endoscopy team at bedside to set up for EGD 
 
0930: Per Dr. Stan Sosa, recheck STAT PTT to make sure PTT <50, and schedule q6h H/H and fibrinogen. If PTT <50, OK to start tPA  
 
0945: Spoke with Charbel Lipscomb, Pharmacist, Recommends holding Heparin gtt for PTT >50. New orders to hold heparin gtt if PTT >50 
 
0946: Dr. Garrick Arizmendi at bedside to perform EGD 
 
1000: Orders to stop Heparin gtt per Jordan Sullivan NP due to critical PTT 
 
1045: Repeat PTT 70.2, will recheck PTT in 1 hr, if <50 will start tPA 
 
1145: Repeat PTT 40.4, will start tPA infusion 1230: tPA infusion started 1520: Received call from Dr. Garrick Tabares, informed him of metabolic panel. New orders received for 200ml water flush q6h. 
 
1530: tPA infusion completed 1600: Dr. Donnie Zarco in to perform DEVORA 
 
1730: Heparin gtt restarted per Jordan Sullivan NP. Started at 13U/kg/hr per pharmacy, will redraw PTT in 6 hours 1800: Dr. Sonia Rao called to update family on pt condition 1900: Dr. Garrick Tabares at bedside, new orders to turn off bumex gtt if Na >150 with morning labs 1945: Bedside and Verbal shift change report given to Gunnar Walls RN (oncoming nurse) by Akash George RN (offgoing nurse). Report included the following information SBAR, Procedure Summary, Intake/Output, MAR, Recent Results, Cardiac Rhythm SR and Alarm Parameters .

## 2019-02-07 NOTE — PROGRESS NOTES
Pharmacist Note - Vancomycin Dosing Therapy day 2 Indication:  sepsis Current regimen: 1500 mg LD on 2/6 @ 16:06 A Random Level resulted at 14.6 mcg/mL which was obtained 24 hrs post-dose. Goal trough: 15 - 20 mcg/mL Plan: Will order 1000 mg IV x 1. Pharmacy will continue to monitor this patient daily for changes in clinical status and renal function.

## 2019-02-08 NOTE — PROGRESS NOTES
2000: Report received from Gregg Andres RN, drip rate dual RN verified and care assumed of patient. Patient not responding to painful stimuli, propofol decreased. 2045: Propofol rate decreased. 2130: Patient opening eyes, tracking, wiggling toes on command. 2145: Patient squeezing hands on command. Grimacing. Propofol increased. 2350: Pt BG 67. Treated with 12.5g D50.   
 
0005: Repeat . KCL & Mg sent to lab 
 
0130: K 3.0 Mg 1.9, will replete. 0400: CXR completed. 0430: Patient bathed, linen changed. 0500: ABG drawn and SVO2 recalibrated. 56: Dr. Damon Osorio called for update. Order received to stop Bumex drip and will reevaluate later today. 0615: Labs drawn. 0800: Bedside shift change report given to Robert Wang RN (oncoming nurse) by Chavez Snow RN (offgoing nurse). Report included the following information SBAR, Kardex, Procedure Summary, Intake/Output, MAR and Recent Results.

## 2019-02-08 NOTE — PROGRESS NOTES
Cardiology Progress Note Admit Date: 2/4/2019 Admit Diagnosis: SOB (shortness of breath) [R06.02] Date: 2/8/2019     Time: 0730 Subjective: Remains Intubated, sedated and on mechanical ventilation as well as on vasopressor support (britt-synephrine) Assessment and Plan  
  
1. Acute CHF/mixed, HFmrEF and HFpEF:  
- NYHA  IV on admission 
-EF 41-45%, mod LV concentric hypertrophy 
-RV systolic function reduced 
-PRO BNP >30,000 today 
-Bumex gtt stopped per renal. (hypernatremic, free water flushes increased per renal)  
-No BB or ace-I for now 
  
3. AS/MR Hx of SAVR and now TAVR/TMVR 5/2018 by dr. El Quinones and Dr. Blessing Contreras. -s/p TPA infusion 2/7/19.  
-Repeat DEVORA post tranfusion- no thrombus. Improved AV gradient 
-Mild MR: prosthesis abnormal on TTE 2/4(change from echo 6/5/18) -Repeat TTE pending this a.m. 
-PA pressures remain elevated 4. Hx of PAF: currently NSR 
-Off amiodarone- concern for pulmonary effects 
-Not on [address] PTA-  
  
5. Hx of CAD: s/p CABG 9 years ago 
-On ASA, statin held currently 
-No BB for now. -Stress test was planned outpt but obviously on back burner 
   
6. CKD: stage II-III 
-creatinine 2.03 
-Nephrology following.  
-bumex held- hypernatremia 
  
7. Anemia, chronic: hgb stable 8 s/p PRBC transfusions 
-GI consulted- distal esophogitis on EGD 2/7 s/p hemiclip 8. Hx of HTN: bp meds on hold. Now requiring vasopressor support. 9. Hypernatremia: free water flushes per renal. 
 
Repeat TTE pending. PA pressures still elevated. .. Cardiology attending: seen and examined earlier today. Agree with assess and plan 
Continues to diurese and oxygenation is improving. Nurse reports when sedation lightened, no focal deficits. DEVORA following TPA showed marked improvement in mv gradient, though swan shows pa pressures still elevated. Chest clear anteriorly, cv rrr 2/6 as murmur, ext tr edema. Labs noted. Valve appears to have improved but not sure she is much better. On heparin and presume we will transition to coumadin. Further plans per valve team. 
 
Objective: 
  
 Physical Exam: 
             
Visit Vitals /46 (BP 1 Location: Left arm, BP Patient Position: Supine) Pulse 67 Temp 99.1 °F (37.3 °C) Resp 12 Ht 5' 2\" (1.575 m) Wt 155 lb 10.3 oz (70.6 kg) SpO2 100% BMI 28.47 kg/m² General Appearance:   sedated, intubated on ventilator. Ears/Nose/Mouth/Throat:     
  
    Neck:  Supple. Chest:    Lungs clear to auscultation bilaterally. Cardiovascular:   Regular rate and rhythm, S1, S2 normal, grade I-II/VI systolic murmur. Abdomen:    Soft, non-distended. Extremities:  No edema bilaterally. Skin:  Warm and dry. Telemetry: NSR Data Review:  
 Labs:   
Recent Results (from the past 24 hour(s)) PTT Collection Time: 02/07/19  9:37 AM  
Result Value Ref Range aPTT 105.4 (HH) 22.1 - 32.0 sec  
 aPTT, therapeutic range     58.0 - 77.0 SECS  
PTT Collection Time: 02/07/19 10:43 AM  
Result Value Ref Range aPTT 70.2 (H) 22.1 - 32.0 sec  
 aPTT, therapeutic range     58.0 - 77.0 SECS  
CORTISOL Collection Time: 02/07/19 10:43 AM  
Result Value Ref Range Cortisol, random 39.2 ug/dL TSH 3RD GENERATION Collection Time: 02/07/19 10:43 AM  
Result Value Ref Range TSH 0.29 (L) 0.36 - 3.74 uIU/mL FIBRINOGEN Collection Time: 02/07/19 10:43 AM  
Result Value Ref Range Fibrinogen >800 (H) 200 - 475 mg/dL HGB & HCT Collection Time: 02/07/19 10:43 AM  
Result Value Ref Range HGB 8.0 (L) 11.5 - 16.0 g/dL HCT 24.7 (L) 35.0 - 47.0 % PROTHROMBIN TIME + INR Collection Time: 02/07/19 10:43 AM  
Result Value Ref Range INR 1.2 (H) 0.9 - 1.1 Prothrombin time 12.4 (H) 9.0 - 11.1 sec GLUCOSE, POC Collection Time: 02/07/19 10:48 AM  
Result Value Ref Range Glucose (POC) 93 65 - 100 mg/dL Performed by [facility]   
PTT Collection Time: 02/07/19 11:45 AM  
Result Value Ref Range aPTT 40.4 (H) 22.1 - 32.0 sec  
 aPTT, therapeutic range     58.0 - 77.0 SECS RENAL FUNCTION PANEL Collection Time: 02/07/19  2:00 PM  
Result Value Ref Range Sodium 149 (H) 136 - 145 mmol/L Potassium 3.5 3.5 - 5.1 mmol/L Chloride 109 (H) 97 - 108 mmol/L  
 CO2 22 21 - 32 mmol/L Anion gap 18 (H) 5 - 15 mmol/L Glucose 72 65 - 100 mg/dL BUN 81 (H) 6 - 20 MG/DL Creatinine 2.13 (H) 0.55 - 1.02 MG/DL  
 BUN/Creatinine ratio 38 (H) 12 - 20 GFR est AA 28 (L) >60 ml/min/1.73m2 GFR est non-AA 23 (L) >60 ml/min/1.73m2 Calcium 8.3 (L) 8.5 - 10.1 MG/DL Phosphorus 6.1 (H) 2.6 - 4.7 MG/DL Albumin 2.0 (L) 3.5 - 5.0 g/dL MAGNESIUM Collection Time: 02/07/19  2:00 PM  
Result Value Ref Range Magnesium 2.2 1.6 - 2.4 mg/dL PTT Collection Time: 02/07/19  4:12 PM  
Result Value Ref Range aPTT 28.1 22.1 - 32.0 sec  
 aPTT, therapeutic range     58.0 - 77.0 SECS FIBRINOGEN Collection Time: 02/07/19  4:12 PM  
Result Value Ref Range Fibrinogen >800 (H) 200 - 475 mg/dL HGB & HCT Collection Time: 02/07/19  4:12 PM  
Result Value Ref Range HGB 8.0 (L) 11.5 - 16.0 g/dL HCT 24.9 (L) 35.0 - 47.0 % PROTHROMBIN TIME + INR Collection Time: 02/07/19  4:12 PM  
Result Value Ref Range INR 1.2 (H) 0.9 - 1.1 Prothrombin time 11.6 (H) 9.0 - 11.1 sec Jocelin Kirby Collection Time: 02/07/19  4:12 PM  
Result Value Ref Range Vancomycin, random 14.6 UG/ML  
GLUCOSE, POC Collection Time: 02/07/19  4:21 PM  
Result Value Ref Range Glucose (POC) 81 65 - 100 mg/dL Performed by JONA ELIAS   
POC G3 - PUL Collection Time: 02/07/19  6:24 PM  
Result Value Ref Range FIO2 (POC) 80 % pH (POC) 7.456 (H) 7.35 - 7.45    
 pCO2 (POC) 31.7 (L) 35.0 - 45.0 MMHG  
 pO2 (POC) 253 (H) 80 - 100 MMHG HCO3 (POC) 22.3 22 - 26 MMOL/L  
 sO2 (POC) 100 (H) 92 - 97 % Base deficit (POC) 2 mmol/L Site DRAWN FROM ARTERIAL LINE Device: VENT Mode ASSIST CONTROL Tidal volume 450 ml Set Rate 12 bpm  
 PEEP/CPAP (POC) 5 cmH2O Allens test (POC) N/A Specimen type (POC) ARTERIAL Total resp. rate 19 Volume control YES    
GLUCOSE, POC Collection Time: 02/07/19 11:47 PM  
Result Value Ref Range Glucose (POC) 67 65 - 100 mg/dL Performed by Julisa Sanon FIBRINOGEN Collection Time: 02/07/19 11:48 PM  
Result Value Ref Range Fibrinogen 800 (H) 200 - 475 mg/dL PROTHROMBIN TIME + INR Collection Time: 02/07/19 11:48 PM  
Result Value Ref Range INR 1.2 (H) 0.9 - 1.1 Prothrombin time 11.9 (H) 9.0 - 11.1 sec PTT Collection Time: 02/07/19 11:48 PM  
Result Value Ref Range aPTT 56.9 (H) 22.1 - 32.0 sec  
 aPTT, therapeutic range     58.0 - 77.0 SECS  
GLUCOSE, POC Collection Time: 02/08/19 12:05 AM  
Result Value Ref Range Glucose (POC) 128 (H) 65 - 100 mg/dL Performed by Julisa Sanon MAGNESIUM Collection Time: 02/08/19 12:09 AM  
Result Value Ref Range Magnesium 1.9 1.6 - 2.4 mg/dL POTASSIUM Collection Time: 02/08/19 12:09 AM  
Result Value Ref Range Potassium 3.0 (L) 3.5 - 5.1 mmol/L  
GLUCOSE, POC Collection Time: 02/08/19  4:57 AM  
Result Value Ref Range Glucose (POC) 84 65 - 100 mg/dL Performed by [facility] [employer] POC G3 - PUL Collection Time: 02/08/19  5:06 AM  
Result Value Ref Range FIO2 (POC) 50 % pH (POC) 7.388 7.35 - 7.45    
 pCO2 (POC) 36.0 35.0 - 45.0 MMHG  
 pO2 (POC) 131 (H) 80 - 100 MMHG  
 HCO3 (POC) 21.7 (L) 22 - 26 MMOL/L  
 sO2 (POC) 99 (H) 92 - 97 % Base deficit (POC) 3 mmol/L Site DRAWN FROM ARTERIAL LINE Device: VENT Mode SIMV Tidal volume 450 ml Set Rate 12 bpm  
 PEEP/CPAP (POC) 5 cmH2O  Pressure support 10 cmH2O  
 Allens test (POC) N/A Inspiratory Time 0.98 sec Specimen type (POC) ARTERIAL Total resp. rate 18 POC VENOUS BLOOD GAS Collection Time: 02/08/19  5:10 AM  
Result Value Ref Range Device: VENT    
 FIO2 (POC) 50 %  
 pH, venous (POC) 7.354 7.32 - 7.42    
 pCO2, venous (POC) 36.2 (L) 41 - 51 MMHG  
 pO2, venous (POC) 39 25 - 40 mmHg HCO3, venous (POC) 20.1 (L) 23.0 - 28.0 MMOL/L  
 sO2, venous (POC) 72 65 - 88 % Base deficit, venous (POC) 5 mmol/L Mode SIMV Tidal volume 450 ml Set Rate 12 bpm  
 PEEP/CPAP (POC) 5 cmH2O Pressure support 10 cmH2O Allens test (POC) N/A Inspiratory Time 0.98 sec Total resp. rate 18 Site SWAN [employer] Specimen type (POC) MIXED VENOUS    
PTT Collection Time: 02/08/19  6:14 AM  
Result Value Ref Range aPTT 72.9 (H) 22.1 - 32.0 sec  
 aPTT, therapeutic range     58.0 - 77.0 SECS  
LACTIC ACID Collection Time: 02/08/19  6:14 AM  
Result Value Ref Range Lactic acid 0.6 0.4 - 2.0 MMOL/L  
NT-PRO BNP Collection Time: 02/08/19  6:14 AM  
Result Value Ref Range NT pro-BNP >35,000 (H) 0 - 125 PG/ML  
CBC W/O DIFF Collection Time: 02/08/19  6:14 AM  
Result Value Ref Range WBC 16.5 (H) 3.6 - 11.0 K/uL  
 RBC 3.44 (L) 3.80 - 5.20 M/uL HGB 8.0 (L) 11.5 - 16.0 g/dL HCT 24.6 (L) 35.0 - 47.0 % MCV 71.5 (L) 80.0 - 99.0 FL  
 MCH 23.3 (L) 26.0 - 34.0 PG  
 MCHC 32.5 30.0 - 36.5 g/dL RDW 20.3 (H) 11.5 - 14.5 % PLATELET 784 (L) 205 - 400 K/uL NRBC 0.4 (H) 0  WBC ABSOLUTE NRBC 0.06 (H) 0.00 - 0.01 K/uL METABOLIC PANEL, COMPREHENSIVE Collection Time: 02/08/19  6:14 AM  
Result Value Ref Range Sodium 150 (H) 136 - 145 mmol/L Potassium 4.2 3.5 - 5.1 mmol/L Chloride 112 (H) 97 - 108 mmol/L  
 CO2 20 (L) 21 - 32 mmol/L Anion gap 18 (H) 5 - 15 mmol/L Glucose 73 65 - 100 mg/dL BUN 77 (H) 6 - 20 MG/DL  Creatinine 2.02 (H) 0.55 - 1.02 MG/DL  
 BUN/Creatinine ratio 38 (H) 12 - 20    
 GFR est AA 30 (L) >60 ml/min/1.73m2 GFR est non-AA 24 (L) >60 ml/min/1.73m2 Calcium 8.1 (L) 8.5 - 10.1 MG/DL Bilirubin, total 1.2 (H) 0.2 - 1.0 MG/DL  
 ALT (SGPT) 35 12 - 78 U/L  
 AST (SGOT) 55 (H) 15 - 37 U/L Alk. phosphatase 89 45 - 117 U/L Protein, total 5.9 (L) 6.4 - 8.2 g/dL Albumin 1.9 (L) 3.5 - 5.0 g/dL Globulin 4.0 2.0 - 4.0 g/dL A-G Ratio 0.5 (L) 1.1 - 2.2 LD Collection Time: 02/08/19  6:14 AM  
Result Value Ref Range  (H) 81 - 246 U/L FIBRINOGEN Collection Time: 02/08/19  6:14 AM  
Result Value Ref Range Fibrinogen >800 (H) 200 - 475 mg/dL PROTHROMBIN TIME + INR Collection Time: 02/08/19  6:14 AM  
Result Value Ref Range INR 1.2 (H) 0.9 - 1.1 Prothrombin time 11.8 (H) 9.0 - 11.1 sec POC EG7 Collection Time: 02/08/19  6:19 AM  
Result Value Ref Range Calcium, ionized (POC) 1.16 1.12 - 1.32 mmol/L  
 FIO2 (POC) 50 % pH (POC) 7.377 7.35 - 7.45    
 pCO2 (POC) 33.4 (L) 35.0 - 45.0 MMHG  
 pO2 (POC) 141 (H) 80 - 100 MMHG  
 HCO3 (POC) 19.6 (L) 22 - 26 MMOL/L Base deficit (POC) 6 mmol/L  
 sO2 (POC) 99 (H) 92 - 97 % Site DRAWN FROM ARTERIAL LINE Device: VENT Mode SIMV Tidal volume 450 ml Set Rate 12 bpm  
 PEEP/CPAP (POC) 5 cmH2O Pressure support 10 cmH2O Allens test (POC) N/A Specimen type (POC) ARTERIAL Total resp. rate 18 Radiology:  
 
  
Current Facility-Administered Medications Medication Dose Route Frequency  ELECTROLYTE REPLACEMENT PROTOCOL  1 Each Other PRN  
 ELECTROLYTE REPLACEMENT PROTOCOL  1 Each Other PRN  pantoprazole (PROTONIX) 40 mg in sodium chloride 0.9% 10 mL injection  40 mg IntraVENous Q12H  
 vancomycin (VANCOCIN) 750 mg in 0.9% sodium chloride (MBP/ADV) 250 mL  750 mg IntraVENous Q24H  
 balsam peru-castor oil (VENELEX) ointment   Topical BID  nystatin (MYCOSTATIN) 100,000 unit/gram powder   Topical BID  
  arformoterol (BROVANA) neb solution 15 mcg  15 mcg Nebulization BID RT And  
 budesonide (PULMICORT) 500 mcg/2 ml nebulizer suspension  500 mcg Nebulization BID RT  
 sodium chloride (NS) flush 5-40 mL  5-40 mL IntraVENous Q8H  
 sodium chloride (NS) flush 5-40 mL  5-40 mL IntraVENous PRN  
 iron sucrose (VENOFER) 300 mg in 0.9% sodium chloride 250 mL IVPB  300 mg IntraVENous Q24H  piperacillin-tazobactam (ZOSYN) 3.375 g in 0.9% sodium chloride (MBP/ADV) 100 mL  3.375 g IntraVENous Q8H  
 heparin 25,000 units in D5W 250 ml infusion  12-25 Units/kg/hr IntraVENous TITRATE  PHENYLephrine (MIKE-SYNEPHRINE) 30 mg in 0.9% sodium chloride 250 mL infusion   mcg/min IntraVENous TITRATE  bumetanide (BUMEX) 0.25 mg/mL infusion  0.5 mg/hr IntraVENous CONTINUOUS  
 niCARdipine (CARDENE) 25 mg in 0.9% sodium chloride 250 mL infusion  0-15 mg/hr IntraVENous TITRATE  
 0.45% sodium chloride infusion  10 mL/hr IntraVENous CONTINUOUS  
 0.9% sodium chloride infusion  9 mL/hr IntraVENous CONTINUOUS  
 ondansetron (ZOFRAN) injection 4 mg  4 mg IntraVENous Q6H PRN  
 vancomycin dosing by pharmacy   Other Rx Dosing/Monitoring  morphine injection 2 mg  2 mg IntraVENous Q4H PRN  
 insulin glargine (LANTUS) injection 10 Units  10 Units SubCUTAneous DAILY  folic acid (FOLVITE) tablet 1 mg  1 mg Oral DAILY  propofol (DIPRIVAN) infusion  0-50 mcg/kg/min IntraVENous TITRATE  insulin lispro (HUMALOG) injection   SubCUTAneous Q6H  
 bisacodyl (DULCOLAX) suppository 10 mg  10 mg Rectal DAILY PRN  
 sodium chloride (NS) flush 5-40 mL  5-40 mL IntraVENous Q8H  
 sodium chloride (NS) flush 5-40 mL  5-40 mL IntraVENous PRN  
 nitroglycerin (NITROSTAT) tablet 0.4 mg  0.4 mg SubLINGual Q5MIN PRN  
 glucose chewable tablet 16 g  4 Tab Oral PRN  
 dextrose (D50W) injection syrg 12.5-25 g  25-50 mL IntraVENous PRN  
 glucagon (GLUCAGEN) injection 1 mg  1 mg IntraMUSCular PRN Igor Kim.  Lisandro NP 
 
 [facility] [address] [place] [address], [address] 
 [phone]

## 2019-02-08 NOTE — WOUND CARE
WOCN Note:  
 
New consult placed by RN for sacrum. Chart shows: 
Admitted for SOB & CHF with intubation 2/6/19 and extubated 2/8/19 with 2 TEEs and and EGD 2/7/19; history of DM, TAVR, anemia, CVA On Neosynephrine Assessment:  
She opens her eyes briefly with turning. RN at bedside to assist with turning from left to right. She has a Jiménez. Bed: total care SPORT Grimaces with turning. Bilateral heel and buttocks skin intact and without erythema. Heels offloaded on pillows. 1. Left upper buttock and sacrum with linear burgundy field which is intact over puckered sacral scar tissue = 2.5 x 8 x 0 cm  Venelex in use. Recommendations:   
Continue with frequent application of Venelex and turning. Offload heels. Total care SPORT bed with air mattress: Use only flat sheet and one incontinence pad. Discussed above plan with RN and Layo Suazo clinical lead. Transition of Care: Plan to follow as needed while admitted to hospital.  
 
LATOSHA Pat, RN, [facility] [place] Certified Wound, Ostomy, Continence Nurse 
office [phone] 
pager [phone] or call  to page

## 2019-02-08 NOTE — PROGRESS NOTES
Problem: Falls - Risk of 
Goal: *Absence of Falls Document Oscar Calderon Fall Risk and appropriate interventions in the flowsheet. Outcome: Progressing Towards Goal 
Fall Risk Interventions: 
Mobility Interventions: Communicate number of staff needed for ambulation/transfer, Strengthening exercises (ROM-active/passive) Medication Interventions: Evaluate medications/consider consulting pharmacy Elimination Interventions: Toileting schedule/hourly rounds Problem: Pressure Injury - Risk of 
Goal: *Prevention of pressure injury Document Ivan Scale and appropriate interventions in the flowsheet. Outcome: Progressing Towards Goal 
Pressure Injury Interventions: 
Sensory Interventions: Assess need for specialty bed, Check visual cues for pain, Float heels, Keep linens dry and wrinkle-free, Minimize linen layers, Turn and reposition approx. every two hours (pillows and wedges if needed) Moisture Interventions: Maintain skin hydration (lotion/cream), Minimize layers Activity Interventions: Assess need for specialty bed, Increase time out of bed, Pressure redistribution bed/mattress(bed type) Mobility Interventions: Float heels, HOB 30 degrees or less, Turn and reposition approx. every two hours(pillow and wedges) Nutrition Interventions: Document food/fluid/supplement intake, Offer support with meals,snacks and hydration, Discuss nutritional consult with provider Friction and Shear Interventions: Feet elevated on foot rest, Apply protective barrier, creams and emollients, HOB 30 degrees or less, Lift sheet, Minimize layers Problem: Heart Failure: Day 4 Goal: *Oxygen saturation within defined limits Outcome: Not Progressing Towards Goal 
Mechanical ventilation FIO2 50% Goal: *Demonstrates progressive activity Outcome: Not Progressing Towards Goal 
Intubated and sedated

## 2019-02-08 NOTE — PROGRESS NOTES
Nephrology Progress Note [address]  
 
[email or web address]                  Phone - [phone] Patient: Umang Barahona YOB: 1950     Admit Date: 2/4/2019 Date- 2/8/2019 CC: Follow up for arf Subjective: Interval History:  
-  Cr. Stable Urine out put > 5000 ml Na high 
k low Remained intubated ROS:- as above Assessment:  
· sumaya due to chf 
· ckd · Metabolic acidosis · HYPOKALEMIA · ckd 3- bl.cr. 1.3 · chf 
· MS,MR,TMVR MAY 2018 · H/o CAD, CABG · Anemia · DM 2 Plan: · S/p iv kcl · Check labs this afternoon · Increase free water to 200 ml q 4 hours · Off bumex gtt at present Physical Exam:  
GEN: on vent NECK-no mass RESP: Clear b/l, no wheezing, CVS: RRR,S1,S2 SKIN: No Rash, ABDO: soft , non tender, EXT: + Edema NEURO:Can't access due to patient's current condition Care Plan discussed with: nurse Objective:  
Patient Vitals for the past 24 hrs: 
 Temp Pulse Resp BP SpO2  
02/08/19 0800 99.1 °F (37.3 °C) 69 13  100 % 02/08/19 0700 98.9 °F (37.2 °C) 68 12  100 % 02/08/19 0600 98.9 °F (37.2 °C) 62 15  100 % 02/08/19 0500 99 °F (37.2 °C) 67 19  100 % 02/08/19 0400 99.6 °F (37.6 °C) 70 20 114/46 100 % 02/08/19 0300 99.6 °F (37.6 °C) 72 21  100 % 02/08/19 0200 99.8 °F (37.7 °C) 71 23  100 % 02/08/19 0100 99.9 °F (37.7 °C) 69 17  100 % 02/08/19 0004  70 16  100 % 02/08/19 0000 100.1 °F (37.8 °C) 70 16 120/48 100 % 02/07/19 2300 100.1 °F (37.8 °C) 71 16  100 % 02/07/19 2200 100 °F (37.8 °C) 71 13  100 % 02/07/19 2121  71 18  100 % 02/07/19 2100 99.5 °F (37.5 °C) 69 14  100 % 02/07/19 2000 99.5 °F (37.5 °C) 64 12 107/46 98 % 02/07/19 1900 99.8 °F (37.7 °C) 65 16  98 % 02/07/19 1800 99.6 °F (37.6 °C) 62 18  100 % 02/07/19 1737  62 16  100 % 02/07/19 1700 100.2 °F (37.9 °C) 68 12  100 % 02/07/19 1600 100.2 °F (37.9 °C) 67 12  100 % 02/07/19 1500 100 °F (37.8 °C) 68 13  100 % 02/07/19 1430 100 °F (37.8 °C) 67 13  100 % 02/07/19 1400 99.9 °F (37.7 °C) 67 21  100 % 02/07/19 1330 99.9 °F (37.7 °C) 65 16  100 % 02/07/19 1315 99.9 °F (37.7 °C) 66 24  100 % 02/07/19 1300 99.9 °F (37.7 °C) 65 15  100 % 02/07/19 1245 99.9 °F (37.7 °C) 64 13  100 % 02/07/19 1230 99.9 °F (37.7 °C) 67 12  100 % 02/07/19 1200 100 °F (37.8 °C) 67 13  100 % 02/07/19 1126  66 15  100 % 02/07/19 1117     100 % 02/07/19 1100  66 14  100 % 02/07/19 1001  73 18  100 % 02/07/19 0945  70 22  100 % 02/07/19 0900  71 18  100 % Last 3 Recorded Weights in this Encounter 02/06/19 0713 02/07/19 0330 02/08/19 0400 Weight: 74.3 kg (163 lb 12.8 oz) 70.2 kg (154 lb 12.2 oz) 70.6 kg (155 lb 10.3 oz) 02/06 1901 - 02/08 0700 In: 4713.9 [I.V.:4313.9] Out: Anthony Santo [[id number]] Chart reviewed. Pertinent Notes reviewed. Medication list  reviewed Current Facility-Administered Medications Medication  ELECTROLYTE REPLACEMENT PROTOCOL  ELECTROLYTE REPLACEMENT PROTOCOL  pantoprazole (PROTONIX) 40 mg in sodium chloride 0.9% 10 mL injection  vancomycin (VANCOCIN) 750 mg in 0.9% sodium chloride (MBP/ADV) 250 mL  balsam peru-castor oil (VENELEX) ointment  nystatin (MYCOSTATIN) 100,000 unit/gram powder  arformoterol (BROVANA) neb solution 15 mcg And  budesonide (PULMICORT) 500 mcg/2 ml nebulizer suspension  sodium chloride (NS) flush 5-40 mL  sodium chloride (NS) flush 5-40 mL  iron sucrose (VENOFER) 300 mg in 0.9% sodium chloride 250 mL IVPB  piperacillin-tazobactam (ZOSYN) 3.375 g in 0.9% sodium chloride (MBP/ADV) 100 mL  heparin 25,000 units in D5W 250 ml infusion  PHENYLephrine (MIKE-SYNEPHRINE) 30 mg in 0.9% sodium chloride 250 mL infusion  bumetanide (BUMEX) 0.25 mg/mL infusion  niCARdipine (CARDENE) 25 mg in 0.9% sodium chloride 250 mL infusion  0.45% sodium chloride infusion  
 0.9% sodium chloride infusion  ondansetron (ZOFRAN) injection 4 mg  vancomycin dosing by pharmacy  morphine injection 2 mg  insulin glargine (LANTUS) injection 10 Units  folic acid (FOLVITE) tablet 1 mg  propofol (DIPRIVAN) infusion  insulin lispro (HUMALOG) injection  bisacodyl (DULCOLAX) suppository 10 mg  
 sodium chloride (NS) flush 5-40 mL  sodium chloride (NS) flush 5-40 mL  nitroglycerin (NITROSTAT) tablet 0.4 mg  
 glucose chewable tablet 16 g  
 dextrose (D50W) injection syrg 12.5-25 g  
 glucagon (GLUCAGEN) injection 1 mg Data Review : 
Recent Labs 02/08/19 
0614 02/08/19 
0009 02/07/19 
2348 02/07/19 
1612 02/07/19 
1400 02/07/19 
1145 02/07/19 
1043  02/07/19 
0420  02/06/19 
1410  02/06/19 
0400 WBC 16.5*  --   --   --   --   --   --   --  18.3*  --  21.9*  --  20.2* HGB 8.0*  --   --  8.0*  --   --  8.0*  --  8.4*  --  7.8*  --  6.8*  
*  --   --   --   --   --   --   --  154  --  235  --  137* ANEU  --   --   --   --   --   --   --   --   --   --  20.6*  --  18.4* INR 1.2*  --  1.2* 1.2*  --   --  1.2*  --   --   --   --   --   --   
APTT 72.9*  --  56.9* 28.1  --  40.4* 70.2*   < >  --    < >  --    < >  --   
*  --   --   --  149*  --   --   --  146*  --  141  --  140  
K 4.2 3.0*  --   --  3.5  --   --   --  2.9*  --  3.9  --  4.1 GLU 73  --   --   --  72  --   --   --  82  --  152*  --  153* BUN 77*  --   --   --  81*  --   --   --  80*  --  82*  --  78* CREA 2.02*  --   --   --  2.13*  --   --   --  2.07*  --  2.05*  --  2.10* ALT 35  --   --   --   --   --   --   --  43  --   --   --   --   
SGOT 55*  --   --   --   --   --   --   --  39*  --   --   --   --   
TBILI 1.2*  --   --   --   --   --   --   --  1.5*  --   --   --   --   
AP 89  --   --   --   --   --   --   --  90  --   --   --   --   
CA 8.1*  --   --   --  8.3*  --   --   --  8.3*  --  9.0  --  9.0 MG  --  1.9  --   --  2.2  --   --   --  2.1  --   --   --   --   
PHOS  --   --   --   --  6.1*  --   --   --  6.2*  --  5.7*  --  5.4*  
 < > = values in this interval not displayed. Lab Results Component Value Date/Time Culture result: NO GROWTH 2 DAYS 02/06/2019 03:58 PM  
 Culture result: ESCHERICHIA COLI (A) 04/23/2018 12:19 PM  
 
No results found for: LOYD Recent Labs 02/06/19 
0400 FERR 1,117* No results found for: Basilio Alvarado MD 
[facility] 
 www. [facility] [employer] [address], [address] [place], [address] [place] Phone - [phone] Fax - [phone]

## 2019-02-08 NOTE — PROGRESS NOTES
0800 Bedside report received from Venkatesh Arzate, assumed care of pt. Drips dual verified. 2284 Dr. Simi Bingham at bedside. Discussed current drips and lab values. Orders obtained to increase free water flushes to every 4 hours. 6988 Orders obtained to place [place] [address] per Desiree Morel NP; also ok to hold lantus d/t hypoglycemia last night. 0945 OGT removed. 10 fr dobhoff tube placed per orders. Placement verified with air bolus. KUB ordered. 1007 Propofol stopped in preparation for SBT. 1030 KUB performed at bedside 1058 Pt alert and following commands. Over breathing th ventilator. SBT started. 1135 ABG obtained: pH 7.35 / Co2 36.6 / pO2 159 / HCO3 20.5. Pt extubated to 6L NC. 
 
1212 GI NP at bedside, updates given. 1312 Pt passed bedside swallow eval. Per NP pt can have liquids for comfort, tube feeds tonight, and will reevaluated in the morning. 1525 Tube feeds started per order. 56 Dr. Rosa Gamino at bedside speaking with pt and pt's family. 1928 .3. Heparin gtt stopped. Repeat PTT will be drawn in 2 hours. Dr. Rosa Gamino made aware, no new orders. 2000 Bedside and Verbal shift change report given to Gee (oncoming nurse) by Celine Smith (offgoing nurse). Report included the following information SBAR, Kardex, MAR, Recent Results and Cardiac Rhythm NSR 1st AVB .

## 2019-02-08 NOTE — DIABETES MGMT
DTC Consult Follow-up Note Recommendations/ Comments:  Pt is intubated and sedated. Noted Lantus d/c due to hypoglycemia. Note TF. Pt continues on correction scale with high sensitivity. DTC to continue to follow. Current hospital DM medication: correction scale Humalog with high sensitivity Chart reviewed on Zina Dorman. Patient is a 77 y.o. female with known diabetes on Glipizide 5 mg on Tuesdays and Thursdays and Janumet  mg in the evening at home. A1c:  
Lab Results Component Value Date/Time Hemoglobin A1c <3.5 (L) 02/04/2019 01:09 PM  
 Hemoglobin A1c 5.2 04/23/2018 12:20 PM  
 
 
Recent Glucose Results:  
Lab Results Component Value Date/Time GLU 73 02/08/2019 06:14 AM  
 GLU 72 02/07/2019 02:00 PM  
 GLUCPOC 91 02/08/2019 11:31 AM  
 GLUCPOC 84 02/08/2019 04:57 AM  
 GLUCPOC 128 (H) 02/08/2019 12:05 AM  
  
 
Lab Results Component Value Date/Time Creatinine 2.02 (H) 02/08/2019 06:14 AM  
 
Estimated Creatinine Clearance: 23.7 mL/min (A) (based on SCr of 2.02 mg/dL (H)). Active Orders Diet DIET NPO  
  
 
PO intake:  
Patient Vitals for the past 72 hrs: 
 % Diet Eaten 02/06/19 0922 20 % Will continue to follow as needed. Thank you Avni Alfonso RD, CDE Time spent: 7 minutes

## 2019-02-08 NOTE — PROGRESS NOTES
[facility] ICU Progress Note Critical Care Note Admit Date: 2019 Procedure:  Procedure(s): ESOPHAGOGASTRODUODENOSCOPY (EGD) Subjective:  
Pt seen with Dr. Parmjit Lugo. Intubated, sedated. Fio2 50%. Tmax 100.2. On bumex 0.5, heparin, britt 40, propofol 30. S/p TPA yesterday, FU DEVORA shows no residual thrombus. Objective:  
Vitals: 
Blood pressure 114/46, pulse 66, temperature 98.8 °F (37.1 °C), resp. rate 13, height 5' 2\" (1.575 m), weight 155 lb 10.3 oz (70.6 kg), SpO2 100 %. Temp (24hrs), Av.7 °F (37.6 °C), Min:98.8 °F (37.1 °C), Max:100.2 °F (37.9 °C) Hemodynamics: 
 CO: CO (l/min): 4.9 l/min CI: CI (l/min/m2): 2.8 l/min/m2 CVP: CVP (mmHg): 9 mmHg (19) SVR: SVR (dyne*sec)/cm5: 800 (dyne*sec)/cm5 (19) PAP Systolic: PAP Systolic: 57 ( 6208) PAP Diastolic: PAP Diastolic: 22 ( 3742) PVR:   
 SV02: SVO2 (%): 75 % (19) SCV02:   
 
EKG/Rhythm:  NSR w/ 1st deg block Ventilator: 
Ventilator Volumes Vt Set (ml): 450 ml (19) Vt Exhaled (Machine Breath) (ml): 505 ml (19) Vt Spont (ml): 508 ml (19 050) Ve Observed (l/min): 6.29 l/min (19) Oxygen Therapy: 
Oxygen Therapy O2 Sat (%): 100 % (19) Pulse via Oximetry: 66 beats per minute (19) O2 Device: Endotracheal tube (02/08/19 0912) O2 Flow Rate (L/min): 4 l/min (19 1602) FIO2 (%): 50 % (19 0912) CXR:   
CXR Results  (Last 48 hours) 19 0452  XR CHEST PORT Final result Impression:  IMPRESSION:  
Slight improvement in interstitial and parenchymal opacity bilaterally. Otherwise no change. Narrative:  PORTABLE CHEST RADIOGRAPH/S: 2019 4:52 AM  
   
Clinical history: CHF, intubated INDICATION:   CHF, intubated COMPARISON: 2019 FINDINGS:  
AP portable upright view of the chest demonstrates large cardiopericardial  
 silhouette. The lungs are adequately expanded. Left basilar atelectasis and  
effusion is not significantly changed. Multifocal airspace opacity and  
interstitial prominence is slightly improved compared to the previous study. The  
[place] catheter remains in place. ET tube and NG tube unchanged. . The osseous  
structures are unremarkable. Patient is on a cardiac monitor. 02/07/19 0447  XR CHEST PORT Final result Impression:  IMPRESSION: No significant change. Narrative:  INDICATION:  CHF, intubated EXAM: CXR Portable. FINDINGS: Portable chest shows satisfactory NG tube placement in the stomach  
with otherwise stable support lines/devices since yesterday. There is no  
apparent pneumothorax. Lungs show unchanged bilateral prominent airspace  
disease/edema. Heart size is obscured. There is no midline shift. 02/06/19 1449  XR CHEST PORT Final result Impression:  IMPRESSION:   
Right IJ [place] catheter and ET tube as described. No pneumothorax. Narrative:  INDICATION:    Lines, ETT   
   
EXAMINATION:  AP CHEST, PORTABLE  
   
COMPARISON: Earlier today FINDINGS: Single AP portable view of the chest at 1437 hours demonstrates  
interval placement of a right IJ [place] catheter, tip in the main pulmonary  
outflow tract. ET tube is in satisfactory position. There is no pneumothorax. There is chronic cardiomegaly with evidence of CABG. The cardiomediastinal  
silhouette is stable. Diffuse bilateral interstitial and patchy airspace  
opacities persist.  
   
  
  
 
 
 
Admission Weight: Last Weight Weight: 147 lb (66.7 kg) Weight: 155 lb 10.3 oz (70.6 kg) Intake / Output / Drain: 
Current Shift: 02/08 0701 - 02/08 1900 In: 115.6 [I.V.:85.6] Out: 237 [FFPLM:257] Last 24 hrs.:  
 
Intake/Output Summary (Last 24 hours) at 2/8/2019 9047 Last data filed at 2/8/2019 2210 Gross per 24 hour Intake 3491.69 ml Output 3350 ml  
 Net 141.69 ml EXAM: 
General:  Intubated, sedated. Lungs:   Clear to auscultation bilaterally. Heart:  Regular rate and rhythm, S1, S2 normal, no murmur, click, rub or gallop. Abdomen:   Soft, non-tender. Bowel sounds hypoactive. No masses,  No organomegaly. Extremities:  No edema. PPP. Neurologic:  Gross motor and sensory apparatus intact. Labs:  
Recent Labs 19 
8291 19 
0662 WBC 16.5*  --   
HGB 8.0*  --   
HCT 24.6*  --   
*  --   
*  --   
K 4.2  --   
BUN 77*  --   
CREA 2.02*  --   
GLU 73  --   
GLUCPOC  --  84 INR 1.2*  --   
 
 
 Assessment:  
 
Principal Problem: 
  SOB (shortness of breath) (2019) Plan/Recommendations/Medical Decision Makin. Acute on chronic diastolic CHF, NYHA Class IV on admit: EF 40-45%, repeat TTE this morning. Hold bumex gtt this morning per renal.  No BB/ACE until appropriate.   
  
2. S/p TAVR/TMVR 2018:  S/p TPA, DEVORA  shows no residual MV thrombus. Cont heparin gtt, will eventually need coumadin. Repeat TTE this morning.    
  
3. Acute hypoxic respiratory failure: Now intubated. On 50% fi02. Vent bundle. Diuresis. Cont steroid nebs. SBT/wean to extubate today.   
  
4. Metabolic, lactic acidosis: renal assisting to manage, Lactate improved 0.6 -- trend.   
  
5. Acute on chronic anemia: history of, prev workup by Dr. Judith Sahu with hematology w/ unclear etiology. Transfuse prn. Hematology following. Cont IV venofer. Trend H/H. S/p upper endoscopy w/ GI . Add protonix IV. Platelets dropping on heparin gtt, hold aspirin for now.  
  
6. SILVINO: renal consulted/following. Hold bumex gtt per renal.     
  
7. Leukocytosis: on Zosyn/vanco. Currently afebrile, UA with mod leuks, no bacteria. Blood cx 2/6 NGTD. 
  
8. DM: BS low, stop lantus, SSI per orders. BS q6h.   DTC consult.   
  
 9. Hx of PAF: was on amio, holding here due to pulmonary contraindications. No anticoagulation, had been on coumadin after valve procedure - had supratherapeutic INR prompting readmission. Been off coumadin now for several months.   
  
10. HTN, now hypotension: Holding BP meds until appropriate. Wean britt for MAP >65. Random cortisol WNL.   
  
11. Hx of CAD, s/p CABG: hold ASA,  no BB currently - had been on metoprolol as recently as 1/4/19 but not on PTA med list?  Holding statin for now, will resume as LFTS improve.   
  
12. HLD: holding statin for now.  
  
13. Prior CVA, TIA: on ASA, holding statin for now 
  
14. Hx of carotid stenosis: f/u with vascular surgery 
  
15. RA: on enbrel weekly PTA 16. Hypernatremia: Cont free water flushes 200 ml q4h. 17. GI/DVT px:  Protonix, heparin gtt. 18. Nutrition:  Place dobhoff, nutrition consult for TF.  200 ml free water flushes q4h.   
  
19. Dispo: remain in CVI.    
 
 
Signed By: Yary Santos NP

## 2019-02-08 NOTE — PROGRESS NOTES
NUTRITION COMPLETE ASSESSMENT 
 
RECOMMENDATIONS:  
If appetite poor after diet advanced consider nocturnal feedings until intake improves Glucerna 1.2 @ 60 ml/hr x 12 hr (flush per renal) Interventions/Plan:  
Food/Nutrient Delivery:   Initiate enteral nutrition Glucerna 1.2 @ 50 ml/hr; 200 ml water flush q 4 hr 
Assessment:  
Reason for Assessment:  
[x] Provider Consult-Tube Feeding management Diet: NPO Nutritionally Significant Medications: [x] Reviewed & Includes:  Omar, folic acid, correction scale insulin, Venofer, KCL, 200 ml water flush q 6 hr 
Meal Intake:  
Patient Vitals for the past 100 hrs: 
 % Diet Eaten 02/06/19 0922 20 % Subjective: 
Per -pt was only eating 2 meals per day for the past month. She does like her potato chips. Objective: Ms Pinedo Parent was admitted with SOB. Noted: Acute on chronic diastolic heart failure-was on Bumex drip; acute hypoxic respiratory-intubated 2/6, extubated this morning; SILVINO on CKD; significant anemia-hematology following, severe iron deficiency. PMHx: DM, MR, HTN CAD, others noted. Patient appears well-nourished; negative MST on admission. Decreased po intake d/t congestions PTA (per ). Sodium elevated-free water flushes ordered via [employer] . BUN and creatinine trending down. Good to see pt extubated this morning. Will go ahead with enteral feedings and monitor oral intake as diet advanced. Consider nocturnal tube feeding if appetite is poor initially. Recommended feeding to meet pt's estimated needs: Glucerna 1.2 @ 50 ml/hr with water flush as ordered by MD (200 ml q 4 hr). This will provide 1200 ml, 1440 calories, 72 gm protein and 2170 ml free water (tube feeding/flush) per day. Once sodium WNL-reduce flush to 80 ml q 4 hr. If nocturnal feedings indicated-suggest: Glucerna 1.2 @ 60 ml/hr x 12 hr. This will provide 720 ml, 865 calories and 43 gm protein to meet 60% pt's estimated needs. Estimated Nutrition Needs: Kcals/day: 1465 Kcals/day Protein: 72 g(1.0g/kg) Fluid: (1ml/kcal) Based On: [facility] ([id number]) Weight Used: Actual wt(72 kg) Pt expected to meet estimated nutrient needs:  [x]   Yes via enteral feeding    []  No  [] Unable to predict at this time Nutrition Diagnosis:  
1. Inadequate protein-energy intake related to acute respiratory failure as evidenced by intubation; plan for enteral nutrition support. Goals: Tolerate tube feeding at goal in next 1-2 days. Monitoring & Evaluation: - Enteral/parenteral nutrition intake - Electrolyte and renal profile, Weight/weight change Previous Nutrition Goals Met: N/A Previous Recommendations: N/A Education & Discharge Needs: 
 [x] None Identified 
 [] Identified and addressed [x] Participated in care plan, discharge planning, and/or interdisciplinary rounds Cultural, Church and ethnic food preferences identified: 
 None Skin Integrity: [x]Intact  [] Other Edema: []None [x]2+ NP BUE's, 1+ pitting BLE's Last BM: 2/4 Food Allergies: [x]None []Other Anthropometrics:   
Weight Loss Metrics 2/8/2019 2/4/2019 1/4/2019 7/12/2018 7/12/2018 6/28/2018 6/26/2018 Today's Wt 154 lb - 147 lb 139 lb 14.4 oz 140 lb 137 lb 8 oz 140 lb BMI - 28.17 kg/m2 26.46 kg/m2 25.18 kg/m2 25 kg/m2 24.55 kg/m2 25 kg/m2 Last 3 Recorded Weights in this Encounter 02/08/19 0400 02/08/19 0950 02/08/19 1123 Weight: 70.6 kg (155 lb 10.3 oz) 69.9 kg (154 lb) 69.9 kg (154 lb) Weight Source: Bed Height: 5' 2\" (157.5 cm), Body mass index is 28.17 kg/m². IBW : 49.9 kg (110 lb), % IBW (Calculated): 140 % 
 ,   
 
Labs:   
Lab Results Component Value Date/Time  Sodium 150 (H) 02/08/2019 06:14 AM  
 Potassium 4.2 02/08/2019 06:14 AM  
 Chloride 112 (H) 02/08/2019 06:14 AM  
 CO2 20 (L) 02/08/2019 06:14 AM  
 Glucose 73 02/08/2019 06:14 AM  
 BUN 77 (H) 02/08/2019 06:14 AM  
 Creatinine 2.02 (H) 02/08/2019 06:14 AM  
 Calcium 8.1 (L) 02/08/2019 06:14 AM  
 Magnesium 1.9 02/08/2019 12:09 AM  
 Phosphorus 6.1 (H) 02/07/2019 02:00 PM  
 Albumin 1.9 (L) 02/08/2019 06:14 AM  
 
Lab Results Component Value Date/Time Hemoglobin A1c <3.5 (L) 02/04/2019 01:09 PM  
 
Lab Results Component Value Date/Time Glucose (POC) 84 02/08/2019 04:57 AM  
  
Lab Results Component Value Date/Time ALT (SGPT) 35 02/08/2019 06:14 AM  
 AST (SGOT) 55 (H) 02/08/2019 06:14 AM  
 Alk.  phosphatase 89 02/08/2019 06:14 AM  
 Bilirubin, total 1.2 (H) 02/08/2019 06:14 AM  
  
 
Kelin Dias RD [facility]

## 2019-02-08 NOTE — PROGRESS NOTES
Reason for Admission:   Shortness of breath RRAT Score:     21-Red-High Resources/supports as identified by patient/family:    
             
Top Challenges facing patient (as identified by patient/family and CM): Finances/Medication cost?    [facility] [employer] Transportation? Patient's family will assist. 
           
Support system or lack thereof?  and other family Living arrangements? Patient lives with her . Self-care/ADLs/Cognition?  reports patient is independent with self-care and ADLs. Current Advanced Directive/Advance Care Plan:  No AMD on file but  reports that they are in the process of completing paperwork. Patient is FULL code status. Plan for utilizing home health:    CM completed referral to [facility] for [facility]. Likelihood of readmission: High given RAT score. Transition of Care Plan:        Plan is for patient to discharge home with family support and [facility] but this is to be further determined pending further treatment and evaluation. *Initial assessment completed over the phone with patient's  as she was extubated today and asleep at the time of CMs attempted bedside visit.  
 
Dedra Topete MS

## 2019-02-08 NOTE — PROGRESS NOTES
CM reviewed chart and attended rounds this am - patient admitted for SOB, hx TAVR/TMVR with acute hypoxic resp failure- patient remains intubated and sedated at this time in CVICU. Will continue to follow and await input from PT/OT and MD when appropriate.  JULIUS Garcia

## 2019-02-08 NOTE — PROGRESS NOTES
[facility] 
[address] [facility], [address] GI PROGRESS NOTE Kiel Gannon, [facility] office [phone] NP in-hospital cell phone M-F until 4:30 After 5pm or on weekends, please call  for physician on call NAME: Radha Carver :  1950 MRN:  [id number] Subjective:  
Extubated, denies complaint. Family at bedside. Discussed with RN no melena or BRBPR. Objective: VITALS:  
Last 24hrs VS reviewed since prior progress note. Most recent are: 
Visit Vitals /46 Pulse 76 Temp 99.2 °F (37.3 °C) Resp 16 Ht 5' 2\" (1.575 m) Wt 69.9 kg (154 lb) SpO2 100% BMI 28.17 kg/m² PHYSICAL EXAM: 
General: Cooperative, no acute distress   
Neurologic:  Alert and oriented HEENT: EOMI, no scleral icterus, dobhoff Lungs:  CTA bilaterally. No wheezing Heart:  S1 S2 Abdomen: Soft, non-distended, no tenderness. +Bowel sounds Extremities: warm Psych:   Fair insight. Not anxious or agitated. Lab Data Reviewed:  
 
Recent Results (from the past 24 hour(s)) RENAL FUNCTION PANEL Collection Time: 19  2:00 PM  
Result Value Ref Range Sodium 149 (H) 136 - 145 mmol/L Potassium 3.5 3.5 - 5.1 mmol/L Chloride 109 (H) 97 - 108 mmol/L  
 CO2 22 21 - 32 mmol/L Anion gap 18 (H) 5 - 15 mmol/L Glucose 72 65 - 100 mg/dL BUN 81 (H) 6 - 20 MG/DL Creatinine 2.13 (H) 0.55 - 1.02 MG/DL  
 BUN/Creatinine ratio 38 (H) 12 - 20 GFR est AA 28 (L) >60 ml/min/1.73m2 GFR est non-AA 23 (L) >60 ml/min/1.73m2 Calcium 8.3 (L) 8.5 - 10.1 MG/DL Phosphorus 6.1 (H) 2.6 - 4.7 MG/DL Albumin 2.0 (L) 3.5 - 5.0 g/dL MAGNESIUM Collection Time: 19  2:00 PM  
Result Value Ref Range Magnesium 2.2 1.6 - 2.4 mg/dL PTT Collection Time: 19  4:12 PM  
Result Value Ref Range aPTT 28.1 22.1 - 32.0 sec  
 aPTT, therapeutic range     58.0 - 77.0 SECS FIBRINOGEN  Collection Time: 19  4:12 PM  
 Result Value Ref Range Fibrinogen >800 (H) 200 - 475 mg/dL HGB & HCT Collection Time: 02/07/19  4:12 PM  
Result Value Ref Range HGB 8.0 (L) 11.5 - 16.0 g/dL HCT 24.9 (L) 35.0 - 47.0 % PROTHROMBIN TIME + INR Collection Time: 02/07/19  4:12 PM  
Result Value Ref Range INR 1.2 (H) 0.9 - 1.1 Prothrombin time 11.6 (H) 9.0 - 11.1 sec Xi Legato Collection Time: 02/07/19  4:12 PM  
Result Value Ref Range Vancomycin, random 14.6 UG/ML  
GLUCOSE, POC Collection Time: 02/07/19  4:21 PM  
Result Value Ref Range Glucose (POC) 81 65 - 100 mg/dL Performed by JONA ELIAS   
POC G3 - PUL Collection Time: 02/07/19  6:24 PM  
Result Value Ref Range FIO2 (POC) 80 % pH (POC) 7.456 (H) 7.35 - 7.45    
 pCO2 (POC) 31.7 (L) 35.0 - 45.0 MMHG  
 pO2 (POC) 253 (H) 80 - 100 MMHG  
 HCO3 (POC) 22.3 22 - 26 MMOL/L  
 sO2 (POC) 100 (H) 92 - 97 % Base deficit (POC) 2 mmol/L Site DRAWN FROM ARTERIAL LINE Device: VENT Mode ASSIST CONTROL Tidal volume 450 ml Set Rate 12 bpm  
 PEEP/CPAP (POC) 5 cmH2O Allens test (POC) N/A Specimen type (POC) ARTERIAL Total resp. rate 19 Volume control YES    
GLUCOSE, POC Collection Time: 02/07/19 11:47 PM  
Result Value Ref Range Glucose (POC) 67 65 - 100 mg/dL Performed by Zachery Pepe FIBRINOGEN Collection Time: 02/07/19 11:48 PM  
Result Value Ref Range Fibrinogen 800 (H) 200 - 475 mg/dL PROTHROMBIN TIME + INR Collection Time: 02/07/19 11:48 PM  
Result Value Ref Range INR 1.2 (H) 0.9 - 1.1 Prothrombin time 11.9 (H) 9.0 - 11.1 sec PTT Collection Time: 02/07/19 11:48 PM  
Result Value Ref Range aPTT 56.9 (H) 22.1 - 32.0 sec  
 aPTT, therapeutic range     58.0 - 77.0 SECS  
GLUCOSE, POC Collection Time: 02/08/19 12:05 AM  
Result Value Ref Range Glucose (POC) 128 (H) 65 - 100 mg/dL Performed by Zachery TONY  
 Collection Time: 02/08/19 12:09 AM  
Result Value Ref Range Magnesium 1.9 1.6 - 2.4 mg/dL POTASSIUM Collection Time: 02/08/19 12:09 AM  
Result Value Ref Range Potassium 3.0 (L) 3.5 - 5.1 mmol/L  
GLUCOSE, POC Collection Time: 02/08/19  4:57 AM  
Result Value Ref Range Glucose (POC) 84 65 - 100 mg/dL Performed by Radha Hutson POC G3 - PUL Collection Time: 02/08/19  5:06 AM  
Result Value Ref Range FIO2 (POC) 50 % pH (POC) 7.388 7.35 - 7.45    
 pCO2 (POC) 36.0 35.0 - 45.0 MMHG  
 pO2 (POC) 131 (H) 80 - 100 MMHG  
 HCO3 (POC) 21.7 (L) 22 - 26 MMOL/L  
 sO2 (POC) 99 (H) 92 - 97 % Base deficit (POC) 3 mmol/L Site DRAWN FROM ARTERIAL LINE Device: VENT Mode SIMV Tidal volume 450 ml Set Rate 12 bpm  
 PEEP/CPAP (POC) 5 cmH2O Pressure support 10 cmH2O Allens test (POC) N/A Inspiratory Time 0.98 sec Specimen type (POC) ARTERIAL Total resp. rate 18 POC VENOUS BLOOD GAS Collection Time: 02/08/19  5:10 AM  
Result Value Ref Range Device: VENT    
 FIO2 (POC) 50 %  
 pH, venous (POC) 7.354 7.32 - 7.42    
 pCO2, venous (POC) 36.2 (L) 41 - 51 MMHG  
 pO2, venous (POC) 39 25 - 40 mmHg HCO3, venous (POC) 20.1 (L) 23.0 - 28.0 MMOL/L  
 sO2, venous (POC) 72 65 - 88 % Base deficit, venous (POC) 5 mmol/L Mode SIMV Tidal volume 450 ml Set Rate 12 bpm  
 PEEP/CPAP (POC) 5 cmH2O Pressure support 10 cmH2O Allens test (POC) N/A Inspiratory Time 0.98 sec Total resp. rate 18 Site BIANKA Garcia Specimen type (POC) MIXED VENOUS    
PTT Collection Time: 02/08/19  6:14 AM  
Result Value Ref Range aPTT 72.9 (H) 22.1 - 32.0 sec  
 aPTT, therapeutic range     58.0 - 77.0 SECS  
LACTIC ACID Collection Time: 02/08/19  6:14 AM  
Result Value Ref Range Lactic acid 0.6 0.4 - 2.0 MMOL/L  
NT-PRO BNP Collection Time: 02/08/19  6:14 AM  
Result Value Ref Range  NT pro-BNP >35,000 (H) 0 - 125 PG/ML  
 CBC W/O DIFF Collection Time: 02/08/19  6:14 AM  
Result Value Ref Range WBC 16.5 (H) 3.6 - 11.0 K/uL  
 RBC 3.44 (L) 3.80 - 5.20 M/uL HGB 8.0 (L) 11.5 - 16.0 g/dL HCT 24.6 (L) 35.0 - 47.0 % MCV 71.5 (L) 80.0 - 99.0 FL  
 MCH 23.3 (L) 26.0 - 34.0 PG  
 MCHC 32.5 30.0 - 36.5 g/dL RDW 20.3 (H) 11.5 - 14.5 % PLATELET 259 (L) 820 - 400 K/uL NRBC 0.4 (H) 0  WBC ABSOLUTE NRBC 0.06 (H) 0.00 - 0.01 K/uL METABOLIC PANEL, COMPREHENSIVE Collection Time: 02/08/19  6:14 AM  
Result Value Ref Range Sodium 150 (H) 136 - 145 mmol/L Potassium 4.2 3.5 - 5.1 mmol/L Chloride 112 (H) 97 - 108 mmol/L  
 CO2 20 (L) 21 - 32 mmol/L Anion gap 18 (H) 5 - 15 mmol/L Glucose 73 65 - 100 mg/dL BUN 77 (H) 6 - 20 MG/DL Creatinine 2.02 (H) 0.55 - 1.02 MG/DL  
 BUN/Creatinine ratio 38 (H) 12 - 20 GFR est AA 30 (L) >60 ml/min/1.73m2 GFR est non-AA 24 (L) >60 ml/min/1.73m2 Calcium 8.1 (L) 8.5 - 10.1 MG/DL Bilirubin, total 1.2 (H) 0.2 - 1.0 MG/DL  
 ALT (SGPT) 35 12 - 78 U/L  
 AST (SGOT) 55 (H) 15 - 37 U/L Alk. phosphatase 89 45 - 117 U/L Protein, total 5.9 (L) 6.4 - 8.2 g/dL Albumin 1.9 (L) 3.5 - 5.0 g/dL Globulin 4.0 2.0 - 4.0 g/dL A-G Ratio 0.5 (L) 1.1 - 2.2 LD Collection Time: 02/08/19  6:14 AM  
Result Value Ref Range  (H) 81 - 246 U/L FIBRINOGEN Collection Time: 02/08/19  6:14 AM  
Result Value Ref Range Fibrinogen >800 (H) 200 - 475 mg/dL PROTHROMBIN TIME + INR Collection Time: 02/08/19  6:14 AM  
Result Value Ref Range INR 1.2 (H) 0.9 - 1.1 Prothrombin time 11.8 (H) 9.0 - 11.1 sec POC EG7 Collection Time: 02/08/19  6:19 AM  
Result Value Ref Range Calcium, ionized (POC) 1.16 1.12 - 1.32 mmol/L  
 FIO2 (POC) 50 % pH (POC) 7.377 7.35 - 7.45    
 pCO2 (POC) 33.4 (L) 35.0 - 45.0 MMHG  
 pO2 (POC) 141 (H) 80 - 100 MMHG  
 HCO3 (POC) 19.6 (L) 22 - 26 MMOL/L  Base deficit (POC) 6 mmol/L  
 sO2 (POC) 99 (H) 92 - 97 % Site DRAWN FROM ARTERIAL LINE Device: VENT Mode SIMV Tidal volume 450 ml Set Rate 12 bpm  
 PEEP/CPAP (POC) 5 cmH2O Pressure support 10 cmH2O Allens test (POC) N/A Specimen type (POC) ARTERIAL Total resp. rate 18 ECHO ADULT COMPLETE Collection Time: 02/08/19  9:50 AM  
Result Value Ref Range LV E' Lateral Velocity 6.08 cm/s LV E' Septal Velocity 3.89 cm/s Tapse 1.20 (A) 1.5 - 2.0 cm Aortic Valve Systolic Peak Velocity 861.28 cm/s AoV VTI 39.01 cm Aortic Valve Area by Continuity of Peak Velocity 1.3 cm2 Aortic Valve Area by Continuity of VTI 1.8 cm2 AoV PG 18.1 mmHg LVIDd 4.52 3.9 - 5.3 cm  
 LVPWd 1.42 (A) 0.6 - 0.9 cm LVIDs 2.85 cm IVSd 1.42 (A) 0.6 - 0.9 cm  
 LVOT d 1.69 cm  
 LVOT Peak Velocity 125.70 cm/s LVOT Peak Gradient 6.3 mmHg LVOT VTI 31.39 cm  
 MVA (PHT) 3.1 cm2  
 MV A Colt 167.36 cm/s  
 MV E Colt 2.15 cm/s  
 MV E/A 0.01   
 MV Mean Gradient 8.8 mmHg Mitral Valve Annulus Velocity Time Integral 70.53 cm RVIDd 2.95 cm Right Ventricular Outflow Track Peak Velocity 58.65 cm/s Aortic Valve Systolic Mean Gradient 9.9 mmHg LV Mass .8 (A) 67 - 162 g  
 LV Mass AL Index 178.2 43 - 95 g/m2 E/E' lateral 0.35   
 E/E' septal 0.55 RVSP 47.7 mmHg MV Peak Gradient 17.5 mmHg E/E' ratio (averaged) 0.45 Est. RA Pressure 5.0 mmHg Mitral Valve E Wave Deceleration Time 241.3 ms  
 Mitral Valve Pressure Half-time 70.0 ms Left Atrium Major Axis 4.86 cm Triscuspid Valve Regurgitation Peak Gradient 42.7 mmHg Mitral Valve Max Velocity 209.25 cm/s MVA VTI 1.0 cm2 LVOT SV 70.1 ml  
 TR Max Velocity 326.80 cm/s PASP 47.7 mmHg GLUCOSE, POC Collection Time: 02/08/19 11:31 AM  
Result Value Ref Range Glucose (POC) 91 65 - 100 mg/dL Performed by Tonny Beth Assessment: · Acute on chronic anemia: hgb stable 8. 0;EGD 2/7/19: distal esophagitis (likely OG tube trauma) status post hemoclip · Acute on chronic congestive heart failure · Status post TAVR/TMVR in 5/2018: mitral valve thrombus status post TPA on heparin · Acute hypoxic respiratory failure · Acute kidney injury on chronic kidney disease Patient Active Problem List  
Diagnosis Code  Coronary atherosclerosis of native coronary artery I25.10  Mitral valve disease I05.9  Aortic valve disorder I35.9  DM (diabetes mellitus) ([facility] [address] E11.9  
 Essential hypertension, benign I10  Dyslipidemia E78.5  History of MVR with cardiopulmonary bypass Z95.2  
 S/P AVR (aortic valve replacement) Z95.2  Paroxysmal atrial fibrillation (HCC) I48.0  Type 2 diabetes with nephropathy (HCC) E11.21  
 AS (aortic stenosis) I35.0  Elevated INR R79.1  SOB (shortness of breath) R06.02 Plan: · Monitor CBC, transfuse as necessary · PPI · If she develops signs of upper GI bleeding will consider repeating EGD, if BRBPR should get CTA with GI bleed protocol Signed By: Chelita Pérez NP   
 2/8/2019  12:13 PM  
 
GI Attending: Agree with above. Will see on request this weekend. Please call with any questions. Sonny Winston MD

## 2019-02-08 NOTE — PROGRESS NOTES
Case reviewed Discussed with Cardiology NP Hb stable TPA was successful- on heparin with plans to transition to coumadin once stable No bleeding Will follow

## 2019-02-09 NOTE — PROGRESS NOTES
Problem: Falls - Risk of 
Goal: *Absence of Falls Document Catherene Bunting Fall Risk and appropriate interventions in the flowsheet. Fall Risk Interventions: 
Mobility Interventions: Communicate number of staff needed for ambulation/transfer Mentation Interventions: Adequate sleep, hydration, pain control, More frequent rounding, Reorient patient Medication Interventions: Evaluate medications/consider consulting pharmacy Elimination Interventions: Toileting schedule/hourly rounds Problem: Pressure Injury - Risk of 
Goal: *Prevention of pressure injury Document Ivan Scale and appropriate interventions in the flowsheet. Outcome: Progressing Towards Goal 
Pressure Injury Interventions: 
Sensory Interventions: Assess changes in LOC, Float heels, Keep linens dry and wrinkle-free, Minimize linen layers Moisture Interventions: Apply protective barrier, creams and emollients, Check for incontinence Q2 hours and as needed, Minimize layers Activity Interventions: Pressure redistribution bed/mattress(bed type), Assess need for specialty bed Mobility Interventions: HOB 30 degrees or less, Float heels Nutrition Interventions: Document food/fluid/supplement intake Friction and Shear Interventions: Lift sheet, Minimize layers Problem: Impaired Skin Integrity/Pressure Injury Treatment Goal: *Prevention of pressure injury Document Ivan Scale and appropriate interventions in the flowsheet. Outcome: Progressing Towards Goal 
Pressure Injury Interventions: 
Sensory Interventions: Assess changes in LOC, Float heels, Keep linens dry and wrinkle-free, Minimize linen layers Moisture Interventions: Apply protective barrier, creams and emollients, Check for incontinence Q2 hours and as needed, Minimize layers Activity Interventions: Pressure redistribution bed/mattress(bed type), Assess need for specialty bed Mobility Interventions: HOB 30 degrees or less, Float heels Nutrition Interventions: Document food/fluid/supplement intake Friction and Shear Interventions: Lift sheet, Minimize layers

## 2019-02-09 NOTE — PROGRESS NOTES
Cardiology Progress Note Admit Date: 2/4/2019 Admit Diagnosis: SOB (shortness of breath) [R06.02] Date: 2/9/2019     Time:3:10 PM   
Subjective:  
Nice lady with family at bedside, sitting up Weak and mild SOB but improved No chest pain + lower extremity edema Running low bp 90-100s Holding beta blocker Having diarrhea On bumex drip-will need to adjust if diarrhea continues Try move off Tube feeds soon Assessment and Plan  
  
1. Acute CHF/mixed, HFmrEF and HFpEF:  
- NYHA  IV on admission 
-EF 41-45%, mod LV concentric hypertrophy 
-RV systolic function reduced 
-PRO BNP >30,000 today 
-Bumex gtt stopped per renal. (hypernatremic, free water flushes increased per renal) -now restarted 
-No BB or ace-I for now 
  
2. AS/MR Hx of SAVR and now TAVR/TMVR 5/2018 by Dr. Fitz Storm and Dr. Nato Echavarria. -s/p TPA infusion 2/7/19.  
-Repeat DEVORA post tranfusion- no thrombus. Improved AV gradient 
-Mild MR: prosthesis abnormal on TTE 2/4(change from echo 6/5/18) -PA pressures remain elevated 4. Hx of PAF: currently NSR 
-Off amiodarone- concern for pulmonary effects 
-Not on OAC PTA-  
 On heparin and presume we will transition to coumadin 5. Hx of CAD: s/p CABG 9 years ago 
-On ASA, statin held currently 
-No BB for now. -Stress test was planned outpt but obviously on back burner 
   
6. CKD: stage II-III Lab Results Component Value Date/Time Creatinine 2.09 (H) 02/09/2019 04:02 AM  
  
-Nephrology following.  
-bumex held- hypernatremia 
  
7. Anemia, chronic: hgb stable 8 s/p PRBC transfusions 
-GI consulted- distal esophogitis on EGD 2/7 s/p hemiclip 8. Hx of HTN: bp meds on hold. 9. Hypernatremia:  per renal. 
Objective: 
  
 Physical Exam: 
             
Visit Vitals /46 Pulse 94 Temp 99.6 °F (37.6 °C) Resp 22 Ht 5' 2\" (1.575 m) Wt 158 lb 1.1 oz (71.7 kg) SpO2 92% BMI 28.91 kg/m² General Appearance:   sedated, intubated on ventilator. Ears/Nose/Mouth/Throat:     
  
    Neck:  Supple. Chest:    Lungs clear to auscultation bilaterally. Cardiovascular:   Regular rate and rhythm, S1, S2 normal, grade 2/6 systolic early/decresendo  murmur at RUSB to LUSB without radiation beyond apex 
c murmur. Abdomen:    Soft, non-distended. Extremities:  1-2+ shins edema bilaterally. Skin:  Warm and dry. Telemetry: NSR Data Review:  
 Labs:   
Recent Results (from the past 24 hour(s)) PTT Collection Time: 02/08/19  5:50 PM  
Result Value Ref Range aPTT 124.3 (HH) 22.1 - 32.0 sec  
 aPTT, therapeutic range     58.0 - 77.0 SECS  
HGB & HCT Collection Time: 02/08/19  5:50 PM  
Result Value Ref Range HGB 7.4 (L) 11.5 - 16.0 g/dL HCT 23.8 (L) 35.0 - 47.0 % GLUCOSE, POC Collection Time: 02/08/19  5:56 PM  
Result Value Ref Range Glucose (POC) 125 (H) 65 - 100 mg/dL Performed by Gwendolyn Armando METABOLIC PANEL, COMPREHENSIVE Collection Time: 02/08/19  5:57 PM  
Result Value Ref Range Sodium 150 (H) 136 - 145 mmol/L Potassium 3.7 3.5 - 5.1 mmol/L Chloride 110 (H) 97 - 108 mmol/L  
 CO2 20 (L) 21 - 32 mmol/L Anion gap 20 (H) 5 - 15 mmol/L Glucose 127 (H) 65 - 100 mg/dL BUN 77 (H) 6 - 20 MG/DL Creatinine 2.01 (H) 0.55 - 1.02 MG/DL  
 BUN/Creatinine ratio 38 (H) 12 - 20 GFR est AA 30 (L) >60 ml/min/1.73m2 GFR est non-AA 24 (L) >60 ml/min/1.73m2 Calcium 8.2 (L) 8.5 - 10.1 MG/DL Bilirubin, total 1.4 (H) 0.2 - 1.0 MG/DL  
 ALT (SGPT) 29 12 - 78 U/L  
 AST (SGOT) 49 (H) 15 - 37 U/L Alk. phosphatase 111 45 - 117 U/L Protein, total 5.9 (L) 6.4 - 8.2 g/dL Albumin 1.9 (L) 3.5 - 5.0 g/dL Globulin 4.0 2.0 - 4.0 g/dL A-G Ratio 0.5 (L) 1.1 - 2.2 MAGNESIUM Collection Time: 02/08/19  5:57 PM  
Result Value Ref Range Magnesium 2.2 1.6 - 2.4 mg/dL PTT Collection Time: 02/08/19  9:19 PM  
Result Value Ref Range aPTT 39.9 (H) 22.1 - 32.0 sec  
 aPTT, therapeutic range     58.0 - 77.0 SECS  
GLUCOSE, POC Collection Time: 02/08/19 11:59 PM  
Result Value Ref Range Glucose (POC) 149 (H) 65 - 100 mg/dL Performed by Larissa Yanez   
LACTIC ACID Collection Time: 02/09/19  4:02 AM  
Result Value Ref Range Lactic acid 0.6 0.4 - 2.0 MMOL/L  
NT-PRO BNP Collection Time: 02/09/19  4:02 AM  
Result Value Ref Range NT pro-BNP >35,000 (H) 0 - 125 PG/ML  
CBC W/O DIFF Collection Time: 02/09/19  4:02 AM  
Result Value Ref Range WBC 11.3 (H) 3.6 - 11.0 K/uL  
 RBC 3.07 (L) 3.80 - 5.20 M/uL HGB 7.1 (L) 11.5 - 16.0 g/dL HCT 22.5 (L) 35.0 - 47.0 % MCV 73.3 (L) 80.0 - 99.0 FL  
 MCH 23.1 (L) 26.0 - 34.0 PG  
 MCHC 31.6 30.0 - 36.5 g/dL RDW 21.1 (H) 11.5 - 14.5 % PLATELET 86 (L) 648 - 400 K/uL NRBC 0.6 (H) 0  WBC ABSOLUTE NRBC 0.07 (H) 0.00 - 0.01 K/uL METABOLIC PANEL, COMPREHENSIVE Collection Time: 02/09/19  4:02 AM  
Result Value Ref Range Sodium 150 (H) 136 - 145 mmol/L Potassium 4.1 3.5 - 5.1 mmol/L Chloride 112 (H) 97 - 108 mmol/L  
 CO2 18 (L) 21 - 32 mmol/L Anion gap 20 (H) 5 - 15 mmol/L Glucose 163 (H) 65 - 100 mg/dL BUN 78 (H) 6 - 20 MG/DL Creatinine 2.09 (H) 0.55 - 1.02 MG/DL  
 BUN/Creatinine ratio 37 (H) 12 - 20 GFR est AA 28 (L) >60 ml/min/1.73m2 GFR est non-AA 23 (L) >60 ml/min/1.73m2 Calcium 8.3 (L) 8.5 - 10.1 MG/DL Bilirubin, total 1.3 (H) 0.2 - 1.0 MG/DL  
 ALT (SGPT) 31 12 - 78 U/L  
 AST (SGOT) 41 (H) 15 - 37 U/L Alk. phosphatase 149 (H) 45 - 117 U/L Protein, total 5.8 (L) 6.4 - 8.2 g/dL Albumin 1.8 (L) 3.5 - 5.0 g/dL Globulin 4.0 2.0 - 4.0 g/dL A-G Ratio 0.5 (L) 1.1 - 2.2 LD Collection Time: 02/09/19  4:02 AM  
Result Value Ref Range  (H) 81 - 246 U/L  
PROTHROMBIN TIME + INR Collection Time: 02/09/19  4:02 AM  
Result Value Ref Range INR 1.2 (H) 0.9 - 1.1 Prothrombin time 11.7 (H) 9.0 - 11.1 sec PTT Collection Time: 02/09/19  4:02 AM  
Result Value Ref Range aPTT 53.9 (H) 22.1 - 32.0 sec  
 aPTT, therapeutic range     58.0 - 77.0 SECS  
POC VENOUS BLOOD GAS Collection Time: 02/09/19  4:02 AM  
Result Value Ref Range Device: NASAL CANNULA Flow rate (POC) 3 L/M  
 pH, venous (POC) 7.357 7.32 - 7.42    
 pCO2, venous (POC) 34.1 (L) 41 - 51 MMHG  
 pO2, venous (POC) 34 25 - 40 mmHg HCO3, venous (POC) 19.2 (L) 23.0 - 28.0 MMOL/L  
 sO2, venous (POC) 64 (L) 65 - 88 % Base deficit, venous (POC) 6 mmol/L Allens test (POC) N/A Site [facility] Specimen type (POC) MIXED VENOUS    
GLUCOSE, POC Collection Time: 02/09/19  6:26 AM  
Result Value Ref Range Glucose (POC) 194 (H) 65 - 100 mg/dL Performed by Hesham Ricketts, POC Collection Time: 02/09/19 11:34 AM  
Result Value Ref Range Glucose (POC) 268 (H) 65 - 100 mg/dL Performed by Cristopher Monsalve GLUCOSE, POC Collection Time: 02/09/19 11:35 AM  
Result Value Ref Range Glucose (POC) 256 (H) 65 - 100 mg/dL Performed by Cristopher Monsalve   
PTT Collection Time: 02/09/19 11:37 AM  
Result Value Ref Range aPTT 51.4 (H) 22.1 - 32.0 sec  
 aPTT, therapeutic range     58.0 - 77.0 SECS Radiology:  
 
  
Current Facility-Administered Medications Medication Dose Route Frequency  acetaminophen (OFIRMEV) infusion 1,000 mg  1,000 mg IntraVENous Q6H PRN  
 HYDROmorphone (PF) (DILAUDID) injection 0.5 mg  0.5 mg IntraVENous Q6H PRN  
 ELECTROLYTE REPLACEMENT PROTOCOL  1 Each Other PRN  
 ELECTROLYTE REPLACEMENT PROTOCOL  1 Each Other PRN  pantoprazole (PROTONIX) 40 mg in sodium chloride 0.9% 10 mL injection  40 mg IntraVENous Q12H  
 vancomycin (VANCOCIN) 750 mg in 0.9% sodium chloride (MBP/ADV) 250 mL  750 mg IntraVENous Q24H  
 balsam peru-castor oil (VENELEX) ointment   Topical BID  
  nystatin (MYCOSTATIN) 100,000 unit/gram powder   Topical BID  arformoterol (BROVANA) neb solution 15 mcg  15 mcg Nebulization BID RT And  
 budesonide (PULMICORT) 500 mcg/2 ml nebulizer suspension  500 mcg Nebulization BID RT  
 sodium chloride (NS) flush 5-40 mL  5-40 mL IntraVENous Q8H  
 sodium chloride (NS) flush 5-40 mL  5-40 mL IntraVENous PRN  piperacillin-tazobactam (ZOSYN) 3.375 g in 0.9% sodium chloride (MBP/ADV) 100 mL  3.375 g IntraVENous Q8H  
 heparin 25,000 units in D5W 250 ml infusion  12-25 Units/kg/hr IntraVENous TITRATE  PHENYLephrine (MIKE-SYNEPHRINE) 30 mg in 0.9% sodium chloride 250 mL infusion   mcg/min IntraVENous TITRATE  bumetanide (BUMEX) 0.25 mg/mL infusion  0.5 mg/hr IntraVENous CONTINUOUS  
 niCARdipine (CARDENE) 25 mg in 0.9% sodium chloride 250 mL infusion  0-15 mg/hr IntraVENous TITRATE  
 0.45% sodium chloride infusion  10 mL/hr IntraVENous CONTINUOUS  
 0.9% sodium chloride infusion  9 mL/hr IntraVENous CONTINUOUS  
 ondansetron (ZOFRAN) injection 4 mg  4 mg IntraVENous Q6H PRN  
 vancomycin dosing by pharmacy   Other Rx Dosing/Monitoring  folic acid (FOLVITE) tablet 1 mg  1 mg Oral DAILY  propofol (DIPRIVAN) infusion  0-50 mcg/kg/min IntraVENous TITRATE  insulin lispro (HUMALOG) injection   SubCUTAneous Q6H  
 bisacodyl (DULCOLAX) suppository 10 mg  10 mg Rectal DAILY PRN  
 sodium chloride (NS) flush 5-40 mL  5-40 mL IntraVENous Q8H  
 sodium chloride (NS) flush 5-40 mL  5-40 mL IntraVENous PRN  
 nitroglycerin (NITROSTAT) tablet 0.4 mg  0.4 mg SubLINGual Q5MIN PRN  
 glucose chewable tablet 16 g  4 Tab Oral PRN  
 dextrose (D50W) injection syrg 12.5-25 g  25-50 mL IntraVENous PRN  
 glucagon (GLUCAGEN) injection 1 mg  1 mg IntraMUSCular PRN Adelita Eagle MD 
 
 [facility] [address], [address] [facility], [address] 
 [phone]

## 2019-02-09 NOTE — PROGRESS NOTES
Day # 4 of Vancomycin Indication:  sepsis, leukocytosis 
- Echo showing ? MV thrombus  ? Endocarditis - SILVINO on CKD Current regimen:  Vancomycin 750mg IV every 24 hours Abx regimen:  Vanc and Zosyn ID Following ?: NO 
Concomitant nephrotoxic drugs (requires more frequent monitoring): Loop diuretics and Vasopressors Frequency of BMP?: daily Recent Labs 19 
6383 19 
1757 19 
8531  19 
0651 WBC 11.3*  --  16.5*  --  18.3*  
CREA 2.09* 2.01* 2.02*   < > 2.07* BUN 78* 77* 77*   < > 80*  
 < > = values in this interval not displayed. Est CrCl: 20-25 ml/min; UO: 0.9 ml/kg/hr Temp (24hrs), Av.6 °F (37.6 °C), Min:99.1 °F (37.3 °C), Max:100.4 °F (38 °C) Cultures:  
 blood - NGTD 
 UA- budding yeast 
 
Goal trough = 15 - 20 mcg/mL Recent trough history: 
 @ 1612  = A random level resulted at 14.6 mcg/mL which was obtained 24 hrs post-dose, Vancomycin 1 gram ordered Plan: Continue current regimen for anticipated trough of 18.7 mcg/mL. Simi Womack, PharmD, BCPP, BCPS Clinical Pharmacy Specialist, [facility]

## 2019-02-09 NOTE — PROGRESS NOTES
Nephrology Progress Note [address]  
 
[email or web address]. [email or web address]                  Phone - [phone] Patient: Eduard Joseph YOB: 1950     Admit Date: 2/4/2019 Date- 2/9/2019 CC: Follow up for ARF Subjective: Interval History:  
-  Na high but  Stable at 150 Cr. Stable 2.0 Input >>>>>out put 
CVP high 
C./o sob No fever ROS:- as above Assessment: · ARF due to chf 
· ckd · Metabolic acidosis · HYPOKALEMIA · ckd 3- bl.cr. 1.3 · chf 
· MS,MR,TMVR MAY 2018 · H/o CAD, CABG · Anemia · DM 2 Plan:  
· bumex 2 mg iv now and restart bumex gtt · One dose of diuril · Follow bmp Physical Exam:  
GEN: NAD NECK-no mass NG TUBE + 
RESP: coarse b/l, no wheezing, CVS: s1,s2,rrr SKIN: No Rash, ABDO: soft , non tender, EXT: + edema NEURO:Can't access due to patient's current condition Care Plan discussed with: nurse, family Objective:  
Patient Vitals for the past 24 hrs: 
 Temp Pulse Resp SpO2  
02/09/19 0900  99 23 94 % 02/09/19 0800 99.4 °F (37.4 °C) 86 25 100 % 02/09/19 0753    100 % 02/09/19 0700  86 22 95 % 02/09/19 0600  77 13 96 % 02/09/19 0500  79 15 95 % 02/09/19 0400 100.1 °F (37.8 °C) 82 18 97 % 02/09/19 0300  75 13 93 % 02/09/19 0200  76 15 92 % 02/09/19 0100  79 19 99 % 02/09/19 0000 100.1 °F (37.8 °C) 75 13 100 % 02/08/19 2300  85 25 98 % 02/08/19 2200  76 22 95 % 02/08/19 2136    98 % 02/08/19 2100  78 17 97 % 02/08/19 2000 100.4 °F (38 °C) 83 27 98 % 02/08/19 1900  76 17 98 % 02/08/19 1800  76 21 99 % 02/08/19 1700  76 15 99 % 02/08/19 1600 99.8 °F (37.7 °C) 75 21 99 % 02/08/19 1500 99.5 °F (37.5 °C) 74 19 100 % 02/08/19 1400 99.5 °F (37.5 °C) 73 15 100 % 02/08/19 1300 99.5 °F (37.5 °C) 77 16 100 % 02/08/19 1200 99.2 °F (37.3 °C) 76 16 100 % 02/08/19 1100 99.2 °F (37.3 °C) 71 16 100 % Last 3 Recorded Weights in this Encounter 02/08/19 0950 02/08/19 1123 02/09/19 0400 Weight: 69.9 kg (154 lb) 69.9 kg (154 lb) 71.7 kg (158 lb 1.1 oz) 02/07 1901 - 02/09 0700 In: 5481.3 [P.O.:120; I.V.:3391.3] Out: 5964 [BYV:7268] Chart reviewed. Pertinent Notes reviewed. Medication list  reviewed Current Facility-Administered Medications Medication  acetaminophen (OFIRMEV) infusion 1,000 mg  
 HYDROmorphone (PF) (DILAUDID) injection 0.5 mg  
 bumetanide (BUMEX) injection 2 mg  chlorothiazide (DIURIL) 500 mg in sterile water (preservative free) 18 mL injection  ELECTROLYTE REPLACEMENT PROTOCOL  ELECTROLYTE REPLACEMENT PROTOCOL  pantoprazole (PROTONIX) 40 mg in sodium chloride 0.9% 10 mL injection  vancomycin (VANCOCIN) 750 mg in 0.9% sodium chloride (MBP/ADV) 250 mL  balsam peru-castor oil (VENELEX) ointment  nystatin (MYCOSTATIN) 100,000 unit/gram powder  arformoterol (BROVANA) neb solution 15 mcg And  budesonide (PULMICORT) 500 mcg/2 ml nebulizer suspension  sodium chloride (NS) flush 5-40 mL  sodium chloride (NS) flush 5-40 mL  piperacillin-tazobactam (ZOSYN) 3.375 g in 0.9% sodium chloride (MBP/ADV) 100 mL  heparin 25,000 units in D5W 250 ml infusion  PHENYLephrine (MIKE-SYNEPHRINE) 30 mg in 0.9% sodium chloride 250 mL infusion  bumetanide (BUMEX) 0.25 mg/mL infusion  niCARdipine (CARDENE) 25 mg in 0.9% sodium chloride 250 mL infusion  0.45% sodium chloride infusion  
 0.9% sodium chloride infusion  ondansetron (ZOFRAN) injection 4 mg  vancomycin dosing by pharmacy  folic acid (FOLVITE) tablet 1 mg  propofol (DIPRIVAN) infusion  insulin lispro (HUMALOG) injection  bisacodyl (DULCOLAX) suppository 10 mg  
 sodium chloride (NS) flush 5-40 mL  sodium chloride (NS) flush 5-40 mL  nitroglycerin (NITROSTAT) tablet 0.4 mg  
 glucose chewable tablet 16 g  
 dextrose (D50W) injection syrg 12.5-25 g  
 glucagon (GLUCAGEN) injection 1 mg Data Review : 
Recent Labs 02/09/19 
0402 02/08/19 
2119 02/08/19 
1757 02/08/19 
1750 02/08/19 
1205 02/08/19 
2420 02/08/19 
0009 02/07/19 
2348 02/07/19 
1612 02/07/19 
1400  02/07/19 
0420  02/06/19 
1410 WBC 11.3*  --   --   --   --  16.5*  --   --   --   --   --  18.3*  --  21.9* HGB 7.1*  --   --  7.4*  --  8.0*  --   --  8.0*  --    < > 8.4*  --  7.8* PLT 86*  --   --   --   --  128*  --   --   --   --   --  154  --  235 ANEU  --   --   --   --   --   --   --   --   --   --   --   --   --  20.6* INR 1.2*  --   --   --   --  1.2*  --  1.2* 1.2*  --    < >  --   --   --   
APTT 53.9* 39.9*  --  124.3* 55.4* 72.9*  --  56.9* 28.1  --    < >  --    < >  --   
*  --  150*  --   --  150*  --   --   --  149*  --  146*  --  141  
K 4.1  --  3.7  --   --  4.2 3.0*  --   --  3.5  --  2.9*  --  3.9 *  --  127*  --   --  73  --   --   --  72  --  82  --  152* BUN 78*  --  77*  --   --  77*  --   --   --  81*  --  80*  --  82* CREA 2.09*  --  2.01*  --   --  2.02*  --   --   --  2.13*  --  2.07*  --  2.05* ALT 31  --  29  --   --  35  --   --   --   --   --  43  --   --   
SGOT 41*  --  49*  --   --  55*  --   --   --   --   --  39*  --   --   
TBILI 1.3*  --  1.4*  --   --  1.2*  --   --   --   --   --  1.5*  --   --   
*  --  111  --   --  89  --   --   --   --   --  90  --   --   
CA 8.3*  --  8.2*  --   --  8.1*  --   --   --  8.3*  --  8.3*  --  9.0 MG  --   --  2.2  --   --  2.3 1.9  --   --  2.2  --  2.1   < >  --   
PHOS  --   --   --   --   --  5.3*  --   --   --  6.1*  --  6.2*  --  5.7*  
 < > = values in this interval not displayed. Lab Results Component Value Date/Time Culture result: NO GROWTH 3 DAYS 02/06/2019 03:58 PM  
 Culture result: ESCHERICHIA COLI (A) 04/23/2018 12:19 PM  
 
No results found for: SDES No results for input(s): FE, TIBC, PSAT, FERR in the last 72 hours. No results found for: Lisa Alvarenga MD 
 [facility] 
 www. [facility] [employer] [address] [place], [address] Phone - [phone] Fax - [phone]

## 2019-02-09 NOTE — PROGRESS NOTES
1100: 2 mg bumex given and restarted on bumex gtt at 0.5 mg/hour. Diuril also given 
 
1300: increased the Heparin to 14 units/hour 
 
1530. flexiseal inserted for liquid stools 1600: labs sent 1830: concerns that her SBP is rising , 150's,  and the CVP is 20 with rising PA pressures 72/32. Discussed with Dr Lynn Ochoa. Will continue to monitor. Urine output is good on bumex infusion 
 
2000: Bedside shift change report given to [address] [place] [address] (oncoming nurse) by Migue Toussaint RN (offgoing nurse). Report included the following information SBAR, Kardex, Intake/Output, MAR and Recent Results.

## 2019-02-09 NOTE — PROGRESS NOTES
2000 Bedside shift change report given to Luis Carlos Rodriguez (oncoming nurse) by Jorge Estrada (offgoing nurse). Report included the following information SBAR, Kardex and Cardiac Rhythm NSR w/ 1st degree block 2215 Heparin drip restarted. PTT 39.9. MAP 74 begin wean of Omar.  
 
0000 Pt tolerating tube feed rate increase. No complaints of pain. 0140 Omar Stopped. MAP 73. 
 
0800 Bedside shift change report given to  Berna (oncoming nurse) by Luis Carlos Rodriguez (offgoing nurse). Report included the following information SBAR, Kardex and Cardiac Rhythm NSR w/ 1st degree block.

## 2019-02-09 NOTE — PROGRESS NOTES
[facility] ICU Progress Note Critical Care Note Admit Date: 2019 Procedure:  Procedure(s): ESOPHAGOGASTRODUODENOSCOPY (EGD) Subjective:  
Pt seen with Dr. Jazmin Young. Extubated yesterday. On heparin. Objective:  
Vitals: 
Blood pressure 114/46, pulse (!) 101, temperature 99.4 °F (37.4 °C), resp. rate 16, height 5' 2\" (1.575 m), weight 158 lb 1.1 oz (71.7 kg), SpO2 95 %. Temp (24hrs), Av.7 °F (37.6 °C), Min:99.2 °F (37.3 °C), Max:100.4 °F (38 °C) Hemodynamics: 
 CO: CO (l/min): 6.2 l/min CI: CI (l/min/m2): 3.5 l/min/m2 CVP: CVP (mmHg): 19 mmHg (19 1100) SVR: SVR (dyne*sec)/cm5: 700 (dyne*sec)/cm5 (19 0400) PAP Systolic: PAP Systolic: 68 (66/68/44 7206) PAP Diastolic: PAP Diastolic: 31 (45/11/82 7452) PVR:   
 SV02: SVO2 (%): 62 % (19 1100) SCV02:   
 
EKG/Rhythm:  NSR w/ 1st deg block Oxygen Therapy: 
Oxygen Therapy O2 Sat (%): 95 % (19 1100) Pulse via Oximetry: 101 beats per minute (19 1100) O2 Device: Nasal cannula (19 08) O2 Flow Rate (L/min): 3 l/min (19 0800) FIO2 (%): 50 % (19 0912) CXR:   
CXR Results  (Last 48 hours) 19 0435  XR CHEST PORT Final result Impression:  IMPRESSION: Interval extubation. Otherwise no interval change. Narrative:  INDICATION: CHF, intubated. Portable AP semiupright view of the chest.  
   
Direct comparison made to prior chest x-ray dated 2019. Cardiomediastinal silhouette is stable. There has been interval extubation. Tubes and lines are otherwise unchanged. There is persistent bilateral  
interstitial and patchy airspace disease, unchanged. There is persistent  
retrocardiac partial opacification. 19 0452  XR CHEST PORT Final result Impression:  IMPRESSION:  
Slight improvement in interstitial and parenchymal opacity bilaterally. Otherwise no change. Narrative:  PORTABLE CHEST RADIOGRAPH/S: 2019 4:52 AM  
   
Clinical history: CHF, intubated INDICATION:   CHF, intubated COMPARISON: 2019 FINDINGS:  
AP portable upright view of the chest demonstrates large cardiopericardial  
silhouette. The lungs are adequately expanded. Left basilar atelectasis and  
effusion is not significantly changed. Multifocal airspace opacity and  
interstitial prominence is slightly improved compared to the previous study. The  
Hollister-Hang catheter remains in place. ET tube and NG tube unchanged. . The osseous  
structures are unremarkable. Patient is on a cardiac monitor. Admission Weight: Last Weight Weight: 147 lb (66.7 kg) Weight: 158 lb 1.1 oz (71.7 kg) Intake / Output / Drain: 
Current Shift:  0701 -  1900 In: 100 Out: 110 [Urine:110] Last 24 hrs.:  
 
Intake/Output Summary (Last 24 hours) at 2019 1127 Last data filed at 2019 1100 Gross per 24 hour Intake 3365.8 ml Output 1230 ml Net 2135.8 ml EXAM: 
General:  NAD Lungs:   Clear to auscultation bilaterally. Heart:  Regular rate and rhythm, S1, S2 normal, no murmur, click, rub or gallop. Abdomen:   Soft, non-tender. Bowel sounds hypoactive. No masses,  No organomegaly. Extremities:  No edema. PPP. Neurologic:  Gross motor and sensory apparatus intact. Labs:  
Recent Labs 19 
2605 19 
0577 WBC  --  11.3* HGB  --  7.1* HCT  --  22.5* PLT  --  86* NA  --  150* K  --  4.1 BUN  --  78* CREA  --  2.09* GLU  --  163* GLUCPOC 194*  --   
INR  --  1.2* Assessment:  
 
Principal Problem: 
  SOB (shortness of breath) (2019) Plan/Recommendations/Medical Decision Makin.  Acute on chronic diastolic CHF, NYHA Class IV on admit: EF 40-45%, resume bumex gtt this morning per renal.  No BB/ACE until appropriate.   
  
2. 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 Cont heparin gtt, will eventually need coumadin.  
  
3. Acute hypoxic respiratory failure:  Diuresis. Cont steroid nebs.   
  
4. Metabolic, lactic acidosis: renal assisting to manage, Lactate improved 0.6 -- trend.   
  
5. Acute on chronic anemia: history of, prev workup by Dr. Denver Grapes with hematology w/ unclear etiology. Transfuse prn. Hematology following. Cont IV venofer. Trend H/H. S/p upper endoscopy w/ GI 2/7. Add protonix IV. Platelets dropping on heparin gtt, hold aspirin for now.  
  
6. SILVINO: renal consulted/following. Hold bumex gtt per renal.     
  
7. Leukocytosis: on Zosyn/vanco. Currently afebrile, UA with mod leuks, no bacteria. Blood cx 2/6 NGTD. 
  
8. DM: BS low, stop lantus, SSI per orders. BS q6h. DTC consult.   
  
9. Hx of PAF: was on amio, holding here due to pulmonary contraindications. No anticoagulation, had been on coumadin after valve procedure - had supratherapeutic INR prompting readmission. Been off coumadin now for several months.   
  
10. HTN, now hypotension: Holding BP meds until appropriate. Wean britt for MAP >65. Random cortisol WNL.   
  
11. Hx of CAD, s/p CABG: hold ASA,  no BB currently - had been on metoprolol as recently as 1/4/19 but not on PTA med list?  Holding statin for now, will resume as LFTS improve.   
  
12. HLD: holding statin for now.  
  
13. Prior CVA, TIA: on ASA, holding statin for now 
  
14. Hx of carotid stenosis: f/u with vascular surgery 
  
15. RA: on enbrel weekly PTA 16. Hypernatremia: Cont free water flushes 200 ml q4h. 17. GI/DVT px:  Protonix, heparin gtt. 18. Nutrition:  Place dobhoff, nutrition consult for TF.  200 ml free water flushes q4h.   
  
19. Dispo: remain in CVI.    
 
 
Signed By: JACQUELYN Velez

## 2019-02-10 NOTE — PROCEDURES
Emergent Intubation Performed by: Gaby Dominguez MD 
Authorized by: Gaby Dominguez MD  
 
Emergent Intubation: Location:  ICU Date/Time:  2/10/2019 3:19 PM 
Indications:  Impending respiratory failure Spontaneous Ventilation: present Level of Consciousness: sedated Preoxygenated: Yes Airway Documentation: Airway:  ETT - Cuffed Technique:  Intubating stylet Insertion Site:  Oral 
Blade Type:  Kelby Charlesia Blade Size:  2 ETT size (mm):  7.5 ETT Line Syed:  Lips ETT Insertion depth (cm):  21 Placement verified by: auscultation and EtCO2 Attempts:  1 Difficult airway: No

## 2019-02-10 NOTE — PROGRESS NOTES
Cardiology Progress Note Admit Date: 2/4/2019 Admit Diagnosis: SOB (shortness of breath) [R06.02] Date: 2/10/2019 Subjective:  
Yesterday. .. Nice lady with family at bedside, sitting up Weak and mild SOB but improved No chest pain + lower extremity edema Running low bp 90-100s Holding beta blocker Having diarrhea On bumex drip-will need to adjust if diarrhea continues Try move off Tube feeds soon Today however. .. Noted increasing PA pressures and SOB Echo at bedside with Dr Bang Forde showed high mitral gradient and lack of movement in MV leaflets Given tpa again Then around noon, got more SOB and now on non rebreather Has mod distress though able to answer questions Denies cp, palps or dizziness Note the EF is low as it was when acute clot before, the EF seems to drop to 40s when TMVR thrombus Assessment and Plan  
  
1. Acute CHF/mixed, HFmrEF and HFpEF:  
- NYHA  IV on admission 
-EF 41-45%, mod LV concentric hypertrophy 
-RV systolic function reduced 
-PRO BNP >30,000  
-Bumex gtt stopped per renal. (hypernatremic, free water flushes increased per renal) -now restarted Had 2500 cc out  
-No BB or ace-I for now 
  
2. AS/MR Hx of SAVR and now TAVR/TMVR 5/2018 by Dr. Teresa Vazquez and Dr. Janet Morris. -s/p TPA infusion 2/7/19. Repeat TPA 2-10-19 
-Repeat DEVORA post tranfusion- no thrombus. Improved AV gradient 
-Mild MR: prosthesis abnormal on TTE 2/4(change from echo 6/5/18) -PA pressures remain elevated Repeat echo today worrisome 02/04/19 ECHO ADULT COMPLETE 02/10/2019 2/10/2019 Narrative · Left ventricular mild-to-moderately decreased systolic function. Estimated left ventricular ejection fraction is 41 - 45%. Visually  
measured ejection fraction. Moderate concentric remodeling observed. Abnormal left ventricular wall motion as described on the wall scoring diagram below. Abnormal left ventricular septal motion consistent with  
paradoxic motion. · Pacing wire present and appears normal. 
· Aortic valve is prosthetic. Moderate aortic valve sclerosis. Moderate  
aortic valve leaflet calcification present with reduced excursion. There  
is a prosthetic aortic valve from prior TAVR procedure. Prosthesis is  
normal. Prosthetic valve function is sufficient. · Mitral valve is prosthetic. Severe mitral annular calcification. Severe  
mitral valve stenosis. There is a TAVR unknown type bioprosthetic mitral  
valve. Prosthesis is abnormal consistent with thrombus or mass. · Mild tricuspid valve stenosis is present. There is no evidence of  
pulmonary hypertension. · TMVR/TAVR on 5/2/18 by Dr. Ceci George and Dr. Manjula Zamudio · MS, MR s/p MVR (#27 Medtronic Mosaic in 2009) and TMVR 5/2/18, AS s/p SAVR with patch closure of Aorta (#21 Medtronic Ultra Mosaic in 2009) and  
TAVR 5/2/18, CAD s/p CABG X 3 Signed by: Jean Pierre Richey MD  
  
 
4. Hx of PAF: currently NSR 
-Off amiodarone- concern for pulmonary effects 
-Not on OAC PTA-  
 On heparin and presume we will transition to coumadin 5. Hx of CAD: s/p CABG 9 years ago 
-On ASA, statin held currently 
-No BB for now. -Stress test was planned outpt but obviously on back burner 
   
6. CKD: stage II-III Lab Results Component Value Date/Time Creatinine 2.29 (H) 02/10/2019 04:29 AM  
  
-Nephrology following.  
-bumex held- hypernatremia 
  
7. Anemia, chronic: hgb stable 8 s/p PRBC transfusions 
-GI consulted- distal esophogitis on EGD 2/7 s/p hemiclip 8. Hx of HTN: bp meds on hold. 9. Hypernatremia:  per renal. 
Lab Results Component Value Date/Time  Sodium 148 (H) 02/10/2019 04:29 AM  
 Potassium 3.2 (L) 02/10/2019 04:29 AM  
 Chloride 106 02/10/2019 04:29 AM  
 CO2 20 (L) 02/10/2019 04:29 AM  
 Anion gap 22 (H) 02/10/2019 04:29 AM  
 Glucose 222 (H) 02/10/2019 04:29 AM  
 BUN 87 (H) 02/10/2019 04:29 AM  
 Creatinine 2.29 (H) 02/10/2019 04:29 AM  
 BUN/Creatinine ratio 38 (H) 02/10/2019 04:29 AM  
 GFR est AA 26 (L) 02/10/2019 04:29 AM  
 GFR est non-AA 21 (L) 02/10/2019 04:29 AM  
 Calcium 8.2 (L) 02/10/2019 04:29 AM  
  
Objective: 
  
 Physical Exam: 
             
Visit Vitals /62 Pulse (!) 110 Temp 99.5 °F (37.5 °C) Resp 27 Ht 5' 2\" (1.575 m) Wt 161 lb (73 kg) SpO2 90% BMI 29.45 kg/m² General Appearance:   awake, on NRB, high RR Ears/Nose/Mouth/Throat:     
  
    Neck:  Supple. Chest:   Lungs coarse bs  
Cardiovascular:   tachy  rate and rhythm, S1, S2 normal, grade 2/6 systolic early/decresendo  murmur at RUSB to LUSB without radiation beyond apex 
c murmur. Abdomen:    Soft, non-distended. Extremities:  1-2+ shins edema bilaterally. Skin:  Warm and dry. Telemetry: NSR Data Review:  
 Labs:   
Recent Results (from the past 24 hour(s)) PLATELET COUNT Collection Time: 02/09/19  4:18 PM  
Result Value Ref Range PLATELET 78 (L) 966 - 400 K/uL METABOLIC PANEL, BASIC Collection Time: 02/09/19  4:18 PM  
Result Value Ref Range Sodium 147 (H) 136 - 145 mmol/L Potassium 4.2 3.5 - 5.1 mmol/L Chloride 108 97 - 108 mmol/L  
 CO2 19 (L) 21 - 32 mmol/L Anion gap 20 (H) 5 - 15 mmol/L Glucose 251 (H) 65 - 100 mg/dL BUN 87 (H) 6 - 20 MG/DL Creatinine 2.35 (H) 0.55 - 1.02 MG/DL  
 BUN/Creatinine ratio 37 (H) 12 - 20 GFR est AA 25 (L) >60 ml/min/1.73m2 GFR est non-AA 20 (L) >60 ml/min/1.73m2 Calcium 8.0 (L) 8.5 - 10.1 MG/DL  
GLUCOSE, POC Collection Time: 02/09/19  5:33 PM  
Result Value Ref Range Glucose (POC) 282 (H) 65 - 100 mg/dL Performed by Supriya Gil   
PTT Collection Time: 02/09/19  6:05 PM  
Result Value Ref Range aPTT 57.5 (H) 22.1 - 32.0 sec  
 aPTT, therapeutic range     58.0 - 77.0 SECS  
HGB & HCT Collection Time: 02/09/19  6:26 PM  
Result Value Ref Range HGB 7.3 (L) 11.5 - 16.0 g/dL HCT 23.3 (L) 35.0 - 47.0 % GLUCOSE, POC Collection Time: 02/10/19 12:05 AM  
Result Value Ref Range Glucose (POC) 278 (H) 65 - 100 mg/dL Performed by Jossie Hwang   
POC G3 - PUL Collection Time: 02/10/19  1:01 AM  
Result Value Ref Range pH (POC) 7.442 7.35 - 7.45    
 pCO2 (POC) 25.8 (L) 35.0 - 45.0 MMHG  
 pO2 (POC) 57 (L) 80 - 100 MMHG  
 HCO3 (POC) 17.6 (L) 22 - 26 MMOL/L  
 sO2 (POC) 91 (L) 92 - 97 % Base deficit (POC) 6 mmol/L Site DRAWN FROM ARTERIAL LINE Device: NASAL CANNULA Flow rate (POC) 6 L/M Allens test (POC) N/A Specimen type (POC) ARTERIAL    
PTT Collection Time: 02/10/19  2:23 AM  
Result Value Ref Range aPTT 58.7 (H) 22.1 - 32.0 sec  
 aPTT, therapeutic range     58.0 - 77.0 SECS  
LACTIC ACID Collection Time: 02/10/19  4:29 AM  
Result Value Ref Range Lactic acid 1.0 0.4 - 2.0 MMOL/L  
NT-PRO BNP Collection Time: 02/10/19  4:29 AM  
Result Value Ref Range NT pro-BNP >35,000 (H) 0 - 125 PG/ML  
CBC W/O DIFF Collection Time: 02/10/19  4:29 AM  
Result Value Ref Range WBC 14.0 (H) 3.6 - 11.0 K/uL  
 RBC 3.05 (L) 3.80 - 5.20 M/uL HGB 6.8 (L) 11.5 - 16.0 g/dL HCT 22.2 (L) 35.0 - 47.0 % MCV 72.8 (L) 80.0 - 99.0 FL  
 MCH 22.3 (L) 26.0 - 34.0 PG  
 MCHC 30.6 30.0 - 36.5 g/dL RDW 22.1 (H) 11.5 - 14.5 % PLATELET 72 (L) 257 - 400 K/uL NRBC 0.5 (H) 0  WBC ABSOLUTE NRBC 0.07 (H) 0.00 - 0.01 K/uL METABOLIC PANEL, COMPREHENSIVE Collection Time: 02/10/19  4:29 AM  
Result Value Ref Range Sodium 148 (H) 136 - 145 mmol/L Potassium 3.2 (L) 3.5 - 5.1 mmol/L Chloride 106 97 - 108 mmol/L  
 CO2 20 (L) 21 - 32 mmol/L Anion gap 22 (H) 5 - 15 mmol/L Glucose 222 (H) 65 - 100 mg/dL BUN 87 (H) 6 - 20 MG/DL Creatinine 2.29 (H) 0.55 - 1.02 MG/DL  
 BUN/Creatinine ratio 38 (H) 12 - 20 GFR est AA 26 (L) >60 ml/min/1.73m2 GFR est non-AA 21 (L) >60 ml/min/1.73m2 Calcium 8.2 (L) 8.5 - 10.1 MG/DL Bilirubin, total 1.4 (H) 0.2 - 1.0 MG/DL  
 ALT (SGPT) 37 12 - 78 U/L  
 AST (SGOT) 47 (H) 15 - 37 U/L Alk. phosphatase 167 (H) 45 - 117 U/L Protein, total 6.2 (L) 6.4 - 8.2 g/dL Albumin 2.0 (L) 3.5 - 5.0 g/dL Globulin 4.2 (H) 2.0 - 4.0 g/dL A-G Ratio 0.5 (L) 1.1 - 2.2 LD Collection Time: 02/10/19  4:29 AM  
Result Value Ref Range  (H) 81 - 246 U/L  
POC VENOUS BLOOD GAS Collection Time: 02/10/19  5:03 AM  
Result Value Ref Range Device: High Flow Nasal Cannula Flow rate (POC) 30 L/M  
 FIO2 (POC) 60 %  
 pH, venous (POC) 7.395 7.32 - 7.42    
 pCO2, venous (POC) 32.7 (L) 41 - 51 MMHG  
 pO2, venous (POC) 31 25 - 40 mmHg HCO3, venous (POC) 20.0 (L) 23.0 - 28.0 MMOL/L  
 sO2, venous (POC) 60 (L) 65 - 88 % Base deficit, venous (POC) 5 mmol/L Allens test (POC) N/A Site OTHER Specimen type (POC) MIXED VENOUS    
POC G3 - PUL Collection Time: 02/10/19  5:08 AM  
Result Value Ref Range FIO2 (POC) 60 % pH (POC) 7.405 7.35 - 7.45    
 pCO2 (POC) 31.4 (L) 35.0 - 45.0 MMHG  
 pO2 (POC) 86 80 - 100 MMHG  
 HCO3 (POC) 19.7 (L) 22 - 26 MMOL/L  
 sO2 (POC) 97 92 - 97 % Base deficit (POC) 5 mmol/L Site DRAWN FROM ARTERIAL LINE Device: High Flow Nasal Cannula Flow rate (POC) 30 L/M Allens test (POC) N/A Specimen type (POC) ARTERIAL    
GLUCOSE, POC Collection Time: 02/10/19  6:34 AM  
Result Value Ref Range Glucose (POC) 239 (H) 65 - 100 mg/dL Performed by Sofya Coburn   
PTT Collection Time: 02/10/19  9:13 AM  
Result Value Ref Range aPTT 49.5 (H) 22.1 - 32.0 sec  
 aPTT, therapeutic range     58.0 - 77.0 SECS  
ECHO ADULT COMPLETE Collection Time: 02/10/19  9:46 AM  
Result Value Ref Range LV E' Septal Velocity 6.77 cm/s Ao Root D 2.68 cm Aortic Valve Systolic Peak Velocity 345.42 cm/s AoV VTI 63.27 cm AoV PG 50.9 mmHg  LVIDd 4.75 3.9 - 5.3 cm  
 LVPWd 1.31 (A) 0.6 - 0.9 cm LVIDs 2.93 cm IVSd 1.27 (A) 0.6 - 0.9 cm  
 MVA (PHT) 2.0 cm2  
 MV Mean Gradient 18.8 mmHg Mitral Valve Annulus Velocity Time Integral 64.20 cm Left Atrium to Aortic Root Ratio 1.33 Aortic Valve Systolic Mean Gradient 68.7 mmHg LV Mass .3 (A) 67 - 162 g  
 LV Mass AL Index 163.7 43 - 95 g/m2 MV Peak Gradient 28.8 mmHg Mitral Valve Pressure Half-time 111.1 ms Left Atrium Major Axis 3.56 cm Triscuspid Valve Regurgitation Peak Gradient 27.4 mmHg Mitral Valve Max Velocity 268.44 cm/s  
 TR Max Velocity 261.75 cm/s PTT Collection Time: 02/10/19 10:51 AM  
Result Value Ref Range aPTT 32.7 (H) 22.1 - 32.0 sec  
 aPTT, therapeutic range     58.0 - 77.0 SECS  
TYPE + CROSSMATCH Collection Time: 02/10/19 12:09 PM  
Result Value Ref Range Crossmatch Expiration 02/13/2019 ABO/Rh(D) A NEGATIVE Antibody screen NEG Unit number [id number] Blood component type  LR Unit division 00 Status of unit ISSUED Crossmatch result Compatible GLUCOSE, POC Collection Time: 02/10/19 12:14 PM  
Result Value Ref Range Glucose (POC) 255 (H) 65 - 100 mg/dL Performed by Dee Plummer   
POC G3 - PUL Collection Time: 02/10/19  1:01 PM  
Result Value Ref Range FIO2 (POC) 100 % pH (POC) 7.408 7.35 - 7.45    
 pCO2 (POC) 29.5 (L) 35.0 - 45.0 MMHG  
 pO2 (POC) 51 (L) 80 - 100 MMHG  
 HCO3 (POC) 18.6 (L) 22 - 26 MMOL/L  
 sO2 (POC) 87 (L) 92 - 97 % Base deficit (POC) 6 mmol/L Site DRAWN FROM ARTERIAL LINE Device: High Flow Nasal Cannula Flow rate (POC) 40 L/M Allens test (POC) N/A Specimen type (POC) ARTERIAL Radiology:  
 
  
Current Facility-Administered Medications Medication Dose Route Frequency  HYDROmorphone (PF) (DILAUDID) injection 0.5 mg  0.5 mg IntraVENous Q6H PRN  
 ELECTROLYTE REPLACEMENT PROTOCOL  1 Each Other PRN  
  ELECTROLYTE REPLACEMENT PROTOCOL  1 Each Other PRN  pantoprazole (PROTONIX) 40 mg in sodium chloride 0.9% 10 mL injection  40 mg IntraVENous Q12H  
 vancomycin (VANCOCIN) 750 mg in 0.9% sodium chloride (MBP/ADV) 250 mL  750 mg IntraVENous Q24H  
 balsam peru-castor oil (VENELEX) ointment   Topical BID  nystatin (MYCOSTATIN) 100,000 unit/gram powder   Topical BID  arformoterol (BROVANA) neb solution 15 mcg  15 mcg Nebulization BID RT And  
 budesonide (PULMICORT) 500 mcg/2 ml nebulizer suspension  500 mcg Nebulization BID RT  
 sodium chloride (NS) flush 5-40 mL  5-40 mL IntraVENous Q8H  
 sodium chloride (NS) flush 5-40 mL  5-40 mL IntraVENous PRN  piperacillin-tazobactam (ZOSYN) 3.375 g in 0.9% sodium chloride (MBP/ADV) 100 mL  3.375 g IntraVENous Q8H  
 heparin 25,000 units in D5W 250 ml infusion  12-25 Units/kg/hr IntraVENous TITRATE  PHENYLephrine (MIKE-SYNEPHRINE) 30 mg in 0.9% sodium chloride 250 mL infusion   mcg/min IntraVENous TITRATE  bumetanide (BUMEX) 0.25 mg/mL infusion  1 mg/hr IntraVENous CONTINUOUS  
 niCARdipine (CARDENE) 25 mg in 0.9% sodium chloride 250 mL infusion  0-15 mg/hr IntraVENous TITRATE  
 0.45% sodium chloride infusion  10 mL/hr IntraVENous CONTINUOUS  
 0.9% sodium chloride infusion  9 mL/hr IntraVENous CONTINUOUS  
 ondansetron (ZOFRAN) injection 4 mg  4 mg IntraVENous Q6H PRN  
 vancomycin dosing by pharmacy   Other Rx Dosing/Monitoring  folic acid (FOLVITE) tablet 1 mg  1 mg Oral DAILY  propofol (DIPRIVAN) infusion  0-50 mcg/kg/min IntraVENous TITRATE  insulin lispro (HUMALOG) injection   SubCUTAneous Q6H  
 bisacodyl (DULCOLAX) suppository 10 mg  10 mg Rectal DAILY PRN  
 sodium chloride (NS) flush 5-40 mL  5-40 mL IntraVENous Q8H  
 sodium chloride (NS) flush 5-40 mL  5-40 mL IntraVENous PRN  
 nitroglycerin (NITROSTAT) tablet 0.4 mg  0.4 mg SubLINGual Q5MIN PRN  
 glucose chewable tablet 16 g  4 Tab Oral PRN  
  dextrose (D50W) injection syrg 12.5-25 g  25-50 mL IntraVENous PRN  
 glucagon (GLUCAGEN) injection 1 mg  1 mg IntraMUSCular PRN Malik Alexander MD 
 
 [facility], [address] 
 [phone]

## 2019-02-10 NOTE — PROGRESS NOTES
0800: Report received from Debi Posey [facility]. Carley verfied. Pt on HiFlow NC, 40L 100% FiO2, sating %, RR 20-30s. PA pressures high. 0815: Dr. Pranay Mcintosh at bedside, updated on climbing PA pressures and increased O2 demand, low Hgb, and recent output. Order received  to give 1uPRBCs (GIVE SLOWLY), TTE, and admin diuril. ECHO paged. 0818: Echo tech returned page. Order placed. 0828: ECHO tech at bedside. 9282: Noted mitral valve stenosis on DEVORA. TPA dose used last time, give now, per Dr. Donna North. Keep eye on PA pressures. 0838: PTT sent 
 
[id number] called to discuss TPA order, advised to stop heparin gtt and wait until PTT <50 to give TPa. Discussed w/ PA to hold heparin gtt at this time. Will repeat PTT at 1045. Will give TPa dose when PTT <50. 
 
1045: Dr. Fay Stauffer at bedside, continue bumex gtt. Call if U/O drops, otherwise no order received at this time. 1052: PTT sent 1054: All immediate family has been informed of current plan and pt's status overnight. Opportunity for questions given, all okay w/ plan. Family would like Pastoral care to visit pt today d/t recent depressive symptoms. 1057: Pastoral care consult placed. 1145: PTT resulted <50; TPA bolus given and,drip started. PA 70/30s 1215: Type and cross sent 1230: Diuril given [id number]: Pt coughing a lot (nonproductive), coarse upon auscultation, RR 30s, desated to 84%, SvO2 dropped to 43, stopped tube feeds, sat pt up, called RT, and ABG drawn 
 
1302: AB.408 / 29.5 / 51 / -6 / 18.6 / 87 -- setting up BiPAP, informed MD. Low threshold for elective intubation, per Dr. Donna North. 
 
4610: BiPAP placed; will check ABG in 1-2h 
 
1313: Saturation 96% on BiPAP 100% FiO2 12/6, rate 4. RR low 30s. PA 66/28 (45) 
 
1409: TPA infusion complete 1420: Dr. Deshaun Sutherland at bedside, updated on procedure/treatments this morning. No orders received at this time. 1426: RR 30s, PA 69/33 (47), sats 90%. Paged RT. 
 
9419: 1u PRBCs started 1447: AB.44 / 28.8 / 55 / -5 / 19.6 / 90 - informed MD 
 
1458: Intubate pt per Dr. Burke Ge. Family and pt aware of plan. Pt consented.  signed consent and consented as well. 
 
[phone]: Dr. Anjel Starr at bedside to intubate pt. 
 
1510: Blood noted when placing ett, blood pouring out of ett. MAP dropped 52, propofol gtt started. Saturations 40%, SvO2 33. 
 
1515: Sats 70% on vent, lavagedx3 pt to clear blood from Ett (thick), PEEP set to 10, FiO2 100%. 1529: Versed given, tachypnea. 1534: CXR at bedside. 1535: Peak pressures alarming, blood in ett, sats 75% - RT at bedside, lavaged and suctioned pt. 
 
1538: Sats coming up, 88%. 1543: AB.238 / 40.5 / 63 / -10 / 17.3 / 86 - will recheck ABG in 1-2h 
 
1551: Dr. Burke Ge updated. Orders received for one time roger, start nibex gtt, fentanyl gtt for pain, 2 amps bicarb, and 1 amp Ca. Get PEEP up 10-12 if pressures tolerates. 1559: Blood transfusion complete. 1620: Roger given. Nimbex gtt started. 1715: OG placed, connected to low intermittent suction. 1719: TOF 0, decreased nimbex gtt. 1727: RT at bedside, AB.293 / 50.0 / 73 / -2 / 24.7 / 92; increased RR to 18. 
 
1748: PTT and H&H sent 1818: Fentanyl PCA started 1830: Hgb 8.4, PTT 26.2.  
 
1833: MAP 63, britt started 194; Paged Dr. Burke Ge regarding heparin gtt, tube feeds, and low U/O. Bedside and Verbal shift change report given to Lonnie España RN. Report included the following information SBAR, Intake/Output, MAR, Recent Results, Cardiac Rhythm NSR w/ 1st AV block and Alarm Parameters . [id number]: Dr. Hugh Serrano at bedside, updated on low U/O, increased BP support needs, and current drips. Dr. Burke Ge returned page, updated on current high (unchanged) PA #s, low U/O, CVP 12-15, CI >2, and current drips.  Dr. Hugh Serrano discussed w/ Dr. Burke Ge as well, plan for repeat ECHO tomorrow, restart heparin gtt at previous rate, restart tube feeds, and order received to start dobutamine at 3.

## 2019-02-10 NOTE — PROGRESS NOTES
[facility] ICU Progress Note Critical Care Note Admit Date: 2019 Procedure:  Procedure(s): ESOPHAGOGASTRODUODENOSCOPY (EGD) Subjective:  
Pt seen with Dr. Adeel Villafana. PA pressures increasing 60-70s/30s. Now requiring HiFlow NC 60 % FiO2. On heparin. Objective:  
Vitals: 
Blood pressure 114/46, pulse 95, temperature 99.7 °F (37.6 °C), resp. rate 21, height 5' 2\" (1.575 m), weight 161 lb 9.6 oz (73.3 kg), SpO2 99 %. Temp (24hrs), Av.6 °F (37.6 °C), Min:99.4 °F (37.4 °C), Max:99.7 °F (37.6 °C) Hemodynamics: 
 CO: CO (l/min): 5.5 l/min CI: CI (l/min/m2): 3.1 l/min/m2 CVP: CVP (mmHg): 15 mmHg (02/10/19 0900) SVR: SVR (dyne*sec)/cm5: 766 (dyne*sec)/cm5 (02/10/19 0800) PAP Systolic: PAP Systolic: 70 (38/16/36 2597) PAP Diastolic: PAP Diastolic: 32 (/ 6177) PVR:   
 SV02: SVO2 (%): 66 % (02/10/19 0900) SCV02:   
 
EKG/Rhythm:  NSR w/ 1st deg block Oxygen Therapy: 
Oxygen Therapy O2 Sat (%): 99 % (02/10/19 0900) Pulse via Oximetry: 95 beats per minute (02/10/19 0900) O2 Device: Heated; Hi flow nasal cannula (02/10/19 0800) O2 Flow Rate (L/min): 40 l/min (02/10/19 0800) O2 Temperature: 87.8 °F (31 °C) (02/10/19 0746) FIO2 (%): 100 % (02/10/19 0800) CXR:   
CXR Results  (Last 48 hours) 02/10/19 0435  XR CHEST PORT Final result Impression:  IMPRESSION:  
Slight worsening of severe bilateral lung infiltrates. .  . Narrative:  INDICATION:  CHF, intubated EXAM: Chest single view. COMPARISON: None. Kev Hale FINDINGS: A single frontal view of the chest at 0349 hours shows slight  
worsening of severe bilateral lung infiltrates. .  The heart, mediastinum and  
pulmonary vasculature are stable status post median sternotomy . The bony  
thorax is unremarkable for age. Kev Hale PA catheter is stable with its tip over the  
proximal right pulmonary artery. Feeding tube traverses the thorax. Mesh stent  
material projects over the mediastinum. 02/09/19 0435  XR CHEST PORT Final result Impression:  IMPRESSION: Interval extubation. Otherwise no interval change. Narrative:  INDICATION: CHF, intubated. Portable AP semiupright view of the chest.  
   
Direct comparison made to prior chest x-ray dated February 8, 2019. Cardiomediastinal silhouette is stable. There has been interval extubation. Tubes and lines are otherwise unchanged. There is persistent bilateral  
interstitial and patchy airspace disease, unchanged. There is persistent  
retrocardiac partial opacification. Admission Weight: Last Weight Weight: 147 lb (66.7 kg) Weight: 161 lb 9.6 oz (73.3 kg) Intake / Tomi Rouge / Drain: 
Current Shift: 02/10 0701 - 02/10 1900 In: 250 Out: 445 [Urine:375; Drains:70] Last 24 hrs.:  
 
Intake/Output Summary (Last 24 hours) at 2/10/2019 0878 Last data filed at 2/10/2019 0900 Gross per 24 hour Intake 3374.32 ml Output 2915 ml Net 459.32 ml EXAM: 
General:  NAD Lungs:   Clear to auscultation bilaterally. Heart:  Regular rate and rhythm, S1, S2 normal, no murmur, click, rub or gallop. Abdomen:   Soft, non-tender. Bowel sounds hypoactive. No masses,  No organomegaly. Extremities:  No edema. PPP. Neurologic:  Gross motor and sensory apparatus intact. Labs:  
Recent Labs  
  02/10/19 
1524 02/10/19 
0429  02/09/19 
0402 WBC  --  14.0*  --  11.3* HGB  --  6.8*   < > 7.1* HCT  --  22.2*   < > 22.5* PLT  --  72*   < > 86* NA  --  148*   < > 150* K  --  3.2*   < > 4.1 BUN  --  87*   < > 78* CREA  --  2.29*   < > 2.09* GLU  --  222*   < > 163* GLUCPOC 239*  --    < >  --   
INR  --   --   --  1.2*  
 < > = values in this interval not displayed. Assessment:  
 
Principal Problem: 
  SOB (shortness of breath) (2/4/2019) Plan/Recommendations/Medical Decision Making: 1. Acute on chronic diastolic CHF, NYHA Class IV on admit: EF 40-45%, Cont bumex gtt. No BB/ACE until appropriate.   
  
2. S/p TAVR/TMVR 5/2018:  S/p TPA, DEVORA 2/8 showed no residual MV thrombus. Today TTE shows MS with mean gradient of 20. Repeat dose of TPa. Cont heparin gtt.  
  
3. Acute hypoxic respiratory failure:  Diuresis. Cont steroid nebs. May need reintubation, but hold until TPA given.  
  
4. Metabolic, lactic acidosis: renal assisting to manage, trend.   
  
5. Acute on chronic anemia: history of, prev workup by Dr. Neftaly Man with hematology w/ unclear etiology. Transfuse prn. Hematology following. Cont IV venofer. Trend H/H. S/p upper endoscopy w/ GI 2/7. Add protonix IV. Platelets dropping on heparin gtt, hold aspirin for now.  
  
6. SILVINO: renal consulted/following. Hold bumex gtt per renal.     
  
7. Leukocytosis: on Zosyn/vanco. Currently afebrile, UA with mod leuks, no bacteria. Blood cx 2/6 NGTD. 
  
8. DM: BS low, stop lantus, SSI per orders. BS q6h. DTC consult.   
  
9. Hx of PAF: was on amio, holding here due to pulmonary contraindications. No anticoagulation, had been on coumadin after valve procedure - had supratherapeutic INR prompting readmission. Been off coumadin now for several months.   
  
10. HTN, now hypotension: Holding BP meds until appropriate. Wean britt for MAP >65. Random cortisol WNL.   
  
11. Hx of CAD, s/p CABG: hold ASA,  no BB currently - had been on metoprolol as recently as 1/4/19 but not on PTA med list?  Holding statin for now, will resume as LFTS improve.   
  
12. HLD: holding statin for now.  
  
13. Prior CVA, TIA: on ASA, holding statin for now 
  
14. Hx of carotid stenosis: f/u with vascular surgery 
  
15. RA: on enbrel weekly PTA 16. Hypernatremia: Cont free water flushes 200 ml q4h. 17. GI/DVT px:  Protonix, heparin gtt. 18. Nutrition:  Place dobhoff, nutrition consult for TF.  200 ml free water flushes q4h.    
  
 19. Dispo: remain in CVI. Critically ill. Not a candidate for surgery.    
 
 
Signed By: JACQUELYN Pro

## 2019-02-10 NOTE — PROGRESS NOTES
Problem: Falls - Risk of 
Goal: *Absence of Falls Document Dennis Jiménez Fall Risk and appropriate interventions in the flowsheet. Outcome: Progressing Towards Goal 
Fall Risk Interventions: 
Mobility Interventions: Communicate number of staff needed for ambulation/transfer Mentation Interventions: Adequate sleep, hydration, pain control, Familiar objects from home, More frequent rounding Medication Interventions: Evaluate medications/consider consulting pharmacy Elimination Interventions: Call light in reach, Toileting schedule/hourly rounds Problem: Pressure Injury - Risk of 
Goal: *Prevention of pressure injury Document Ivan Scale and appropriate interventions in the flowsheet. Outcome: Progressing Towards Goal 
Pressure Injury Interventions: 
Sensory Interventions: Assess changes in LOC, Keep linens dry and wrinkle-free, Minimize linen layers, Monitor skin under medical devices Moisture Interventions: Apply protective barrier, creams and emollients, Minimize layers Activity Interventions: Pressure redistribution bed/mattress(bed type) Mobility Interventions: Assess need for specialty bed, Float heels, Pressure redistribution bed/mattress (bed type) Nutrition Interventions: Document food/fluid/supplement intake Friction and Shear Interventions: Lift sheet, Minimize layers, Foam dressings/transparent film/skin sealants Problem: Impaired Skin Integrity/Pressure Injury Treatment Goal: *Prevention of pressure injury Document Ivan Scale and appropriate interventions in the flowsheet. Outcome: Progressing Towards Goal 
Pressure Injury Interventions: 
Sensory Interventions: Assess changes in LOC, Keep linens dry and wrinkle-free, Minimize linen layers, Monitor skin under medical devices Moisture Interventions: Apply protective barrier, creams and emollients, Minimize layers Activity Interventions: Pressure redistribution bed/mattress(bed type) Mobility Interventions: Assess need for specialty bed, Float heels, Pressure redistribution bed/mattress (bed type) Nutrition Interventions: Document food/fluid/supplement intake Friction and Shear Interventions: Lift sheet, Minimize layers, Foam dressings/transparent film/skin sealants

## 2019-02-10 NOTE — PROGRESS NOTES
[facility] [employer] [facility] at [address] [place] [address] [phone] Hematology / Oncology Consult F/U Reason for Visit:  
Florinda Peña is a 77 y.o. female seen for f/u of hemolytic anemia, mitral valve replacement. History of Present Illness: Ms. 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 EGD was negative for acute bleeding. She underwent TPA for mitral valve thrombus and is now on Heparin infusion. Nursing reports no acute bleeding. Pt was extubated on 2/9/19. Past Medical History:  
Diagnosis Date  Anemia  Bruit of right carotid artery  CAD (coronary artery disease)  CHF (congestive heart failure) ([address]  CVA (cerebral vascular accident) [facility]  DM (diabetes mellitus) ([facility]  Dyslipidemia  Hypertension  Mitral regurgitation and aortic stenosis  Paroxysmal A-fib ([facility]  Psoriasis  TIA (transient ischemic attack) Past Surgical History:  
Procedure Laterality Date  HX AORTIC VALVE REPLACEMENT    
 HX CORONARY ARTERY BYPASS GRAFT    
 HX MITRAL VALVE REPLACEMENT    
 TAVR Social History Tobacco Use  Smoking status: Never Smoker  Smokeless tobacco: Never Used Substance Use Topics  Alcohol use: Yes Alcohol/week: 0.0 oz  
  Comment: Rare History reviewed. No pertinent family history. Current Facility-Administered Medications Medication Dose Route Frequency  alteplase (ACTIVASE) infusion for MV Thrombosis   100 mg IntraVENous ONCE TITR  
 HYDROmorphone (PF) (DILAUDID) injection 0.5 mg  0.5 mg IntraVENous Q6H PRN  
 ELECTROLYTE REPLACEMENT PROTOCOL  1 Each Other PRN  
 ELECTROLYTE REPLACEMENT PROTOCOL  1 Each Other PRN  pantoprazole (PROTONIX) 40 mg in sodium chloride 0.9% 10 mL injection  40 mg IntraVENous Q12H  vancomycin (VANCOCIN) 750 mg in 0.9% sodium chloride (MBP/ADV) 250 mL  750 mg IntraVENous Q24H  
 balsam peru-castor oil (VENELEX) ointment   Topical BID  nystatin (MYCOSTATIN) 100,000 unit/gram powder   Topical BID  arformoterol (BROVANA) neb solution 15 mcg  15 mcg Nebulization BID RT And  
 budesonide (PULMICORT) 500 mcg/2 ml nebulizer suspension  500 mcg Nebulization BID RT  
 sodium chloride (NS) flush 5-40 mL  5-40 mL IntraVENous Q8H  
 sodium chloride (NS) flush 5-40 mL  5-40 mL IntraVENous PRN  piperacillin-tazobactam (ZOSYN) 3.375 g in 0.9% sodium chloride (MBP/ADV) 100 mL  3.375 g IntraVENous Q8H  
 heparin 25,000 units in D5W 250 ml infusion  12-25 Units/kg/hr IntraVENous TITRATE  PHENYLephrine (MIKE-SYNEPHRINE) 30 mg in 0.9% sodium chloride 250 mL infusion   mcg/min IntraVENous TITRATE  bumetanide (BUMEX) 0.25 mg/mL infusion  1 mg/hr IntraVENous CONTINUOUS  
 niCARdipine (CARDENE) 25 mg in 0.9% sodium chloride 250 mL infusion  0-15 mg/hr IntraVENous TITRATE  
 0.45% sodium chloride infusion  10 mL/hr IntraVENous CONTINUOUS  
 0.9% sodium chloride infusion  9 mL/hr IntraVENous CONTINUOUS  
 ondansetron (ZOFRAN) injection 4 mg  4 mg IntraVENous Q6H PRN  
 vancomycin dosing by pharmacy   Other Rx Dosing/Monitoring  folic acid (FOLVITE) tablet 1 mg  1 mg Oral DAILY  propofol (DIPRIVAN) infusion  0-50 mcg/kg/min IntraVENous TITRATE  insulin lispro (HUMALOG) injection   SubCUTAneous Q6H  
 bisacodyl (DULCOLAX) suppository 10 mg  10 mg Rectal DAILY PRN  
 sodium chloride (NS) flush 5-40 mL  5-40 mL IntraVENous Q8H  
 sodium chloride (NS) flush 5-40 mL  5-40 mL IntraVENous PRN  
 nitroglycerin (NITROSTAT) tablet 0.4 mg  0.4 mg SubLINGual Q5MIN PRN  
 glucose chewable tablet 16 g  4 Tab Oral PRN  
 dextrose (D50W) injection syrg 12.5-25 g  25-50 mL IntraVENous PRN  
 glucagon (GLUCAGEN) injection 1 mg  1 mg IntraMUSCular PRN Allergies Allergen Reactions  Sulfamethoxazole Hives Review of Systems: A complete review of systems was obtained, negative except as described above. Physical Exam:  
 
Visit Vitals /62 Pulse 100 Temp 100 °F (37.8 °C) Resp 24 Ht 5' 2\" (1.575 m) Wt 161 lb (73 kg) SpO2 93% BMI 29.45 kg/m² ECOG PS: 2-3 General: No distress Eyes: PERRLA, anicteric sclerae HENT: Atraumatic with normal appearance of ears and nose; OP clear Neck: Supple; no thyromegaly Lymphatic: No cervical, supraclavicular, or inguinal adenopathy Respiratory: CTAB, normal respiratory effort, on supplemental O2 
CV: Normal rate, regular rhythm, no murmurs, 2+ pitting edema in BLE 
GI: Soft, nontender, nondistended, no masses, no hepatomegaly, no splenomegaly MS: Normal gait and station. Digits without clubbing or cyanosis. Skin: No rashes, ecchymoses, or petechiae. Normal temperature, turgor, and texture. Neuro/Psych: Alert, oriented, appropriate affect, normal judgment/insight Results:  
 
Lab Results Component Value Date/Time WBC 14.0 (H) 02/10/2019 04:29 AM  
 HGB 6.8 (L) 02/10/2019 04:29 AM  
 HCT 22.2 (L) 02/10/2019 04:29 AM  
 PLATELET 72 (L) 79/95/1195 04:29 AM  
 MCV 72.8 (L) 02/10/2019 04:29 AM  
 ABS. NEUTROPHILS 20.6 (H) 02/06/2019 02:10 PM  
 
Lab Results Component Value Date/Time Sodium 148 (H) 02/10/2019 04:29 AM  
 Potassium 3.2 (L) 02/10/2019 04:29 AM  
 Chloride 106 02/10/2019 04:29 AM  
 CO2 20 (L) 02/10/2019 04:29 AM  
 Glucose 222 (H) 02/10/2019 04:29 AM  
 BUN 87 (H) 02/10/2019 04:29 AM  
 Creatinine 2.29 (H) 02/10/2019 04:29 AM  
 GFR est AA 26 (L) 02/10/2019 04:29 AM  
 GFR est non-AA 21 (L) 02/10/2019 04:29 AM  
 Calcium 8.2 (L) 02/10/2019 04:29 AM  
 Glucose (POC) 255 (H) 02/10/2019 12:14 PM  
 
Lab Results Component Value Date/Time  Bilirubin, total 1.4 (H) 02/10/2019 04:29 AM  
 ALT (SGPT) 37 02/10/2019 04:29 AM  
 AST (SGOT) 47 (H) 02/10/2019 04:29 AM  
 Alk. phosphatase 167 (H) 02/10/2019 04:29 AM  
 Protein, total 6.2 (L) 02/10/2019 04:29 AM  
 Albumin 2.0 (L) 02/10/2019 04:29 AM  
 Globulin 4.2 (H) 02/10/2019 04:29 AM  
 
Lab Results Component Value Date/Time Iron 17 (L) 02/05/2019 04:32 AM  
 TIBC 274 02/05/2019 04:32 AM  
 Iron % saturation 6 (L) 02/05/2019 04:32 AM  
 Ferritin 1,117 (H) 02/06/2019 04:00 AM  
 
Assessment and Recommendations:  
Pravin Marin is a 77 y.o. female with acute on chronic CHF and mitral valve prosthetic thrombus with severe anemia. 1. Microcytic anemia: Combination of iron deficiency, hemolysis and anemia of chronic disease. Currently with stable counts and no clinically significant bleeding while on Heparin infusion. S/p venofer x 1.  
-- Recommend continuing to monitor with daily CBC. 2. Mitral valve prosthetic thrombus: s/p TPA and now on Heparin infusion. DEVORA 2/8 shows no residual MV thrombus. -- Continues on heparin gtt with plan for future coumadin. 3. Acute on chronic CHF: Cardiology/ CT surgery managing closely. 4. Thrombocytopenia: Continue to monitor closely. If PLTs continue to drop, may need to check HIT Ab and SSRA and consider switching from Heparin to Argatraban. Signed By: Marco Herrera MD   
 February 10, 2019

## 2019-02-10 NOTE — PROGRESS NOTES
Spiritual Care Assessment/Progress Note [address] 
 
 
NAME: Ynes Mendoza      MRN: [id number] AGE: 77 y.o. SEX: female Tenriism Affiliation: Yazidism Language: [place] 2/10/2019     Total Time (in minutes): 10 Spiritual Assessment begun in [facility] 4 CV INTNSV CARE through conversation with: 
  
    [x]Patient        [] Family    [] Friend(s) Reason for Consult: Initial/Spiritual assessment, critical care Spiritual beliefs: (Please include comment if needed) [x] Identifies with a cuco tradition:     
   [] Supported by a cuco community:        
   [] Claims no spiritual orientation:       
   [] Seeking spiritual identity:            
   [] Adheres to an individual form of spirituality:       
   [] Not able to assess:                   
 
    
Identified resources for coping:  
   [x] Prayer                           
   [] Music                  [] Guided Imagery [x] Family/friends                 [] Pet visits [] Devotional reading                         [] Unknown 
   [] Other:                                          
 
 
Interventions offered during this visit: (See comments for more details) Patient Interventions: Affirmation of cuco, Affirmation of emotions/emotional suffering, Communion ([address]) Plan of Care: 
 
 [x] Support spiritual and/or cultural needs  
 [] Support AMD and/or advance care planning process    
 [] Support grieving process 
 [] Coordinate Rites and/or Rituals  
 [] Coordination with community clergy [] No spiritual needs identified at this time 
 [] Detailed Plan of Care below (See Comments)  [] Make referral to Music Therapy 
[] Make referral to Pet Therapy    
[] Make referral to Addiction services 
[] Make referral to [facility] 
[] Make referral to Spiritual Care Partner 
[] No future visits requested       
[x] Follow up visits as needed Responded to a Spiritual Care Order requesting pt be visited by an Eucharistic . This was accomplished. Pt was not listed as a Jewish, which was changed through admissions. Chaplains will continue to support as needed. Chaplain Cleary MDiv, MS, [place] [phone] PRA ([phone])

## 2019-02-10 NOTE — PROGRESS NOTES
0800 Bedside shift change report given to Jay Cornelius (oncoming nurse) by Liliane Crawley (offgoing nurse). Report included the following information SBAR, Kardex and Cardiac Rhythm NSR w/ 1st degree block. 0000 Pt SPO2 level decrease from 97%-89% on 3L, O2 titrated up to 6L. SPO2 90-91% 0030 Pt's Work of breathing increased. RT called for ABG.  
 
0100 Dr. Rios Ryan. ABG pH 7.4, pCO2 25.8, pO2 57, base defecit -6, HCO3 17.6. PA pressures remain in 60's/20's, SO2 90 on 6L NC. Orders received for HiFlo NC, Bumex drip titrated to 1mg/hr, and 1 dose of diuril. 0110 Pt on HiFlo FiO2 60% on 30L/min. SPO2 98%. Tolerating well. 0740 Pt SPO2 decreased from 95% to 88% at rest. Increased work of breathing. RT consulted, FiO2 increased to 100% and 45L. Pt recovered to [id number] 0800 Bedside shift change report given to [facility] (oncoming nurse) by Jay Cornelius (offgoing nurse). Report included the following information SBAR, MAR and Cardiac Rhythm NSR w/ 1st degree block.

## 2019-02-10 NOTE — PROGRESS NOTES
I have reviewed and agreed with notes and assessments performed by [facility] RN

## 2019-02-10 NOTE — PROGRESS NOTES
Nephrology Progress Note [address]  
 
[facility]. [facility]                  Phone - [phone] Patient: Hanane Villatoro YOB: 1950     Admit Date: 2/4/2019 Date- 2/10/2019 CC: Follow up for ARF Subjective: Interval History:  
Na 148 
k low Cr stable Urine out put increased - > 2500 ml ROS:- as above Assessment: · ARF due to chf 
· ckd · Metabolic acidosis · HYPOKALEMIA · ckd 3- bl.cr. 1.3 · chf 
· MS,MR,TMVR MAY 2018 · H/o CAD, CABG · Anemia · DM 2 Plan:  
· CONTINUE Bumex gtt · Plan for TPA noted · Follow bmp · Replace kcl iv and po Physical Exam:  
GEN: NAD NECK-no mass NG TUBE + 
RESP: coarse b/l, no wheezing, CVS:s1,s2,RRR SKIN: No Rash, ABDO: soft , non tender, EXT: + edema NEURO:Can't access due to patient's current condition Care Plan discussed with: nurse, family Objective:  
Patient Vitals for the past 24 hrs: 
 Temp Pulse Resp BP SpO2  
02/10/19 1108     99 % 02/10/19 1100 99.9 °F (37.7 °C) 97 19  99 % 02/10/19 1000 99.5 °F (37.5 °C) 96 20  100 % 02/10/19 0942    115/62   
02/10/19 0900 99.6 °F (37.6 °C) 95 21  99 % 02/10/19 0800 99.7 °F (37.6 °C) 98 16  100 % 02/10/19 0746     100 % 02/10/19 0700  98 20  93 % 02/10/19 0600  98 24  94 % 02/10/19 0500  93 18  94 % 02/10/19 0400 99.6 °F (37.6 °C) 98 26  93 % 02/10/19 0300  98 28  95 % 02/10/19 0200  92 18  99 % 02/10/19 0154     95 % 02/10/19 0100  95 19  91 % 02/10/19 0010 99.4 °F (37.4 °C) 97 17  91 % 02/09/19 2300  94 22  92 % 02/09/19 2200  93 21  96 % 02/09/19 2140     96 % 02/09/19 2100  94 20  92 % 02/09/19 2000 99.5 °F (37.5 °C) 96 21  94 % 02/09/19 1900  91 27  96 % 02/09/19 1800  95 29  93 % 02/09/19 1726 99.7 °F (37.6 °C)    90 % 02/09/19 1700  94 23  94 % 02/09/19 1600  91 26  96 % 02/09/19 1500  92 24  91 % 02/09/19 1400  94 22  92 % 02/09/19 1300  93 18  96 % 02/09/19 1200 99.6 °F (37.6 °C) 99 16  95 % Last 3 Recorded Weights in this Encounter 02/09/19 0400 02/10/19 4169 02/10/19 4679 Weight: 71.7 kg (158 lb 1.1 oz) 73.3 kg (161 lb 9.6 oz) 73 kg (161 lb) 02/08 1901 - 02/10 0700 In: 5580.8 [P.O.:455; I.V.:2375.8] Out: 7781 [OIFGU:[id number]] Chart reviewed. Pertinent Notes reviewed. Medication list  reviewed Current Facility-Administered Medications Medication  alteplase (ACTIVASE) infusion for MV Thrombosis  HYDROmorphone (PF) (DILAUDID) injection 0.5 mg  
 ELECTROLYTE REPLACEMENT PROTOCOL  ELECTROLYTE REPLACEMENT PROTOCOL  pantoprazole (PROTONIX) 40 mg in sodium chloride 0.9% 10 mL injection  vancomycin (VANCOCIN) 750 mg in 0.9% sodium chloride (MBP/ADV) 250 mL  balsam peru-castor oil (VENELEX) ointment  nystatin (MYCOSTATIN) 100,000 unit/gram powder  arformoterol (BROVANA) neb solution 15 mcg And  budesonide (PULMICORT) 500 mcg/2 ml nebulizer suspension  sodium chloride (NS) flush 5-40 mL  sodium chloride (NS) flush 5-40 mL  piperacillin-tazobactam (ZOSYN) 3.375 g in 0.9% sodium chloride (MBP/ADV) 100 mL  heparin 25,000 units in D5W 250 ml infusion  PHENYLephrine (MIKE-SYNEPHRINE) 30 mg in 0.9% sodium chloride 250 mL infusion  bumetanide (BUMEX) 0.25 mg/mL infusion  niCARdipine (CARDENE) 25 mg in 0.9% sodium chloride 250 mL infusion  0.45% sodium chloride infusion  
 0.9% sodium chloride infusion  ondansetron (ZOFRAN) injection 4 mg  vancomycin dosing by pharmacy  folic acid (FOLVITE) tablet 1 mg  propofol (DIPRIVAN) infusion  insulin lispro (HUMALOG) injection  bisacodyl (DULCOLAX) suppository 10 mg  
 sodium chloride (NS) flush 5-40 mL  sodium chloride (NS) flush 5-40 mL  nitroglycerin (NITROSTAT) tablet 0.4 mg  
 glucose chewable tablet 16 g  
 dextrose (D50W) injection syrg 12.5-25 g  glucagon (GLUCAGEN) injection 1 mg Data Review : 
Recent Labs  
  02/10/19 
0913 02/10/19 
0429 02/10/19 
0223 02/09/19 
1826 02/09/19 
1805 02/09/19 
1618 02/09/19 
1137 02/09/19 
0402 02/08/19 
1757 02/08/19 
1750  02/08/19 
1127 02/08/19 
0009 02/07/19 
2348 02/07/19 
1612  02/07/19 
1400 WBC  --  14.0*  --   --   --   --   --  11.3*  --   --   --   --  16.5*  --   --   --   --   --   
HGB  --  6.8*  --  7.3*  --   --   --  7.1*  --   --  7.4*  --  8.0*  --   --  8.0*   < >  --   
PLT  --  72*  --   --   --  78*  --  86*  --   --   --   --  128*  --   --   --   --   --   
INR  --   --   --   --   --   --   --  1.2*  --   --   --   --  1.2*  --  1.2* 1.2*  --   --   
APTT 49.5*  --  58.7*  --  57.5*  --  51.4* 53.9*   < >  --  124.3*   < > 72.9*  --  56.9* 28.1  --   --   
NA  --  148*  --   --   --  147*  --  150*  --  150*  --   --  150*  --   --   --   --  149* K  --  3.2*  --   --   --  4.2  --  4.1  --  3.7  --   --  4.2 3.0*  --   --   --  3.5 GLU  --  222*  --   --   --  251*  --  163*  --  127*  --   --  73  --   --   --   --  72 BUN  --  87*  --   --   --  87*  --  78*  --  77*  --   --  77*  --   --   --   --  81* CREA  --  2.29*  --   --   --  2.35*  --  2.09*  --  2.01*  --   --  2.02*  --   --   --   --  2.13* ALT  --  37  --   --   --   --   --  31  --  29  --   --  35  --   --   --   --   --   
SGOT  --  47*  --   --   --   --   --  41*  --  49*  --   --  55*  --   --   --   --   --   
TBILI  --  1.4*  --   --   --   --   --  1.3*  --  1.4*  --   --  1.2*  --   --   --   --   --   
AP  --  167*  --   --   --   --   --  149*  --  111  --   --  89  --   --   --   --   --   
CA  --  8.2*  --   --   --  8.0*  --  8.3*  --  8.2*  --   --  8.1*  --   --   --   --  8.3*  
MG  --   --   --   --   --   --   --   --   --  2.2  --   --  2.3 1.9  --   --   --  2.2 PHOS  --   --   --   --   --   --   --   --   --   --   --   --  5.3*  --   --   --   --  6.1*  
 < > = values in this interval not displayed. Lab Results Component Value Date/Time Culture result: NO GROWTH 4 DAYS 02/06/2019 03:58 PM  
 Culture result: ESCHERICHIA COLI (A) 04/23/2018 12:19 PM  
 
No results found for: SDES No results for input(s): FE, TIBC, PSAT, FERR in the last 72 hours. No results found for: Caleb Serrano MD 
[facility] 
 [email or web address] [place] [address] [employer] [address] [place], [address] [facility] Phone - [phone] Fax - [phone]

## 2019-02-11 NOTE — PROGRESS NOTES
2000: Report received from [facility], [facility], drips dual RN verified and care assumed of patient. 2010: Dr. Maria Luisa Elias bedside. Dr. Abdirashid Jaimes updated by [facility] of low urine output, Dr. Maria Luisa Elias and Dr. Abdirashid Jaimes discussed patient. Orders received from Dr. Abdirashid Jaimes to restart heparin drip at previous rate, add Dobutamine drip at 3mcg/kg/min, restart tube feeds, and to add ace wraps to legs to decrease LE edema with hopes that urine output would increase. 2030: Dobutamine drip started and heparin drip restarted. 2105: Omar increased for MAP 57. 
 
2110: Omar increased for MAP 58. 
 
2112: Omar increased for MAP 60. 
 
2130: Ace wraps applied to BLE. RT bedside and lavaged and suctioned ETT. Copious amounts of blood clots retrieved. 2200: Tube feedings restarted at 50ml/hr with 200ml H2O flush q4hr. OG tube clamped, left in place for now. 0200: MAP 57-59 despite increase in Dobutamine. ABG, labs drawn. pH 7.259 CO2 55.6 pO2 308 HCO3 24.9 BE -2. Ionized Ca 1.14 ABG and iCa called to Dr. Abdirashid Jaimes. Orders received for 1 amp calcium chloride, levophed drip and to increase vent rate to 22 and wean PEEP to 8 then 5.   
 
0430: 3/4 twitches. Increased Nimbex to 2mcg/kg/min. Patient weighed, bathed, linen changed. 0515: 3/4 twitches, increased Nimbex to 2.5mcg/kg/min 
 
0700: 1/4 twitches on 2.5mcg/kg/min Nimbex. 0730: Dr. Luzmaria Gtz bedside. 0800: Bedside shift change report given to Barney Pablo RN (oncoming nurse) by Moon Cisneros RN (offgoing nurse). Report included the following information SBAR, Kardex, Procedure Summary, Intake/Output, MAR, Accordion and Recent Results. Dr. Sandra Golden bedside.

## 2019-02-11 NOTE — PROGRESS NOTES
Visited in response to notification of pt's death. Consulted with Nurse Eugene Arenas who said that the family left immediately upon the pt's death. She also indicated that Melvin Rivera had arrived in time to administer the IPICO. Maira Staples made the [place] request at the family's request. Apryl Salguero will follow as needed. Chaplain Coppola MDiv, MS, [address] [employer] 
[address] [place] [address] [phone]

## 2019-02-11 NOTE — PROGRESS NOTES
[facility] [address] [place], [address] GI PROGRESS NOTE Will Lauren Willis, [phone] office [phone] NP/PA in-hospital cell phone M-F until 4:30PM 
After 5PM or on weekends, please call  for physician on call NAME: Lesvia Obrien :  1950 MRN:  [id number] Subjective:  
Patient was intubated yesterday. Nurse is at the bedside. No signs of active GI bleeding. Objective: VITALS:  
Last 24hrs VS reviewed since prior progress note. Most recent are: 
Visit Vitals /44 Pulse 98 Temp 99.7 °F (37.6 °C) Resp 14 Ht 5' 2\" (1.575 m) Wt 73 kg (161 lb) SpO2 100% BMI 29.45 kg/m² PHYSICAL EXAM: 
General: Intubated and sedated Neurologic:  Sedated HEENT: ET tube in place Lungs:  Coarse bilaterally anteriorly Heart:  S1 S2 Abdomen: Soft, non-distended. + Bowel sounds. Flexiseal in place. Extremities: Warm Psych:   Unable to assess Lab Data Reviewed:  
 
Recent Results (from the past 24 hour(s)) TYPE + CROSSMATCH Collection Time: 02/10/19 12:09 PM  
Result Value Ref Range Crossmatch Expiration 2019 ABO/Rh(D) A NEGATIVE Antibody screen NEG Unit number [id number] Blood component type  LR Unit division 00 Status of unit TRANSFUSED Crossmatch result Compatible Unit number [id number] Blood component type  LRIR Unit division 00 Status of unit ISSUED Crossmatch result Compatible GLUCOSE, POC Collection Time: 02/10/19 12:14 PM  
Result Value Ref Range Glucose (POC) 255 (H) 65 - 100 mg/dL Performed by Liban Spence   
POC G3 - PUL Collection Time: 02/10/19  1:01 PM  
Result Value Ref Range FIO2 (POC) 100 % pH (POC) 7.408 7.35 - 7.45    
 pCO2 (POC) 29.5 (L) 35.0 - 45.0 MMHG  
 pO2 (POC) 51 (L) 80 - 100 MMHG  
 HCO3 (POC) 18.6 (L) 22 - 26 MMOL/L  
 sO2 (POC) 87 (L) 92 - 97 %  Base deficit (POC) 6 mmol/L  
 Site DRAWN FROM ARTERIAL LINE Device: High Flow Nasal Cannula Flow rate (POC) 40 L/M Allens test (POC) N/A Specimen type (POC) ARTERIAL    
POC G3 - PUL Collection Time: 02/10/19  2:47 PM  
Result Value Ref Range FIO2 (POC) 100 % pH (POC) 7.441 7.35 - 7.45    
 pCO2 (POC) 28.8 (L) 35.0 - 45.0 MMHG  
 pO2 (POC) 55 (L) 80 - 100 MMHG  
 HCO3 (POC) 19.6 (L) 22 - 26 MMOL/L  
 sO2 (POC) 90 (L) 92 - 97 % Base deficit (POC) 5 mmol/L Site DRAWN FROM ARTERIAL LINE Device: BIPAP    
 PEEP/CPAP (POC) 6 cmH2O Pressure support 6 cmH2O Allens test (POC) N/A Specimen type (POC) ARTERIAL    
POC G3 - PUL Collection Time: 02/10/19  3:42 PM  
Result Value Ref Range FIO2 (POC) 100 % pH (POC) 7.238 (LL) 7.35 - 7.45    
 pCO2 (POC) 40.5 35.0 - 45.0 MMHG  
 pO2 (POC) 63 (L) 80 - 100 MMHG  
 HCO3 (POC) 17.3 (L) 22 - 26 MMOL/L  
 sO2 (POC) 87 (L) 92 - 97 % Base deficit (POC) 10 mmol/L Site DRAWN FROM ARTERIAL LINE Device: VENT Mode ASSIST CONTROL Tidal volume 380 ml Set Rate 16 bpm  
 PEEP/CPAP (POC) 10 cmH2O Allens test (POC) N/A Specimen type (POC) ARTERIAL    
GLUCOSE, POC Collection Time: 02/10/19  5:24 PM  
Result Value Ref Range Glucose (POC) 243 (H) 65 - 100 mg/dL Performed by Mulugeta Villegas   
POC G3 - PUL Collection Time: 02/10/19  5:26 PM  
Result Value Ref Range FIO2 (POC) 100 % pH (POC) 7.293 (L) 7.35 - 7.45    
 pCO2 (POC) 50.9 (H) 35.0 - 45.0 MMHG  
 pO2 (POC) 73 (L) 80 - 100 MMHG  
 HCO3 (POC) 24.7 22 - 26 MMOL/L  
 sO2 (POC) 92 92 - 97 % Base deficit (POC) 2 mmol/L Site DRAWN FROM ARTERIAL LINE Device: VENT Mode ASSIST CONTROL Tidal volume 380 ml Set Rate 16 bpm  
 PEEP/CPAP (POC) 10 cmH2O Allens test (POC) N/A Specimen type (POC) ARTERIAL    
HGB & HCT Collection Time: 02/10/19  5:47 PM  
Result Value Ref Range HGB 8.4 (L) 11.5 - 16.0 g/dL HCT 26.9 (L) 35.0 - 47.0 % PTT  
 Collection Time: 02/10/19  5:47 PM  
Result Value Ref Range aPTT 26.2 22.1 - 32.0 sec  
 aPTT, therapeutic range     58.0 - 77.0 SECS  
GLUCOSE, POC Collection Time: 02/10/19 10:04 PM  
Result Value Ref Range Glucose (POC) 191 (H) 65 - 100 mg/dL Performed by Carson Khan HGB & HCT Collection Time: 02/11/19  1:55 AM  
Result Value Ref Range HGB 6.9 (L) 11.5 - 16.0 g/dL HCT 22.6 (L) 35.0 - 47.0 % PTT Collection Time: 02/11/19  1:55 AM  
Result Value Ref Range aPTT 56.5 (H) 22.1 - 32.0 sec  
 aPTT, therapeutic range     58.0 - 77.0 SECS  
POTASSIUM Collection Time: 02/11/19  1:55 AM  
Result Value Ref Range Potassium 3.7 3.5 - 5.1 mmol/L MAGNESIUM Collection Time: 02/11/19  1:55 AM  
Result Value Ref Range Magnesium 2.3 1.6 - 2.4 mg/dL POC EG7 Collection Time: 02/11/19  2:02 AM  
Result Value Ref Range Calcium, ionized (POC) 1.14 1.12 - 1.32 mmol/L  
 FIO2 (POC) 100 % pH (POC) 7.259 (L) 7.35 - 7.45    
 pCO2 (POC) 55.6 (H) 35.0 - 45.0 MMHG  
 pO2 (POC) 308 (H) 80 - 100 MMHG  
 HCO3 (POC) 24.9 22 - 26 MMOL/L Base deficit (POC) 2 mmol/L  
 sO2 (POC) 100 (H) 92 - 97 % Site DRAWN FROM ARTERIAL LINE Device: VENT Mode ASSIST CONTROL Tidal volume 380 ml Set Rate 18 bpm  
 PEEP/CPAP (POC) 10 cmH2O Allens test (POC) N/A Specimen type (POC) ARTERIAL    
POC EG7 Collection Time: 02/11/19  4:38 AM  
Result Value Ref Range Calcium, ionized (POC) 1.23 1.12 - 1.32 mmol/L  
 FIO2 (POC) 100 % pH (POC) 7.304 (L) 7.35 - 7.45    
 pCO2 (POC) 46.9 (H) 35.0 - 45.0 MMHG  
 pO2 (POC) 77 (L) 80 - 100 MMHG  
 HCO3 (POC) 23.3 22 - 26 MMOL/L Base deficit (POC) 3 mmol/L  
 sO2 (POC) 94 92 - 97 % Site DRAWN FROM ARTERIAL LINE Device: VENT Mode ASSIST CONTROL Tidal volume 380 ml Set Rate 18 bpm  
 PEEP/CPAP (POC) 8 cmH2O Allens test (POC) N/A  Specimen type (POC) ARTERIAL    
GLUCOSE, POC  
 Collection Time: 02/11/19  4:42 AM  
Result Value Ref Range Glucose (POC) 233 (H) 65 - 100 mg/dL Performed by Miles Plascenciaks POC VENOUS BLOOD GAS Collection Time: 02/11/19  4:44 AM  
Result Value Ref Range Device: VENT    
 FIO2 (POC) 100 %  
 pH, venous (POC) 7.282 (L) 7.32 - 7.42    
 pCO2, venous (POC) 45.8 41 - 51 MMHG  
 pO2, venous (POC) 34 25 - 40 mmHg HCO3, venous (POC) 21.6 (L) 23.0 - 28.0 MMOL/L  
 sO2, venous (POC) 57 (L) 65 - 88 % Base deficit, venous (POC) 5 mmol/L Mode ASSIST CONTROL Tidal volume 380 ml Set Rate 22 bpm  
 PEEP/CPAP (POC) 8 cmH2O Allens test (POC) N/A Site OTHER Specimen type (POC) MIXED VENOUS    
GLUCOSE, POC Collection Time: 02/11/19  4:48 AM  
Result Value Ref Range Glucose (POC) 256 (H) 65 - 100 mg/dL Performed by Derald Sacks LACTIC ACID Collection Time: 02/11/19  5:40 AM  
Result Value Ref Range Lactic acid 1.0 0.4 - 2.0 MMOL/L  
NT-PRO BNP Collection Time: 02/11/19  5:40 AM  
Result Value Ref Range NT pro-BNP >35,000 (H) 0 - 125 PG/ML  
CBC W/O DIFF Collection Time: 02/11/19  5:40 AM  
Result Value Ref Range WBC 15.7 (H) 3.6 - 11.0 K/uL  
 RBC 2.75 (L) 3.80 - 5.20 M/uL HGB 6.7 (L) 11.5 - 16.0 g/dL HCT 21.5 (L) 35.0 - 47.0 % MCV 78.2 (L) 80.0 - 99.0 FL  
 MCH 24.4 (L) 26.0 - 34.0 PG  
 MCHC 31.2 30.0 - 36.5 g/dL RDW 22.5 (H) 11.5 - 14.5 % PLATELET 54 (L) 119 - 400 K/uL NRBC 0.2 (H) 0  WBC ABSOLUTE NRBC 0.03 (H) 0.00 - 0.01 K/uL METABOLIC PANEL, COMPREHENSIVE Collection Time: 02/11/19  5:40 AM  
Result Value Ref Range Sodium 146 (H) 136 - 145 mmol/L Potassium 4.4 3.5 - 5.1 mmol/L Chloride 106 97 - 108 mmol/L  
 CO2 21 21 - 32 mmol/L Anion gap 19 (H) 5 - 15 mmol/L Glucose 216 (H) 65 - 100 mg/dL BUN 95 (H) 6 - 20 MG/DL  Creatinine 2.76 (H) 0.55 - 1.02 MG/DL  
 BUN/Creatinine ratio 34 (H) 12 - 20    
 GFR est AA 21 (L) >60 ml/min/1.73m2 GFR est non-AA 17 (L) >60 ml/min/1.73m2 Calcium 8.8 8.5 - 10.1 MG/DL Bilirubin, total 1.4 (H) 0.2 - 1.0 MG/DL  
 ALT (SGPT) 31 12 - 78 U/L  
 AST (SGOT) 43 (H) 15 - 37 U/L Alk. phosphatase 107 45 - 117 U/L Protein, total 5.6 (L) 6.4 - 8.2 g/dL Albumin 1.8 (L) 3.5 - 5.0 g/dL Globulin 3.8 2.0 - 4.0 g/dL A-G Ratio 0.5 (L) 1.1 - 2.2 LD Collection Time: 02/11/19  5:40 AM  
Result Value Ref Range  (H) 81 - 246 U/L  
PROTHROMBIN TIME + INR Collection Time: 02/11/19  5:40 AM  
Result Value Ref Range INR 1.2 (H) 0.9 - 1.1 Prothrombin time 11.6 (H) 9.0 - 11.1 sec PTT Collection Time: 02/11/19  5:40 AM  
Result Value Ref Range aPTT 57.9 (H) 22.1 - 32.0 sec  
 aPTT, therapeutic range     58.0 - 77.0 SECS  
PTT Collection Time: 02/11/19  8:22 AM  
Result Value Ref Range aPTT 63.3 (H) 22.1 - 32.0 sec  
 aPTT, therapeutic range     58.0 - 77.0 SECS  
POC G3 - PUL Collection Time: 02/11/19  8:40 AM  
Result Value Ref Range FIO2 (POC) 100 % pH (POC) 7.313 (L) 7.35 - 7.45    
 pCO2 (POC) 41.9 35.0 - 45.0 MMHG  
 pO2 (POC) 180 (H) 80 - 100 MMHG  
 HCO3 (POC) 21.2 (L) 22 - 26 MMOL/L  
 sO2 (POC) 99 (H) 92 - 97 % Base deficit (POC) 5 mmol/L Site DRAWN FROM ARTERIAL LINE Device: VENT Mode ASSIST CONTROL Tidal volume 380 ml Set Rate 22 bpm  
 PEEP/CPAP (POC) 8 cmH2O Allens test (POC) N/A Specimen type (POC) ARTERIAL Volume control YES    
ECHO ADULT COMPLETE Collection Time: 02/11/19  9:56 AM  
Result Value Ref Range Tapse 1.13 (A) 1.5 - 2.0 cm Assessment: · Acute on chronic anemia: EGD 2/7/19: distal esophagitis (likely OG tube trauma) status post hemoclip. Hgb 6.7. Received 1 unit PRBCs yesterday and 1 unit today. No signs of active GI bleeding. 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  
Diagnosis Code  Coronary atherosclerosis of native coronary artery I25.10  Mitral valve disease I05.9  Aortic valve disorder I35.9  DM (diabetes mellitus) ([facility] [address] E11.9  
 Essential hypertension, benign I10  Dyslipidemia E78.5  History of MVR with cardiopulmonary bypass Z95.2  
 S/P AVR (aortic valve replacement) Z95.2  Paroxysmal atrial fibrillation (HCC) I48.0  Type 2 diabetes with nephropathy (HCC) E11.21  
 AS (aortic stenosis) I35.0  Elevated INR R79.1  SOB (shortness of breath) R06.02 Plan: · PPI · Monitor CBC and transfuse as necessary. No signs of active GI bleeding. · Hematology following Signed By: JACQUELYN Montes   
 2/11/2019  11:55 AM 
  
 
GI Attending: Plan as above. Will continue to follow along with you. Please call with any questions. Sonny Hussein MD

## 2019-02-11 NOTE — PROGRESS NOTES
0845: 0/4 twitches. Will get ABG and adjust nimbex. 0845: PTT sent 0840: reduced nimbex to 2 mcg. ABG obtained and FiO2 to 90% 0915: increased levophed to 6 mcg. ECHO at bedside 0945: started blood, reduced nimbex to 1.5 for zero twitches. Family at bedside. NP called to discuss issues 1015: Aguilar Dez in to talk to family. They want to discuss options with Dr Loraine Arteaga. Will hold on dialysis decision per family until they talk with him 
 
1030: heparin stopped and bival started at. 075mg/kg 1200: started on insulinn gtt 1230: ptt drawn 1245: change TF's to Nepro at 50 cc's 
 
1400: increased the bival to 0.094mg per 
 
 protocol 1430: increased the levo to 7 mcg, MAP in the 50's 1445: increased the levophed to 9 mcg. Dr Loraine Arteaga at bedside. Echo tech repeated Echo. Family discussion followed. Family understands there is nothing further that can be done. They wish to withdraw treatment. Requesting last rites. Call placed to pastoral care. [facility] notified 1515: tube feeds and insulin off. Will await further withdrawal until pastoral care arrives 1545: last rites given, increased the fenanly to 48 mch abd reduce nimbex to 0.5 
 
1610: patient's vent turned to 21%. All inotropes stopped, comfort screen turned on. Family at bedside 8265: patient asystolic. Family notified that she passed. [facility] notified. Dr Sheba hidalgo notified. Awaiting formal declaration of death 1630: pronounced by BANDAR Cabrales NP 
 
   REFERRAL OF POTENTIAL ORGAN DONOR 
    [id number] ([phone]) 1. Defined hospital clinical trigger(s) met: Discussion of terminal w/drawal of support initiated by hospital or family 2.  1st call to [facility]: 2/11/2019 5:46 PM 
 Notified by: Venice Souza RN 2nd call to [facility]: 2/11/2019  5:46 PM 
  Notified by: Ashkan Randolph RN 
  
 
  
    3rd call to [facility]: 2/11/2019  5:46 PM 
  Notified by: Daniele Cortez 3.  Patient logged out by Donation Coordinator \"Referred Survived\". yes Person notified Triston Sida 4. Patient ; call [phone] with time of death; complete Record of Death.

## 2019-02-11 NOTE — PROGRESS NOTES
[facility] [place] [address] [address], [address] W: [phone]  F: [phone] for Consult:  
Saumya Briones is a 77 y.o. female who is seen in consultation at the request of Dr. Teodoro Ramirez for evaluation of anemia History of Present Illness:  
 
Patient is a 77 y.o. female with a PMH as below who was admitted on 2/4/19 for low hemoglobin by per PCP and shortness of breath. Upon arrival to ER Hgb was 7.2g/dL. Chart reviewed and available records indicate anemia dating back to 2009 and typically ranges between 7-8g/dL. Per patient, she has had increased shortness of breath over the past week. Patient states she has always had anemia since she was a child and has required prbc transfusions in the past. She takes an iron pill daily for this. Patient denies any history of GIB. Denies dark tarry stools. Occult blood was ordered but not obtained as she has not had a bowel movement since admission. Iron studies were completed and show iron level of 11, iron % sat of 6, and TIBC of 274. Vitamin b12 not deficient. Patient denies recent night sweats, illness, or fevers. No unexplained weight loss. Interval history Patient is intubated and sedated. She received TPA again on 2/10 for a valve thrombus. Now on Bivalirudin gtt. Family at bedside. No bleeding noted by family. Past Medical History:  
Diagnosis Date  Anemia  Bruit of right carotid artery  CAD (coronary artery disease)  CHF (congestive heart failure) ([address]  CVA (cerebral vascular accident) ([address]  DM (diabetes mellitus) ([address]  Dyslipidemia  Hypertension  Mitral regurgitation and aortic stenosis  Paroxysmal A-fib ([address]  Psoriasis  TIA (transient ischemic attack) Past Surgical History:  
Procedure Laterality Date  HX AORTIC VALVE REPLACEMENT    
 HX CORONARY ARTERY BYPASS GRAFT    
 HX MITRAL VALVE REPLACEMENT    
 TAVR Social History Tobacco Use  Smoking status: Never Smoker  Smokeless tobacco: Never Used Substance Use Topics  Alcohol use: Yes Alcohol/week: 0.0 oz  
  Comment: Rare History reviewed. No pertinent family history. Current Facility-Administered Medications Medication Dose Route Frequency  NOREPINephrine (LEVOPHED) 8 mg in 5% dextrose 250mL infusion  2-16 mcg/min IntraVENous TITRATE  [START ON 2/12/2019] Vancomycin Random Level with am lab 2/12   Other ONCE  
 bivalirudin (ANGIOMAX) 250 mg in 0.9% sodium chloride (MBP/ADV) 50 mL  0.03-0.5 mg/kg/hr IntraVENous TITRATE  insulin regular (NOVOLIN R, HUMULIN R) 100 Units in 0.9% sodium chloride 100 mL infusion  1-50 Units/hr IntraVENous TITRATE  insulin glargine (LANTUS) injection 1-50 Units  1-50 Units SubCUTAneous ONCE PRN  
 cisatracurium (NIMBEX) 2 mg/mL IV infusion  0-10 mcg/kg/min IntraVENous TITRATE  fentaNYL (PF) 1,500 mcg/30 mL (50 mcg/mL) infusion  0-200 mcg/hr IntraVENous TITRATE  white petrolatum-mineral oil (AKWA TEARS) 83-15 % ophthalmic ointment   Both Eyes BID  DOBUTamine (DOBUTREX) 500 mg/250 mL (2,000 mcg/mL) infusion  0-10 mcg/kg/min IntraVENous TITRATE  
 HYDROmorphone (PF) (DILAUDID) injection 0.5 mg  0.5 mg IntraVENous Q6H PRN  
 ELECTROLYTE REPLACEMENT PROTOCOL  1 Each Other PRN  
 ELECTROLYTE REPLACEMENT PROTOCOL  1 Each Other PRN  pantoprazole (PROTONIX) 40 mg in sodium chloride 0.9% 10 mL injection  40 mg IntraVENous Q12H  
 balsam peru-castor oil (VENELEX) ointment   Topical BID  nystatin (MYCOSTATIN) 100,000 unit/gram powder   Topical BID  arformoterol (BROVANA) neb solution 15 mcg  15 mcg Nebulization BID RT And  
 budesonide (PULMICORT) 500 mcg/2 ml nebulizer suspension  500 mcg Nebulization BID RT  
 sodium chloride (NS) flush 5-40 mL  5-40 mL IntraVENous Q8H  
 sodium chloride (NS) flush 5-40 mL  5-40 mL IntraVENous PRN  piperacillin-tazobactam (ZOSYN) 3.375 g in 0.9% sodium chloride (MBP/ADV) 100 mL  3.375 g IntraVENous Q8H  
 PHENYLephrine (MIKE-SYNEPHRINE) 30 mg in 0.9% sodium chloride 250 mL infusion   mcg/min IntraVENous TITRATE  bumetanide (BUMEX) 0.25 mg/mL infusion  1 mg/hr IntraVENous CONTINUOUS  
 niCARdipine (CARDENE) 25 mg in 0.9% sodium chloride 250 mL infusion  0-15 mg/hr IntraVENous TITRATE  
 0.45% sodium chloride infusion  10 mL/hr IntraVENous CONTINUOUS  
 0.9% sodium chloride infusion  9 mL/hr IntraVENous CONTINUOUS  
 ondansetron (ZOFRAN) injection 4 mg  4 mg IntraVENous Q6H PRN  
 vancomycin dosing by pharmacy   Other Rx Dosing/Monitoring  folic acid (FOLVITE) tablet 1 mg  1 mg Oral DAILY  propofol (DIPRIVAN) infusion  0-50 mcg/kg/min IntraVENous TITRATE  insulin lispro (HUMALOG) injection   SubCUTAneous Q6H  
 bisacodyl (DULCOLAX) suppository 10 mg  10 mg Rectal DAILY PRN  
 sodium chloride (NS) flush 5-40 mL  5-40 mL IntraVENous Q8H  
 sodium chloride (NS) flush 5-40 mL  5-40 mL IntraVENous PRN  
 nitroglycerin (NITROSTAT) tablet 0.4 mg  0.4 mg SubLINGual Q5MIN PRN  
 glucose chewable tablet 16 g  4 Tab Oral PRN  
 dextrose (D50W) injection syrg 12.5-25 g  25-50 mL IntraVENous PRN  
 glucagon (GLUCAGEN) injection 1 mg  1 mg IntraMUSCular PRN Allergies Allergen Reactions  Sulfamethoxazole Hives Review of Systems: A complete review of systems was obtained, negative except as described above. Physical Exam:  
 
Visit Vitals /44 Pulse 98 Temp 99.7 °F (37.6 °C) Resp 24 Ht 5' 2\" (1.575 m) Wt 161 lb (73 kg) SpO2 100% BMI 29.45 kg/m² General: Intubated and sedated Lymphatic: No cervical or supraclavicular adenopathy CV: Normal rate, no murmurs, on telemetry Resp: intubated, red streaked sputum in ET tube GI: Soft, nontender, nondistended, no masses, no hepatomegaly, no splenomegaly MS: No edema Skin: Warm, dry Psych: sedated Results:  
 
Lab Results Component Value Date/Time WBC 15.7 (H) 02/11/2019 05:40 AM  
 HGB 6.7 (L) 02/11/2019 05:40 AM  
 HCT 21.5 (L) 02/11/2019 05:40 AM  
 PLATELET 54 (L) 17/79/7870 05:40 AM  
 MCV 78.2 (L) 02/11/2019 05:40 AM  
 ABS. NEUTROPHILS 20.6 (H) 02/06/2019 02:10 PM  
 
Lab Results Component Value Date/Time Sodium 146 (H) 02/11/2019 05:40 AM  
 Potassium 4.4 02/11/2019 05:40 AM  
 Chloride 106 02/11/2019 05:40 AM  
 CO2 21 02/11/2019 05:40 AM  
 Glucose 216 (H) 02/11/2019 05:40 AM  
 BUN 95 (H) 02/11/2019 05:40 AM  
 Creatinine 2.76 (H) 02/11/2019 05:40 AM  
 GFR est AA 21 (L) 02/11/2019 05:40 AM  
 GFR est non-AA 17 (L) 02/11/2019 05:40 AM  
 Calcium 8.8 02/11/2019 05:40 AM  
 Glucose (POC) 256 (H) 02/11/2019 04:48 AM  
 
Lab Results Component Value Date/Time Bilirubin, total 1.4 (H) 02/11/2019 05:40 AM  
 ALT (SGPT) 31 02/11/2019 05:40 AM  
 AST (SGOT) 43 (H) 02/11/2019 05:40 AM  
 Alk. phosphatase 107 02/11/2019 05:40 AM  
 Protein, total 5.6 (L) 02/11/2019 05:40 AM  
 Albumin 1.8 (L) 02/11/2019 05:40 AM  
 Globulin 3.8 02/11/2019 05:40 AM  
 
Lab Results Component Value Date/Time Iron % saturation 6 (L) 02/05/2019 04:32 AM  
 TIBC 274 02/05/2019 04:32 AM  
 Ferritin 1,117 (H) 02/06/2019 04:00 AM  
 Vitamin B12 1,202 (H) 02/06/2019 08:49 AM  
 Haptoglobin 36 02/07/2019 04:20 AM  
 FRANKLIN Poly NEG 02/07/2019 04:20 AM  
  (H) 02/11/2019 05:40 AM  
 TSH 0.29 (L) 02/07/2019 10:43 AM  
 
Lab Results Component Value Date/Time INR 1.2 (H) 02/11/2019 05:40 AM  
 aPTT 63.3 (H) 02/11/2019 08:22 AM  
 Fibrinogen >800 (H) 02/08/2019 06:14 AM  
 
 
Records reviewed and summarized above. Radiology report(s) reviewed above. ECHO- Mitral valve is prosthetic. Mild mitral valve regurgitation. 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 Prosthesis is abnormal consistent with stenosis or obstruction. Non-specific thickening of the tricuspid valve. Severe tricuspid valve regurgitation is present. Moderate pulmonary hypertension is present Assessment:  
1) Anemia, microcytic Hgb upon admission was 7.2g/dL She has profound iron deficiency (noted that ferritin is markedly elevated but that is likely reactive, TSAT of 6% is suggestive of iron deficiency) No clear bleeding source on EGD Work up reviewed and noted and elevated LD, normal haptoglobin ( despite inflammation), Bb slightly elevated, LD markedly elevated ( largely due to myocardial injury and less likely due to significant hemolysis) Anemia is multifactorial and causes include possible occult blood loss, low grade intravascular hemolysis secondary to valve obstruction and secondary acute Heart failure Management is supportive and with her critically ill condition would transfuse as necessary to maintain a Hb of > 7 g/dl; 1 unit prbc today Continue folate replacement Plans for Colonoscopy on hold until no. 2 is addressed Discussed celiac disease work up with Dr. David De Anda 2) Prosthetic valve thrombus Discussed with cardiac surgery PA pressures remained high despite heparin and TPA was deemed necessary; s/p TPA on 2/8 and 2/10 Family understands that there is a risk of serious bleeding We will support with transfusions as necessary 3) Leukocytosis Neutrophilic and reactive to acute inflammation Follow On IV abx 4) Acute on chronic HF Per cardiology 5) Stage 3 acute on chronic kidney injury Nephrology following 6) Thrombocytopenia PLT dropped on 2/8 to 128K; today 54K Differentials include HIT, consumption from acute illness, consumption from TPA, and drug induced from vancomycin-- 
CITLALY and HIT Ab pending If PLT continue to drop tomorrow consider discontinuing Vanomycin Heparin gtt discontinued and now on Bivalirudin Plan: · CITLALY and HIT Ab--results pending · Daily cbc, cmp, coags including Fibrinogen · Folic acid · Transfuse to keep Hb > 7 g/dl · Consider discontinuing Vancomycin tomorrow if PLT continue to drop to below 40k · Agree with discontinuing heparin and substituting with Bivalirudin Discussed the critical situation with her family at bedside I appreciate the opportunity to participate in Ms. Snow Grate care.  
 
Signed By: Viviana Degroot MD

## 2019-02-11 NOTE — PROGRESS NOTES
Problem: Falls - Risk of 
Goal: *Absence of Falls Document [place] Frost Fall Risk and appropriate interventions in the flowsheet. Outcome: Progressing Towards Goal 
Fall Risk Interventions: 
Mobility Interventions: Communicate number of staff needed for ambulation/transfer Mentation Interventions: Evaluate medications/consider consulting pharmacy, Update white board Medication Interventions: Evaluate medications/consider consulting pharmacy Elimination Interventions: Toileting schedule/hourly rounds Problem: Pressure Injury - Risk of 
Goal: *Prevention of pressure injury Document Ivan Scale and appropriate interventions in the flowsheet. Outcome: Progressing Towards Goal 
Pressure Injury Interventions: 
Sensory Interventions: Assess changes in LOC, Check visual cues for pain, Float heels, Keep linens dry and wrinkle-free, Minimize linen layers, Sit a 90-degree angle/use footstool if needed, Use 30-degree side-lying position Moisture Interventions: Absorbent underpads Activity Interventions: Assess need for specialty bed Mobility Interventions: Assess need for specialty bed, HOB 30 degrees or less, Float heels, Pressure redistribution bed/mattress (bed type), Turn and reposition approx. every two hours(pillow and wedges) Nutrition Interventions: Document food/fluid/supplement intake, Discuss nutritional consult with provider Friction and Shear Interventions: Apply protective barrier, creams and emollients, Foam dressings/transparent film/skin sealants, Lift sheet, Minimize layers Problem: Heart Failure: Day 4 Goal: *Oxygen saturation within defined limits Outcome: Not Progressing Towards Goal 
On vent FiO2 100% Goal: *Hemodynamically stable Outcome: Not Progressing Towards Goal 
Omar, Levo, Dobut to keep hemodynamically stable Goal: *Demonstrates progressive activity Outcome: Not Progressing Towards Goal 
PROM, NWBA

## 2019-02-11 NOTE — PROGRESS NOTES
[facility] ICU Progress Note Critical Care Note Admit Date: 2019 Procedure:  Procedure(s): ESOPHAGOGASTRODUODENOSCOPY (EGD) Subjective:  
Pt seen with Dr Tino Robin. PA pressures increasing 60-70s/30s. Reintubated yesterday afternoon. Tmax 100.2. On fi02 90%. No UOP despite bumex gtt. Paralyzed/sedated. On dobut 5, heparin, fent 25, levo 4, britt 100, prop 30. Bloody secretions. S/p TPA again 2/10. Objective:  
Vitals: 
Blood pressure 121/44, pulse 100, temperature 100.1 °F (37.8 °C), resp. rate 22, height 5' 2\" (1.575 m), weight 163 lb 2.3 oz (74 kg), SpO2 100 %. Temp (24hrs), Av.7 °F (37.6 °C), Min:98.5 °F (36.9 °C), Max:100.2 °F (37.9 °C) Hemodynamics: 
 CO: CO (l/min): 7 l/min CI: CI (l/min/m2): 4 l/min/m2 CVP: CVP (mmHg): 16 mmHg (19) SVR: SVR (dyne*sec)/cm5: 583 (dyne*sec)/cm5 (19) PAP Systolic: PAP Systolic: 63 (65/01/87 3176) PAP Diastolic: PAP Diastolic: 29 (75/55/61 4958) PVR:   
 SV02: SVO2 (%): 64 % (19) SCV02:   
 
EKG/Rhythm:  ST w/ 1st deg block Oxygen Therapy: 
Oxygen Therapy O2 Sat (%): 100 % (19) Pulse via Oximetry: 100 beats per minute (19) O2 Device: Ventilator (19 033) O2 Flow Rate (L/min): 40 l/min (02/10/19 1200) O2 Temperature: 87.8 °F (31 °C) (02/10/19 1108) FIO2 (%): 90 % (19) CXR:   
CXR Results  (Last 48 hours) 19 0434  XR CHEST PORT Final result Impression:  IMPRESSION:  
   
Stable bilateral interstitial and airspace opacities. Narrative:  EXAM:  XR CHEST PORT INDICATION: Intubated. Congestive heart failure. Pulmonary edema. COMPARISON: 2/10/2019 at 1529 hours TECHNIQUE: Portable AP semiupright chest view at 0403 hours FINDINGS: The endotracheal tube, enteric tube, and right IJ pulmonary artery  
catheter are stable. Sternal wires are present. A prosthetic aortic valve is again noted. Cardiac monitoring wires overlie the thorax. The cardiomediastinal  
contours are stable. There are bilateral interstitial and airspace opacities. There is no pleural  
effusion or pneumothorax. The bones and upper abdomen are stable. 02/10/19 1539  XR CHEST PORT Final result Impression:  Impression: ET tube satisfactory position. Increased bilateral pulmonary  
infiltrates. Narrative: Indication: ET tube placement Comparison to earlier the same day. Portable exam obtained at 1529 demonstrates  
interval placement of an ET tube, projecting in satisfactory position. Bilateral  
pulmonary infiltrates have increased compared to the prior exam.  
   
  
 02/10/19 0435  XR CHEST PORT Final result Impression:  IMPRESSION:  
Slight worsening of severe bilateral lung infiltrates. .  . Narrative:  INDICATION:  CHF, intubated EXAM: Chest single view. COMPARISON: None. Peggyann Gang FINDINGS: A single frontal view of the chest at 0349 hours shows slight  
worsening of severe bilateral lung infiltrates. .  The heart, mediastinum and  
pulmonary vasculature are stable status post median sternotomy . The bony  
thorax is unremarkable for age. Peggyann Gang PA catheter is stable with its tip over the  
proximal right pulmonary artery. Feeding tube traverses the thorax. Mesh stent  
material projects over the mediastinum. Admission Weight: Last Weight Weight: 147 lb (66.7 kg) Weight: 163 lb 2.3 oz (74 kg) Intake / Output / Drain: 
Current Shift: 02/11 0701 - 02/11 1900 In: 122.8 [I.V.:122.8] Out: 10 [Urine:10] Last 24 hrs.:  
 
Intake/Output Summary (Last 24 hours) at 2/11/2019 9846 Last data filed at 2/11/2019 0800 Gross per 24 hour Intake 4062.83 ml Output 1203 ml Net 2859.83 ml EXAM: 
General:  Intubated/sedated/paralyzed. Lungs:   Clear to auscultation bilaterally. Heart:  ST, Regular rate and rhythm, S1, S2 normal, no murmur, click, rub or gallop. Abdomen:   Soft, non-tender. Bowel sounds hypoactive. No masses,  No organomegaly. Extremities:  No edema. PPP. Neurologic:  intubated/sedated/paralyzed. Labs:  
Recent Labs  
  19 
0540 19 
0448 WBC 15.7*  --   
HGB 6.7*  --   
HCT 21.5*  --   
PLT 54*  --   
*  --   
K 4.4  --   
BUN 95*  --   
CREA 2.76*  --   
*  --   
GLUCPOC  --  256* INR 1.2*  --   
 
 
 Assessment:  
 
Principal Problem: 
  SOB (shortness of breath) (2019) Plan/Recommendations/Medical Decision Makin. Acute on chronic diastolic CHF, NYHA Class IV on admit: EF 40-45%, not responding to bumex gtt, renal discussed CVVH w/ family. No BB/ACE until appropriate. Repeat TTE today. Cont dobut at 5 mcg.  
  
2. S/p TAVR/TMVR 2018:  S/p TPA  & 2/10. DEVORA  showed no residual MV thrombus. Repeat TTE now. Cont heparin gtt--change to bival gtt.  
  
3. Acute hypoxic respiratory failure: reintubated, wean fi02 for 02 sat > 90%. Paralyzed/sedated. Need CVVH for fluid removal  Cont steroid nebs. Vent bundle. HOB > 30 deg.   
  
4. Metabolic, lactic acidosis: renal assisting to manage, trend.   
  
5. Acute on chronic anemia: history of, prev workup by Dr. Judith Sahu with hematology w/ unclear etiology. Transfuse prn. Hematology following. Completed IV venofer. Trend H/H. S/p upper endoscopy w/ GI . Cont protonix IV. Platelets dropping on heparin gtt, hold aspirin for now--send HIT panel. Change to bival.    Transfuse 1 unit pRBC now.   
  
6. SILVINO: renal consulted/following. Anuric, discuss CVVH w/ family.   
  
7. Leukocytosis: on Zosyn/vanco. Currently afebrile, UA with mod leuks, no bacteria. Blood cx  NGTD. WBC 15.7.   
  
8. DM: resume insulin gtt. SSI per orders. BS q6h.   DTC consult.   
  
 9. Hx of PAF: was on amio, holding here due to pulmonary contraindications. No anticoagulation, had been on coumadin after valve procedure - had supratherapeutic INR prompting readmission. Been off coumadin now for several months.   
  
10. HTN, now hypotension: Holding BP meds until appropriate. Wean britt/levo for MAP >65. Random cortisol WNL.   
  
11. Hx of CAD, s/p CABG: hold ASA,  no BB currently - had been on metoprolol as recently as 1/4/19 but not on PTA med list?  Holding statin for now, will resume as LFTS improve.   
  
12. HLD: holding statin for now.  
  
13. Prior CVA, TIA: on ASA, holding statin for now 
  
14. Hx of carotid stenosis: f/u with vascular surgery 
  
15. RA: on enbrel weekly PTA 16. Hypernatremia: improving, . Cont free water flushes 200 ml q4h. 17. GI/DVT px:  Protonix, heparin gtt--change to bival.   
 
18. Nutrition:  Cont TF via dobhoff. 200 ml free water flushes q4h. Restart insulin gtt for elevated BS.   
  
19. Dispo: remain in CVI. Critically ill. Not a candidate for surgery.    
 
 
Signed By: Rosi Chatman NP

## 2019-02-11 NOTE — PROGRESS NOTES
Spoke with [facility] and Donation coordinator, Sosa Weber. Due to patient's age, patient is not a candidate for organ donation and just to call with TOD.

## 2019-02-11 NOTE — PROGRESS NOTES
[facility] 
 [address] [facility], [facility] [address] [facility] Phone: [phone]   [phone]   
  
Nephrology Progress Note Karthik Seo     1950     [id number] Date of Admission : 2/4/2019 02/11/19 CC:  Follow up for SILVINO Assessment and Plan SILVINO on CKD · 2/2 Decompensated CHF · No UOP most of the day yesterday despite bumex drip · cvp rising · On multiple pressos · Will need CRRT - discussed with  - he wants to discuss with his family then will let us know Lytes : 
· Ordered electrolyte replacement protocol · Labs Q12hr while on Bumex gtt CKD Stage II-III: 
· Baseline Cr 1.2-1.3 mg/dl Acute decompensated HFrEF  
· MS, MR s/p MVR in 2009 and TMVR 5/2/18,  
· AS s/p SAVR with patch closure of Aorta  in 2009 and TAVR 5/2/18 · Hx of CAD s/p CABG X 3 
· DEVORA confirmed thrombus on MV · S/p TPA over the weekend · For repeat DEVORA today Metabolic acidosis : 
· stable Severe Anemia / Thrombocytopenia · Per hematology Type II DM Leucocytosis Interval History: 
Seen and examined. Remains sedated on the vent. Minimal UOP over night and most of the day yesterday. CVP 16. Review of Systems: Review of systems not obtained due to patient factors. Current Medications:  
Current Facility-Administered Medications Medication Dose Route Frequency  NOREPINephrine (LEVOPHED) 8 mg in 5% dextrose 250mL infusion  2-16 mcg/min IntraVENous TITRATE  [START ON 2/12/2019] Vancomycin Random Level with am lab 2/12   Other ONCE  
 cisatracurium (NIMBEX) 2 mg/mL IV infusion  0-10 mcg/kg/min IntraVENous TITRATE  fentaNYL (PF) 1,500 mcg/30 mL (50 mcg/mL) infusion  0-200 mcg/hr IntraVENous TITRATE  white petrolatum-mineral oil (AKWA TEARS) 83-15 % ophthalmic ointment   Both Eyes BID  DOBUTamine (DOBUTREX) 500 mg/250 mL (2,000 mcg/mL) infusion  0-10 mcg/kg/min IntraVENous TITRATE  HYDROmorphone (PF) (DILAUDID) injection 0.5 mg  0.5 mg IntraVENous Q6H PRN  
 ELECTROLYTE REPLACEMENT PROTOCOL  1 Each Other PRN  
 ELECTROLYTE REPLACEMENT PROTOCOL  1 Each Other PRN  pantoprazole (PROTONIX) 40 mg in sodium chloride 0.9% 10 mL injection  40 mg IntraVENous Q12H  
 balsam peru-castor oil (VENELEX) ointment   Topical BID  nystatin (MYCOSTATIN) 100,000 unit/gram powder   Topical BID  arformoterol (BROVANA) neb solution 15 mcg  15 mcg Nebulization BID RT And  
 budesonide (PULMICORT) 500 mcg/2 ml nebulizer suspension  500 mcg Nebulization BID RT  
 sodium chloride (NS) flush 5-40 mL  5-40 mL IntraVENous Q8H  
 sodium chloride (NS) flush 5-40 mL  5-40 mL IntraVENous PRN  piperacillin-tazobactam (ZOSYN) 3.375 g in 0.9% sodium chloride (MBP/ADV) 100 mL  3.375 g IntraVENous Q8H  
 heparin 25,000 units in D5W 250 ml infusion  12-25 Units/kg/hr IntraVENous TITRATE  PHENYLephrine (MIKE-SYNEPHRINE) 30 mg in 0.9% sodium chloride 250 mL infusion   mcg/min IntraVENous TITRATE  bumetanide (BUMEX) 0.25 mg/mL infusion  1 mg/hr IntraVENous CONTINUOUS  
 niCARdipine (CARDENE) 25 mg in 0.9% sodium chloride 250 mL infusion  0-15 mg/hr IntraVENous TITRATE  
 0.45% sodium chloride infusion  10 mL/hr IntraVENous CONTINUOUS  
 0.9% sodium chloride infusion  9 mL/hr IntraVENous CONTINUOUS  
 ondansetron (ZOFRAN) injection 4 mg  4 mg IntraVENous Q6H PRN  
 vancomycin dosing by pharmacy   Other Rx Dosing/Monitoring  folic acid (FOLVITE) tablet 1 mg  1 mg Oral DAILY  propofol (DIPRIVAN) infusion  0-50 mcg/kg/min IntraVENous TITRATE  insulin lispro (HUMALOG) injection   SubCUTAneous Q6H  
 bisacodyl (DULCOLAX) suppository 10 mg  10 mg Rectal DAILY PRN  
 sodium chloride (NS) flush 5-40 mL  5-40 mL IntraVENous Q8H  
 sodium chloride (NS) flush 5-40 mL  5-40 mL IntraVENous PRN  
 nitroglycerin (NITROSTAT) tablet 0.4 mg  0.4 mg SubLINGual Q5MIN PRN  
  glucose chewable tablet 16 g  4 Tab Oral PRN  
 dextrose (D50W) injection syrg 12.5-25 g  25-50 mL IntraVENous PRN  
 glucagon (GLUCAGEN) injection 1 mg  1 mg IntraMUSCular PRN Allergies Allergen Reactions  Sulfamethoxazole Hives Objective: 
Vitals:   
Vitals:  
 02/11/19 0700 02/11/19 0735 02/11/19 0743 02/11/19 0800 BP:      
Pulse: 96 100  100 Resp: 24 25  22 Temp: 100.2 °F (37.9 °C)   100.1 °F (37.8 °C) SpO2: 100% 100% 100% 100% Weight:      
Height:      
 
Intake and Output: 
02/11 0701 - 02/11 1900 In: 122.8 [I.V.:122.8] Out: 10 [Urine:10] 
02/09 1901 - 02/11 0700 In: 6248.8 [P.O.:140; I.V.:3423.8] Out: 7815 [Urine:2933; Drains:120] Physical Examination: 
General: On vent Neck:  Lines + Resp:  Diminished B/L bases CV:  Tachy , Murmur +, 2-3+ b/l LE Edema GI:  Soft, NT Neurologic:  Sedated Skin:  Ecchymosis :  Jiménez + 
 
[]    High complexity decision making was performed 
[]    Patient is at high-risk of decompensation with multiple organ involvement Lab Data Personally Reviewed: I have reviewed all the pertinent labs, microbiology data and radiology studies during assessment. Recent Labs  
  02/11/19 
0540 02/11/19 
0155 02/10/19 
0429 02/09/19 
1618 02/09/19 
0402 02/08/19 
1757 *  --  148* 147* 150* 150*  
K 4.4 3.7 3.2* 4.2 4.1 3.7   --  106 108 112* 110* CO2 21  --  20* 19* 18* 20* *  --  222* 251* 163* 127* BUN 95*  --  87* 87* 78* 77* CREA 2.76*  --  2.29* 2.35* 2.09* 2.01* CA 8.8  --  8.2* 8.0* 8.3* 8.2* MG  --  2.3  --   --   --  2.2 ALB 1.8*  --  2.0*  --  1.8* 1.9*  
SGOT 43*  --  47*  --  41* 49* ALT 31  --  37  --  31 29 INR 1.2*  --   --   --  1.2*  --   
 
Recent Labs  
  02/11/19 
0540 02/11/19 
0155 02/10/19 
1747 02/10/19 
7664 02/09/19 
1826 02/09/19 
1618 02/09/19 
0402 WBC 15.7*  --   --  14.0*  --   --  11.3* HGB 6.7* 6.9* 8.4* 6.8* 7.3*  --  7.1*  
 HCT 21.5* 22.6* 26.9* 22.2* 23.3*  --  22.5*  
PLT 54*  --   --  72*  --  78* 86* No results found for: SDES Lab Results Component Value Date/Time Culture result: NO GROWTH 5 DAYS 02/06/2019 03:58 PM  
 Culture result: ESCHERICHIA COLI (A) 04/23/2018 12:19 PM  
 
Recent Results (from the past 24 hour(s)) PTT Collection Time: 02/10/19  9:13 AM  
Result Value Ref Range aPTT 49.5 (H) 22.1 - 32.0 sec  
 aPTT, therapeutic range     58.0 - 77.0 SECS  
ECHO ADULT COMPLETE Collection Time: 02/10/19  9:46 AM  
Result Value Ref Range LV E' Septal Velocity 6.77 cm/s Ao Root D 2.68 cm Aortic Valve Systolic Peak Velocity 786.88 cm/s AoV VTI 63.27 cm AoV PG 50.9 mmHg LVIDd 4.75 3.9 - 5.3 cm  
 LVPWd 1.31 (A) 0.6 - 0.9 cm LVIDs 2.93 cm IVSd 1.27 (A) 0.6 - 0.9 cm  
 MVA (PHT) 2.0 cm2  
 MV Mean Gradient 18.8 mmHg Mitral Valve Annulus Velocity Time Integral 64.20 cm Left Atrium to Aortic Root Ratio 1.33 Aortic Valve Systolic Mean Gradient 11.7 mmHg LV Mass .3 (A) 67 - 162 g  
 LV Mass AL Index 163.7 43 - 95 g/m2 MV Peak Gradient 28.8 mmHg Mitral Valve Pressure Half-time 111.1 ms Left Atrium Major Axis 3.56 cm Triscuspid Valve Regurgitation Peak Gradient 27.4 mmHg Mitral Valve Max Velocity 268.44 cm/s  
 TR Max Velocity 261.75 cm/s PTT Collection Time: 02/10/19 10:51 AM  
Result Value Ref Range aPTT 32.7 (H) 22.1 - 32.0 sec  
 aPTT, therapeutic range     58.0 - 77.0 SECS  
TYPE + CROSSMATCH Collection Time: 02/10/19 12:09 PM  
Result Value Ref Range Crossmatch Expiration 02/13/2019 ABO/Rh(D) A NEGATIVE Antibody screen NEG Unit number [id number] Blood component type RC LR Unit division 00 Status of unit TRANSFUSED Crossmatch result Compatible GLUCOSE, POC Collection Time: 02/10/19 12:14 PM  
Result Value Ref Range Glucose (POC) 255 (H) 65 - 100 mg/dL Performed by Kassandra Lilly   
POC G3 - PUL Collection Time: 02/10/19  1:01 PM  
Result Value Ref Range FIO2 (POC) 100 % pH (POC) 7.408 7.35 - 7.45    
 pCO2 (POC) 29.5 (L) 35.0 - 45.0 MMHG  
 pO2 (POC) 51 (L) 80 - 100 MMHG  
 HCO3 (POC) 18.6 (L) 22 - 26 MMOL/L  
 sO2 (POC) 87 (L) 92 - 97 % Base deficit (POC) 6 mmol/L Site DRAWN FROM ARTERIAL LINE Device: High Flow Nasal Cannula Flow rate (POC) 40 L/M Allens test (POC) N/A Specimen type (POC) ARTERIAL    
POC G3 - PUL Collection Time: 02/10/19  2:47 PM  
Result Value Ref Range FIO2 (POC) 100 % pH (POC) 7.441 7.35 - 7.45    
 pCO2 (POC) 28.8 (L) 35.0 - 45.0 MMHG  
 pO2 (POC) 55 (L) 80 - 100 MMHG  
 HCO3 (POC) 19.6 (L) 22 - 26 MMOL/L  
 sO2 (POC) 90 (L) 92 - 97 % Base deficit (POC) 5 mmol/L Site DRAWN FROM ARTERIAL LINE Device: BIPAP    
 PEEP/CPAP (POC) 6 cmH2O Pressure support 6 cmH2O Allens test (POC) N/A Specimen type (POC) ARTERIAL    
POC G3 - PUL Collection Time: 02/10/19  3:42 PM  
Result Value Ref Range FIO2 (POC) 100 % pH (POC) 7.238 (LL) 7.35 - 7.45    
 pCO2 (POC) 40.5 35.0 - 45.0 MMHG  
 pO2 (POC) 63 (L) 80 - 100 MMHG  
 HCO3 (POC) 17.3 (L) 22 - 26 MMOL/L  
 sO2 (POC) 87 (L) 92 - 97 % Base deficit (POC) 10 mmol/L Site DRAWN FROM ARTERIAL LINE Device: VENT Mode ASSIST CONTROL Tidal volume 380 ml Set Rate 16 bpm  
 PEEP/CPAP (POC) 10 cmH2O Allens test (POC) N/A Specimen type (POC) ARTERIAL    
GLUCOSE, POC Collection Time: 02/10/19  5:24 PM  
Result Value Ref Range Glucose (POC) 243 (H) 65 - 100 mg/dL Performed by Shaylee Vicente   
POC G3 - PUL Collection Time: 02/10/19  5:26 PM  
Result Value Ref Range FIO2 (POC) 100 % pH (POC) 7.293 (L) 7.35 - 7.45    
 pCO2 (POC) 50.9 (H) 35.0 - 45.0 MMHG  
 pO2 (POC) 73 (L) 80 - 100 MMHG  
 HCO3 (POC) 24.7 22 - 26 MMOL/L  
 sO2 (POC) 92 92 - 97 % Base deficit (POC) 2 mmol/L Site DRAWN FROM ARTERIAL LINE  Device: VENT    
 Mode ASSIST CONTROL Tidal volume 380 ml Set Rate 16 bpm  
 PEEP/CPAP (POC) 10 cmH2O Allens test (POC) N/A Specimen type (POC) ARTERIAL    
HGB & HCT Collection Time: 02/10/19  5:47 PM  
Result Value Ref Range HGB 8.4 (L) 11.5 - 16.0 g/dL HCT 26.9 (L) 35.0 - 47.0 % PTT Collection Time: 02/10/19  5:47 PM  
Result Value Ref Range aPTT 26.2 22.1 - 32.0 sec  
 aPTT, therapeutic range     58.0 - 77.0 SECS  
GLUCOSE, POC Collection Time: 02/10/19 10:04 PM  
Result Value Ref Range Glucose (POC) 191 (H) 65 - 100 mg/dL Performed by Lin Constantino HGB & HCT Collection Time: 02/11/19  1:55 AM  
Result Value Ref Range HGB 6.9 (L) 11.5 - 16.0 g/dL HCT 22.6 (L) 35.0 - 47.0 % PTT Collection Time: 02/11/19  1:55 AM  
Result Value Ref Range aPTT 56.5 (H) 22.1 - 32.0 sec  
 aPTT, therapeutic range     58.0 - 77.0 SECS  
POTASSIUM Collection Time: 02/11/19  1:55 AM  
Result Value Ref Range Potassium 3.7 3.5 - 5.1 mmol/L MAGNESIUM Collection Time: 02/11/19  1:55 AM  
Result Value Ref Range Magnesium 2.3 1.6 - 2.4 mg/dL POC EG7 Collection Time: 02/11/19  2:02 AM  
Result Value Ref Range Calcium, ionized (POC) 1.14 1.12 - 1.32 mmol/L  
 FIO2 (POC) 100 % pH (POC) 7.259 (L) 7.35 - 7.45    
 pCO2 (POC) 55.6 (H) 35.0 - 45.0 MMHG  
 pO2 (POC) 308 (H) 80 - 100 MMHG  
 HCO3 (POC) 24.9 22 - 26 MMOL/L Base deficit (POC) 2 mmol/L  
 sO2 (POC) 100 (H) 92 - 97 % Site DRAWN FROM ARTERIAL LINE Device: VENT Mode ASSIST CONTROL Tidal volume 380 ml Set Rate 18 bpm  
 PEEP/CPAP (POC) 10 cmH2O Allens test (POC) N/A Specimen type (POC) ARTERIAL    
POC EG7 Collection Time: 02/11/19  4:38 AM  
Result Value Ref Range Calcium, ionized (POC) 1.23 1.12 - 1.32 mmol/L  
 FIO2 (POC) 100 %  pH (POC) 7.304 (L) 7.35 - 7.45    
 pCO2 (POC) 46.9 (H) 35.0 - 45.0 MMHG  
 pO2 (POC) 77 (L) 80 - 100 MMHG  
 HCO3 (POC) 23.3 22 - 26 MMOL/L  
 Base deficit (POC) 3 mmol/L  
 sO2 (POC) 94 92 - 97 % Site DRAWN FROM ARTERIAL LINE Device: VENT Mode ASSIST CONTROL Tidal volume 380 ml Set Rate 18 bpm  
 PEEP/CPAP (POC) 8 cmH2O Allens test (POC) N/A Specimen type (POC) ARTERIAL    
GLUCOSE, POC Collection Time: 02/11/19  4:42 AM  
Result Value Ref Range Glucose (POC) 233 (H) 65 - 100 mg/dL Performed by [employer] POC VENOUS BLOOD GAS Collection Time: 02/11/19  4:44 AM  
Result Value Ref Range Device: VENT    
 FIO2 (POC) 100 %  
 pH, venous (POC) 7.282 (L) 7.32 - 7.42    
 pCO2, venous (POC) 45.8 41 - 51 MMHG  
 pO2, venous (POC) 34 25 - 40 mmHg HCO3, venous (POC) 21.6 (L) 23.0 - 28.0 MMOL/L  
 sO2, venous (POC) 57 (L) 65 - 88 % Base deficit, venous (POC) 5 mmol/L Mode ASSIST CONTROL Tidal volume 380 ml Set Rate 22 bpm  
 PEEP/CPAP (POC) 8 cmH2O Allens test (POC) N/A Site OTHER Specimen type (POC) MIXED VENOUS    
GLUCOSE, POC Collection Time: 02/11/19  4:48 AM  
Result Value Ref Range Glucose (POC) 256 (H) 65 - 100 mg/dL Performed by [employer] LACTIC ACID Collection Time: 02/11/19  5:40 AM  
Result Value Ref Range Lactic acid 1.0 0.4 - 2.0 MMOL/L  
NT-PRO BNP Collection Time: 02/11/19  5:40 AM  
Result Value Ref Range NT pro-BNP >35,000 (H) 0 - 125 PG/ML  
CBC W/O DIFF Collection Time: 02/11/19  5:40 AM  
Result Value Ref Range WBC 15.7 (H) 3.6 - 11.0 K/uL  
 RBC 2.75 (L) 3.80 - 5.20 M/uL HGB 6.7 (L) 11.5 - 16.0 g/dL HCT 21.5 (L) 35.0 - 47.0 % MCV 78.2 (L) 80.0 - 99.0 FL  
 MCH 24.4 (L) 26.0 - 34.0 PG  
 MCHC 31.2 30.0 - 36.5 g/dL RDW 22.5 (H) 11.5 - 14.5 % PLATELET 54 (L) 480 - 400 K/uL NRBC 0.2 (H) 0  WBC ABSOLUTE NRBC 0.03 (H) 0.00 - 0.01 K/uL METABOLIC PANEL, COMPREHENSIVE Collection Time: 02/11/19  5:40 AM  
Result Value Ref Range  Sodium 146 (H) 136 - 145 mmol/L  
 Potassium 4.4 3.5 - 5.1 mmol/L Chloride 106 97 - 108 mmol/L  
 CO2 21 21 - 32 mmol/L Anion gap 19 (H) 5 - 15 mmol/L Glucose 216 (H) 65 - 100 mg/dL BUN 95 (H) 6 - 20 MG/DL Creatinine 2.76 (H) 0.55 - 1.02 MG/DL  
 BUN/Creatinine ratio 34 (H) 12 - 20 GFR est AA 21 (L) >60 ml/min/1.73m2 GFR est non-AA 17 (L) >60 ml/min/1.73m2 Calcium 8.8 8.5 - 10.1 MG/DL Bilirubin, total 1.4 (H) 0.2 - 1.0 MG/DL  
 ALT (SGPT) 31 12 - 78 U/L  
 AST (SGOT) 43 (H) 15 - 37 U/L Alk. phosphatase 107 45 - 117 U/L Protein, total 5.6 (L) 6.4 - 8.2 g/dL Albumin 1.8 (L) 3.5 - 5.0 g/dL Globulin 3.8 2.0 - 4.0 g/dL A-G Ratio 0.5 (L) 1.1 - 2.2 LD Collection Time: 02/11/19  5:40 AM  
Result Value Ref Range  (H) 81 - 246 U/L  
PROTHROMBIN TIME + INR Collection Time: 02/11/19  5:40 AM  
Result Value Ref Range INR 1.2 (H) 0.9 - 1.1 Prothrombin time 11.6 (H) 9.0 - 11.1 sec PTT Collection Time: 02/11/19  5:40 AM  
Result Value Ref Range aPTT 57.9 (H) 22.1 - 32.0 sec  
 aPTT, therapeutic range     58.0 - 77.0 SECS I have reviewed the flowsheets. Chart and Pertinent Notes have been reviewed. No change in PMH ,family and social history from Consult note.  
 
 
Chuckie Garcia MD

## 2019-02-11 NOTE — PROGRESS NOTES
NUTRITION Chart reviewed for brief tube feeding follow-up; discussed with RN. Events of the weekend noted-pt now re-intubated on pressors and paralytic; acute renal failure-family deciding about CRRT. Recommend adjusting tube feeding formula to non-fiber d/t above. Diprivan @ current rate of 13.4 ml/hr-will provide 354 lipid calories per day. Recommended feeding with Diprivan/no CVVH:  Nepro @ 30 ml/hr with one packet Prosource daily and 50 ml water flush q 4 hr will provide 720 ml, 1355 calories (1710 including Diprivan), 73 gm protein and 885 ml free water (tube feeding/flush) per day. If CVVH initiated recommend the following feeding: Nepro @ 25 ml/hr with one packet Prosource q 6 hr and 50 ml water flush q 4 hr. This will provide 600 ml, 1320 calories (1675 including Diprivan), 109 gm protein and 975 ml free water (tube feeding/flush) per day. RD to follow. Estimated Nutrition Needs:  
Kcals/day: 6894 Kcals/day Protein: 72 g(1.0g/kg; CVVH-108 gm(1.5g/kg) Fluid: (1ml/kcal) or per renal 
Based On: [address] (2003b) Weight Used: Actual wt(72 kg) Chtaa Whitmore RD [facility]

## 2019-02-11 NOTE — DIABETES MGMT
DTC Insulin Drip Progress Note Recommendations/ Comments:  Patient on the Insulin drip for ~2 hours. Drip rate 7.7. Noted pt receiving EN via dobhoff (Nepro 30 ml/hr, continuous). BG at 1155a was 246 mg/dl. Patient will require basal insulin 2 hours prior to discontinuing the drip. Chart reviewed on Saran Hyman. Patient is 77 y.o. female known hx on Glipizide 5 mg Tuesday/Thursdays and Janumet 50/1000 mg BID at home. A1c:  
Lab Results Component Value Date/Time Hemoglobin A1c <3.5 (L) 02/04/2019 01:09 PM  
 
 
 
Recent Glucose Results:  
Lab Results Component Value Date/Time  (H) 02/11/2019 05:40 AM  
 GLUCPOC 246 (H) 02/11/2019 11:55 AM  
 GLUCPOC 256 (H) 02/11/2019 04:48 AM  
 GLUCPOC 233 (H) 02/11/2019 04:42 AM  
  
 
Lab Results Component Value Date/Time Creatinine 2.76 (H) 02/11/2019 05:40 AM  
 
 
Active Orders Diet DIET NPO  
  
 
PO intake:  
Patient Vitals for the past 72 hrs: 
 % Diet Eaten 02/09/19 1800 10 % Currently on insulin gtt. Will continue to follow as needed. Thank you Wendy Ibrahim RD Time Spent: 5 minutes

## 2019-02-11 NOTE — PROGRESS NOTES
Time of death was 0. Pronounced by Tai Haas NP. Family at bedside. After patient pronounced the family left for home

## 2019-02-11 NOTE — PROGRESS NOTES
Cardiology Progress Note Admit Date: 2/4/2019 Admit Diagnosis: SOB (shortness of breath) [R06.02] Date: 2/11/2019 Subjective:  Remains intubated, sedated and now chemically paralyzed on ventilator (was reintubated yesterday afternoon). Remains with markedly elevated PA pressures. Received TPA yesterday Near oliguric despite bumex gtt. Now on dobutamine, Levophed, neosynephrine. Had bloody secretions overnight. Assessment and Plan  
  
1. Acute CHF/mixed, HFmrEF and HFpEF:  
- NYHA  IV on admission 
-Repeat echo today EF 61-65% (from 40-45% yesterday), mod-severe pulmonary HTN. RV global systolic function borderline low-On Dobutamine 5 mcg/kg/min 
-No BB or ace-I for now- hypotensive on multiple vasopressors 
  
2. AS/MR Hx of SAVR and now TAVR/TMVR 5/2018 by Dr. Fady Jasmine and Dr. Roldan Hoff. -s/p TPA infusion 2/8/19. And 2/10/19 
-Repeat DEVORA 2/8 post tranfusion- no residual thrombus. -Repeat TTE 2/11/19: . Mild-mod MS no thrombus noted on MV but poorly visualized -PA pressures remain elevated systolics in 31'C 4. Hx of PAF: currently NSR 
-Off amiodarone- concern for pulmonary effects 
-Not on [address] PTA-  
-No heparin/anticoagulation d/t thrombocytopenia, marke anemia 5. Hx of CAD: s/p CABG 9 years ago 
-Off ASA/statin currently 
-No BB for now. -Stress test was planned outpt but obviously on back burner 
   
6. SILVINO on CKD: stage II-III Lab Results Component Value Date/Time Creatinine 2.76 (H) 02/11/2019 05:40 AM  
  
-Nephrology following.  
-on bumex gtt but minimal output. Will need CRRT 
  
7. Acute on chronic Anemia: hgb 6.8 
-Transfusion ordered per primary team 
-GI consulted- distal esophogitis on EGD 2/7 s/p hemiclip. 8. Thrombocytopenia:  plts decreased on heparin-- was stopped. HIT panel sent 9. Hx of HTN: bp meds on hold. Now hypotensive requiring pressors. Remains critically ill with high PA pressures and requiring multiple vasopressors. Renal function worsening and likely will need CRRT soon. Hgb and plts trending down. Cardiology attending: seen and examined early am. Agree with assess and plan 
Continued to deterioate throughout the day, am told that fup echo shows like unsuccessful second round tpa. Pa pressures remain high and she is requiring pressor support. ongoing issue with anemia and worsening renal function. Does not appear that the valve can be salvaged and continue support unlikely to result in any different outcome. Agree with plans per surgery team to withdraw care. Objective: 
  
 Physical Exam: 
             
Visit Vitals /44 Pulse (!) 101 Temp 100 °F (37.8 °C) Resp 25 Ht 5' 2\" (1.575 m) Wt 161 lb (73 kg) SpO2 97% BMI 29.45 kg/m² General Appearance:   sedated on mechanical ventilation and nimbex Ears/Nose/Mouth/Throat:     
  
    Neck:  Supple. Chest:   Lungs coarse bs  
Cardiovascular:   tachy  rate and rhythm, S1, S2 normal, grade 2/6 systolic   murmur best at LUSB  
c murmur. Abdomen:    Soft, non-distended. Extremities:  1-2+ BLE pedal edema bilaterally. Ace wraps in place. Skin:  Warm and dry. Telemetry: NSR Data Review:  
 Labs:   
Recent Results (from the past 24 hour(s)) POC G3 - PUL Collection Time: 02/10/19  3:42 PM  
Result Value Ref Range FIO2 (POC) 100 % pH (POC) 7.238 (LL) 7.35 - 7.45    
 pCO2 (POC) 40.5 35.0 - 45.0 MMHG  
 pO2 (POC) 63 (L) 80 - 100 MMHG  
 HCO3 (POC) 17.3 (L) 22 - 26 MMOL/L  
 sO2 (POC) 87 (L) 92 - 97 % Base deficit (POC) 10 mmol/L Site DRAWN FROM ARTERIAL LINE Device: VENT Mode ASSIST CONTROL Tidal volume 380 ml Set Rate 16 bpm  
 PEEP/CPAP (POC) 10 cmH2O Allens test (POC) N/A Specimen type (POC) ARTERIAL    
GLUCOSE, POC Collection Time: 02/10/19  5:24 PM  
Result Value Ref Range Glucose (POC) 243 (H) 65 - 100 mg/dL Performed by Leslie Mcelroy   
POC G3 - PUL Collection Time: 02/10/19  5:26 PM  
Result Value Ref Range FIO2 (POC) 100 % pH (POC) 7.293 (L) 7.35 - 7.45    
 pCO2 (POC) 50.9 (H) 35.0 - 45.0 MMHG  
 pO2 (POC) 73 (L) 80 - 100 MMHG  
 HCO3 (POC) 24.7 22 - 26 MMOL/L  
 sO2 (POC) 92 92 - 97 % Base deficit (POC) 2 mmol/L Site DRAWN FROM ARTERIAL LINE Device: VENT Mode ASSIST CONTROL Tidal volume 380 ml Set Rate 16 bpm  
 PEEP/CPAP (POC) 10 cmH2O Allens test (POC) N/A Specimen type (POC) ARTERIAL    
HGB & HCT Collection Time: 02/10/19  5:47 PM  
Result Value Ref Range HGB 8.4 (L) 11.5 - 16.0 g/dL HCT 26.9 (L) 35.0 - 47.0 % PTT Collection Time: 02/10/19  5:47 PM  
Result Value Ref Range aPTT 26.2 22.1 - 32.0 sec  
 aPTT, therapeutic range     58.0 - 77.0 SECS  
GLUCOSE, POC Collection Time: 02/10/19 10:04 PM  
Result Value Ref Range Glucose (POC) 191 (H) 65 - 100 mg/dL Performed by Elana Caballero HGB & HCT Collection Time: 02/11/19  1:55 AM  
Result Value Ref Range HGB 6.9 (L) 11.5 - 16.0 g/dL HCT 22.6 (L) 35.0 - 47.0 % PTT Collection Time: 02/11/19  1:55 AM  
Result Value Ref Range aPTT 56.5 (H) 22.1 - 32.0 sec  
 aPTT, therapeutic range     58.0 - 77.0 SECS  
POTASSIUM Collection Time: 02/11/19  1:55 AM  
Result Value Ref Range Potassium 3.7 3.5 - 5.1 mmol/L MAGNESIUM Collection Time: 02/11/19  1:55 AM  
Result Value Ref Range Magnesium 2.3 1.6 - 2.4 mg/dL POC EG7 Collection Time: 02/11/19  2:02 AM  
Result Value Ref Range Calcium, ionized (POC) 1.14 1.12 - 1.32 mmol/L  
 FIO2 (POC) 100 % pH (POC) 7.259 (L) 7.35 - 7.45    
 pCO2 (POC) 55.6 (H) 35.0 - 45.0 MMHG  
 pO2 (POC) 308 (H) 80 - 100 MMHG  
 HCO3 (POC) 24.9 22 - 26 MMOL/L Base deficit (POC) 2 mmol/L  
 sO2 (POC) 100 (H) 92 - 97 % Site DRAWN FROM ARTERIAL LINE  Device: VENT    
 Mode ASSIST CONTROL Tidal volume 380 ml Set Rate 18 bpm  
 PEEP/CPAP (POC) 10 cmH2O Allens test (POC) N/A Specimen type (POC) ARTERIAL    
POC EG7 Collection Time: 02/11/19  4:38 AM  
Result Value Ref Range Calcium, ionized (POC) 1.23 1.12 - 1.32 mmol/L  
 FIO2 (POC) 100 % pH (POC) 7.304 (L) 7.35 - 7.45    
 pCO2 (POC) 46.9 (H) 35.0 - 45.0 MMHG  
 pO2 (POC) 77 (L) 80 - 100 MMHG  
 HCO3 (POC) 23.3 22 - 26 MMOL/L Base deficit (POC) 3 mmol/L  
 sO2 (POC) 94 92 - 97 % Site DRAWN FROM ARTERIAL LINE Device: VENT Mode ASSIST CONTROL Tidal volume 380 ml Set Rate 18 bpm  
 PEEP/CPAP (POC) 8 cmH2O Allens test (POC) N/A Specimen type (POC) ARTERIAL    
GLUCOSE, POC Collection Time: 02/11/19  4:42 AM  
Result Value Ref Range Glucose (POC) 233 (H) 65 - 100 mg/dL Performed by [employer] POC VENOUS BLOOD GAS Collection Time: 02/11/19  4:44 AM  
Result Value Ref Range Device: VENT    
 FIO2 (POC) 100 %  
 pH, venous (POC) 7.282 (L) 7.32 - 7.42    
 pCO2, venous (POC) 45.8 41 - 51 MMHG  
 pO2, venous (POC) 34 25 - 40 mmHg HCO3, venous (POC) 21.6 (L) 23.0 - 28.0 MMOL/L  
 sO2, venous (POC) 57 (L) 65 - 88 % Base deficit, venous (POC) 5 mmol/L Mode ASSIST CONTROL Tidal volume 380 ml Set Rate 22 bpm  
 PEEP/CPAP (POC) 8 cmH2O Allens test (POC) N/A Site OTHER Specimen type (POC) MIXED VENOUS    
GLUCOSE, POC Collection Time: 02/11/19  4:48 AM  
Result Value Ref Range Glucose (POC) 256 (H) 65 - 100 mg/dL Performed by [employer] LACTIC ACID Collection Time: 02/11/19  5:40 AM  
Result Value Ref Range Lactic acid 1.0 0.4 - 2.0 MMOL/L  
NT-PRO BNP Collection Time: 02/11/19  5:40 AM  
Result Value Ref Range NT pro-BNP >35,000 (H) 0 - 125 PG/ML  
CBC W/O DIFF Collection Time: 02/11/19  5:40 AM  
Result Value Ref Range WBC 15.7 (H) 3.6 - 11.0 K/uL  
 RBC 2.75 (L) 3.80 - 5.20 M/uL HGB 6.7 (L) 11.5 - 16.0 g/dL HCT 21.5 (L) 35.0 - 47.0 % MCV 78.2 (L) 80.0 - 99.0 FL  
 MCH 24.4 (L) 26.0 - 34.0 PG  
 MCHC 31.2 30.0 - 36.5 g/dL RDW 22.5 (H) 11.5 - 14.5 % PLATELET 54 (L) 056 - 400 K/uL NRBC 0.2 (H) 0  WBC ABSOLUTE NRBC 0.03 (H) 0.00 - 0.01 K/uL METABOLIC PANEL, COMPREHENSIVE Collection Time: 02/11/19  5:40 AM  
Result Value Ref Range Sodium 146 (H) 136 - 145 mmol/L Potassium 4.4 3.5 - 5.1 mmol/L Chloride 106 97 - 108 mmol/L  
 CO2 21 21 - 32 mmol/L Anion gap 19 (H) 5 - 15 mmol/L Glucose 216 (H) 65 - 100 mg/dL BUN 95 (H) 6 - 20 MG/DL Creatinine 2.76 (H) 0.55 - 1.02 MG/DL  
 BUN/Creatinine ratio 34 (H) 12 - 20 GFR est AA 21 (L) >60 ml/min/1.73m2 GFR est non-AA 17 (L) >60 ml/min/1.73m2 Calcium 8.8 8.5 - 10.1 MG/DL Bilirubin, total 1.4 (H) 0.2 - 1.0 MG/DL  
 ALT (SGPT) 31 12 - 78 U/L  
 AST (SGOT) 43 (H) 15 - 37 U/L Alk. phosphatase 107 45 - 117 U/L Protein, total 5.6 (L) 6.4 - 8.2 g/dL Albumin 1.8 (L) 3.5 - 5.0 g/dL Globulin 3.8 2.0 - 4.0 g/dL A-G Ratio 0.5 (L) 1.1 - 2.2 LD Collection Time: 02/11/19  5:40 AM  
Result Value Ref Range  (H) 81 - 246 U/L  
PROTHROMBIN TIME + INR Collection Time: 02/11/19  5:40 AM  
Result Value Ref Range INR 1.2 (H) 0.9 - 1.1 Prothrombin time 11.6 (H) 9.0 - 11.1 sec PTT Collection Time: 02/11/19  5:40 AM  
Result Value Ref Range aPTT 57.9 (H) 22.1 - 32.0 sec  
 aPTT, therapeutic range     58.0 - 77.0 SECS  
PTT Collection Time: 02/11/19  8:22 AM  
Result Value Ref Range aPTT 63.3 (H) 22.1 - 32.0 sec  
 aPTT, therapeutic range     58.0 - 77.0 SECS  
POC G3 - PUL Collection Time: 02/11/19  8:40 AM  
Result Value Ref Range FIO2 (POC) 100 % pH (POC) 7.313 (L) 7.35 - 7.45    
 pCO2 (POC) 41.9 35.0 - 45.0 MMHG  
 pO2 (POC) 180 (H) 80 - 100 MMHG  
 HCO3 (POC) 21.2 (L) 22 - 26 MMOL/L  
 sO2 (POC) 99 (H) 92 - 97 %  Base deficit (POC) 5 mmol/L  
 Site DRAWN FROM ARTERIAL LINE Device: VENT Mode ASSIST CONTROL Tidal volume 380 ml Set Rate 22 bpm  
 PEEP/CPAP (POC) 8 cmH2O Allens test (POC) N/A Specimen type (POC) ARTERIAL Volume control YES    
ECHO ADULT COMPLETE Collection Time: 02/11/19  9:56 AM  
Result Value Ref Range Tapse 1.13 (A) 1.5 - 2.0 cm GLUCOSE, POC Collection Time: 02/11/19 11:55 AM  
Result Value Ref Range Glucose (POC) 246 (H) 65 - 100 mg/dL Performed by Magda Mariano Collection Time: 02/11/19 11:56 AM  
Result Value Ref Range Glucose 246 mg/dL Insulin order 5.6 units/hour Insulin adminstered 5.6 units/hour Multiplier 0.030 Low target 95 mg/dL High target 130 mg/dL D50 order 0.0 ml  
 D50 administered 0.00 ml Minutes until next BG 60 min Order initials pls Administered initials pls GLSCOM Comments PTT Collection Time: 02/11/19 12:28 PM  
Result Value Ref Range aPTT 42.8 (H) 22.1 - 32.0 sec  
 aPTT, therapeutic range     58.0 - 77.0 SECS  
GLUCOSE, POC Collection Time: 02/11/19  1:00 PM  
Result Value Ref Range Glucose (POC) 253 (H) 65 - 100 mg/dL Performed by Magda Mariano Collection Time: 02/11/19  1:01 PM  
Result Value Ref Range Glucose 253 mg/dL Insulin order 7.7 units/hour Insulin adminstered 7.7 units/hour Multiplier 0.040 Low target 95 mg/dL High target 130 mg/dL D50 order 0.0 ml  
 D50 administered 0.00 ml Minutes until next BG 60 min Order initials pls Administered initials pls GLSCOM Comments GLUCOSE, POC Collection Time: 02/11/19  2:03 PM  
Result Value Ref Range Glucose (POC) 211 (H) 65 - 100 mg/dL Performed by Magda Mariano Collection Time: 02/11/19  2:04 PM  
Result Value Ref Range Glucose 211 mg/dL Insulin order 7.6 units/hour Insulin adminstered 7.6 units/hour  Multiplier 0.050   
 Low target 95 mg/dL High target 130 mg/dL D50 order 0.0 ml  
 D50 administered 0.00 ml Minutes until next BG 60 min Order initials pls Administered initials pls GLSCOM Comments Radiology:  
 
  
Current Facility-Administered Medications Medication Dose Route Frequency  NOREPINephrine (LEVOPHED) 8 mg in 5% dextrose 250mL infusion  2-16 mcg/min IntraVENous TITRATE  [START ON 2/12/2019] Vancomycin Random Level with am lab 2/12   Other ONCE  
 bivalirudin (ANGIOMAX) 250 mg in 0.9% sodium chloride (MBP/ADV) 50 mL  0.03-0.5 mg/kg/hr IntraVENous TITRATE  insulin regular (NOVOLIN R, HUMULIN R) 100 Units in 0.9% sodium chloride 100 mL infusion  1-50 Units/hr IntraVENous TITRATE  insulin glargine (LANTUS) injection 1-50 Units  1-50 Units SubCUTAneous ONCE PRN  piperacillin-tazobactam (ZOSYN) 3.375 g in 0.9% sodium chloride (MBP/ADV) 100 mL  3.375 g IntraVENous Q12H  cisatracurium (NIMBEX) 2 mg/mL IV infusion  0-10 mcg/kg/min IntraVENous TITRATE  fentaNYL (PF) 1,500 mcg/30 mL (50 mcg/mL) infusion  0-200 mcg/hr IntraVENous TITRATE  white petrolatum-mineral oil (AKWA TEARS) 83-15 % ophthalmic ointment   Both Eyes BID  DOBUTamine (DOBUTREX) 500 mg/250 mL (2,000 mcg/mL) infusion  0-10 mcg/kg/min IntraVENous TITRATE  
 HYDROmorphone (PF) (DILAUDID) injection 0.5 mg  0.5 mg IntraVENous Q6H PRN  
 ELECTROLYTE REPLACEMENT PROTOCOL  1 Each Other PRN  
 ELECTROLYTE REPLACEMENT PROTOCOL  1 Each Other PRN  pantoprazole (PROTONIX) 40 mg in sodium chloride 0.9% 10 mL injection  40 mg IntraVENous Q12H  
 balsam peru-castor oil (VENELEX) ointment   Topical BID  nystatin (MYCOSTATIN) 100,000 unit/gram powder   Topical BID  arformoterol (BROVANA) neb solution 15 mcg  15 mcg Nebulization BID RT  And  
 budesonide (PULMICORT) 500 mcg/2 ml nebulizer suspension  500 mcg Nebulization BID RT  
 sodium chloride (NS) flush 5-40 mL  5-40 mL IntraVENous Q8H  
  sodium chloride (NS) flush 5-40 mL  5-40 mL IntraVENous PRN  
 PHENYLephrine (MIKE-SYNEPHRINE) 30 mg in 0.9% sodium chloride 250 mL infusion   mcg/min IntraVENous TITRATE  bumetanide (BUMEX) 0.25 mg/mL infusion  1 mg/hr IntraVENous CONTINUOUS  
 niCARdipine (CARDENE) 25 mg in 0.9% sodium chloride 250 mL infusion  0-15 mg/hr IntraVENous TITRATE  
 0.45% sodium chloride infusion  10 mL/hr IntraVENous CONTINUOUS  
 0.9% sodium chloride infusion  9 mL/hr IntraVENous CONTINUOUS  
 ondansetron (ZOFRAN) injection 4 mg  4 mg IntraVENous Q6H PRN  
 vancomycin dosing by pharmacy   Other Rx Dosing/Monitoring  folic acid (FOLVITE) tablet 1 mg  1 mg Oral DAILY  propofol (DIPRIVAN) infusion  0-50 mcg/kg/min IntraVENous TITRATE  insulin lispro (HUMALOG) injection   SubCUTAneous Q6H  
 bisacodyl (DULCOLAX) suppository 10 mg  10 mg Rectal DAILY PRN  
 sodium chloride (NS) flush 5-40 mL  5-40 mL IntraVENous Q8H  
 sodium chloride (NS) flush 5-40 mL  5-40 mL IntraVENous PRN  
 nitroglycerin (NITROSTAT) tablet 0.4 mg  0.4 mg SubLINGual Q5MIN PRN  
 glucose chewable tablet 16 g  4 Tab Oral PRN  
 dextrose (D50W) injection syrg 12.5-25 g  25-50 mL IntraVENous PRN  
 glucagon (GLUCAGEN) injection 1 mg  1 mg IntraMUSCular PRN Alley Perish. [facility] [address] [place] [address], [address] 
 [phone]

## 2019-02-11 NOTE — PROGRESS NOTES
Called for pt's death. No heart tones, no pulse, non-reactive pupils. Asystole on monitor. Pronounced dead at 1625.

## 2019-02-11 NOTE — DISCHARGE SUMMARY
[facility] Discharge Summary Patient ID: 
Pauly Hargrove [id number] 
40 y.o. 
1950 Admit date: 2019 Discharge date: 2019 Admitting Physician: Betsy Victoria MD  
 
Referring Cardiologist:  Dr. Yan Mandujano PCP:  Kanwal Kruger NP Admitting Diagnoses:  MV thrombus, CHF Discharge Diagnoses:  
 
Hospital Problems  Date Reviewed: 2019 Codes Class Noted POA * (Principal) SOB (shortness of breath) ICD-10-CM: R06.02 
ICD-9-CM: 786.05  2019 Unknown Discharged Condition: improved Disposition:  Procedures for this admission:  Procedure(s): ESOPHAGOGASTRODUODENOSCOPY (EGD) at bedside RESOLUTION CLIP Discharge Medications: My Medications ASK your doctor about these medications Instructions Each Dose to Equal Morning Noon Evening Bedtime  
amiodarone 200 mg tablet Commonly known as:  CORDARONE Your last dose was: Your next dose is: Take 200 mg by mouth nightly. 200 mg 
  
  
  
  
  
amLODIPine 10 mg tablet Commonly known as:  Shahid Turner Your last dose was: Your next dose is: TAKE 1 TABLET EVERY DAY FOR BLOOD PRESSURE 
   
  
  
  
  
aspirin 81 mg chewable tablet Your last dose was: Your next dose is: Take 1 Tab by mouth daily. 81 mg 
  
  
  
  
  
atorvastatin 20 mg tablet Commonly known as:  LIPITOR Your last dose was: Your next dose is: Take 20 mg by mouth every evening. 20 mg CALCIUM 600 PO Your last dose was: Your next dose is: Take 1 Tab by mouth Before breakfast, lunch, and dinner. 1 Tab ENBREL 50 mg/mL (0.98 mL) injection Generic drug:  etanercept Your last dose was: Your next dose is:   
 
  
 50 mg by SubCUTAneous route every seven (7) days. 50 mg 
  
  
  
  
  
glipiZIDE SR 5 mg CR tablet Commonly known as:  GLUCOTROL XL Your last dose was: Your next dose is: Take 5 mg by mouth every Tuesday and Thursday. 5 mg 
  
  
  
  
  
hydroCHLOROthiazide 50 mg tablet Commonly known as:  HYDRODIURIL Your last dose was: Your next dose is: Take 50 mg by mouth daily. 50 mg 
  
  
  
  
  
JANUMET XR 50-1,000 mg Tm24 Generic drug:  SITagliptin-metFORMIN Your last dose was: Your next dose is:   
 
  
 TK 2 TS PO IN THE EVENING 
   
  
  
  
  
lisinopril 20 mg tablet Commonly known as:  Husam Ely Your last dose was: Your next dose is: Take 1 Tab by mouth daily. 20 mg 
  
  
  
  
  
nitroglycerin 0.4 mg SL tablet Commonly known as:  NITROSTAT Your last dose was: Your next dose is:   
 
  
 DISSOLVE 1 TABLET UNDER THE TONGUE EVERY 5 MINUTES FOR 3 DOSES AS NEEDED FOR CHEST PAIN 
   
  
  
  
  
therapeutic multivitamin tablet Commonly known as:  [facility] Your last dose was: Your next dose is: Take 1 Tab by mouth daily. 1 Tab VITAMIN C 500 mg tablet Generic drug:  ascorbic acid (vitamin C) Your last dose was: Your next dose is: Take 500 mg by mouth daily. 500 mg 
  
  
  
  
  
VITRON-C 65 mg iron- 125 mg Tbec Generic drug:  iron,carbonyl-vitamin C Your last dose was: Your next dose is: Take 1 Tab by mouth three (3) times daily. 1 Tab 
  
  
  
  
  
  
 
 
HPI:  Suzi Layton is a 77 y.o.  female who was referred for evaluation of CHF, prev TAVR/TMVR on 5/2/18, well known to our office, by Dr. Ekta Malhotra.  Pt has prior PMH significant for MS/MR s/p MVR (#27 Medtronic Mosaic in 2009), s/p SAVR with patch closure of aorta (#21 Medtronic Ultra Mosaic in 2009), CAD s/p CABG x 3 (LIMA-LAD, SVG to OM1, Diag 1 in 2009), HTN, DM, HLD, PAF, carotid stenosis, TIA x 2 (2003), CVA, chronic anemia (received several transfusions in past), RA and psoriasis. 
  
Pt reports shortly after [place] feeling more SOB with activity and fatigue, along with increased edema. Family noted that her \"coloring was off\" and she physically did not look as well. She discussed her symptoms with Dr. Crum Finely office who wanted to perform a stress test in January but pt did not feel well enough to complete it. About 2 weeks ago, she began to experience worsening SOB, edema, fatigue and dry cough. She was seen by her PCP and given antibiotics for respiratory infection. She returned to their office 2/4/19 since her symptoms were not improving, actually becoming worse, had labs drawn which revealed low hemoglobin and pt was instructed to go straight to the ED. Since her admission, pt's SOB has become progressively worse. She is currently on a non-re breather which was added overnight due to worsening respiratory status. She denies any CP, palpitations, fever, chills.  
  
In addition to her significant anemia, her lab work on admission has revealed a metabolic acidosis, chest xray w/ severe pulm edema, a pBNP > 35,000 and acute kidney injury. She has received blood transfusions and been given diuretics without relief. She had a TTE on 2/4/19 with concerns for possible thrombosis of her mitral valve. Cardiac Testing 
  
Cardiac catheterization 3/6/18: CORONARY CIRCULATION: The coronary circulation is right dominant. Distal left main: There was a 75 % stenosis. The lesion was hazy. Ostial LAD: 
Normal. 1st obtuse marginal: There was a 100 % stenosis. 2nd obtuse marginal: Normal. Graft to the mid LAD: The graft was a LIMA. It was normal. Graft to the 1st diagonal: The graft was a saphenous vein graft from the aorta. It is a Y graft to D1 and OM1 Aortic Pressure (S/D/M): 148/61/96 Pulmonary Artery (S/D/M): 70/24/41 Pulmonary Capillary Wedge: 25/35/23 Right Atrium (a/v/M): 6/6/0 Right Ventricle (s/edp): 33/8/-- 
  
ECHO 2/4/19:  
Left Ventricle Normal cavity size.  Moderate concentric hypertrophy observed. There is moderately decreased systolic function. The estimated ejection fraction is 41 - 45% and 40-45. Unable to assess diastolic function. Left Atrium Normal cavity size. No atrial septal defect present. No patent foramen ovale visualized. Right Ventricle Normal cavity size. Global systolic function is moderately to severely reduced. Right Atrium Normal size. Aortic Valve No regurgitation. Aortic valve is prosthetic. Aortic valve peak gradient is 44.8 mmHg. Aortic valve mean gradient is 21.9 mmHg. Aortic valve area is 1.2 cm2. There is mild to moderate aortic stenosis. The peak aortic velocity was measured in the apical view. There is a TAVR present. Prosthetic valve insufficiency not present. The prosthetic valve is abnormal. The prosthetic valve has the following abnormalities: stenosis or obstruction. Mitral Valve Mitral valve is prosthetic. 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 The prosthetic valve appears abnormal. The prosthetic valve has the following abnormalities: stenosis or obstruction. Mild regurgitation. Tricuspid Valve Tricuspid valve not well visualized. No stenosis. Non-specific thickening. No prolapse. Severe tricuspid valve regurgitation. Pulmonary arterial systolic pressure is 64.3 mmHg. Moderate pulmonary hypertension. Pulmonic Valve The pulmonic valve was not well visualized. Mild to moderate regurgitation. Aorta Normal aortic root, ascending aortic, and aortic arch. Normal aortic root. Pericardium No evidence of pericardial effusion. Hospital Day #9:   
1. Acute on chronic diastolic CHF, NYHA Class IV on admit: EF 40-45%, not responding to bumex gtt, renal discussed CVVH w/ family. No BB/ACE until appropriate. Repeat TTE today. Cont dobut at 5 mcg.  
  
2. S/p TAVR/TMVR 5/2018:  S/p TPA 2/8 & 2/10. DEVORA 2/8 showed no residual MV thrombus. Repeat TTE now.    Cont heparin gtt--change to bival gtt.  
  
 3. Acute hypoxic respiratory failure: reintubated, wean fi02 for 02 sat > 90%. Paralyzed/sedated. Need CVVH for fluid removal  Cont steroid nebs. Vent bundle. HOB > 30 deg.   
  
4. Metabolic, lactic acidosis: renal assisting to manage, trend.   
  
5. Acute on chronic anemia: history of, prev workup by Dr. Kiki Root with hematology w/ unclear etiology. Transfuse prn. Hematology following. Completed IV venofer. Trend H/H. S/p upper endoscopy w/ GI 2/7. Cont protonix IV. Platelets dropping on heparin gtt, hold aspirin for now--send HIT panel. Change to bival.    Transfuse 1 unit pRBC now.   
  
6. SILVINO: renal consulted/following. Anuric, discuss CVVH w/ family.   
  
7. Leukocytosis: on Zosyn/vanco. Currently afebrile, UA with mod leuks, no bacteria. Blood cx 2/6 NGTD. WBC 15.7.   
  
8. DM: resume insulin gtt. SSI per orders. BS q6h. DTC consult.   
  
9. Hx of PAF: was on amio, holding here due to pulmonary contraindications. No anticoagulation, had been on coumadin after valve procedure - had supratherapeutic INR prompting readmission. Been off coumadin now for several months.   
  
10. HTN, now hypotension: Holding BP meds until appropriate. Wean britt/levo for MAP >65. Random cortisol WNL.   
  
11. Hx of CAD, s/p CABG: hold ASA,  no BB currently - had been on metoprolol as recently as 1/4/19 but not on PTA med list?  Holding statin for now, will resume as LFTS improve.   
  
12. HLD: holding statin for now.  
  
13. Prior CVA, TIA: on ASA, holding statin for now 
  
14. Hx of carotid stenosis: f/u with vascular surgery 
  
15. RA: on enbrel weekly PTA 
  
16. Hypernatremia: improving, . Cont free water flushes 200 ml q4h.  
  
17. GI/DVT px:  Protonix, heparin gtt--change to bival.   
  
18. Nutrition:  Cont TF via dobhoff. 200 ml free water flushes q4h. Restart insulin gtt for elevated BS.   
  
19. Dispo: remain in CVI. Critically ill. Not a candidate for surgery. Discharge Vital Signs:  
Visit Vitals /44 Pulse 99 Temp 100 °F (37.8 °C) Resp 23 Ht 5' 2\" (1.575 m) Wt 161 lb (73 kg) SpO2 98% BMI 29.45 kg/m² Labs:  
Recent Labs  
  02/11/19 
1403  02/11/19 
0540 WBC  --   --  15.7* HGB  --   --  6.7* HCT  --   --  21.5* PLT  --   --  54* NA  --   --  146* K  --   --  4.4 BUN  --   --  95* CREA  --   --  2.76* GLU  --   --  216* GLUCPOC 211*   < >  --   
INR  --   --  1.2*  
 < > = values in this interval not displayed. Diagnostics: N/a Patient Instructions/Follow Up Care: N/a Signed: Brodie Turcios NP 
2/11/2019 
4:43 PM

## 2019-02-11 NOTE — PROGRESS NOTES
Critical Care Note Condomínio Jade Aguilar 1045 Acute on chronic diastolic CHF, NYHA Class IV on admit: EF 40-45%, S/P TPA infusions twice with initial good result then recurrence. Repeat ECHO reviewed from today showing recurrence of thrombus following repeat infusion yesterday Cardiogenic shock SILVINO  Cont bumex gtt per renal, most likely needs CVVH for volume removal.  No BB/ACE until appropriate.   
  
2. S/p TAVR/TMVR 5/2018:   
  
3. Acute hypoxic respiratory failure: Now intubated. On 80% fi02.  Vent bundle.  Diuresis.  Start steroid nebs.   
  
4. Metabolic, lactic acidosis: renal assisting to manage, give IV bicarb this morning.  Lactate improved 0.7 -- trend.   
  
5. Acute on chronic anemia: history of, prev workup by Dr. Sachi Dudley with hematology w/ unclear etiology. Transfuse prn. Receiving IV iron. Hematology following.  Cont IV venofer.  Trend H/H.   Consult GI -- upper endoscopy today?  
  
6. SILVINO: renal consulted/following. On bumex gtt, likely needs CVVH.    
  
7. Leukocytosis: on Zosyn/vanco. Currently afebrile, UA with mod leuks, no bacteria.  Blood cx 2/6 NGTD. IMPRESSION Recurrence of valve thrombosis despite Patient Vitals for the past 4 hrs: 
 Temp Pulse Resp SpO2  
02/11/19 1400  (!) 103 12 98 % 02/11/19 1300  (!) 101 22 98 % 02/11/19 1200 100 °F (37.8 °C) 98 10 99 % 02/11/19 1131  98 14 100 % 02/11/19 1109  97 22 100 % 02/11/19 1100 99.7 °F (37.6 °C) 98 24 100 % Intake/Output Summary (Last 24 hours) at 2/11/2019 1441 Last data filed at 2/11/2019 1400 Gross per 24 hour Intake 5119.8 ml Output 727 ml Net 4392.8 ml Hemodynamics: 
 CO: CO (l/min): 6.9 l/min CI: CI (l/min/m2): 3.9 l/min/m2 CVP: CVP (mmHg): 11 mmHg (02/11/19 1400) SVR: SVR (dyne*sec)/cm5: 583 (dyne*sec)/cm5 (02/11/19 0800) PAP Systolic: PAP Systolic: 61 (70/72/33 8508) PAP Diastolic: PAP Diastolic: 23 (92/82/95 7172)  PVR:   
 SV02: SVO2 (%): 65 % (02/11/19 1400) SCV02:   
 
Gen sedated edematous Chest NSR mild murmur Cardiac course bilateral BS At bed side for repeat echo Echo reviewed Still with severe MS Chest xrays reviewed HCT Date/Time Value Ref Range Status 02/11/2019 05:40 AM 21.5 (L) 35.0 - 47.0 % Final  
 
WBC Date/Time Value Ref Range Status 02/11/2019 05:40 AM 15.7 (H) 3.6 - 11.0 K/uL Final  
  Comment:  
  Due to mathematical rounding between the [address], and the new [employer] [facility]emat[facility] analyzers, the reported automated differential may vary by up to +/- 0.5% per cell line. This finding may produce a result that is 100% +/- 3%, which is clinically insignificant. Labs reviewed for last 24 hours Plan Long conversation with family Plans for withdrawal of care agreed to by all Critical care time 75  minutes Medical decision making- High complexity Risk of morbidity and mortality - high CPT code 18454

## 2019-02-11 NOTE — PROGRESS NOTES
Received a call from nurse, Pedro Antonio, that family would like for patient to be anointed by a ; stated patient would be transitioning to comfort care. [facility] and talked with their ; she stated she would give the message to Aydee Hill. Notified Pedro Antonio of the plan. : Rev. Amber Álvarez. Barbara Merino; [facility], to contact [phone] [facility] call: [phone]

## 2019-02-12 LAB
ABO + RH BLD: NORMAL
BLD PROD TYP BPU: NORMAL
BLD PROD TYP BPU: NORMAL
BLOOD GROUP ANTIBODIES SERPL: NORMAL
BPU ID: NORMAL
BPU ID: NORMAL
CROSSMATCH RESULT,%XM: NORMAL
CROSSMATCH RESULT,%XM: NORMAL
PF4 HEPARIN CMPLX AB SER-ACNC: 0.2 OD (ref 0–0.4)
SPECIMEN EXP DATE BLD: NORMAL
STATUS OF UNIT,%ST: NORMAL
STATUS OF UNIT,%ST: NORMAL
UNIT DIVISION, %UDIV: 0
UNIT DIVISION, %UDIV: 0

## 2019-02-13 LAB
SRA 100IU/ML UFH SER-ACNC: <1 % (ref 0–20)
SRA UFH SER-IMP: NORMAL
UNFRAC HEPARIN LOW DOSE: <1 % (ref 0–20)

## 2019-02-14 ENCOUNTER — TELEPHONE (OUTPATIENT)
Dept: CARDIOLOGY CLINIC | Age: 69
End: 2019-02-14

## 2019-02-14 NOTE — TELEPHONE ENCOUNTER
[facility] called and said they were told Dr. Joana George would be signing patient's death certificate so it was dropped off at his office. They received a call today stating Dr. Joana George will not be signing the death certificate and patient's service is scheduled for tomorrow. They would like to know if Dr. Nelida Cole would be willing to sign it. Dr. Nelida Cole agreed to sign certificate tomorrow morning. They will go  it up from Dr. Phylicia Jiang office tomorrow morning then come by our office for Dr. Nelida Cole to sign it.

## 2019-04-07 DIAGNOSIS — I25.10 ATHEROSCLEROSIS OF NATIVE CORONARY ARTERY OF NATIVE HEART WITHOUT ANGINA PECTORIS: ICD-10-CM

## 2019-04-07 DIAGNOSIS — I48.0 PAROXYSMAL ATRIAL FIBRILLATION (HCC): ICD-10-CM

## 2019-04-07 DIAGNOSIS — I10 ESSENTIAL HYPERTENSION, BENIGN: ICD-10-CM

## 2019-04-07 DIAGNOSIS — E78.5 DYSLIPIDEMIA: ICD-10-CM

## 2019-04-09 DIAGNOSIS — I10 ESSENTIAL HYPERTENSION, BENIGN: ICD-10-CM

## 2019-04-09 DIAGNOSIS — I48.0 PAROXYSMAL ATRIAL FIBRILLATION (HCC): ICD-10-CM

## 2019-04-09 DIAGNOSIS — I25.10 ATHEROSCLEROSIS OF NATIVE CORONARY ARTERY OF NATIVE HEART WITHOUT ANGINA PECTORIS: ICD-10-CM

## 2019-04-09 DIAGNOSIS — E78.5 DYSLIPIDEMIA: ICD-10-CM

## 2021-04-03 NOTE — CONSULTS
[address] 
 [address] [place] [address] GASTROENTEROLOGY CONSULTATION Jean Carey, [phone] office [phone] NP/PA in-hospital cell phone M-F until 4:30PM 
After 5PM or on weekends, please call  for physician on call NAME:  Saumya Briones :   1950 MRN:   [id number] Referring Physician: Samy Dutton NP Consult Date: 2019 7:54 AM 
 
Chief Complaint: unable to obtain History of Present Illness:  Patient is a 77 y.o. who is seen in consultation at the request of Samy Dutton NP, for severe anemia. Patient has a past medical history of coronary artery disease, paroxysmal atrial fibrillation, mitral valve regurgitation, history of TAVR/TMVR in 2018, hypertension, diabetes, and dyslipidemia. She presented to the ED with complaints of shortness of breath. Patient was admitted to the hospital on 19. Patient is intubated and history is unable to be obtained from the patient. History was obtained through chart review and nurse at the bedside. No vomiting. No bowel movement since admission. No signs of active GI bleeding. She has received a total of 4 units PRBCs since admission. Per report, she has required multiple blood transfusions and iron infusions in the past.   
 
Colonoscopy (18) by Dr. Ryan Spicer was normal.  A repeat colonoscopy was recommended in 10 years. EGD (07) by Dr. Ryan Spicer for iron deficiency anemia was normal. 
 
I have reviewed the emergency room note, hospital admission note, notes by all other clinicians who have seen the patient during this hospitalization to date. I have reviewed the problem list and the reason for this hospitalization. I have reviewed the allergies and the medications the patient was taking at home prior to this hospitalization. PMH: 
Past Medical History:  
Diagnosis Date  Anemia  Bruit of right carotid artery  CAD (coronary artery disease)  CHF (congestive heart failure) ([facility]  CVA (cerebral vascular accident) ([facility]  DM (diabetes mellitus) ([facility]  Dyslipidemia  Hypertension  Mitral regurgitation and aortic stenosis  Paroxysmal A-fib ([facility]  Psoriasis  TIA (transient ischemic attack) PSH: 
Past Surgical History:  
Procedure Laterality Date  HX AORTIC VALVE REPLACEMENT    
 HX CORONARY ARTERY BYPASS GRAFT    
 HX MITRAL VALVE REPLACEMENT    
 TAVR Allergies: Allergies Allergen Reactions  Sulfamethoxazole Hives Home Medications: 
Prior to Admission Medications Prescriptions Last Dose Informant Patient Reported? Taking? ENBREL 50 mg/mL (0.98 mL) injection 2/3/2019  Yes No  
Si mg by SubCUTAneous route every seven (7) days. JANUMET XR 50-1,000 mg TM24 2/3/2019 at Unknown time  Yes Yes Sig: TK 2 TS PO IN THE EVENING  
amLODIPine (NORVASC) 10 mg tablet 2019 at Unknown time  No Yes Sig: TAKE 1 TABLET EVERY DAY FOR BLOOD PRESSURE  
amiodarone (CORDARONE) 200 mg tablet 2/3/2019 at pm  Yes Yes Sig: Take 200 mg by mouth nightly. ascorbic acid (VITAMIN C) 500 mg tablet 2019 at Unknown time Self Yes Yes Sig: Take 500 mg by mouth daily. aspirin 81 mg chewable tablet 2019 at Unknown time  No Yes Sig: Take 1 Tab by mouth daily. atorvastatin (LIPITOR) 20 mg tablet 2/3/2019 at pm  Yes Yes Sig: Take 20 mg by mouth every evening. calcium carbonate (CALCIUM 600 PO) 2019 at Unknown time  Yes Yes Sig: Take 1 Tab by mouth Before breakfast, lunch, and dinner. glipiZIDE SR (GLUCOTROL XL) 5 mg CR tablet 2019  Yes Yes Sig: Take 5 mg by mouth every Tuesday and Thursday. hydrochlorothiazide (HYDRODIURIL) 50 mg tablet 2019 at Unknown time Self Yes Yes Sig: Take 50 mg by mouth daily. iron-vitamin C (VITRON-C) 65 mg iron- 125 mg TbEC 2019 at Unknown time Self Yes Yes Sig: Take 1 Tab by mouth three (3) times daily. lisinopril (PRINIVIL, ZESTRIL) 20 mg tablet 2/4/2019 at Unknown time Self No Yes Sig: Take 1 Tab by mouth daily. nitroglycerin (NITROSTAT) 0.4 mg SL tablet   No Yes Sig: DISSOLVE 1 TABLET UNDER THE TONGUE EVERY 5 MINUTES FOR 3 DOSES AS NEEDED FOR CHEST PAIN  
therapeutic multivitamin (THERAGRAN) tablet 2/4/2019 at Unknown time  Yes Yes Sig: Take 1 Tab by mouth daily. Facility-Administered Medications: None Hospital Medications: 
Current Facility-Administered Medications Medication Dose Route Frequency  balsam peru-castor oil (VENELEX) ointment   Topical BID  nystatin (MYCOSTATIN) 100,000 unit/gram powder   Topical BID  potassium chloride 20 mEq in 50 ml IVPB  20 mEq IntraVENous Q1H  
 magnesium sulfate 1 g/100 ml IVPB (premix or compounded)  1 g IntraVENous ONCE  
 sodium bicarbonate 8.4 % (1 mEq/mL) injection 100 mEq  100 mEq IntraVENous ONCE  
 0.9% sodium chloride infusion 250 mL  250 mL IntraVENous PRN  
 iron sucrose (VENOFER) 300 mg in 0.9% sodium chloride 250 mL IVPB  300 mg IntraVENous Q24H  piperacillin-tazobactam (ZOSYN) 3.375 g in 0.9% sodium chloride (MBP/ADV) 100 mL  3.375 g IntraVENous Q8H  
 heparin 25,000 units in D5W 250 ml infusion  12-25 Units/kg/hr IntraVENous TITRATE  PHENYLephrine (MIKE-SYNEPHRINE) 30 mg in 0.9% sodium chloride 250 mL infusion   mcg/min IntraVENous TITRATE  bumetanide (BUMEX) 0.25 mg/mL infusion  0.5 mg/hr IntraVENous CONTINUOUS  
 niCARdipine (CARDENE) 25 mg in 0.9% sodium chloride 250 mL infusion  0-15 mg/hr IntraVENous TITRATE  
 0.45% sodium chloride infusion  10 mL/hr IntraVENous CONTINUOUS  
 0.9% sodium chloride infusion  9 mL/hr IntraVENous CONTINUOUS  
 ondansetron (ZOFRAN) injection 4 mg  4 mg IntraVENous Q6H PRN  
 vancomycin dosing by pharmacy   Other Rx Dosing/Monitoring  morphine injection 2 mg  2 mg IntraVENous Q4H PRN  
 insulin glargine (LANTUS) injection 10 Units  10 Units SubCUTAneous DAILY  folic acid (FOLVITE) tablet 1 mg  1 mg Oral DAILY  propofol (DIPRIVAN) infusion  0-50 mcg/kg/min IntraVENous TITRATE  insulin lispro (HUMALOG) injection   SubCUTAneous Q6H  
 0.9% sodium chloride infusion 250 mL  250 mL IntraVENous PRN  
 bisacodyl (DULCOLAX) suppository 10 mg  10 mg Rectal DAILY PRN  
 aspirin chewable tablet 81 mg  81 mg Oral DAILY  sodium chloride (NS) flush 5-40 mL  5-40 mL IntraVENous Q8H  
 sodium chloride (NS) flush 5-40 mL  5-40 mL IntraVENous PRN  
 nitroglycerin (NITROSTAT) tablet 0.4 mg  0.4 mg SubLINGual Q5MIN PRN  
 glucose chewable tablet 16 g  4 Tab Oral PRN  
 dextrose (D50W) injection syrg 12.5-25 g  25-50 mL IntraVENous PRN  
 glucagon (GLUCAGEN) injection 1 mg  1 mg IntraMUSCular PRN Social History: 
Social History Tobacco Use  Smoking status: Never Smoker  Smokeless tobacco: Never Used Substance Use Topics  Alcohol use: Yes Alcohol/week: 0.0 oz  
  Comment: Rare Family History: 
History reviewed. No pertinent family history. Review of Systems: 
Unable to obtain as the patient is intubated. Objective:  
 
Patient Vitals for the past 8 hrs: 
 BP Temp Pulse Resp SpO2 Weight  
02/07/19 0700  99.5 °F (37.5 °C) 71 15 100 %   
02/07/19 0600  99.3 °F (37.4 °C) 71 22 100 %   
02/07/19 0500  98.8 °F (37.1 °C) 70 17 100 %   
02/07/19 0400 105/45 99 °F (37.2 °C) 71 19 97 %   
02/07/19 0330      70.2 kg (154 lb 12.2 oz) 02/07/19 0300 118/47 99.7 °F (37.6 °C) 72 21 100 %   
02/07/19 0200 117/48 98.6 °F (37 °C) 71 22 99 %   
02/07/19 0100 108/47 98.4 °F (36.9 °C) 67 13 100 %   
02/07/19 0000 105/45 98.5 °F (36.9 °C) 68 19 100 %  No intake/output data recorded. 02/05 1901 - 02/07 0700 In: 2759.7 [P.O.:300; I.V.:1860.1] Out: Freddie Mendez [[id number]] EXAM:   
 CONST:  Intubated, sedated NEURO:  Intubated HEENT: ET tube in place LUNGS: Coarse bilaterally anteriorly  CARD:  S1 S2 
 ABD:  Soft, no apparent tenderness. + Bowel sounds. EXT:  Warm PSYCH: Unable to assess Data Review Recent Labs 02/07/19 
0420 02/06/19 
1410 WBC 18.3* 21.9* HGB 8.4* 7.8* HCT 25.2* 24.8*  
 235 Recent Labs 02/07/19 
0420 02/06/19 
1410 02/06/19 
0400 * 141 140  
K 2.9* 3.9 4.1  106 107 CO2 20* 16* 14* BUN 80* 82* 78* CREA 2.07* 2.05* 2.10* GLU 82 152* 153* PHOS  --  5.7* 5.4*  
CA 8.3* 9.0 9.0 Recent Labs 02/07/19 
7319 02/06/19 
1410  02/05/19 
9004 SGOT 39*  --   --  47* AP 90  --   --  97  
TP 5.7*  --   --  7.1 ALB 2.3* 2.7*   < > 2.9*  
GLOB 3.4  --   --  4.2*  
 < > = values in this interval not displayed. Recent Labs 02/07/19 
0618 02/06/19 
2249  02/04/19 
1840 02/04/19 
1309 INR  --   --   --  1.2* 1.1 PTP  --   --   --  11.6* 11.0 APTT 49.3* 57.5*   < >  --   --   
 < > = values in this interval not displayed. Assessment: · Acute on chronic anemia: Hgb 8.4 (7.2 on admission, 7.6 on 5/30/18), platelets 211. INR 1.2 (2/4/19). Status post 4 units PRBCs this admission. · Acute on chronic congestive heart failure · Status post TAVR/TMVR in 5/2018 · Acute hypoxic respiratory failure: intubated · Acute kidney injury on chronic kidney disease · Acute on chronic kidney injury Patient Active Problem List  
Diagnosis Code  Coronary atherosclerosis of native coronary artery I25.10  Mitral valve disease I05.9  Aortic valve disorder I35.9  DM (diabetes mellitus) ([facility] E11.9  
 Essential hypertension, benign I10  Dyslipidemia E78.5  History of MVR with cardiopulmonary bypass Z95.2  
 S/P AVR (aortic valve replacement) Z95.2  Paroxysmal atrial fibrillation (HCC) I48.0  Type 2 diabetes with nephropathy (HCC) E11.21  
 AS (aortic stenosis) I35.0  Elevated INR R79.1  SOB (shortness of breath) R06.02 Plan:  
 
· NPO 
· Monitor CBC and transfuse as necessary · Plan for EGD at the bedside this AM with Dr. Nicolasa Andrews prior to tPA administration (to rule out high risk lesion in the upper GI tract that may be contributing to the patient's anemia). Discussed with the patient's nurse. Please verify consent has been obtained. · Hematology following · Thank you for allowing me to participate in care of Archer Him Signed By: Clari Bustos   
 2/7/2019  7:54 AM 
  
 
GI Attending: Patient seen and examined. Please see my separate note. Sonny Andrews MD 
 
 Self

## (undated) DEVICE — 40418 TRENDELENBURG ONE-STEP ARM PROTECTORS LARGE (1 PAIR): Brand: 40418 TRENDELENBURG ONE-STEP ARM PROTECTORS LARGE (1 PAIR)

## (undated) DEVICE — DRAPE XR C ARM 41X74IN LF --

## (undated) DEVICE — Device: Brand: MEDICAL ACTION INDUSTRIES

## (undated) DEVICE — WRAP SURG W1.31XL1.34M CARD FOR PT 165-172CM THERMOWRP

## (undated) DEVICE — MEDI-TRACE CADENCE ADULT, DEFIBRILLATION ELECTRODE -RTS  (10 PR/PK) - PHILIPS: Brand: MEDI-TRACE CADENCE

## (undated) DEVICE — NEEDLE HYPO 18GA L1.5IN PNK S STL HUB POLYPR SHLD REG BVL

## (undated) DEVICE — GOWN,SIRUS,FABRNF,XL,20/CS: Brand: MEDLINE

## (undated) DEVICE — CANN NASAL O2 CAPNOGRAPHY AD -- FILTERLINE

## (undated) DEVICE — REM POLYHESIVE ADULT PATIENT RETURN ELECTRODE: Brand: VALLEYLAB

## (undated) DEVICE — CATH DIAG FRC4 IMPLS 6FRX100CM -- IMPULSE 16599-02

## (undated) DEVICE — 6 FOOT DISPOSABLE EXTENSION CABLE WITH SAFE CONNECT / SCREW-DOWN

## (undated) DEVICE — KENDALL RADIOLUCENT FOAM MONITORING ELECTRODE -RECTANGULAR SHAPE: Brand: KENDALL

## (undated) DEVICE — SENSOR TEMP SKIN DISP

## (undated) DEVICE — BAG PRESSURE INFUSION 1000ML -- CONVERT TO ITEM 360122

## (undated) DEVICE — GUIDEWIRE VASC L260CM DIA0.035IN STR TIP L7CM PTFE FIX COR

## (undated) DEVICE — Device: Brand: BMC CONNECTOR CABLE

## (undated) DEVICE — SOLIDIFIER FLUID 3000 CC ABSORB

## (undated) DEVICE — AMPLATZ EXTRA STIFF WIRE GUIDE: Brand: AMPLATZ

## (undated) DEVICE — CATH DIAG IMPLS PIG 6FRX100CM -- IMPULSE 16599-40

## (undated) DEVICE — PRESSURE MONITORING SET: Brand: TRUWAVE

## (undated) DEVICE — STERILE POLYISOPRENE POWDER-FREE SURGICAL GLOVES: Brand: PROTEXIS

## (undated) DEVICE — KIT SNR L120CM LOOP DIA25MM CATH 6FR 90DEG G PLT TUNGSTEN

## (undated) DEVICE — Z DISCONTINUED NO SUB IDED SET EXTN W/ 4 W STPCOCK M SPIN LOK 36IN

## (undated) DEVICE — Device

## (undated) DEVICE — 1200 GUARD II KIT W/5MM TUBE W/O VAC TUBE: Brand: GUARDIAN

## (undated) DEVICE — LUER-LOK 360°: Brand: CONNECTA, LUER-LOK

## (undated) DEVICE — SET ADMIN 16ML TBNG L100IN 2 Y INJ SITE IV PIGGY BK DISP

## (undated) DEVICE — PINNACLE INTRODUCER SHEATH: Brand: PINNACLE

## (undated) DEVICE — GDWIRE ANGIO SUP STF 0.035IN --

## (undated) DEVICE — COVER,TABLE,60X90,STERILE: Brand: MEDLINE

## (undated) DEVICE — BW-412T DISP COMBO CLEANING BRUSH: Brand: SINGLE USE COMBINATION CLEANING BRUSH

## (undated) DEVICE — STERILE POLYISOPRENE POWDER-FREE SURGICAL GLOVES WITH EMOLLIENT COATING: Brand: PROTEXIS

## (undated) DEVICE — TRAY CATH 16F URIN MTR LTX -- CONVERT TO ITEM 363111

## (undated) DEVICE — CLIP MED L235CM L2.8MM 11MM OPN HEMSTAT FIX RESOL

## (undated) DEVICE — GRADUATED BOWL: Brand: DEVON

## (undated) DEVICE — INTENDED TO STANDARDIZE OR CAMERAS TO ALLOW FOR THE USE OF THE OR CAMERA COVER: Brand: ASPEN® O.R. CAMERA COVER

## (undated) DEVICE — SYRINGE MED 20ML STD CLR PLAS LUERLOCK TIP N CTRL DISP

## (undated) DEVICE — INFECTION CONTROL KIT SYS

## (undated) DEVICE — FASCIAL DILATOR SET: Brand: COOK

## (undated) DEVICE — MICROPUNCTURE INTRODUCER SET SILHOUETTE TRANSITIONLESS WITH STAINLESS STEEL WIRE GUIDE: Brand: MICROPUNCTURE

## (undated) DEVICE — GUIDEWIRE VASC L260CM DIA0.035IN RAD 3MM J TIP L7CM PTFE

## (undated) DEVICE — 3M™ TEGADERM™ TRANSPARENT FILM DRESSING FRAME STYLE, 1626W, 4 IN X 4-3/4 IN (10 CM X 12 CM), 50/CT 4CT/CASE: Brand: 3M™ TEGADERM™

## (undated) DEVICE — GUIDEWIRE VASC L150CM DIA0.035IN FLX TIP L7CM PTFE STR FIX

## (undated) DEVICE — ELECTRODE ES AD DISPER HYDRGEL THN FOAM ADH SCALLOPED EDGE

## (undated) DEVICE — (D)PREP SKN CHLRAPRP APPL 26ML -- CONVERT TO ITEM 371833

## (undated) DEVICE — SYR LR LCK 1ML GRAD NSAF 30ML --

## (undated) DEVICE — ENDO CARRY-ON PROCEDURE KIT INCLUDES ENZYMATIC SPONGE, GAUZE, BIOHAZARD LABEL, TRAY, LUBRICANT, DIRTY SCOPE LABEL, WATER LABEL, TRAY, DRAWSTRING PAD, AND DEFENDO 4-PIECE KIT.: Brand: ENDO CARRY-ON PROCEDURE KIT

## (undated) DEVICE — INTENDED FOR TISSUE SEPARATION, AND OTHER PROCEDURES THAT REQUIRE A SHARP SURGICAL BLADE TO PUNCTURE OR CUT.: Brand: BARD-PARKER ® CARBON RIB-BACK BLADES

## (undated) DEVICE — PACK PROCEDURE SURG HRT CATH

## (undated) DEVICE — LUB SURG MEDC STRL 2OZ TUBE MC -- MEDICHOICE

## (undated) DEVICE — GDWIRE COR 0.035INX260CM -- CONFIDA